# Patient Record
Sex: MALE | Race: WHITE | Employment: FULL TIME | URBAN - METROPOLITAN AREA
[De-identification: names, ages, dates, MRNs, and addresses within clinical notes are randomized per-mention and may not be internally consistent; named-entity substitution may affect disease eponyms.]

---

## 2018-03-25 ENCOUNTER — HOSPITAL ENCOUNTER (EMERGENCY)
Age: 58
Discharge: HOME | End: 2018-03-25
Payer: MEDICAID

## 2018-03-25 VITALS
WEIGHT: 180 LBS | TEMPERATURE: 97.9 F | BODY MASS INDEX: 25.77 KG/M2 | HEART RATE: 93 BPM | OXYGEN SATURATION: 97 % | HEIGHT: 70 IN | SYSTOLIC BLOOD PRESSURE: 146 MMHG | RESPIRATION RATE: 15 BRPM | DIASTOLIC BLOOD PRESSURE: 87 MMHG

## 2018-03-25 DIAGNOSIS — L03.317 CELLULITIS OF BUTTOCK: Primary | ICD-10-CM

## 2018-03-25 LAB
BASOPHILS ABSOLUTE: 0.07 E9/L (ref 0–0.2)
BASOPHILS RELATIVE PERCENT: 0.8 % (ref 0–2)
EOSINOPHILS ABSOLUTE: 0.12 E9/L (ref 0.05–0.5)
EOSINOPHILS RELATIVE PERCENT: 1.4 % (ref 0–6)
GFR AFRICAN AMERICAN: >60
GFR NON-AFRICAN AMERICAN: >60 ML/MIN/1.73
GLUCOSE BLD-MCNC: 117 MG/DL (ref 74–109)
HCT VFR BLD CALC: 40 % (ref 37–54)
HEMOGLOBIN: 13.7 G/DL (ref 12.5–16.5)
IMMATURE GRANULOCYTES #: 0.04 E9/L
IMMATURE GRANULOCYTES %: 0.5 % (ref 0–5)
LYMPHOCYTES ABSOLUTE: 1.28 E9/L (ref 1.5–4)
LYMPHOCYTES RELATIVE PERCENT: 15.2 % (ref 20–42)
MCH RBC QN AUTO: 33.1 PG (ref 26–35)
MCHC RBC AUTO-ENTMCNC: 34.3 % (ref 32–34.5)
MCV RBC AUTO: 96.6 FL (ref 80–99.9)
MONOCYTES ABSOLUTE: 1.3 E9/L (ref 0.1–0.95)
MONOCYTES RELATIVE PERCENT: 15.5 % (ref 2–12)
NEUTROPHILS ABSOLUTE: 5.6 E9/L (ref 1.8–7.3)
NEUTROPHILS RELATIVE PERCENT: 66.6 % (ref 43–80)
PDW BLD-RTO: 12.4 FL (ref 11.5–15)
PLATELET # BLD: 173 E9/L (ref 130–450)
PMV BLD AUTO: 9.2 FL (ref 7–12)
POC CHLORIDE: 100 MMOL/L (ref 100–108)
POC CREATININE: 0.6 MG/DL (ref 0.7–1.2)
POC POTASSIUM: 4.3 MMOL/L (ref 3.5–5)
POC SODIUM: 133 MMOL/L (ref 132–146)
RBC # BLD: 4.14 E12/L (ref 3.8–5.8)
WBC # BLD: 8.4 E9/L (ref 4.5–11.5)

## 2018-03-25 PROCEDURE — 82565 ASSAY OF CREATININE: CPT

## 2018-03-25 PROCEDURE — 6360000002 HC RX W HCPCS: Performed by: NURSE PRACTITIONER

## 2018-03-25 PROCEDURE — 90471 IMMUNIZATION ADMIN: CPT | Performed by: NURSE PRACTITIONER

## 2018-03-25 PROCEDURE — 96372 THER/PROPH/DIAG INJ SC/IM: CPT

## 2018-03-25 PROCEDURE — 82947 ASSAY GLUCOSE BLOOD QUANT: CPT

## 2018-03-25 PROCEDURE — 99282 EMERGENCY DEPT VISIT SF MDM: CPT

## 2018-03-25 PROCEDURE — 84295 ASSAY OF SERUM SODIUM: CPT

## 2018-03-25 PROCEDURE — 90715 TDAP VACCINE 7 YRS/> IM: CPT | Performed by: NURSE PRACTITIONER

## 2018-03-25 PROCEDURE — 2500000003 HC RX 250 WO HCPCS: Performed by: NURSE PRACTITIONER

## 2018-03-25 PROCEDURE — 2580000003 HC RX 258

## 2018-03-25 PROCEDURE — 82435 ASSAY OF BLOOD CHLORIDE: CPT

## 2018-03-25 PROCEDURE — 36415 COLL VENOUS BLD VENIPUNCTURE: CPT

## 2018-03-25 PROCEDURE — 84132 ASSAY OF SERUM POTASSIUM: CPT

## 2018-03-25 PROCEDURE — 85025 COMPLETE CBC W/AUTO DIFF WBC: CPT

## 2018-03-25 RX ORDER — LIDOCAINE HYDROCHLORIDE AND EPINEPHRINE 10; 10 MG/ML; UG/ML
20 INJECTION, SOLUTION INFILTRATION; PERINEURAL ONCE
Status: COMPLETED | OUTPATIENT
Start: 2018-03-25 | End: 2018-03-25

## 2018-03-25 RX ORDER — CEPHALEXIN 500 MG/1
500 CAPSULE ORAL 4 TIMES DAILY
Qty: 40 CAPSULE | Refills: 0 | Status: SHIPPED | OUTPATIENT
Start: 2018-03-25 | End: 2018-04-04

## 2018-03-25 RX ORDER — IBUPROFEN 800 MG/1
800 TABLET ORAL EVERY 6 HOURS PRN
Qty: 16 TABLET | Refills: 0 | Status: SHIPPED | OUTPATIENT
Start: 2018-03-25 | End: 2018-10-07 | Stop reason: ALTCHOICE

## 2018-03-25 RX ORDER — SULFAMETHOXAZOLE AND TRIMETHOPRIM 800; 160 MG/1; MG/1
1 TABLET ORAL 2 TIMES DAILY
Qty: 20 TABLET | Refills: 0 | Status: SHIPPED | OUTPATIENT
Start: 2018-03-25 | End: 2018-04-04

## 2018-03-25 RX ORDER — CEFAZOLIN SODIUM 1 G/3ML
1 INJECTION, POWDER, FOR SOLUTION INTRAMUSCULAR; INTRAVENOUS ONCE
Status: COMPLETED | OUTPATIENT
Start: 2018-03-25 | End: 2018-03-25

## 2018-03-25 RX ORDER — KETOROLAC TROMETHAMINE 30 MG/ML
30 INJECTION, SOLUTION INTRAMUSCULAR; INTRAVENOUS ONCE
Status: COMPLETED | OUTPATIENT
Start: 2018-03-25 | End: 2018-03-25

## 2018-03-25 RX ADMIN — TETANUS TOXOID, REDUCED DIPHTHERIA TOXOID AND ACELLULAR PERTUSSIS VACCINE, ADSORBED 0.5 ML: 5; 2.5; 8; 8; 2.5 SUSPENSION INTRAMUSCULAR at 14:08

## 2018-03-25 RX ADMIN — LIDOCAINE HYDROCHLORIDE AND EPINEPHRINE 20 ML: 10; 10 INJECTION, SOLUTION INFILTRATION; PERINEURAL at 14:07

## 2018-03-25 RX ADMIN — KETOROLAC TROMETHAMINE 30 MG: 30 INJECTION, SOLUTION INTRAMUSCULAR at 14:07

## 2018-03-25 RX ADMIN — WATER 10 ML: 1 INJECTION INTRAMUSCULAR; INTRAVENOUS; SUBCUTANEOUS at 14:07

## 2018-03-25 RX ADMIN — CEFAZOLIN SODIUM 1 G: 1 INJECTION, POWDER, FOR SOLUTION INTRAMUSCULAR; INTRAVENOUS at 14:07

## 2018-03-25 ASSESSMENT — PAIN SCALES - GENERAL: PAINLEVEL_OUTOF10: 7

## 2018-03-25 NOTE — ED NOTES
Discharge instructions given, medications and follow up instructions reviewed. Patient verbalized understanding, no other noted or stated problems at this time. Patient will follow up with physicians as directed.       Bud Camara RN  03/25/18 [id number]

## 2018-03-27 NOTE — ED PROVIDER NOTES
[facility]     Department of Emergency Medicine   ED  Provider Note  Admit Date/RoomTime: 3/25/2018 12:42 PM  ED Room: 28/33  Chief Complaint   Abscess (right buttocks x 1 week)    History of Present Illness   Source of history provided by:  patient. History/Exam Limitations: none. Deidra Haynes is a 59 y.o. old male with has a past medical history of:   Past Medical History:   Diagnosis Date    Hepatitis C     liver bx wnl. genotype is the best possible    Herniated disc, cervical     presents to the emergency department by private vehicle, for complaint of sudden onset red, flat and painful area on  Right buttocks which began 1 week(s) prior to arrival.  The symptoms were caused by unknown cause. Since onset the symptoms have been persistent. Prior history of similar episodes: Yes. His symptoms are associated with pain warmth and relieved by nothing. He denies any difficulty breathing, difficulty swallowing, wheezing, throat tightness, hoarseness, stridor, lightheadedness, dizziness, facial swelling, lip swelling or tongue swelling. ROS    Pertinent positives and negatives are stated within HPI, all other systems reviewed and are negative. Past Surgical History:   Procedure Laterality Date    ANTERIOR CRUCIATE LIGAMENT REPAIR  1990s    left, [place]    ANTERIOR CRUCIATE LIGAMENT REPAIR  2009    right ACL repair, Dr. Garrick Bills COLONOSCOPY  2009    normal, Dr Jarrett [facility]  5/20/2014    right indirect inguinal [facility]niorrhaphy [facility] 5/20/[facility]    with mesh    NECK SURGERY  1992    neck fusion C6 and C7, [facility] SHOULDER SURGERY  2007    left, removal spur, Dr. Juana Gonzalez  2007    melenoma, left clavical area, [facility]   Social History:  reports that he has been smoking Cigarettes. He has a 20.00 pack-year smoking history.  He has never used smokeless tobacco. He [facility] [id number]

## 2018-08-05 ENCOUNTER — APPOINTMENT (OUTPATIENT)
Dept: GENERAL RADIOLOGY | Age: 58
End: 2018-08-05

## 2018-08-05 ENCOUNTER — HOSPITAL ENCOUNTER (EMERGENCY)
Age: 58
Discharge: HOME | End: 2018-08-05
Attending: EMERGENCY MEDICINE

## 2018-08-05 VITALS
OXYGEN SATURATION: 99 % | HEIGHT: 70 IN | RESPIRATION RATE: 18 BRPM | BODY MASS INDEX: 25.77 KG/M2 | WEIGHT: 180 LBS | HEART RATE: 92 BPM | SYSTOLIC BLOOD PRESSURE: 116 MMHG | DIASTOLIC BLOOD PRESSURE: 76 MMHG | TEMPERATURE: 98.5 F

## 2018-08-05 DIAGNOSIS — A49.02 MRSA INFECTION: Primary | ICD-10-CM

## 2018-08-05 PROCEDURE — 73562 X-RAY EXAM OF KNEE 3: CPT

## 2018-08-05 PROCEDURE — 99283 EMERGENCY DEPT VISIT LOW MDM: CPT

## 2018-08-05 RX ORDER — SULFAMETHOXAZOLE AND TRIMETHOPRIM 800; 160 MG/1; MG/1
1 TABLET ORAL 2 TIMES DAILY
Qty: 20 TABLET | Refills: 0 | Status: SHIPPED | OUTPATIENT
Start: 2018-08-05 | End: 2018-08-15

## 2018-08-05 RX ORDER — ACETAMINOPHEN 325 MG/1
650 TABLET ORAL EVERY 6 HOURS PRN
COMMUNITY
End: 2019-07-19 | Stop reason: ALTCHOICE

## 2018-08-05 ASSESSMENT — PAIN DESCRIPTION - FREQUENCY: FREQUENCY: CONTINUOUS

## 2018-08-05 ASSESSMENT — PAIN DESCRIPTION - DESCRIPTORS: DESCRIPTORS: SHARP

## 2018-08-05 ASSESSMENT — PAIN DESCRIPTION - PAIN TYPE: TYPE: ACUTE PAIN

## 2018-08-05 ASSESSMENT — PAIN DESCRIPTION - PROGRESSION: CLINICAL_PROGRESSION: GRADUALLY WORSENING

## 2018-08-05 ASSESSMENT — PAIN DESCRIPTION - ORIENTATION: ORIENTATION: RIGHT

## 2018-08-05 ASSESSMENT — PAIN DESCRIPTION - LOCATION: LOCATION: KNEE

## 2018-08-05 ASSESSMENT — PAIN SCALES - GENERAL: PAINLEVEL_OUTOF10: 8

## 2018-08-05 NOTE — ED PROVIDER NOTES
HPI:  8/5/18,   Time: 4:37 PM         Glen Nazario is a 59 y.o. male presenting to the ED for pain and redness of the right knee, beginning 1w ago. The complaint has been persistent, mild in severity, and worsened by nothing. The patient states that he has a history of staph infections    ROS:   Pertinent positives and negatives are stated within HPI, all other systems reviewed and are negative.  --------------------------------------------- PAST HISTORY ---------------------------------------------  Past Medical History:  has a past medical history of Hepatitis C and Herniated disc, cervical.    Past Surgical History:  has a past surgical history that includes Anterior cruciate ligament repair (1990s); Neck surgery (1992); shoulder surgery (2007); Skin cancer excision (2007); Anterior cruciate ligament repair (2009); Colonoscopy (2009); Inguinal hernia repair (5/20/2014); and Inguinal hernia repair (Right, 5/20/14). Social History:  reports that he has been smoking Cigarettes. He has a 20.00 pack-year smoking history. He has never used smokeless tobacco. He reports that he drinks about 8.4 oz of alcohol per week . He reports that he uses drugs, including Opiates . Family History: family history includes Cancer in his father; Heart Attack (age of onset: 68) in his mother; Parkinsonism in his paternal uncle. The patients home medications have been reviewed. Allergies: Patient has no known allergies. -------------------------------------------------- RESULTS -------------------------------------------------  All laboratory and radiology results have been personally reviewed by myself   LABS:  No results found for this visit on 08/05/18.     RADIOLOGY:  Interpreted by Radiologist.  XR KNEE RIGHT (3 VIEWS)    (Results Pending)       ------------------------- NURSING NOTES AND VITALS REVIEWED ---------------------------   The nursing notes within the ED encounter and vital signs as below have been

## 2018-10-07 ENCOUNTER — HOSPITAL ENCOUNTER (EMERGENCY)
Age: 58
Discharge: HOME | End: 2018-10-07
Attending: FAMILY MEDICINE

## 2018-10-07 VITALS
HEIGHT: 70 IN | TEMPERATURE: 98.4 F | HEART RATE: 74 BPM | OXYGEN SATURATION: 95 % | DIASTOLIC BLOOD PRESSURE: 93 MMHG | BODY MASS INDEX: 25.77 KG/M2 | WEIGHT: 180 LBS | SYSTOLIC BLOOD PRESSURE: 137 MMHG

## 2018-10-07 DIAGNOSIS — M70.42 PREPATELLAR BURSITIS OF LEFT KNEE: Primary | ICD-10-CM

## 2018-10-07 PROCEDURE — 99282 EMERGENCY DEPT VISIT SF MDM: CPT

## 2018-10-07 RX ORDER — SULFAMETHOXAZOLE AND TRIMETHOPRIM 800; 160 MG/1; MG/1
1 TABLET ORAL 2 TIMES DAILY
Qty: 20 TABLET | Refills: 0 | Status: SHIPPED | OUTPATIENT
Start: 2018-10-07 | End: 2018-10-17

## 2018-10-07 ASSESSMENT — PAIN DESCRIPTION - PAIN TYPE: TYPE: ACUTE PAIN

## 2018-10-07 ASSESSMENT — PAIN DESCRIPTION - ORIENTATION: ORIENTATION: LEFT

## 2018-10-07 ASSESSMENT — PAIN SCALES - GENERAL: PAINLEVEL_OUTOF10: 7

## 2018-10-07 ASSESSMENT — PAIN DESCRIPTION - FREQUENCY: FREQUENCY: CONTINUOUS

## 2018-10-07 ASSESSMENT — PAIN DESCRIPTION - DESCRIPTORS: DESCRIPTORS: ACHING

## 2018-10-07 ASSESSMENT — PAIN DESCRIPTION - LOCATION: LOCATION: KNEE

## 2019-03-11 ENCOUNTER — HOSPITAL ENCOUNTER (EMERGENCY)
Age: 59
Discharge: HOME | End: 2019-03-11
Payer: COMMERCIAL

## 2019-03-11 ENCOUNTER — APPOINTMENT (OUTPATIENT)
Dept: GENERAL RADIOLOGY | Age: 59
End: 2019-03-11
Payer: COMMERCIAL

## 2019-03-11 VITALS
TEMPERATURE: 97.9 F | RESPIRATION RATE: 16 BRPM | HEART RATE: 78 BPM | BODY MASS INDEX: 25.77 KG/M2 | SYSTOLIC BLOOD PRESSURE: 124 MMHG | OXYGEN SATURATION: 100 % | DIASTOLIC BLOOD PRESSURE: 84 MMHG | HEIGHT: 70 IN | WEIGHT: 180 LBS

## 2019-03-11 DIAGNOSIS — S20.212A CONTUSION OF RIB ON LEFT SIDE, INITIAL ENCOUNTER: Primary | ICD-10-CM

## 2019-03-11 PROCEDURE — 99283 EMERGENCY DEPT VISIT LOW MDM: CPT

## 2019-03-11 PROCEDURE — 71101 X-RAY EXAM UNILAT RIBS/CHEST: CPT

## 2019-03-11 RX ORDER — NAPROXEN 500 MG/1
500 TABLET ORAL 2 TIMES DAILY
Qty: 20 TABLET | Refills: 0 | Status: SHIPPED | OUTPATIENT
Start: 2019-03-11 | End: 2019-07-19 | Stop reason: ALTCHOICE

## 2019-03-11 ASSESSMENT — PAIN DESCRIPTION - ORIENTATION: ORIENTATION: LEFT

## 2019-03-11 ASSESSMENT — PAIN DESCRIPTION - LOCATION: LOCATION: RIB CAGE

## 2019-03-11 ASSESSMENT — PAIN DESCRIPTION - DESCRIPTORS: DESCRIPTORS: SHARP

## 2019-07-19 ENCOUNTER — HOSPITAL ENCOUNTER (EMERGENCY)
Age: 59
Discharge: HOME | End: 2019-07-19
Payer: COMMERCIAL

## 2019-07-19 VITALS
WEIGHT: 180 LBS | TEMPERATURE: 97.7 F | SYSTOLIC BLOOD PRESSURE: 142 MMHG | HEART RATE: 74 BPM | DIASTOLIC BLOOD PRESSURE: 88 MMHG | HEIGHT: 70 IN | OXYGEN SATURATION: 97 % | BODY MASS INDEX: 25.77 KG/M2 | RESPIRATION RATE: 16 BRPM

## 2019-07-19 DIAGNOSIS — E87.1 ACUTE HYPONATREMIA: Primary | ICD-10-CM

## 2019-07-19 DIAGNOSIS — E86.0 MILD DEHYDRATION: ICD-10-CM

## 2019-07-19 LAB
ALBUMIN SERPL-MCNC: 5.2 G/DL (ref 3.5–5.2)
ALP BLD-CCNC: 97 U/L (ref 40–129)
ALT SERPL-CCNC: 124 U/L (ref 0–40)
ANION GAP SERPL CALCULATED.3IONS-SCNC: 17 MMOL/L (ref 7–16)
ANION GAP SERPL CALCULATED.3IONS-SCNC: 20 MMOL/L (ref 7–16)
AST SERPL-CCNC: 117 U/L (ref 0–39)
BASOPHILS ABSOLUTE: 0.07 E9/L (ref 0–0.2)
BASOPHILS RELATIVE PERCENT: 1.2 % (ref 0–2)
BILIRUB SERPL-MCNC: 0.9 MG/DL (ref 0–1.2)
BUN BLDV-MCNC: 18 MG/DL (ref 8–23)
BUN BLDV-MCNC: 21 MG/DL (ref 8–23)
CALCIUM SERPL-MCNC: 8.3 MG/DL (ref 8.6–10.2)
CALCIUM SERPL-MCNC: 9.9 MG/DL (ref 8.6–10.2)
CHLORIDE BLD-SCNC: 90 MMOL/L (ref 98–107)
CHLORIDE BLD-SCNC: 95 MMOL/L (ref 98–107)
CO2: 17 MMOL/L (ref 22–29)
CO2: 19 MMOL/L (ref 22–29)
CREAT SERPL-MCNC: 0.9 MG/DL (ref 0.7–1.2)
CREAT SERPL-MCNC: 1.1 MG/DL (ref 0.7–1.2)
EOSINOPHILS ABSOLUTE: 0.01 E9/L (ref 0.05–0.5)
EOSINOPHILS RELATIVE PERCENT: 0.2 % (ref 0–6)
GFR AFRICAN AMERICAN: >60
GFR AFRICAN AMERICAN: >60
GFR NON-AFRICAN AMERICAN: >60 ML/MIN/1.73
GFR NON-AFRICAN AMERICAN: >60 ML/MIN/1.73
GLUCOSE BLD-MCNC: 109 MG/DL (ref 74–99)
GLUCOSE BLD-MCNC: 109 MG/DL (ref 74–99)
HCT VFR BLD CALC: 44.4 % (ref 37–54)
HEMOGLOBIN: 15.8 G/DL (ref 12.5–16.5)
IMMATURE GRANULOCYTES #: 0.02 E9/L
IMMATURE GRANULOCYTES %: 0.3 % (ref 0–5)
LIPASE: 30 U/L (ref 13–60)
LYMPHOCYTES ABSOLUTE: 1.32 E9/L (ref 1.5–4)
LYMPHOCYTES RELATIVE PERCENT: 21.9 % (ref 20–42)
MCH RBC QN AUTO: 33.8 PG (ref 26–35)
MCHC RBC AUTO-ENTMCNC: 35.6 % (ref 32–34.5)
MCV RBC AUTO: 94.9 FL (ref 80–99.9)
MONOCYTES ABSOLUTE: 0.78 E9/L (ref 0.1–0.95)
MONOCYTES RELATIVE PERCENT: 12.9 % (ref 2–12)
NEUTROPHILS ABSOLUTE: 3.84 E9/L (ref 1.8–7.3)
NEUTROPHILS RELATIVE PERCENT: 63.5 % (ref 43–80)
PDW BLD-RTO: 11.8 FL (ref 11.5–15)
PLATELET # BLD: 173 E9/L (ref 130–450)
PMV BLD AUTO: 9.3 FL (ref 7–12)
POTASSIUM REFLEX MAGNESIUM: 3.8 MMOL/L (ref 3.5–5)
POTASSIUM REFLEX MAGNESIUM: 4.3 MMOL/L (ref 3.5–5)
RBC # BLD: 4.68 E12/L (ref 3.8–5.8)
SODIUM BLD-SCNC: 129 MMOL/L (ref 132–146)
SODIUM BLD-SCNC: 129 MMOL/L (ref 132–146)
TOTAL CK: 483 U/L (ref 20–200)
TOTAL PROTEIN: 9.3 G/DL (ref 6.4–8.3)
WBC # BLD: 6 E9/L (ref 4.5–11.5)

## 2019-07-19 PROCEDURE — 2580000003 HC RX 258: Performed by: NURSE PRACTITIONER

## 2019-07-19 PROCEDURE — 6360000002 HC RX W HCPCS: Performed by: NURSE PRACTITIONER

## 2019-07-19 PROCEDURE — 80053 COMPREHEN METABOLIC PANEL: CPT

## 2019-07-19 PROCEDURE — 99283 EMERGENCY DEPT VISIT LOW MDM: CPT

## 2019-07-19 PROCEDURE — 80048 BASIC METABOLIC PNL TOTAL CA: CPT

## 2019-07-19 PROCEDURE — 85025 COMPLETE CBC W/AUTO DIFF WBC: CPT

## 2019-07-19 PROCEDURE — 96374 THER/PROPH/DIAG INJ IV PUSH: CPT

## 2019-07-19 PROCEDURE — 83690 ASSAY OF LIPASE: CPT

## 2019-07-19 PROCEDURE — 36415 COLL VENOUS BLD VENIPUNCTURE: CPT

## 2019-07-19 PROCEDURE — 96361 HYDRATE IV INFUSION ADD-ON: CPT

## 2019-07-19 PROCEDURE — 82550 ASSAY OF CK (CPK): CPT

## 2019-07-19 RX ORDER — 0.9 % SODIUM CHLORIDE 0.9 %
1000 INTRAVENOUS SOLUTION INTRAVENOUS ONCE
Status: COMPLETED | OUTPATIENT
Start: 2019-07-19 | End: 2019-07-19

## 2019-07-19 RX ORDER — BUTALBITAL, ACETAMINOPHEN AND CAFFEINE 50; 325; 40 MG/1; MG/1; MG/1
1 TABLET ORAL EVERY 4 HOURS PRN
Status: ON HOLD | COMMUNITY
End: 2020-06-13 | Stop reason: HOSPADM

## 2019-07-19 RX ORDER — KETOROLAC TROMETHAMINE 30 MG/ML
15 INJECTION, SOLUTION INTRAMUSCULAR; INTRAVENOUS ONCE
Status: COMPLETED | OUTPATIENT
Start: 2019-07-19 | End: 2019-07-19

## 2019-07-19 RX ADMIN — KETOROLAC TROMETHAMINE 15 MG: 30 INJECTION, SOLUTION INTRAMUSCULAR at 20:37

## 2019-07-19 RX ADMIN — SODIUM CHLORIDE 1000 ML: 9 INJECTION, SOLUTION INTRAVENOUS at 20:33

## 2019-07-19 RX ADMIN — SODIUM CHLORIDE 1000 ML: 9 INJECTION, SOLUTION INTRAVENOUS at 20:32

## 2019-07-19 ASSESSMENT — PAIN DESCRIPTION - DESCRIPTORS: DESCRIPTORS: CRAMPING

## 2019-07-19 ASSESSMENT — PAIN DESCRIPTION - FREQUENCY: FREQUENCY: CONTINUOUS

## 2019-07-19 ASSESSMENT — PAIN DESCRIPTION - PAIN TYPE: TYPE: ACUTE PAIN

## 2019-07-19 ASSESSMENT — PAIN DESCRIPTION - LOCATION: LOCATION: GENERALIZED

## 2019-07-19 ASSESSMENT — PAIN SCALES - GENERAL: PAINLEVEL_OUTOF10: 10

## 2020-02-15 ENCOUNTER — HOSPITAL ENCOUNTER (EMERGENCY)
Age: 60
Discharge: LEFT BEFORE DISCHARGE | End: 2020-02-15
Attending: EMERGENCY MEDICINE
Payer: COMMERCIAL

## 2020-02-15 VITALS
HEIGHT: 70 IN | SYSTOLIC BLOOD PRESSURE: 127 MMHG | DIASTOLIC BLOOD PRESSURE: 93 MMHG | OXYGEN SATURATION: 96 % | RESPIRATION RATE: 16 BRPM | BODY MASS INDEX: 25.77 KG/M2 | WEIGHT: 180 LBS | HEART RATE: 98 BPM | TEMPERATURE: 98 F

## 2020-02-15 LAB
ALBUMIN SERPL-MCNC: 4.3 G/DL (ref 3.5–5.2)
ALP BLD-CCNC: 113 U/L (ref 40–129)
ALT SERPL-CCNC: 139 U/L (ref 0–40)
ANION GAP SERPL CALCULATED.3IONS-SCNC: 15 MMOL/L (ref 7–16)
AST SERPL-CCNC: 146 U/L (ref 0–39)
BASOPHILS ABSOLUTE: 0.08 E9/L (ref 0–0.2)
BASOPHILS RELATIVE PERCENT: 2.2 % (ref 0–2)
BILIRUB SERPL-MCNC: 0.3 MG/DL (ref 0–1.2)
BUN BLDV-MCNC: 12 MG/DL (ref 8–23)
CALCIUM SERPL-MCNC: 9.4 MG/DL (ref 8.6–10.2)
CHLORIDE BLD-SCNC: 100 MMOL/L (ref 98–107)
CO2: 20 MMOL/L (ref 22–29)
CREAT SERPL-MCNC: 0.7 MG/DL (ref 0.7–1.2)
EOSINOPHILS ABSOLUTE: 0.05 E9/L (ref 0.05–0.5)
EOSINOPHILS RELATIVE PERCENT: 1.4 % (ref 0–6)
GFR AFRICAN AMERICAN: >60
GFR NON-AFRICAN AMERICAN: >60 ML/MIN/1.73
GLUCOSE BLD-MCNC: 104 MG/DL (ref 74–99)
HCT VFR BLD CALC: 43.2 % (ref 37–54)
HEMOGLOBIN: 14.7 G/DL (ref 12.5–16.5)
IMMATURE GRANULOCYTES #: 0.01 E9/L
IMMATURE GRANULOCYTES %: 0.3 % (ref 0–5)
INFLUENZA A BY PCR: NOT DETECTED
INFLUENZA B BY PCR: NOT DETECTED
LYMPHOCYTES ABSOLUTE: 1.76 E9/L (ref 1.5–4)
LYMPHOCYTES RELATIVE PERCENT: 48.1 % (ref 20–42)
MCH RBC QN AUTO: 33 PG (ref 26–35)
MCHC RBC AUTO-ENTMCNC: 34 % (ref 32–34.5)
MCV RBC AUTO: 96.9 FL (ref 80–99.9)
MONOCYTES ABSOLUTE: 0.39 E9/L (ref 0.1–0.95)
MONOCYTES RELATIVE PERCENT: 10.7 % (ref 2–12)
NEUTROPHILS ABSOLUTE: 1.37 E9/L (ref 1.8–7.3)
NEUTROPHILS RELATIVE PERCENT: 37.3 % (ref 43–80)
PDW BLD-RTO: 12.3 FL (ref 11.5–15)
PLATELET # BLD: 179 E9/L (ref 130–450)
PMV BLD AUTO: 9 FL (ref 7–12)
POTASSIUM SERPL-SCNC: 4 MMOL/L (ref 3.5–5)
RBC # BLD: 4.46 E12/L (ref 3.8–5.8)
SODIUM BLD-SCNC: 135 MMOL/L (ref 132–146)
TOTAL PROTEIN: 8.4 G/DL (ref 6.4–8.3)
TROPONIN: <0.01 NG/ML (ref 0–0.03)
WBC # BLD: 3.7 E9/L (ref 4.5–11.5)

## 2020-02-15 PROCEDURE — 99284 EMERGENCY DEPT VISIT MOD MDM: CPT

## 2020-02-15 PROCEDURE — 36415 COLL VENOUS BLD VENIPUNCTURE: CPT

## 2020-02-15 PROCEDURE — 80053 COMPREHEN METABOLIC PANEL: CPT

## 2020-02-15 PROCEDURE — 87502 INFLUENZA DNA AMP PROBE: CPT

## 2020-02-15 PROCEDURE — 84484 ASSAY OF TROPONIN QUANT: CPT

## 2020-02-15 PROCEDURE — 85025 COMPLETE CBC W/AUTO DIFF WBC: CPT

## 2020-02-15 PROCEDURE — 93005 ELECTROCARDIOGRAM TRACING: CPT | Performed by: PHYSICIAN ASSISTANT

## 2020-02-15 NOTE — ED NOTES
FIRST PROVIDER CONTACT ASSESSMENT NOTE      Department of Emergency Medicine   2/15/20  4:37 PM    Chief Complaint: Dizziness (pt states he got dizzy at work with some CP, and his left arm went numb, denies any CP or SOB at this time )    History of Present Illness:   Lenora Stephens is a 62 y.o. male who presents to the ED for dizziness, chest pain, SOB, and left arm numbness that occurred around 230-3pm, this afternoon. He states the symptoms lasted about 15-20 minutes. They have since improved. He reports feeling \"a golf ball\" under his left arm. Patient denies any known cardiac history. He has no active chest pain or SOB. Patient is a current, everyday smoker. Focused Physical Exam:  VS:  BP (!) 127/93   Pulse 98   Temp 98 °F (36.7 °C)   Resp 16   Ht 5' 10\" (1.778 m)   Wt 180 lb (81.6 kg)   SpO2 96%   BMI 25.83 kg/m²      General: Alert and in no apparent distress   CVS: Regular rate for age, normal rhythm  Resp: Clear and equal bilaterally with good airflow, no respiratory distress    Medical History:  has a past medical history of Hepatitis C and Herniated disc, cervical.    Surgical History:  has a past surgical history that includes Anterior cruciate ligament repair (1990s); Neck surgery (1992); shoulder surgery (2007); Skin cancer excision (2007); Anterior cruciate ligament repair (2009); Colonoscopy (2009); Inguinal hernia repair (5/20/2014); and Inguinal hernia repair (Right, 5/20/14). Social History:  reports that he has been smoking cigarettes. He has a 13.20 pack-year smoking history. He has never used smokeless tobacco. He reports current alcohol use of about 14.0 standard drinks of alcohol per week. He reports current drug use. Drug: Opiates . Family History: family history includes Cancer in his father; Heart Attack (age of onset: 68) in his mother; Parkinsonism in his paternal uncle. *ALLERGIES*     Patient has no known allergies.      Initial Plan of Care:  Initiate

## 2020-02-16 LAB
EKG ATRIAL RATE: 70 BPM
EKG P AXIS: 34 DEGREES
EKG P-R INTERVAL: 170 MS
EKG Q-T INTERVAL: 404 MS
EKG QRS DURATION: 102 MS
EKG QTC CALCULATION (BAZETT): 436 MS
EKG R AXIS: 41 DEGREES
EKG T AXIS: 62 DEGREES
EKG VENTRICULAR RATE: 70 BPM

## 2020-02-16 PROCEDURE — 93010 ELECTROCARDIOGRAM REPORT: CPT | Performed by: INTERNAL MEDICINE

## 2020-05-07 ENCOUNTER — HOSPITAL ENCOUNTER (OUTPATIENT)
Age: 60
Discharge: HOME | End: 2020-05-09

## 2020-05-07 ENCOUNTER — OFFICE VISIT (OUTPATIENT)
Dept: FAMILY MEDICINE CLINIC | Age: 60
End: 2020-05-07
Payer: COMMERCIAL

## 2020-05-07 VITALS
WEIGHT: 183 LBS | RESPIRATION RATE: 16 BRPM | DIASTOLIC BLOOD PRESSURE: 93 MMHG | BODY MASS INDEX: 26.2 KG/M2 | HEIGHT: 70 IN | TEMPERATURE: 97.2 F | SYSTOLIC BLOOD PRESSURE: 133 MMHG | HEART RATE: 90 BPM

## 2020-05-07 PROCEDURE — 81596 NFCT DS CHRNC HCV 6 ASSAYS: CPT

## 2020-05-07 PROCEDURE — G8427 DOCREV CUR MEDS BY ELIG CLIN: HCPCS | Performed by: FAMILY MEDICINE

## 2020-05-07 PROCEDURE — 3017F COLORECTAL CA SCREEN DOC REV: CPT | Performed by: FAMILY MEDICINE

## 2020-05-07 PROCEDURE — 99214 OFFICE O/P EST MOD 30 MIN: CPT | Performed by: FAMILY MEDICINE

## 2020-05-07 PROCEDURE — 4004F PT TOBACCO SCREEN RCVD TLK: CPT | Performed by: FAMILY MEDICINE

## 2020-05-07 PROCEDURE — 87522 HEPATITIS C REVRS TRNSCRPJ: CPT

## 2020-05-07 PROCEDURE — G8419 CALC BMI OUT NRM PARAM NOF/U: HCPCS | Performed by: FAMILY MEDICINE

## 2020-05-07 PROCEDURE — 36415 COLL VENOUS BLD VENIPUNCTURE: CPT

## 2020-05-07 PROCEDURE — 86803 HEPATITIS C AB TEST: CPT

## 2020-05-07 RX ORDER — HYDROCORTISONE BUTYRATE 1 MG/G
CREAM TOPICAL 2 TIMES DAILY
Qty: 45 G | Refills: 0 | Status: ON HOLD
Start: 2020-05-07 | End: 2020-06-12

## 2020-05-07 RX ORDER — DICLOFENAC SODIUM 1 MG/ML
1 SOLUTION/ DROPS OPHTHALMIC 4 TIMES DAILY
Qty: 5 ML | Refills: 1 | Status: SHIPPED | OUTPATIENT
Start: 2020-05-07 | End: 2020-05-14

## 2020-05-07 ASSESSMENT — PATIENT HEALTH QUESTIONNAIRE - PHQ9
SUM OF ALL RESPONSES TO PHQ9 QUESTIONS 1 & 2: 0
SUM OF ALL RESPONSES TO PHQ QUESTIONS 1-9: 0
2. FEELING DOWN, DEPRESSED OR HOPELESS: 0
SUM OF ALL RESPONSES TO PHQ QUESTIONS 1-9: 0
1. LITTLE INTEREST OR PLEASURE IN DOING THINGS: 0

## 2020-05-08 LAB — HEPATITIS C ANTIBODY INTERPRETATION: REACTIVE

## 2020-05-12 LAB
HCV QNT BY NAAT IU/ML: ABNORMAL
HCV QNT BY NAAT LOG IU/ML: 6.31 LOG IU/ML
INTERPRETATION: DETECTED

## 2020-05-15 LAB — HEPATITIS C GENOTYPE: NORMAL

## 2020-05-18 ENCOUNTER — NURSE TRIAGE (OUTPATIENT)
Dept: OTHER | Facility: CLINIC | Age: 60
End: 2020-05-18

## 2020-05-18 ENCOUNTER — HOSPITAL ENCOUNTER (OUTPATIENT)
Age: 60
Discharge: HOME | End: 2020-05-20

## 2020-05-18 ENCOUNTER — OFFICE VISIT (OUTPATIENT)
Dept: FAMILY MEDICINE CLINIC | Age: 60
End: 2020-05-18
Payer: COMMERCIAL

## 2020-05-18 ENCOUNTER — TELEPHONE (OUTPATIENT)
Dept: ADMINISTRATIVE | Age: 60
End: 2020-05-18

## 2020-05-18 VITALS
HEIGHT: 70 IN | SYSTOLIC BLOOD PRESSURE: 146 MMHG | OXYGEN SATURATION: 97 % | TEMPERATURE: 98.4 F | DIASTOLIC BLOOD PRESSURE: 98 MMHG | RESPIRATION RATE: 16 BRPM | HEART RATE: 82 BPM | BODY MASS INDEX: 26.87 KG/M2 | WEIGHT: 187.7 LBS

## 2020-05-18 PROCEDURE — 4004F PT TOBACCO SCREEN RCVD TLK: CPT | Performed by: NURSE PRACTITIONER

## 2020-05-18 PROCEDURE — 80061 LIPID PANEL: CPT

## 2020-05-18 PROCEDURE — G8427 DOCREV CUR MEDS BY ELIG CLIN: HCPCS | Performed by: NURSE PRACTITIONER

## 2020-05-18 PROCEDURE — 84443 ASSAY THYROID STIM HORMONE: CPT

## 2020-05-18 PROCEDURE — G8419 CALC BMI OUT NRM PARAM NOF/U: HCPCS | Performed by: NURSE PRACTITIONER

## 2020-05-18 PROCEDURE — 3017F COLORECTAL CA SCREEN DOC REV: CPT | Performed by: NURSE PRACTITIONER

## 2020-05-18 PROCEDURE — 99214 OFFICE O/P EST MOD 30 MIN: CPT | Performed by: NURSE PRACTITIONER

## 2020-05-18 RX ORDER — CLONIDINE HYDROCHLORIDE 0.1 MG/1
0.1 TABLET ORAL ONCE
Status: DISCONTINUED | OUTPATIENT
Start: 2020-05-18 | End: 2020-05-18

## 2020-05-18 RX ORDER — LISINOPRIL 10 MG/1
10 TABLET ORAL DAILY
Qty: 30 TABLET | Refills: 0 | Status: SHIPPED
Start: 2020-05-18 | End: 2020-06-08 | Stop reason: SDUPTHER

## 2020-05-18 RX ORDER — CLONIDINE HYDROCHLORIDE 0.1 MG/1
TABLET ORAL
Qty: 1 TABLET | Refills: 0 | Status: ON HOLD
Start: 2020-05-18 | End: 2020-06-12

## 2020-05-18 ASSESSMENT — ENCOUNTER SYMPTOMS
EYE PAIN: 0
PHOTOPHOBIA: 0
EYE ITCHING: 0
EYE REDNESS: 0
TROUBLE SWALLOWING: 0
COUGH: 0
CHOKING: 0
SORE THROAT: 0
CHEST TIGHTNESS: 0
RHINORRHEA: 0
SINUS PRESSURE: 0
DIARRHEA: 0
SHORTNESS OF BREATH: 0
SINUS PAIN: 0
STRIDOR: 0
NAUSEA: 0
EYE DISCHARGE: 0
VOMITING: 0
VOICE CHANGE: 0
ABDOMINAL PAIN: 0
WHEEZING: 0

## 2020-05-18 NOTE — TELEPHONE ENCOUNTER
Reason for Disposition   [1] MODERATE dizziness (e.g., vertigo; feels very unsteady, interferes with normal activities) AND [2] has NOT been evaluated by physician for this    Answer Assessment - Initial Assessment Questions  1. DESCRIPTION: \"Describe your dizziness. \"  When stands gets very dizzy , room spins, ringing in ears , eyes very red   2. VERTIGO: \"Do you feel like either you or the room is spinning or tilting? \"    yes   3. LIGHTHEADED: \"Do you feel lightheaded? \" (e.g., somewhat faint, woozy, weak upon standing)    Yes   4. SEVERITY: \"How bad is it? \"  \"Can you walk? \"    - MILD - Feels unsteady but walking normally. - MODERATE - Feels very unsteady when walking, but not falling; interferes with normal activities (e.g., school, work) . - SEVERE - Unable to walk without falling (requires assistance). moderate   5. ONSET:  \"When did the dizziness begin? \"  3 days ago  6. AGGRAVATING FACTORS: \"Does anything make it worse? \" (e.g., standing, change in head position)  Standing , change head position   7. CAUSE: \"What do you think is causing the dizziness? \"   unsure   8. RECURRENT SYMPTOM: \"Have you had dizziness before? \" If so, ask: \"When was the last time? \" \"What happened that time? \"  No   9. OTHER SYMPTOMS: \"Do you have any other symptoms? \" (e.g., headache, weakness, numbness, vomiting, earache)   weakness, slight headache    10. PREGNANCY: \"Is there any chance you are pregnant? \" \"When was your last menstrual period? \"        No    Protocols used: DIZZINESS - VERTIGO-ADULT-    Pt called in with Dizziness and vertigo that has been going on for 3 days. Disposition to see PCP within 24 hrs. Warm transfer to [facility].

## 2020-05-18 NOTE — PROGRESS NOTES
Jeannette Taylor is a 62 y.o. male who presents today for   Chief Complaint   Patient presents with    Dizziness     started saturday     Hypertension    Headache         HPI    Hypertension/Dizziness/Headaches  Patient is here for Dizziness and Headaches. Pt also has multiple elevated BP readings today. Pt also was seen by his PCP on 5/7/20, BP at that time his BPwas mildly elevated- 133/93. He is exercising and is not adherent to a low-salt diet. Blood pressure is not well controlled at home. Cardiac symptoms: none. Patient denies chest pain, chest pressure/discomfort, claudication, dyspnea, irregular heart beat, lower extremity edema, near-syncope, orthopnea, palpitations, paroxysmal nocturnal dyspnea and syncope. Cardiovascular risk factors: advanced age (older than 54 for men, 72 for women), hypertension, male gender and smoking/ tobacco exposure. Use of agents associated with hypertension: Fioricet   Pt works for "[employer] pt stated he has to climb ladders, pt stated dizziness is intermittent and can occur at any time, denies this issue in the past, denies any sinus/URI issues. Headaches are dull achy to entire head      [address]:  Patient's past medical, surgical, social and/or family history reviewed, updated in chart, and are non-contributory (unless otherwise stated). Medications and allergies also reviewed and updated in chart. Review of Systems  Review of Systems   Constitutional: Negative for appetite change, chills, diaphoresis, fatigue and fever. HENT: Negative for congestion, ear discharge, ear pain, hearing loss, nosebleeds, postnasal drip, rhinorrhea, sinus pressure, sinus pain, sneezing, sore throat, tinnitus, trouble swallowing and voice change. Eyes: Negative for photophobia, pain, discharge, redness, itching and visual disturbance. Respiratory: Negative for cough, choking, chest tightness, shortness of breath, wheezing and stridor.     Cardiovascular: Negative regarding above diagnosis, including possible risks and complications,  especially if left uncontrolled. Counseled regarding the possible side effects, risks, benefits and alternatives to treatment; patient and/or guardian verbalizes understanding, agrees, feels comfortable with and wishes to proceed with above treatment plan. Advised patient to call with any new medication issues, and read all Rx info from pharmacy to assure aware of all possible risks and side effects of medication before taking. Reviewed age and gender appropriate health screening exams and vaccinations. Advised patient regarding importance of keeping up with recommended health maintenance and to schedule as soon as possible if overdue, as this is important in assessing for undiagnosed pathology, especially cancer, as well as protecting against potentially harmful/life threatening disease. Patient and/or guardian verbalizes understanding and agrees with above counseling, assessment and plan. All questions answered.     Veronica Mccrary, APRN - CNP

## 2020-05-18 NOTE — TELEPHONE ENCOUNTER
Pt called and stated he has been having dizziness x 3 days. He loses his balance and the room spins and feels disoriented. It occurs when he is sitting or bending over. Spoke with nurse triage and transferred the call to the nurse.

## 2020-05-19 ENCOUNTER — TELEPHONE (OUTPATIENT)
Dept: FAMILY MEDICINE CLINIC | Age: 60
End: 2020-05-19

## 2020-05-19 LAB
CHOLESTEROL, TOTAL: 189 MG/DL (ref 0–199)
HDLC SERPL-MCNC: 70 MG/DL
LDL CHOLESTEROL CALCULATED: 95 MG/DL (ref 0–99)
Lab: NORMAL
REPORT: NORMAL
THIS TEST SENT TO: NORMAL
TRIGL SERPL-MCNC: 121 MG/DL (ref 0–149)
TSH SERPL DL<=0.05 MIU/L-ACNC: 3.14 UIU/ML (ref 0.27–4.2)
VLDLC SERPL CALC-MCNC: 24 MG/DL

## 2020-05-19 RX ORDER — VELPATASVIR AND SOFOSBUVIR 100; 400 MG/1; MG/1
1 TABLET, FILM COATED ORAL DAILY
Qty: 84 TABLET | Refills: 0 | Status: ON HOLD
Start: 2020-05-19 | End: 2020-06-12

## 2020-05-19 NOTE — TELEPHONE ENCOUNTER
Just spoke with pt he was wondering if there was any way he could be seen sooner than Thursday or if he should just wait

## 2020-05-19 NOTE — TELEPHONE ENCOUNTER
Pt started taking Lisinopril 10 mg , he states it making him really lightheaded should he discontinue and would he need to be seen in office or just come in to get a Bp check.       Contact # [phone]

## 2020-05-21 ENCOUNTER — OFFICE VISIT (OUTPATIENT)
Dept: FAMILY MEDICINE CLINIC | Age: 60
End: 2020-05-21
Payer: COMMERCIAL

## 2020-05-21 ENCOUNTER — TELEPHONE (OUTPATIENT)
Dept: FAMILY MEDICINE CLINIC | Age: 60
End: 2020-05-21

## 2020-05-21 VITALS
SYSTOLIC BLOOD PRESSURE: 135 MMHG | BODY MASS INDEX: 26.77 KG/M2 | HEIGHT: 70 IN | WEIGHT: 187 LBS | RESPIRATION RATE: 16 BRPM | DIASTOLIC BLOOD PRESSURE: 93 MMHG | TEMPERATURE: 99.2 F | HEART RATE: 95 BPM | OXYGEN SATURATION: 97 %

## 2020-05-21 PROCEDURE — 3017F COLORECTAL CA SCREEN DOC REV: CPT | Performed by: FAMILY MEDICINE

## 2020-05-21 PROCEDURE — G8419 CALC BMI OUT NRM PARAM NOF/U: HCPCS | Performed by: FAMILY MEDICINE

## 2020-05-21 PROCEDURE — 99214 OFFICE O/P EST MOD 30 MIN: CPT | Performed by: FAMILY MEDICINE

## 2020-05-21 PROCEDURE — G8427 DOCREV CUR MEDS BY ELIG CLIN: HCPCS | Performed by: FAMILY MEDICINE

## 2020-05-21 PROCEDURE — 4004F PT TOBACCO SCREEN RCVD TLK: CPT | Performed by: FAMILY MEDICINE

## 2020-05-21 NOTE — PATIENT INSTRUCTIONS
Increase the lisinopril from 10 to 20 mg today. Take 20 mg for 3 days then, if you are still having symptoms, increase to 40 mg.

## 2020-05-21 NOTE — PROGRESS NOTES
change in bowels, no diarrhea or constipation   No blood in stool or blood per rectum  Neuro: No headaches   No syncope/near syncope   No dizziness  MS: No back pain   No arthralgias   No myalgias   No gait issues            Objective:   BP (!) 135/93   Pulse 95   Temp 99.2 °F (37.3 °C)   Resp 16   Ht 5' 10\" (1.778 m)   Wt 187 lb (84.8 kg)   SpO2 97%   BMI 26.83 kg/m²   General appearance: NAD, alert and interacting appropriately  HEENT: NCAT, PERRLA, EOMI. Jaundice. L eye improved. Resp: CTAB, no [place]  CVS: RRR, no MRG  Abdomen: BS +, SNDNT  Extremities: No clubbing, cyanosis, or edema. Warm. Dry. I have reviewed this patient's previous records. I have reviewed this patient's labs. I have reviewed this patient's medications. Assessment/Plan:    Eliseo Auguste was seen today for hypertension. Diagnoses and all orders for this visit:    Hep C w/o coma, chronic ([address]  -      LIVER; Future    Essential hypertension    Lightheadedness    Tinnitus of both ears        DDx: sxs 2/2 HTN > meniere's vs bppv vs acute labyrinthitis vs IC pathology        Patient Instructions   Increase the lisinopril from 10 to 20 mg today. Take 20 mg for 3 days then, if you are still having symptoms, increase to 40 mg. Return in about 2 weeks (around 6/4/2020) for hypertension. Greater than 50% of this 25 minute face-to-face patient encounter was spent counseling on the following: The primary encounter diagnosis was Hep C w/o coma, chronic ([address] Diagnoses of Essential hypertension, Lightheadedness, and Tinnitus of both ears were also pertinent to this visit.       Electronically signed by Analilia Cifuentes MD on 5/21/2020 at 10:07 AM

## 2020-05-29 ENCOUNTER — TELEPHONE (OUTPATIENT)
Dept: FAMILY MEDICINE CLINIC | Age: 60
End: 2020-05-29

## 2020-05-29 RX ORDER — LISINOPRIL 10 MG/1
10 TABLET ORAL DAILY
Qty: 120 TABLET | Refills: 1 | Status: CANCELLED | OUTPATIENT
Start: 2020-05-29

## 2020-06-05 ENCOUNTER — TELEPHONE (OUTPATIENT)
Dept: FAMILY MEDICINE CLINIC | Age: 60
End: 2020-06-05

## 2020-06-05 NOTE — TELEPHONE ENCOUNTER
Pt called no answer unable to LVM no mailbox set up, need to know if patient has insurance coverage so we can do PA for Hep C medication

## 2020-06-08 RX ORDER — LISINOPRIL 40 MG/1
40 TABLET ORAL DAILY
Qty: 90 TABLET | Refills: 1 | Status: ON HOLD | OUTPATIENT
Start: 2020-06-08 | End: 2020-06-13 | Stop reason: HOSPADM

## 2020-06-09 ENCOUNTER — OFFICE VISIT (OUTPATIENT)
Dept: FAMILY MEDICINE CLINIC | Age: 60
End: 2020-06-09
Payer: COMMERCIAL

## 2020-06-09 VITALS
SYSTOLIC BLOOD PRESSURE: 100 MMHG | HEIGHT: 70 IN | RESPIRATION RATE: 16 BRPM | BODY MASS INDEX: 26.39 KG/M2 | HEART RATE: 123 BPM | DIASTOLIC BLOOD PRESSURE: 74 MMHG | TEMPERATURE: 95.8 F | WEIGHT: 184.3 LBS

## 2020-06-09 LAB — HBA1C MFR BLD: 5.7 %

## 2020-06-09 PROCEDURE — 3017F COLORECTAL CA SCREEN DOC REV: CPT | Performed by: FAMILY MEDICINE

## 2020-06-09 PROCEDURE — 93000 ELECTROCARDIOGRAM COMPLETE: CPT | Performed by: FAMILY MEDICINE

## 2020-06-09 PROCEDURE — G8419 CALC BMI OUT NRM PARAM NOF/U: HCPCS | Performed by: FAMILY MEDICINE

## 2020-06-09 PROCEDURE — 4004F PT TOBACCO SCREEN RCVD TLK: CPT | Performed by: FAMILY MEDICINE

## 2020-06-09 PROCEDURE — G8428 CUR MEDS NOT DOCUMENT: HCPCS | Performed by: FAMILY MEDICINE

## 2020-06-09 PROCEDURE — 99214 OFFICE O/P EST MOD 30 MIN: CPT | Performed by: FAMILY MEDICINE

## 2020-06-09 PROCEDURE — 83036 HEMOGLOBIN GLYCOSYLATED A1C: CPT | Performed by: FAMILY MEDICINE

## 2020-06-09 NOTE — PROGRESS NOTES
Other Topics Concern    Not on file   Social History Narrative    Not on file     Review Of Systems (unless otherwise specified)  Gen: No fevers, sweats, chills   No fatigue   No weight unintentional weight changes  Skin:  No rash, no lesion  Resp: No cough, shortness of breath, wheeze, chest congestion  CV: No chest pain, palpitation, edema   GI:  No abdominal pain   No nausea, no vomiting   No change in bowels, no diarrhea or constipation   No blood in stool or blood per rectum  Neuro: No headaches   No syncope/near syncope   No dizziness  MS: No back pain   No arthralgias   No myalgias   No gait issues        Objective:   /74   Pulse 123   Temp 95.8 °F (35.4 °C)   Resp 16   Ht 5' 10\" (1.778 m)   Wt 184 lb 4.8 oz (83.6 kg)   BMI 26.44 kg/m²   General appearance: NAD, alert and interacting appropriately  HEENT: NCAT, PERRLA, EOMI. Jaundice. Resp: CTAB, no [place]  CVS: Tachycardic, no MRG  Abdomen: BS +, SNDNT  Extremities: No clubbing, cyanosis, or edema. Warm. Dry. Tremor bilateral hands. Denies drug use. Smoking 1/2 pack/day. I have reviewed this patient's previous records. I have reviewed this patient's labs. I have reviewed this patient's medications. Assessment/Plan:    Ramy Bowman was seen today for hypertension. Diagnoses and all orders for this visit:    Hyperglycemia  -     POCT glycosylated hemoglobin (Hb A1C)    Hep C w/o coma, chronic (HCC)  -     External Referral To Gastroenterology    Palpitations  -     EKG 12 Lead; Future  -     Holter Monitor 24 Hour; Future  -     EKG 12 Lead    Tachycardia  -     EKG 12 Lead; Future  -     Holter Monitor 24 Hour; Future  -     EKG 12 Lead    Lightheadedness  -     EKG 12 Lead; Future  -     Holter Monitor 24 Hour; Future  -     EKG 12 Lead    Essential hypertension  -     EKG 12 Lead; Future  -     EKG 12 Lead    ETOH abuse    Extensive counseling provided today on alcohol tapering and cessation.   Continue lisinopril 40 daily for HTN    EKG showing sinus tach, possible early repol. No concerning ischemic changes. There are no Patient Instructions on file for this visit. Return in about 1 month (around 7/9/2020) for hypertension, palpitations. Greater than 50% of this 25 minute face-to-face patient encounter was spent counseling on the following: The primary encounter diagnosis was Hyperglycemia. Diagnoses of Hep C w/o coma, chronic (HCC), Palpitations, Tachycardia, Lightheadedness, Essential hypertension, and ETOH abuse were also pertinent to this visit.       Electronically signed by Rebecca Doll MD on 6/9/2020 at 4:39 PM

## 2020-06-10 ENCOUNTER — TELEPHONE (OUTPATIENT)
Dept: FAMILY MEDICINE CLINIC | Age: 60
End: 2020-06-10

## 2020-06-10 ENCOUNTER — NURSE ONLY (OUTPATIENT)
Dept: FAMILY MEDICINE CLINIC | Age: 60
End: 2020-06-10

## 2020-06-10 ENCOUNTER — TELEPHONE (OUTPATIENT)
Dept: PRIMARY CARE CLINIC | Age: 60
End: 2020-06-10

## 2020-06-10 VITALS — DIASTOLIC BLOOD PRESSURE: 90 MMHG | HEART RATE: 111 BPM | OXYGEN SATURATION: 98 % | SYSTOLIC BLOOD PRESSURE: 126 MMHG

## 2020-06-10 NOTE — TELEPHONE ENCOUNTER
PCP office staff, Angy Sarkar, called regarding the patient today. Pt was seen by doctors at the [facility] yesterday for palpitations and dizziness and was to have a lab visit with that office today. Upon the visit, the pt reported moderate shortness of breath and achiness. Informed the office that further workup may be needed at the ED, but if the patient was seen yesterday and the provider felt that only a COVID swab was necessary, he could come here with an order from that provider in [facility] Otherwise, he would be seen for a visit by a provider here for further workup.

## 2020-06-10 NOTE — TELEPHONE ENCOUNTER
Pt came in today due to still feeling heart palpitations. He said his SOB has increased was unable to even walk up the steps.     Right arm   123/90  Pulse 115    Left arm   126/90  Pulse   111

## 2020-06-10 NOTE — TELEPHONE ENCOUNTER
Tried to do a PA on patients Hep C medication over the phone and online, they are both stating patients insurance was inactive since 2/2020, pt called and notified stated he will check with insurance and call us back

## 2020-06-11 ENCOUNTER — TELEPHONE (OUTPATIENT)
Dept: FAMILY MEDICINE CLINIC | Age: 60
End: 2020-06-11

## 2020-06-11 ENCOUNTER — NURSE ONLY (OUTPATIENT)
Dept: NON INVASIVE DIAGNOSTICS | Age: 60
End: 2020-06-11

## 2020-06-11 VITALS — TEMPERATURE: 98.1 F

## 2020-06-12 ENCOUNTER — APPOINTMENT (OUTPATIENT)
Dept: GENERAL RADIOLOGY | Age: 60
End: 2020-06-12

## 2020-06-12 ENCOUNTER — HOSPITAL ENCOUNTER (OUTPATIENT)
Age: 60
Setting detail: OBSERVATION
Discharge: HOME | End: 2020-06-13
Attending: EMERGENCY MEDICINE | Admitting: FAMILY MEDICINE
Payer: COMMERCIAL

## 2020-06-12 ENCOUNTER — TELEPHONE (OUTPATIENT)
Dept: FAMILY MEDICINE CLINIC | Age: 60
End: 2020-06-12

## 2020-06-12 ENCOUNTER — APPOINTMENT (OUTPATIENT)
Dept: CT IMAGING | Age: 60
End: 2020-06-12

## 2020-06-12 ENCOUNTER — NURSE ONLY (OUTPATIENT)
Dept: FAMILY MEDICINE CLINIC | Age: 60
End: 2020-06-12
Payer: COMMERCIAL

## 2020-06-12 VITALS — SYSTOLIC BLOOD PRESSURE: 109 MMHG | DIASTOLIC BLOOD PRESSURE: 78 MMHG | HEART RATE: 123 BPM

## 2020-06-12 PROBLEM — I10 ESSENTIAL HYPERTENSION: Status: ACTIVE | Noted: 2020-06-12

## 2020-06-12 PROBLEM — R00.0 SINUS TACHYCARDIA: Status: ACTIVE | Noted: 2020-06-12

## 2020-06-12 PROBLEM — I95.2 HYPOTENSION DUE TO DRUGS: Status: ACTIVE | Noted: 2020-06-12

## 2020-06-12 PROBLEM — I95.9 HYPOTENSION: Status: ACTIVE | Noted: 2020-06-12

## 2020-06-12 PROBLEM — R42 DIZZINESS: Status: ACTIVE | Noted: 2020-06-12

## 2020-06-12 LAB
ACETAMINOPHEN LEVEL: <5 MCG/ML (ref 10–30)
ALBUMIN SERPL-MCNC: 4.3 G/DL (ref 3.5–5.2)
ALP BLD-CCNC: 125 U/L (ref 40–129)
ALT SERPL-CCNC: 79 U/L (ref 0–40)
AMPHETAMINE SCREEN, URINE: NOT DETECTED
ANION GAP SERPL CALCULATED.3IONS-SCNC: 15 MMOL/L (ref 7–16)
APTT: 33.3 SEC (ref 24.5–35.1)
AST SERPL-CCNC: 60 U/L (ref 0–39)
BARBITURATE SCREEN URINE: NOT DETECTED
BASOPHILS ABSOLUTE: 0.08 E9/L (ref 0–0.2)
BASOPHILS RELATIVE PERCENT: 1.4 % (ref 0–2)
BENZODIAZEPINE SCREEN, URINE: NOT DETECTED
BILIRUB SERPL-MCNC: 0.3 MG/DL (ref 0–1.2)
BILIRUBIN URINE: NEGATIVE
BLOOD, URINE: NEGATIVE
BUN BLDV-MCNC: 16 MG/DL (ref 8–23)
CALCIUM SERPL-MCNC: 9.1 MG/DL (ref 8.6–10.2)
CANNABINOID SCREEN URINE: NOT DETECTED
CHLORIDE BLD-SCNC: 101 MMOL/L (ref 98–107)
CLARITY: CLEAR
CO2: 20 MMOL/L (ref 22–29)
COCAINE METABOLITE SCREEN URINE: NOT DETECTED
COLOR: YELLOW
CREAT SERPL-MCNC: 0.9 MG/DL (ref 0.7–1.2)
D DIMER: 285 NG/ML DDU
EKG ATRIAL RATE: 106 BPM
EKG P AXIS: 46 DEGREES
EKG P-R INTERVAL: 158 MS
EKG Q-T INTERVAL: 312 MS
EKG QRS DURATION: 84 MS
EKG QTC CALCULATION (BAZETT): 414 MS
EKG R AXIS: 12 DEGREES
EKG T AXIS: 67 DEGREES
EKG VENTRICULAR RATE: 106 BPM
EOSINOPHILS ABSOLUTE: 0.11 E9/L (ref 0.05–0.5)
EOSINOPHILS RELATIVE PERCENT: 2 % (ref 0–6)
ETHANOL: 142 MG/DL (ref 0–0.08)
FENTANYL SCREEN, URINE: NOT DETECTED
GFR AFRICAN AMERICAN: >60
GFR NON-AFRICAN AMERICAN: >60 ML/MIN/1.73
GLUCOSE BLD-MCNC: 119 MG/DL (ref 74–99)
GLUCOSE URINE: NEGATIVE MG/DL
HCT VFR BLD CALC: 42.6 % (ref 37–54)
HEMOGLOBIN: 14.7 G/DL (ref 12.5–16.5)
IMMATURE GRANULOCYTES #: 0.01 E9/L
IMMATURE GRANULOCYTES %: 0.2 % (ref 0–5)
INR BLD: 1
KETONES, URINE: ABNORMAL MG/DL
LEUKOCYTE ESTERASE, URINE: NEGATIVE
LYMPHOCYTES ABSOLUTE: 1.81 E9/L (ref 1.5–4)
LYMPHOCYTES RELATIVE PERCENT: 32.1 % (ref 20–42)
Lab: NORMAL
MCH RBC QN AUTO: 33.4 PG (ref 26–35)
MCHC RBC AUTO-ENTMCNC: 34.5 % (ref 32–34.5)
MCV RBC AUTO: 96.8 FL (ref 80–99.9)
METHADONE SCREEN, URINE: NOT DETECTED
MONOCYTES ABSOLUTE: 1.15 E9/L (ref 0.1–0.95)
MONOCYTES RELATIVE PERCENT: 20.4 % (ref 2–12)
NEUTROPHILS ABSOLUTE: 2.48 E9/L (ref 1.8–7.3)
NEUTROPHILS RELATIVE PERCENT: 43.9 % (ref 43–80)
NITRITE, URINE: NEGATIVE
OPIATE SCREEN URINE: NOT DETECTED
OXYCODONE URINE: NOT DETECTED
PDW BLD-RTO: 11.9 FL (ref 11.5–15)
PH UA: 5.5 (ref 5–9)
PHENCYCLIDINE SCREEN URINE: NOT DETECTED
PLATELET # BLD: 167 E9/L (ref 130–450)
PMV BLD AUTO: 8.9 FL (ref 7–12)
POTASSIUM REFLEX MAGNESIUM: 4.3 MMOL/L (ref 3.5–5)
PRO-BNP: 52 PG/ML (ref 0–125)
PROTEIN UA: NEGATIVE MG/DL
PROTHROMBIN TIME: 11.1 SEC (ref 9.3–12.4)
RBC # BLD: 4.4 E12/L (ref 3.8–5.8)
SALICYLATE, SERUM: <0.3 MG/DL (ref 0–30)
SODIUM BLD-SCNC: 136 MMOL/L (ref 132–146)
SPECIFIC GRAVITY UA: 1.02 (ref 1–1.03)
TOTAL PROTEIN: 8.3 G/DL (ref 6.4–8.3)
TRICYCLIC ANTIDEPRESSANTS SCREEN SERUM: NEGATIVE NG/ML
TROPONIN: <0.01 NG/ML (ref 0–0.03)
TSH SERPL DL<=0.05 MIU/L-ACNC: 4.32 UIU/ML (ref 0.27–4.2)
UROBILINOGEN, URINE: 0.2 E.U./DL
WBC # BLD: 5.6 E9/L (ref 4.5–11.5)

## 2020-06-12 PROCEDURE — 80053 COMPREHEN METABOLIC PANEL: CPT

## 2020-06-12 PROCEDURE — 71046 X-RAY EXAM CHEST 2 VIEWS: CPT

## 2020-06-12 PROCEDURE — 85610 PROTHROMBIN TIME: CPT

## 2020-06-12 PROCEDURE — 99215 OFFICE O/P EST HI 40 MIN: CPT | Performed by: FAMILY MEDICINE

## 2020-06-12 PROCEDURE — 83880 ASSAY OF NATRIURETIC PEPTIDE: CPT

## 2020-06-12 PROCEDURE — 70450 CT HEAD/BRAIN W/O DYE: CPT

## 2020-06-12 PROCEDURE — 80307 DRUG TEST PRSMV CHEM ANLYZR: CPT

## 2020-06-12 PROCEDURE — G0378 HOSPITAL OBSERVATION PER HR: HCPCS

## 2020-06-12 PROCEDURE — 36415 COLL VENOUS BLD VENIPUNCTURE: CPT

## 2020-06-12 PROCEDURE — 93005 ELECTROCARDIOGRAM TRACING: CPT | Performed by: PHYSICIAN ASSISTANT

## 2020-06-12 PROCEDURE — 81003 URINALYSIS AUTO W/O SCOPE: CPT

## 2020-06-12 PROCEDURE — 2580000003 HC RX 258: Performed by: STUDENT IN AN ORGANIZED HEALTH CARE EDUCATION/TRAINING PROGRAM

## 2020-06-12 PROCEDURE — 85730 THROMBOPLASTIN TIME PARTIAL: CPT

## 2020-06-12 PROCEDURE — 94761 N-INVAS EAR/PLS OXIMETRY MLT: CPT

## 2020-06-12 PROCEDURE — 85025 COMPLETE CBC W/AUTO DIFF WBC: CPT

## 2020-06-12 PROCEDURE — 85378 FIBRIN DEGRADE SEMIQUANT: CPT

## 2020-06-12 PROCEDURE — 84443 ASSAY THYROID STIM HORMONE: CPT

## 2020-06-12 PROCEDURE — 99285 EMERGENCY DEPT VISIT HI MDM: CPT

## 2020-06-12 PROCEDURE — G0480 DRUG TEST DEF 1-7 CLASSES: HCPCS

## 2020-06-12 PROCEDURE — 6360000004 HC RX CONTRAST MEDICATION: Performed by: RADIOLOGY

## 2020-06-12 PROCEDURE — 2580000003 HC RX 258: Performed by: EMERGENCY MEDICINE

## 2020-06-12 PROCEDURE — 71275 CT ANGIOGRAPHY CHEST: CPT

## 2020-06-12 PROCEDURE — 84484 ASSAY OF TROPONIN QUANT: CPT

## 2020-06-12 PROCEDURE — 6370000000 HC RX 637 (ALT 250 FOR IP): Performed by: STUDENT IN AN ORGANIZED HEALTH CARE EDUCATION/TRAINING PROGRAM

## 2020-06-12 RX ORDER — SODIUM CHLORIDE 0.9 % (FLUSH) 0.9 %
10 SYRINGE (ML) INJECTION EVERY 12 HOURS SCHEDULED
Status: DISCONTINUED | OUTPATIENT
Start: 2020-06-12 | End: 2020-06-13 | Stop reason: HOSPADM

## 2020-06-12 RX ORDER — ACETAMINOPHEN 325 MG/1
650 TABLET ORAL EVERY 6 HOURS PRN
Status: DISCONTINUED | OUTPATIENT
Start: 2020-06-12 | End: 2020-06-13 | Stop reason: HOSPADM

## 2020-06-12 RX ORDER — NICOTINE 21 MG/24HR
1 PATCH, TRANSDERMAL 24 HOURS TRANSDERMAL ONCE
Status: DISCONTINUED | OUTPATIENT
Start: 2020-06-12 | End: 2020-06-13 | Stop reason: HOSPADM

## 2020-06-12 RX ORDER — SODIUM CHLORIDE 9 MG/ML
INJECTION, SOLUTION INTRAVENOUS CONTINUOUS
Status: DISCONTINUED | OUTPATIENT
Start: 2020-06-12 | End: 2020-06-13

## 2020-06-12 RX ORDER — 0.9 % SODIUM CHLORIDE 0.9 %
1000 INTRAVENOUS SOLUTION INTRAVENOUS ONCE
Status: COMPLETED | OUTPATIENT
Start: 2020-06-12 | End: 2020-06-12

## 2020-06-12 RX ORDER — VELPATASVIR AND SOFOSBUVIR 100; 400 MG/1; MG/1
1 TABLET, FILM COATED ORAL DAILY
Status: DISCONTINUED | OUTPATIENT
Start: 2020-06-13 | End: 2020-06-13 | Stop reason: HOSPADM

## 2020-06-12 RX ORDER — ONDANSETRON 2 MG/ML
4 INJECTION INTRAMUSCULAR; INTRAVENOUS EVERY 6 HOURS PRN
Status: DISCONTINUED | OUTPATIENT
Start: 2020-06-12 | End: 2020-06-13 | Stop reason: HOSPADM

## 2020-06-12 RX ORDER — PROMETHAZINE HYDROCHLORIDE 25 MG/1
12.5 TABLET ORAL EVERY 6 HOURS PRN
Status: DISCONTINUED | OUTPATIENT
Start: 2020-06-12 | End: 2020-06-13 | Stop reason: HOSPADM

## 2020-06-12 RX ORDER — SODIUM CHLORIDE 0.9 % (FLUSH) 0.9 %
10 SYRINGE (ML) INJECTION PRN
Status: DISCONTINUED | OUTPATIENT
Start: 2020-06-12 | End: 2020-06-13 | Stop reason: HOSPADM

## 2020-06-12 RX ORDER — ACETAMINOPHEN 650 MG/1
650 SUPPOSITORY RECTAL EVERY 6 HOURS PRN
Status: DISCONTINUED | OUTPATIENT
Start: 2020-06-12 | End: 2020-06-13 | Stop reason: HOSPADM

## 2020-06-12 RX ADMIN — IOPAMIDOL 60 ML: 755 INJECTION, SOLUTION INTRAVENOUS at 20:25

## 2020-06-12 RX ADMIN — SODIUM CHLORIDE, PRESERVATIVE FREE 10 ML: 5 INJECTION INTRAVENOUS at 23:52

## 2020-06-12 RX ADMIN — SODIUM CHLORIDE 1000 ML: 9 INJECTION, SOLUTION INTRAVENOUS at 18:07

## 2020-06-12 RX ADMIN — SODIUM CHLORIDE: 9 INJECTION, SOLUTION INTRAVENOUS at 23:52

## 2020-06-12 ASSESSMENT — ENCOUNTER SYMPTOMS
CONSTIPATION: 0
RHINORRHEA: 0
VOMITING: 0
COUGH: 0
COUGH: 1
SINUS PAIN: 0
ABDOMINAL DISTENTION: 0
WHEEZING: 0
ABDOMINAL PAIN: 0
SINUS PRESSURE: 0
COLOR CHANGE: 0
NAUSEA: 0
SHORTNESS OF BREATH: 1
DIARRHEA: 0

## 2020-06-12 ASSESSMENT — PAIN SCALES - GENERAL: PAINLEVEL_OUTOF10: 0

## 2020-06-12 NOTE — PROGRESS NOTES
Patient presents today for BP check - he is currently wearing heart monitor and was advised to cut BP med in half due to low readings. He is now taking 20mg daily. He has ringing in his ears, he has shortness of breath. BP is 109/78 and has a heart rate of 123.

## 2020-06-12 NOTE — ED PROVIDER NOTES
Amount and/or Complexity of Data Reviewed  Clinical lab tests: reviewed         ED Course as of Jun 13 1013   Fri Jun 12, 2020 2020 8:20 PM EDT  I have signed this patient out to the oncoming physician, Dr. Mitchell Found. I have discussed the patient's initial exam, treatment and plan of care with the on coming physician. I have notified the patient / family of the change in treating physician and answered their questions to this point. [MA]   2141 Discussed with Dr. Josr Elliott, accepts patient for further evaluation to the hospital.  CT imaging negative for pulmonary embolism, no acute intracranial abnormality. [LM]      ED Course User Index  [LM] Marques Lim DO  [MA] Zakia Carr DO          ----------------------------------------------- PAST HISTORY --------------------------------------------  Past Medical History:  has a past medical history of Hepatitis C and Herniated disc, cervical.    Past Surgical History:  has a past surgical history that includes Anterior cruciate ligament repair (1990s); Neck surgery (1992); shoulder surgery (2007); Skin cancer excision (2007); Anterior cruciate ligament repair (2009); Colonoscopy (2009); Inguinal hernia repair (5/20/2014); and Inguinal hernia repair (Right, 5/20/14). Social History:  reports that he has been smoking cigarettes. He has a 20.00 pack-year smoking history. He has never used smokeless tobacco. He reports current alcohol use of about 14.0 standard drinks of alcohol per week. He reports current drug use. Drug: Opiates . Family History: family history includes Cancer in his father; Heart Attack (age of onset: 68) in his mother; Parkinsonism in his paternal uncle. The patients home medications have been reviewed. Allergies: Patient has no known allergies.     ------------------------------------------------ RESULTS ---------------------------------------------------    LABS:  Results for orders placed or performed during the hospital encounter of 06/12/20   CBC Auto Differential   Result Value Ref Range    WBC 5.6 4.5 - 11.5 E9/L    RBC 4.40 3.80 - 5.80 E12/L    Hemoglobin 14.7 12.5 - 16.5 g/dL    Hematocrit 42.6 37.0 - 54.0 %    MCV 96.8 80.0 - 99.9 fL    MCH 33.4 26.0 - 35.0 pg    MCHC 34.5 32.0 - 34.5 %    RDW 11.9 11.5 - 15.0 fL    Platelets 072 093 - 805 E9/L    MPV 8.9 7.0 - 12.0 fL    Neutrophils % 43.9 43.0 - 80.0 %    Immature Granulocytes % 0.2 0.0 - 5.0 %    Lymphocytes % 32.1 20.0 - 42.0 %    Monocytes % 20.4 (H) 2.0 - 12.0 %    Eosinophils % 2.0 0.0 - 6.0 %    Basophils % 1.4 0.0 - 2.0 %    Neutrophils Absolute 2.48 1.80 - 7.30 E9/L    Immature Granulocytes # 0.01 E9/L    Lymphocytes Absolute 1.81 1.50 - 4.00 E9/L    Monocytes Absolute 1.15 (H) 0.10 - 0.95 E9/L    Eosinophils Absolute 0.11 0.05 - 0.50 E9/L    Basophils Absolute 0.08 0.00 - 0.20 E9/L   Comprehensive Metabolic Panel w/ Reflex to MG   Result Value Ref Range    Sodium 136 132 - 146 mmol/L    Potassium reflex Magnesium 4.3 3.5 - 5.0 mmol/L    Chloride 101 98 - 107 mmol/L    CO2 20 (L) 22 - 29 mmol/L    Anion Gap 15 7 - 16 mmol/L    Glucose 119 (H) 74 - 99 mg/dL    BUN 16 8 - 23 mg/dL    CREATININE 0.9 0.7 - 1.2 mg/dL    GFR Non-African American >60 >=60 mL/min/1.73    GFR African American >60     Calcium 9.1 8.6 - 10.2 mg/dL    Total Protein 8.3 6.4 - 8.3 g/dL    Alb 4.3 3.5 - 5.2 g/dL    Total Bilirubin 0.3 0.0 - 1.2 mg/dL    Alkaline Phosphatase 125 40 - 129 U/L    ALT 79 (H) 0 - 40 U/L    AST 60 (H) 0 - 39 U/L   Brain Natriuretic Peptide   Result Value Ref Range    Pro-BNP 52 0 - 125 pg/mL   Troponin   Result Value Ref Range    Troponin <0.01 0.00 - 0.03 ng/mL   Urinalysis, reflex to microscopic   Result Value Ref Range    Color, UA Yellow Straw/Yellow    Clarity, UA Clear Clear    Glucose, Ur Negative Negative mg/dL    Bilirubin Urine Negative Negative    Ketones, Urine TRACE (A) Negative mg/dL    Specific Gravity, UA 1.025 1.005 - 1.030    Blood, Urine Negative g/dL    Hematocrit 38.2 37.0 - 54.0 %    MCV 96.5 80.0 - 99.9 fL    MCH 33.6 26.0 - 35.0 pg    MCHC 34.8 (H) 32.0 - 34.5 %    RDW 11.8 11.5 - 15.0 fL    Platelets 930 346 - 632 E9/L    MPV 8.8 7.0 - 12.0 fL    Neutrophils % 37.7 (L) 43.0 - 80.0 %    Immature Granulocytes % 0.6 0.0 - 5.0 %    Lymphocytes % 32.6 20.0 - 42.0 %    Monocytes % 22.5 (H) 2.0 - 12.0 %    Eosinophils % 4.9 0.0 - 6.0 %    Basophils % 1.7 0.0 - 2.0 %    Neutrophils Absolute 1.31 (L) 1.80 - 7.30 E9/L    Immature Granulocytes # 0.02 E9/L    Lymphocytes Absolute 1.13 (L) 1.50 - 4.00 E9/L    Monocytes Absolute 0.78 0.10 - 0.95 E9/L    Eosinophils Absolute 0.17 0.05 - 0.50 E9/L    Basophils Absolute 0.06 0.00 - 0.20 E9/L   Troponin   Result Value Ref Range    Troponin <0.01 0.00 - 0.03 ng/mL   Troponin   Result Value Ref Range    Troponin <0.01 0.00 - 0.03 ng/mL   EKG 12 Lead   Result Value Ref Range    Ventricular Rate 106 BPM    Atrial Rate 106 BPM    P-R Interval 158 ms    QRS Duration 84 ms    Q-T Interval 312 ms    QTc Calculation (Bazett) 414 ms    P Axis 46 degrees    R Axis 12 degrees    T Axis 67 degrees   EKG 12 Lead   Result Value Ref Range    Ventricular Rate 77 BPM    Atrial Rate 77 BPM    P-R Interval 182 ms    QRS Duration 96 ms    Q-T Interval 368 ms    QTc Calculation (Bazett) 416 ms    P Axis 67 degrees    R Axis -3 degrees    T Axis 56 degrees       RADIOLOGY:    All Radiology results interpreted by Radiologist unless otherwise noted. CT Head WO Contrast   Final Result   Negative noncontrast CT study. Specifically, there is no evidence of intracranial hemorrhage or mass   effect, and no calvarial traumatic findings are noted. CTA CHEST W CONTRAST   Final Result      No evidence for pulmonary embolism or aortic dissection.       No evidence for acute process on CTA of the chest.      XR CHEST STANDARD (2 VW)   Final Result   NO ACUTE CARDIOPULMONARY PROCESS          MRI Brain WO Contrast    (Results Pending)

## 2020-06-13 ENCOUNTER — APPOINTMENT (OUTPATIENT)
Dept: MRI IMAGING | Age: 60
End: 2020-06-13

## 2020-06-13 VITALS
WEIGHT: 190.4 LBS | BODY MASS INDEX: 28.2 KG/M2 | DIASTOLIC BLOOD PRESSURE: 82 MMHG | TEMPERATURE: 97.6 F | HEART RATE: 91 BPM | SYSTOLIC BLOOD PRESSURE: 122 MMHG | RESPIRATION RATE: 18 BRPM | HEIGHT: 69 IN | OXYGEN SATURATION: 95 %

## 2020-06-13 PROBLEM — H81.13 BENIGN PAROXYSMAL POSITIONAL VERTIGO DUE TO BILATERAL VESTIBULAR DISORDER: Status: ACTIVE | Noted: 2020-06-13

## 2020-06-13 PROBLEM — I95.9 HYPOTENSION: Status: RESOLVED | Noted: 2020-06-12 | Resolved: 2020-06-13

## 2020-06-13 PROBLEM — I95.2 DRUG-INDUCED HYPOTENSION: Status: RESOLVED | Noted: 2020-06-12 | Resolved: 2020-06-13

## 2020-06-13 PROBLEM — R42 DIZZINESS: Status: RESOLVED | Noted: 2020-06-12 | Resolved: 2020-06-13

## 2020-06-13 LAB
ANION GAP SERPL CALCULATED.3IONS-SCNC: 11 MMOL/L (ref 7–16)
BASOPHILS ABSOLUTE: 0.06 E9/L (ref 0–0.2)
BASOPHILS RELATIVE PERCENT: 1.7 % (ref 0–2)
BUN BLDV-MCNC: 13 MG/DL (ref 8–23)
CALCIUM SERPL-MCNC: 8.4 MG/DL (ref 8.6–10.2)
CHLORIDE BLD-SCNC: 102 MMOL/L (ref 98–107)
CO2: 23 MMOL/L (ref 22–29)
CREAT SERPL-MCNC: 0.7 MG/DL (ref 0.7–1.2)
EOSINOPHILS ABSOLUTE: 0.17 E9/L (ref 0.05–0.5)
EOSINOPHILS RELATIVE PERCENT: 4.9 % (ref 0–6)
GFR AFRICAN AMERICAN: >60
GFR NON-AFRICAN AMERICAN: >60 ML/MIN/1.73
GLUCOSE BLD-MCNC: 98 MG/DL (ref 74–99)
HCT VFR BLD CALC: 38.2 % (ref 37–54)
HEMOGLOBIN: 13.3 G/DL (ref 12.5–16.5)
IMMATURE GRANULOCYTES #: 0.02 E9/L
IMMATURE GRANULOCYTES %: 0.6 % (ref 0–5)
LYMPHOCYTES ABSOLUTE: 1.13 E9/L (ref 1.5–4)
LYMPHOCYTES RELATIVE PERCENT: 32.6 % (ref 20–42)
MCH RBC QN AUTO: 33.6 PG (ref 26–35)
MCHC RBC AUTO-ENTMCNC: 34.8 % (ref 32–34.5)
MCV RBC AUTO: 96.5 FL (ref 80–99.9)
MONOCYTES ABSOLUTE: 0.78 E9/L (ref 0.1–0.95)
MONOCYTES RELATIVE PERCENT: 22.5 % (ref 2–12)
NEUTROPHILS ABSOLUTE: 1.31 E9/L (ref 1.8–7.3)
NEUTROPHILS RELATIVE PERCENT: 37.7 % (ref 43–80)
PDW BLD-RTO: 11.8 FL (ref 11.5–15)
PLATELET # BLD: 138 E9/L (ref 130–450)
PMV BLD AUTO: 8.8 FL (ref 7–12)
POTASSIUM REFLEX MAGNESIUM: 4.4 MMOL/L (ref 3.5–5)
RBC # BLD: 3.96 E12/L (ref 3.8–5.8)
SODIUM BLD-SCNC: 136 MMOL/L (ref 132–146)
TROPONIN: <0.01 NG/ML (ref 0–0.03)
TROPONIN: <0.01 NG/ML (ref 0–0.03)
WBC # BLD: 3.5 E9/L (ref 4.5–11.5)

## 2020-06-13 PROCEDURE — 84484 ASSAY OF TROPONIN QUANT: CPT

## 2020-06-13 PROCEDURE — G0378 HOSPITAL OBSERVATION PER HR: HCPCS

## 2020-06-13 PROCEDURE — 93005 ELECTROCARDIOGRAM TRACING: CPT | Performed by: STUDENT IN AN ORGANIZED HEALTH CARE EDUCATION/TRAINING PROGRAM

## 2020-06-13 PROCEDURE — 70551 MRI BRAIN STEM W/O DYE: CPT

## 2020-06-13 PROCEDURE — 85025 COMPLETE CBC W/AUTO DIFF WBC: CPT

## 2020-06-13 PROCEDURE — 80048 BASIC METABOLIC PNL TOTAL CA: CPT

## 2020-06-13 PROCEDURE — 99236 HOSP IP/OBS SAME DATE HI 85: CPT | Performed by: FAMILY MEDICINE

## 2020-06-13 PROCEDURE — 36415 COLL VENOUS BLD VENIPUNCTURE: CPT

## 2020-06-13 ASSESSMENT — ENCOUNTER SYMPTOMS: SORE THROAT: 0

## 2020-06-13 ASSESSMENT — PAIN SCALES - GENERAL: PAINLEVEL_OUTOF10: 0

## 2020-06-13 NOTE — PROGRESS NOTES
Progress Note    Patient:  Shayne Rodriguez 62 y.o. male MRN: [id number] Date of Service: 6/13/2020   LOS: 0    CC:   Chief Complaint   Patient presents with    Fatigue     x3 weeks, dizzy, feels extremely week    Shortness of Breath     w/ exertion, just finished heart monitor and sent in by cardiologist     Brief Summary: Patient was admitted for hypotension tachycardia dizziness shortness of breath after being sent in by PCP. Overnight events: No acute events overnight. Normal sinus rhythm on the monitor. No falls or shortness of breath. Subjective     Feels improved this morning. Denies any chest pain or shortness of breath. However continues to feel dizzy and have a pressure in his head. Feels like his ears are ringing however it is been going on over the last few months. Also concerned about the monitor and has labs showing his heart rate being in the 100s at times. Blood pressure improved overnight and overall feels like he is improving. Objective     Physical Exam:  · Vitals: /76   Pulse 104   Temp 97.5 °F (36.4 °C) (Temporal)   Resp 16   Ht 5' 9\" (1.753 m)   Wt 190 lb 6.4 oz (86.4 kg)   SpO2 95%   BMI 28.12 kg/m²     · I & O - 24hr: I/O this shift:  · In: 480 [P.O.:480]  · Out: -    · General Appearance: alert, appears stated age and cooperative  · HEENT:  Head: Normocephalic, no lesions, without obvious abnormality. · Neck: no adenopathy, no carotid bruit, no JVD, supple, symmetrical, trachea midline and thyroid not enlarged, symmetric, no tenderness/mass/nodules  · Lung: clear to auscultation bilaterally  · Heart: regular rate and rhythm, S1, S2 normal, no murmur, click, rub or gallop  · Abdomen: soft, non-tender; bowel sounds normal; no masses,  no organomegaly  · Extremities:  extremities normal, atraumatic, no cyanosis or edema  · Musculokeletal: No joint swelling, no muscle tenderness. ROM normal in all joints of extremities.    · Neurologic: Mental status: Alert, oriented, thought content appropriate  Subject  Pertinent Labs & Imaging Studies & Meds    axel  MEDICATIONS:  Scheduled Meds:   nicotine  1 patch Transdermal Once    Sofosbuvir-Velpatasvir  1 tablet Oral Daily    sodium chloride flush  10 mL Intravenous 2 times per day    enoxaparin  40 mg Subcutaneous Daily     Continuous Infusions:    PRN Meds:   sodium chloride flush, 10 mL, PRN  acetaminophen, 650 mg, Q6H PRN    Or  acetaminophen, 650 mg, Q6H PRN  magnesium hydroxide, 30 mL, Daily PRN  promethazine, 12.5 mg, Q6H PRN    Or  ondansetron, 4 mg, Q6H PRN  perflutren lipid microspheres, 1.5 mL, ONCE PRN        Laboratory findings:  Labs reviewed. Pertinent Radiology reviewed. Resident's Assessment and Plan     Principal Problem:    Drug-induced hypotension  Active Problems:    Essential hypertension    Dizziness    Hypotension    Sinus tachycardia  Resolved Problems:    * No resolved hospital problems.  *     Drug-induced hypotension  Sinus tachycardia  - Continue to hold lisinopril, blood pressures come up with fluids and heart rate has improved  -Possibly a combination of anxiety and BPPV causing an elevation in his blood pressure that led to being treated for it with high-dose lisinopril and it made him more dizzy  - EKG done this morning shows a normal heart rate no other acute changes, on telemetry he does seem to increase to 104 occasionally, just had a Holter monitor, will need further outpatient follow-up likely with an echo  -We will ambulate patient on pulse ox and telemetry and assess    Dizziness/possible BPPV versus intracranial mass  -will get an MRI brain if cannot be done as inpatient will schedule as outpatient  -Possibly would benefit from an ENT referral as outpatient as well, signs and symptoms of BPPV versus possibly Ménière's disease    Hypertension  -Continue to hold lisinopril, if blood pressure comes up will consider a beta-blocker given his tachycardia and likely underlying anxiety    Hepatitis C  -Continue home med    DVT prophylaxis/ GI prophylaxis: lovenox/not indicated    Disposition: home today      Leah Webb MD, PGY-3  Attending physician: Dr. Mega Ardon

## 2020-06-13 NOTE — PROGRESS NOTES
Ambulated with patient in hallway. Complained of slight dizziness but was not unsteady. Pulse ox remained 95-99% and heart rate ranged 90-120BPM. Resident notified.

## 2020-06-13 NOTE — H&P
History and Physical        Patient's name:  Godfrey Brown  Medical Record Number: [id number]  Patient's YOB: 1960  Age: 62 y.o. Date of Admission: 6/12/2020  5:25 PM  Chief Complaint   Patient presents with    Fatigue     x3 weeks, dizzy, feels extremely week    Shortness of Breath     w/ exertion, just finished heart monitor and sent in by cardiologist     History of Present Illness:   Godfrey Brown with a pmhx of alcohol use, Hep C, HTN p/w dizziness, headaches for about a week or so. Was seen by PCP earlier today and concern for hypotension with some SOB as well. Is a pack a day smoker and drinks beer almost every day. Drinks about 1.5 large cans a day. No hx of DTs except for the shakes on some occasions in the past. His lisinopril has been getting titrated by his PCP over the last few weeks. It was increased to 40mg yesterday and and he has been taking 20mg BID. BPs have fluctuated between low and high depending where he is. When examined feeling otherwise okay. Denies cp, sob currently. The sob has been intermittent as well on exertion over the last few weeks. No cough, sputum production, fevers, chills. Not on inhalers. No pfts on record. Was on holter monitor per PCP as well to determine cardiac cause of his dyspnea. Denies any orthopnea, pnd, lower extremity edema. ED Course: unremarkable work up. 1L bolus NS improved his initial tachycardia and hypotension. CTA negative. CT head negative. Orthostatic negative at 1813. Decision to admit for hypotension and tachycardia per ED. REVIEW OF SYSTEMS:  Review of Systems   Constitutional: Negative for activity change, appetite change, chills, diaphoresis, fatigue and fever. HENT: Negative for congestion, rhinorrhea, sinus pressure and sinus pain. Respiratory: Positive for shortness of breath. Negative for cough and wheezing. Cardiovascular: Positive for palpitations. Negative for chest pain and leg swelling. weakness. Gait: Gait normal.   Psychiatric:         Mood and Affect: Mood normal.       Any additional physical findings:    Laboratory findings:  Complete Blood Count:   Recent Labs     06/12/20  1706   WBC 5.6   HGB 14.7   HCT 42.6           Last 3 Blood Glucose:   Recent Labs     06/12/20  1706   GLUCOSE 119*        PT/INR:    Lab Results   Component Value Date    PROTIME 11.1 06/12/2020    INR 1.0 06/12/2020     PTT:    Lab Results   Component Value Date    APTT 33.3 06/12/2020       Comprehensive Metabolic Profile:   Recent Labs     06/12/20  1706      K 4.3      CO2 20*   BUN 16   CREATININE 0.9   GLUCOSE 119*   CALCIUM 9.1   PROT 8.3   LABALBU 4.3   BILITOT 0.3   ALKPHOS 125   AST 60*   ALT 79*        Radiology/Imaging:   Chest Xray (6/12/2020):   CT Head WO Contrast   Final Result   Negative noncontrast CT study. Specifically, there is no evidence of intracranial hemorrhage or mass   effect, and no calvarial traumatic findings are noted. CTA CHEST W CONTRAST   Final Result      No evidence for pulmonary embolism or aortic dissection. No evidence for acute process on CTA of the chest.      XR CHEST STANDARD (2 VW)   Final Result   NO ACUTE CARDIOPULMONARY PROCESS            ASSESSMENT  And PLAN:   Principal Problem:    Drug-induced hypotension  Active Problems:    Essential hypertension    Dizziness    Hypotension    Sinus tachycardia  Resolved Problems:    * No resolved hospital problems.  *    Sinus Tachycardia  Hypotension   -likely 2/2 lisinopril being uptitrated and fluctuating blood pressures, will have more frequent vital checks to assess   -monitor on telemetry for arrhythmia overnight, sinus tach on holter it appears per chart review   -cycle troponin, repeat EKG in am  -ECHO due to the sob and sporadic palpitations sinus tachycardia   -no active chest pain, ambulating on RA around his hospital room, will hold off on stress test   -repeat orthostatics,

## 2020-06-15 LAB
EKG ATRIAL RATE: 77 BPM
EKG P AXIS: 67 DEGREES
EKG P-R INTERVAL: 182 MS
EKG Q-T INTERVAL: 368 MS
EKG QRS DURATION: 96 MS
EKG QTC CALCULATION (BAZETT): 416 MS
EKG R AXIS: -3 DEGREES
EKG T AXIS: 56 DEGREES
EKG VENTRICULAR RATE: 77 BPM

## 2020-06-15 PROCEDURE — 93010 ELECTROCARDIOGRAM REPORT: CPT | Performed by: FAMILY MEDICINE

## 2020-06-16 ENCOUNTER — OFFICE VISIT (OUTPATIENT)
Dept: FAMILY MEDICINE CLINIC | Age: 60
End: 2020-06-16
Payer: COMMERCIAL

## 2020-06-16 VITALS
BODY MASS INDEX: 26.77 KG/M2 | DIASTOLIC BLOOD PRESSURE: 73 MMHG | TEMPERATURE: 96.3 F | HEIGHT: 70 IN | HEART RATE: 122 BPM | SYSTOLIC BLOOD PRESSURE: 101 MMHG | WEIGHT: 187 LBS | RESPIRATION RATE: 20 BRPM

## 2020-06-16 PROCEDURE — 3017F COLORECTAL CA SCREEN DOC REV: CPT | Performed by: FAMILY MEDICINE

## 2020-06-16 PROCEDURE — G8427 DOCREV CUR MEDS BY ELIG CLIN: HCPCS | Performed by: FAMILY MEDICINE

## 2020-06-16 PROCEDURE — 4004F PT TOBACCO SCREEN RCVD TLK: CPT | Performed by: FAMILY MEDICINE

## 2020-06-16 PROCEDURE — 99214 OFFICE O/P EST MOD 30 MIN: CPT | Performed by: FAMILY MEDICINE

## 2020-06-16 PROCEDURE — G8419 CALC BMI OUT NRM PARAM NOF/U: HCPCS | Performed by: FAMILY MEDICINE

## 2020-06-16 RX ORDER — NICOTINE 21 MG/24HR
1 PATCH, TRANSDERMAL 24 HOURS TRANSDERMAL DAILY
Qty: 14 PATCH | Refills: 5 | Status: SHIPPED
Start: 2020-06-16 | End: 2021-02-04

## 2020-06-17 PROBLEM — I95.9 HYPOTENSION: Status: ACTIVE | Noted: 2020-06-17

## 2020-06-30 LAB — HBA1C MFR BLD: 5.7 %

## 2020-06-30 PROCEDURE — 83036 HEMOGLOBIN GLYCOSYLATED A1C: CPT | Performed by: FAMILY MEDICINE

## 2020-07-01 ENCOUNTER — HOSPITAL ENCOUNTER (OUTPATIENT)
Age: 60
Discharge: HOME | End: 2020-07-03
Payer: OTHER GOVERNMENT

## 2020-07-01 ENCOUNTER — NURSE ONLY (OUTPATIENT)
Dept: PRIMARY CARE CLINIC | Age: 60
End: 2020-07-01

## 2020-07-01 ENCOUNTER — TELEPHONE (OUTPATIENT)
Dept: NON INVASIVE DIAGNOSTICS | Age: 60
End: 2020-07-01

## 2020-07-01 PROCEDURE — U0003 INFECTIOUS AGENT DETECTION BY NUCLEIC ACID (DNA OR RNA); SEVERE ACUTE RESPIRATORY SYNDROME CORONAVIRUS 2 (SARS-COV-2) (CORONAVIRUS DISEASE [COVID-19]), AMPLIFIED PROBE TECHNIQUE, MAKING USE OF HIGH THROUGHPUT TECHNOLOGIES AS DESCRIBED BY CMS-2020-01-R: HCPCS

## 2020-07-02 ENCOUNTER — HOSPITAL ENCOUNTER (OUTPATIENT)
Dept: NON INVASIVE DIAGNOSTICS | Age: 60
Discharge: HOME | End: 2020-07-02

## 2020-07-02 LAB
LV EF: 60 %
LVEF MODALITY: NORMAL
SARS-COV-2: NOT DETECTED

## 2020-07-02 PROCEDURE — 93306 TTE W/DOPPLER COMPLETE: CPT

## 2020-07-07 ENCOUNTER — TELEPHONE (OUTPATIENT)
Dept: FAMILY MEDICINE CLINIC | Age: 60
End: 2020-07-07

## 2020-07-14 ENCOUNTER — OFFICE VISIT (OUTPATIENT)
Dept: FAMILY MEDICINE CLINIC | Age: 60
End: 2020-07-14
Payer: COMMERCIAL

## 2020-07-14 ENCOUNTER — HOSPITAL ENCOUNTER (OUTPATIENT)
Age: 60
Discharge: HOME | End: 2020-07-16

## 2020-07-14 VITALS
HEIGHT: 70 IN | BODY MASS INDEX: 28.77 KG/M2 | TEMPERATURE: 96.5 F | DIASTOLIC BLOOD PRESSURE: 80 MMHG | RESPIRATION RATE: 20 BRPM | SYSTOLIC BLOOD PRESSURE: 129 MMHG | HEART RATE: 100 BPM | WEIGHT: 201 LBS

## 2020-07-14 LAB
ALBUMIN SERPL-MCNC: 4.6 G/DL (ref 3.5–5.2)
ALP BLD-CCNC: 121 U/L (ref 40–129)
ALT SERPL-CCNC: 118 U/L (ref 0–40)
ANION GAP SERPL CALCULATED.3IONS-SCNC: 13 MMOL/L (ref 7–16)
AST SERPL-CCNC: 96 U/L (ref 0–39)
BILIRUB SERPL-MCNC: 0.4 MG/DL (ref 0–1.2)
BUN BLDV-MCNC: 12 MG/DL (ref 8–23)
CALCIUM SERPL-MCNC: 9.6 MG/DL (ref 8.6–10.2)
CHLORIDE BLD-SCNC: 102 MMOL/L (ref 98–107)
CO2: 27 MMOL/L (ref 22–29)
CREAT SERPL-MCNC: 0.8 MG/DL (ref 0.7–1.2)
GFR AFRICAN AMERICAN: >60
GFR NON-AFRICAN AMERICAN: >60 ML/MIN/1.73
GLUCOSE BLD-MCNC: 101 MG/DL (ref 74–99)
POTASSIUM SERPL-SCNC: 4.5 MMOL/L (ref 3.5–5)
SODIUM BLD-SCNC: 142 MMOL/L (ref 132–146)
TOTAL PROTEIN: 8.6 G/DL (ref 6.4–8.3)
TSH SERPL DL<=0.05 MIU/L-ACNC: 2.26 UIU/ML (ref 0.27–4.2)

## 2020-07-14 PROCEDURE — 99214 OFFICE O/P EST MOD 30 MIN: CPT | Performed by: FAMILY MEDICINE

## 2020-07-14 PROCEDURE — 3017F COLORECTAL CA SCREEN DOC REV: CPT | Performed by: FAMILY MEDICINE

## 2020-07-14 PROCEDURE — G8427 DOCREV CUR MEDS BY ELIG CLIN: HCPCS | Performed by: FAMILY MEDICINE

## 2020-07-14 PROCEDURE — 80053 COMPREHEN METABOLIC PANEL: CPT

## 2020-07-14 PROCEDURE — G8419 CALC BMI OUT NRM PARAM NOF/U: HCPCS | Performed by: FAMILY MEDICINE

## 2020-07-14 PROCEDURE — 84443 ASSAY THYROID STIM HORMONE: CPT

## 2020-07-14 PROCEDURE — 4004F PT TOBACCO SCREEN RCVD TLK: CPT | Performed by: FAMILY MEDICINE

## 2020-07-14 PROCEDURE — 86704 HEP B CORE ANTIBODY TOTAL: CPT

## 2020-07-14 RX ORDER — BUPRENORPHINE AND NALOXONE 8; 2 MG/1; MG/1
1 FILM, SOLUBLE BUCCAL; SUBLINGUAL 2 TIMES DAILY
COMMUNITY
Start: 2020-07-09

## 2020-07-14 NOTE — PROGRESS NOTES
FM Progress Note    Subjective: Azalee Halsted is a 62y.o. year old male here for SOB. Health Maintenance Due   Topic Date Due    Hepatitis A vaccine (1 of 2 - Risk 2-dose series) 10/21/1959    Pneumococcal 0-64 years Vaccine (1 of 1 - PPSV23) 10/21/1964    Hepatitis B vaccine (1 of 3 - Risk 3-dose series) 10/21/1977    Shingles Vaccine (1 of 2) 10/21/2008    Colon cancer screen colonoscopy  10/21/2008     No further lightheadedness. trops neg. Neg tele  Echo showed age related changes, otherwise wnl. Not on lisinopril. Not on BB. Not feeling palps anymore. Has been drinking more fluid lately. Still smoking a couple cigarettes per day. Wants to quit. Nicotine patches helping at 14 mg. LFTs elevated. Hep C. Seeing GI. Jaundice. No n/v. No abd pain. TSH elevated but less than 10. Some weight gain. Gained much weight with covid. To clarify, pt was neg for covid but has been isolating himself. Now on suboxone from pain clinic. 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      Past Medical History:   Diagnosis Date    Hepatitis C     liver bx wnl. genotype is the best possible    Herniated disc, cervical      Past Surgical History:   Procedure Laterality Date    ANTERIOR CRUCIATE LIGAMENT REPAIR  1990s    left, [facility] ANTERIOR CRUCIATE LIGAMENT REPAIR  2009    right ACL repair, Dr. Laron Rodriguez COL[facility]OSCOPY  2009    [facility]  [facility]    right indirect inguinal herniorrhaphy [place] [id number] [place] 5/20/14    with mesh    NECK SURGERY  1992    neck fusion C6 and C7, [address]  2007    left, removal spur, Dr. Masoud Gonzáles  2007    melenoma, left clavical area, [facility]     Family History   Problem Relation Age of Onset    Heart Attack Mother 68    Cancer Father     Parkinsonism Paternal Uncle      Social History     Socioeconomic History    Marital status: Single     Spouse name: Not on file    Number of children: Not on file    Years of education: Not on file    Highest education level: Not on file   Occupational History    Not on file   Social Needs    Financial resource strain: Not on file    Food insecurity     Worry: Not on file     Inability: Not on file    Transportation needs     Medical: Not on file     Non-medical: Not on file   Tobacco Use    Smoking status: Current Every Day Smoker     Packs/day: 0.50     Years: 40.00     Pack years: 20.00     Types: Cigarettes    Smokeless tobacco: Never Used   Substance and Sexual Activity    Alcohol use:  Yes     Alcohol/week: 14.0 standard drinks     Types: 14 Cans of beer per week    Drug use: Yes     Types: Opiates     Sexual activity: Not on file   Lifestyle    Physical activity     Days per week: Not on file     Minutes per session: Not on file    Stress: Not on file   Relationships    Social connections     Talks on phone: Not on file     Gets together: Not on file     Attends Quaker service: Not on file     Active member of club or organization: Not on file     Attends meetings of clubs or organizations: Not on file     Relationship status: Not on file    Intimate partner violence     Fear of current or ex partner: Not on file     Emotionally abused: Not on file     Physically abused: Not on file     Forced sexual activity: Not on file   Other Topics Concern    Not on file   Social History Narrative    Not on file       Review Of Systems (unless otherwise specified)  Gen: No fevers, sweats, chills   No fatigue   No weight unintentional weight changes  Skin:  No rash, no lesion  Resp: No cough, shortness of breath, wheeze, chest congestion  CV: No chest pain, palpitation, edema   GI:  No abdominal pain   No nausea, no vomiting   No change in bowels, no diarrhea or constipation   No blood in stool or blood per rectum  Neuro: No headaches   No syncope/near syncope   No dizziness  MS: No back pain   No arthralgias   No myalgias   No gait issues        Objective:   /80   Pulse 100   Temp 96.5 °F (35.8 °C) (Temporal)   Resp 20   Ht 5' 10\" (1.778 m)   Wt 201 lb (91.2 kg)   BMI 28.84 kg/m²   General appearance: NAD, alert and interacting appropriately  HEENT: NCAT, PERRLA, EOMI. Scleral icterus per baseline. Resp: CTAB, no [place]  CVS: RRR, no MRG  Abdomen: BS +, SNDNT  Extremities: No clubbing, cyanosis, or edema. Warm. Dry. I have reviewed this patient's previous records. I have reviewed this patient's labs. I have reviewed this patient's imaging reports. I have reviewed this patient's medications. Assessment/Plan:    Joceline Jeffers was seen today for dizziness. Diagnoses and all orders for this visit:    Chronic hepatitis C without hepatic coma ([facility] [address]  -     HEPATITIS B CORE ANTIBODY, TOTAL; Future    Elevated TSH  -     TSH; Future    Tachycardia  -     COMPREHENSIVE METABOLIC PANEL; Future    Elevated LFTs  -     COMPREHENSIVE METABOLIC PANEL; Future    Encounter for smoking cessation counseling      Tobacco abuse improved. CPM.   DE and tachy improved but not controlled. PFTs tomorrow. Return in about 3 months (around 10/14/2020) for shortness of breath.       Electronically signed by Atul Skinner MD on 7/14/2020 at 3:44 PM

## 2020-07-15 ENCOUNTER — HOSPITAL ENCOUNTER (OUTPATIENT)
Dept: PULMONOLOGY | Age: 60
Discharge: HOME | End: 2020-07-15
Payer: COMMERCIAL

## 2020-07-15 ENCOUNTER — TELEPHONE (OUTPATIENT)
Dept: FAMILY MEDICINE CLINIC | Age: 60
End: 2020-07-15

## 2020-07-15 PROCEDURE — 94060 EVALUATION OF WHEEZING: CPT

## 2020-07-15 PROCEDURE — 94060 EVALUATION OF WHEEZING: CPT | Performed by: INTERNAL MEDICINE

## 2020-07-15 PROCEDURE — 94729 DIFFUSING CAPACITY: CPT

## 2020-07-15 PROCEDURE — 94726 PLETHYSMOGRAPHY LUNG VOLUMES: CPT | Performed by: INTERNAL MEDICINE

## 2020-07-15 PROCEDURE — 94729 DIFFUSING CAPACITY: CPT | Performed by: INTERNAL MEDICINE

## 2020-07-15 NOTE — TELEPHONE ENCOUNTER
Pt called stated PCP should be receiving PFT results today, please advise patient once PCP reviews results

## 2020-07-16 LAB — HEPATITIS B CORE TOTAL ANTIBODY: NONREACTIVE

## 2020-07-17 NOTE — PROCEDURES
[address]                  [address] [facility],  [facility]                               PULMONARY FUNCTION    PATIENT NAME: Timothy Sheets                     :        1960  MED REC NO:   [id number]                            ROOM:  ACCOUNT NO:   [de-identified]                           ADMIT DATE: 07/15/2020  PROVIDER:     Eusebia De La Rosa DO    DATE OF PROCEDURE:  07/15/2020    A 57-year-old  male, 70 inches, 200 pounds. Dyspnea on  exertion and 45 pack-year smoker. Pulmonary function test showed forced vital capacity postbronchodilator  4.72 liters 103% of predicted and FEV1 of 3.58 liters 101% of predicted  with an FEV1/FVC ratio of 78%. Significant bronchodilator response  identified. Maximum voluntary ventilation is 168 L/min at 121% of  predicted. Static lung volumes with slow vital capacity 4.96 L, 108% of predicted. Respiratory capacity 4.16 liters 124% of predicted. Expiratory reserve  volume 64% of predicted. Diffusion capacity is normal at 30.95  mL/min/mmHg, 95% of predicted. IMPRESSION:  Normal spirometry and diffusion capacity. Clinical  correlation needed.         Kristen Jaramillo DO    D: 2020 9:22:03       T: 2020 9:30:09     [id number]  Job#: [id number]     Doc#: [id number]    CC:

## 2020-12-16 ENCOUNTER — TELEPHONE (OUTPATIENT)
Dept: FAMILY MEDICINE CLINIC | Age: 60
End: 2020-12-16

## 2020-12-16 RX ORDER — LISINOPRIL 40 MG/1
40 TABLET ORAL DAILY
Qty: 90 TABLET | Refills: 1 | Status: ON HOLD
Start: 2020-12-16 | End: 2021-03-31 | Stop reason: HOSPADM

## 2020-12-17 NOTE — TELEPHONE ENCOUNTER
Pt calling and would like to start taking Lisinopril again. Can be sent to [facility].  He did schedule an appt  2/4/21
Pt notified. Verbalized understanding.
Restart at 20 mg for a week, then can increase to full 40 mg tablet if needed. Thanks.
No

## 2021-02-04 ENCOUNTER — OFFICE VISIT (OUTPATIENT)
Dept: FAMILY MEDICINE CLINIC | Age: 61
End: 2021-02-04
Payer: COMMERCIAL

## 2021-02-04 VITALS
HEIGHT: 70 IN | TEMPERATURE: 97 F | WEIGHT: 195 LBS | BODY MASS INDEX: 27.92 KG/M2 | HEART RATE: 98 BPM | SYSTOLIC BLOOD PRESSURE: 116 MMHG | RESPIRATION RATE: 20 BRPM | DIASTOLIC BLOOD PRESSURE: 71 MMHG

## 2021-02-04 DIAGNOSIS — R25.2 MUSCLE CRAMPS: ICD-10-CM

## 2021-02-04 DIAGNOSIS — F10.10 ETOH ABUSE: ICD-10-CM

## 2021-02-04 DIAGNOSIS — R79.89 ELEVATED LFTS: Primary | ICD-10-CM

## 2021-02-04 DIAGNOSIS — R79.89 ELEVATED TSH: ICD-10-CM

## 2021-02-04 DIAGNOSIS — I10 ESSENTIAL HYPERTENSION: ICD-10-CM

## 2021-02-04 DIAGNOSIS — B19.20 HEPATITIS C VIRUS INFECTION WITHOUT HEPATIC COMA, UNSPECIFIED CHRONICITY: ICD-10-CM

## 2021-02-04 DIAGNOSIS — R53.83 OTHER FATIGUE: ICD-10-CM

## 2021-02-04 DIAGNOSIS — Z12.11 SCREEN FOR COLON CANCER: ICD-10-CM

## 2021-02-04 DIAGNOSIS — R25.2 MUSCLE CRAMP: ICD-10-CM

## 2021-02-04 PROCEDURE — 99214 OFFICE O/P EST MOD 30 MIN: CPT | Performed by: FAMILY MEDICINE

## 2021-02-04 SDOH — ECONOMIC STABILITY: TRANSPORTATION INSECURITY
IN THE PAST 12 MONTHS, HAS LACK OF TRANSPORTATION KEPT YOU FROM MEETINGS, WORK, OR FROM GETTING THINGS NEEDED FOR DAILY LIVING?: NO

## 2021-02-04 ASSESSMENT — PATIENT HEALTH QUESTIONNAIRE - PHQ9
SUM OF ALL RESPONSES TO PHQ9 QUESTIONS 1 & 2: 0
2. FEELING DOWN, DEPRESSED OR HOPELESS: 0
SUM OF ALL RESPONSES TO PHQ QUESTIONS 1-9: 0
SUM OF ALL RESPONSES TO PHQ QUESTIONS 1-9: 0

## 2021-02-04 NOTE — PROGRESS NOTES
FM Progress Note    Subjective:   Ringing in ears    Constantly draining sinus    Dry throat in AM.  Relieved with moving tongue around. Lost voice starting 2 mo ago. Has it back now. R shoulder pain, chronic, starting again. Shots helped in past.     Low energy. HTN, controlled. Lisinopril 40. Leg cramps. Drinks water. Knee stiffness. Chronic. Cut back on EtOH. Drinks a half a tall can per day. Hepatitis C. Saw GI but did not get ultrasound FibroScan nor did he get treatment for hepatitis C despite high viral load. Health Maintenance Due   Topic Date Due    Hepatitis A vaccine (1 of 2 - Risk 2-dose series) 10/21/1959    Pneumococcal 0-64 years Vaccine (1 of 1 - PPSV23) 10/21/1964    Hepatitis B vaccine (1 of 3 - Risk 3-dose series) 10/21/1977    Shingles Vaccine (1 of 2) 10/21/2008    Colon cancer screen colonoscopy  10/21/2008    Flu vaccine (1) 09/01/2020         Objective:   /71   Pulse 98   Temp 97 °F (36.1 °C) (Temporal)   Resp 20   Ht 5' 10\" (1.778 m)   Wt 195 lb (88.5 kg)   BMI 27.98 kg/m²   General appearance: NAD, alert and interacting appropriately. Jaundice with scleral icterus. HEENT: NCAT, PERRLA, EOMI   Resp: CTAB, no WRC  CVS: RRR, no MRG  Abdomen: BS +, SNDNT  Extremities: No clubbing, cyanosis, or edema. Warm. Dry. I have reviewed this patient's previous records. I have reviewed this patient's labs. I have reviewed this patient's medications. Assessment/Plan:    Nestor Delong was seen today for hypertension. Diagnoses and all orders for this visit:    Elevated LFTs  -     COMPREHENSIVE METABOLIC PANEL; Future  -     US FIBROSCAN; Future    Screen for colon cancer  -     Cologuard (For External Results Only); Future    Muscle cramps    Muscle cramp  -     COMPREHENSIVE METABOLIC PANEL; Future  -     MAGNESIUM;  Future    Hepatitis C virus infection without hepatic coma, unspecified chronicity  -     HEP C VIRAL LOAD; Future Other fatigue  -     CBC Auto Differential; Future    Essential hypertension    Elevated TSH  -     TSH; Future    ETOH abuse    Continue present management hypertension. Continue alcohol avoidance as best he can          Patient Instructions   Make another appointment with Dr. Chiki Flores for the Hep C to get started on treatment. Return in about 2 weeks (around 2/18/2021) for Right shoulder injection.           Electronically signed by Simin Kearney MD on 2/4/2021 at 5:46 PM

## 2021-03-23 ENCOUNTER — OFFICE VISIT (OUTPATIENT)
Dept: FAMILY MEDICINE CLINIC | Age: 61
End: 2021-03-23
Payer: COMMERCIAL

## 2021-03-23 VITALS
WEIGHT: 193 LBS | HEIGHT: 70 IN | BODY MASS INDEX: 27.63 KG/M2 | DIASTOLIC BLOOD PRESSURE: 54 MMHG | RESPIRATION RATE: 18 BRPM | SYSTOLIC BLOOD PRESSURE: 96 MMHG | OXYGEN SATURATION: 99 % | HEART RATE: 128 BPM | TEMPERATURE: 97.2 F

## 2021-03-23 DIAGNOSIS — G89.29 CHRONIC RIGHT SHOULDER PAIN: Primary | ICD-10-CM

## 2021-03-23 DIAGNOSIS — R79.89 ELEVATED TSH: ICD-10-CM

## 2021-03-23 DIAGNOSIS — I10 ESSENTIAL HYPERTENSION: ICD-10-CM

## 2021-03-23 DIAGNOSIS — R06.02 SOB (SHORTNESS OF BREATH): ICD-10-CM

## 2021-03-23 DIAGNOSIS — R00.0 TACHYCARDIA: ICD-10-CM

## 2021-03-23 DIAGNOSIS — R25.2 MUSCLE CRAMPS: ICD-10-CM

## 2021-03-23 DIAGNOSIS — R05.9 COUGH: ICD-10-CM

## 2021-03-23 DIAGNOSIS — R79.89 ELEVATED LFTS: ICD-10-CM

## 2021-03-23 DIAGNOSIS — B19.20 HEPATITIS C VIRUS INFECTION WITHOUT HEPATIC COMA, UNSPECIFIED CHRONICITY: ICD-10-CM

## 2021-03-23 DIAGNOSIS — R25.2 MUSCLE CRAMP: ICD-10-CM

## 2021-03-23 DIAGNOSIS — M25.511 CHRONIC RIGHT SHOULDER PAIN: Primary | ICD-10-CM

## 2021-03-23 DIAGNOSIS — F10.10 ETOH ABUSE: ICD-10-CM

## 2021-03-23 DIAGNOSIS — R53.83 OTHER FATIGUE: ICD-10-CM

## 2021-03-23 LAB
ALBUMIN SERPL-MCNC: 3.8 G/DL (ref 3.5–5.2)
ALP BLD-CCNC: 113 U/L (ref 40–129)
ALT SERPL-CCNC: 37 U/L (ref 0–40)
ANION GAP SERPL CALCULATED.3IONS-SCNC: 18 MMOL/L (ref 7–16)
AST SERPL-CCNC: 31 U/L (ref 0–39)
BASOPHILS ABSOLUTE: 0.23 E9/L (ref 0–0.2)
BASOPHILS RELATIVE PERCENT: 0.9 % (ref 0–2)
BILIRUB SERPL-MCNC: 0.6 MG/DL (ref 0–1.2)
BUN BLDV-MCNC: 55 MG/DL (ref 8–23)
BURR CELLS: ABNORMAL
CALCIUM SERPL-MCNC: 9 MG/DL (ref 8.6–10.2)
CHLORIDE BLD-SCNC: 100 MMOL/L (ref 98–107)
CO2: 18 MMOL/L (ref 22–29)
CREAT SERPL-MCNC: 2 MG/DL (ref 0.7–1.2)
D DIMER: 591 NG/ML DDU
EOSINOPHILS ABSOLUTE: 0 E9/L (ref 0.05–0.5)
EOSINOPHILS RELATIVE PERCENT: 0 % (ref 0–6)
GFR AFRICAN AMERICAN: 41
GFR NON-AFRICAN AMERICAN: 34 ML/MIN/1.73
GLUCOSE BLD-MCNC: 183 MG/DL (ref 74–99)
HCT VFR BLD CALC: 40.2 % (ref 37–54)
HEMOGLOBIN: 13.4 G/DL (ref 12.5–16.5)
LYMPHOCYTES ABSOLUTE: 0.77 E9/L (ref 1.5–4)
LYMPHOCYTES RELATIVE PERCENT: 2.6 % (ref 20–42)
MAGNESIUM: 1.7 MG/DL (ref 1.6–2.6)
MCH RBC QN AUTO: 34.6 PG (ref 26–35)
MCHC RBC AUTO-ENTMCNC: 33.3 % (ref 32–34.5)
MCV RBC AUTO: 103.9 FL (ref 80–99.9)
METAMYELOCYTES RELATIVE PERCENT: 2.6 % (ref 0–1)
MONOCYTES ABSOLUTE: 1.28 E9/L (ref 0.1–0.95)
MONOCYTES RELATIVE PERCENT: 5.2 % (ref 2–12)
NEUTROPHILS ABSOLUTE: 23.3 E9/L (ref 1.8–7.3)
NEUTROPHILS RELATIVE PERCENT: 88.7 % (ref 43–80)
OVALOCYTES: ABNORMAL
PDW BLD-RTO: 12.1 FL (ref 11.5–15)
PLATELET # BLD: 195 E9/L (ref 130–450)
PMV BLD AUTO: 10.8 FL (ref 7–12)
POIKILOCYTES: ABNORMAL
POLYCHROMASIA: ABNORMAL
POTASSIUM SERPL-SCNC: 5.1 MMOL/L (ref 3.5–5)
RBC # BLD: 3.87 E12/L (ref 3.8–5.8)
SODIUM BLD-SCNC: 136 MMOL/L (ref 132–146)
TEAR DROP CELLS: ABNORMAL
TOTAL PROTEIN: 8.2 G/DL (ref 6.4–8.3)
TSH SERPL DL<=0.05 MIU/L-ACNC: 3.87 UIU/ML (ref 0.27–4.2)
WBC # BLD: 25.6 E9/L (ref 4.5–11.5)

## 2021-03-23 PROCEDURE — 20610 DRAIN/INJ JOINT/BURSA W/O US: CPT | Performed by: FAMILY MEDICINE

## 2021-03-23 PROCEDURE — 99215 OFFICE O/P EST HI 40 MIN: CPT | Performed by: FAMILY MEDICINE

## 2021-03-23 RX ORDER — FLUTICASONE PROPIONATE 50 MCG
1 SPRAY, SUSPENSION (ML) NASAL 2 TIMES DAILY
Qty: 1 BOTTLE | Refills: 3 | Status: SHIPPED
Start: 2021-03-23 | End: 2021-03-24

## 2021-03-23 RX ORDER — PREDNISONE 20 MG/1
40 TABLET ORAL DAILY
Qty: 10 TABLET | Refills: 0 | Status: ON HOLD
Start: 2021-03-23 | End: 2021-03-31 | Stop reason: HOSPADM

## 2021-03-23 RX ORDER — BENZONATATE 100 MG/1
100 CAPSULE ORAL 3 TIMES DAILY PRN
Qty: 30 CAPSULE | Refills: 0 | Status: ON HOLD
Start: 2021-03-23 | End: 2021-03-31 | Stop reason: HOSPADM

## 2021-03-23 RX ORDER — TRIAMCINOLONE ACETONIDE 40 MG/ML
40 INJECTION, SUSPENSION INTRA-ARTICULAR; INTRAMUSCULAR ONCE
Status: COMPLETED | OUTPATIENT
Start: 2021-03-23 | End: 2021-03-23

## 2021-03-23 RX ORDER — ALBUTEROL SULFATE 90 UG/1
2 AEROSOL, METERED RESPIRATORY (INHALATION) 4 TIMES DAILY PRN
Qty: 3 INHALER | Refills: 1 | Status: SHIPPED
Start: 2021-03-23 | End: 2021-06-22

## 2021-03-23 RX ORDER — LIDOCAINE HYDROCHLORIDE 10 MG/ML
4 INJECTION, SOLUTION INFILTRATION; PERINEURAL ONCE
Status: COMPLETED | OUTPATIENT
Start: 2021-03-23 | End: 2021-03-23

## 2021-03-23 RX ADMIN — LIDOCAINE HYDROCHLORIDE 4 ML: 10 INJECTION, SOLUTION INFILTRATION; PERINEURAL at 15:09

## 2021-03-23 RX ADMIN — TRIAMCINOLONE ACETONIDE 40 MG: 40 INJECTION, SUSPENSION INTRA-ARTICULAR; INTRAMUSCULAR at 15:11

## 2021-03-23 NOTE — PROGRESS NOTES
FM Progress Note    Subjective:   6 and 2 yo grandkids recently w/ URI sxs. Pt started getting SOB w/ cough. Coughed so hard he felt new right scapular pain that is not worse with deep inspiration but worse with cough. Cough is slightly productive. Much rhinorrhea and nasal congestion. Had fever 101.4, but none for last 24 hrs at least.     Pt is overall improving per pt, but tachy today and BP low. Some lightheadedness. On lisinopril 40 mg daily. Blood pressure today 96/54. Hardly drinks any alcohol anymore. Last beer was 3 days ago. Chronic right shoulder pain. Worse with lifting overhead with right shoulder flexion. Can get moderate to severe. Follows w/ GI for Hep C. Dr. Dennie Levee. Was getting muscle cramps frequently, but has been trying to hydrate more lately and has been avoiding alcohol and cramps have improved. Health Maintenance Due   Topic Date Due    Hepatitis A vaccine (1 of 2 - Risk 2-dose series) Never done    Pneumococcal 0-64 years Vaccine (1 of 1 - PPSV23) Never done    COVID-19 Vaccine (1) Never done    Hepatitis B vaccine (1 of 3 - Risk 3-dose series) Never done    Shingles Vaccine (1 of 2) Never done    Colon cancer screen colonoscopy  Never done    Flu vaccine (1) Never done           Objective:   BP (!) 96/54 (Site: Right Upper Arm, Position: Sitting, Cuff Size: Medium Adult)   Pulse 128   Temp 97.2 °F (36.2 °C) (Temporal)   Resp 18   Ht 5' 10\" (1.778 m)   Wt 193 lb (87.5 kg)   SpO2 99%   BMI 27.69 kg/m²   General appearance: NAD, alert and interacting appropriately. Cooperative, AOx4. Jaundice per baseline. HEENT: NCAT, PERRLA, EOMI   Resp: CTAB, no WRC. Appears comfortable w/o tripoding, but does have some increased WOB w/ some dyspnea getting through sentences. CVS: tachy, no MRG  Abdomen: BS +, SNDNT  Extremities: No clubbing, cyanosis, or edema. Warm. Dry. I have reviewed this patient's previous records.     I have reviewed this patient's labs. I have reviewed this patient's medications. Assessment/Plan:    Yasmin Melton was seen today for shoulder pain. Diagnoses and all orders for this visit:    Chronic right shoulder pain  -     triamcinolone acetonide (KENALOG-40) injection 40 mg  -     lidocaine 1 % injection 4 mL  -     CA TRIAMCINOLONE ACETONIDE INJ  -     CA ARTHROCENTESIS ASPIR&/INJ MAJOR JT/BURSA W/O US    SOB (shortness of breath)  -     albuterol sulfate HFA (VENTOLIN HFA) 108 (90 Base) MCG/ACT inhaler; Inhale 2 puffs into the lungs 4 times daily as needed for Wheezing  -     predniSONE (DELTASONE) 20 MG tablet; Take 2 tablets by mouth daily for 5 days  -     XR CHEST STANDARD (2 VW); Future  -     D-DIMER, QUANTITATIVE; Future    Cough  -     fluticasone (FLONASE) 50 MCG/ACT nasal spray; 1 spray by Nasal route 2 times daily  -     albuterol sulfate HFA (VENTOLIN HFA) 108 (90 Base) MCG/ACT inhaler; Inhale 2 puffs into the lungs 4 times daily as needed for Wheezing  -     benzonatate (TESSALON PERLES) 100 MG capsule; Take 1 capsule by mouth 3 times daily as needed for Cough  -     XR CHEST STANDARD (2 VW); Future    Muscle cramps    Hepatitis C virus infection without hepatic coma, unspecified chronicity    ETOH abuse    Essential hypertension    Tachycardia  -     D-DIMER, QUANTITATIVE; Future        R shoulder Injection Procedure Note  R/B/A reviewed with pt. Verbal consent was obtained for the procedure. The joint was prepped with alcohol and a 25 gauge needle was inserted into the joint from a 25 approach. After aspirating to ensure no blood in the syringe, 5 ml 1% lidocaine and 1 ml of kenalog was then injected into the joint through the same needle. The needle was removed and the area cleansed and dressed. Verbal care instructions provided to pt. Complications:  None; patient tolerated the procedure well.          DDx: tachy 2/2 PE vs LRTI vs EtOH w/d vs compensatory tachy 2/2 hypotension 2/2 lisinopril        Patient Instructions   Cut the lisinopril in half to 20 mg daily for now. Continue the mucinex DM.       f/u w/ GI for Hep C     rtc 1 wk for SOB  Counseled pt today extensively that if ddimer elevated then will go to [facility] ER for CT PE which will help to eval for PNA as well. Can get fluids for hypotension as well. Pt agreed. Greater than 50% of this 40 minute face-to-face patient encounter was spent counseling and/or care coordination on the following: The primary encounter diagnosis was Chronic right shoulder pain. Diagnoses of SOB (shortness of breath), Cough, Muscle cramps, Hepatitis C virus infection without hepatic coma, unspecified chronicity, ETOH abuse, Essential hypertension, and Tachycardia were also pertinent to this visit.       Electronically signed by Kwesi Sanchez MD on 3/23/2021 at 5:08 PM

## 2021-03-24 ENCOUNTER — HOSPITAL ENCOUNTER (INPATIENT)
Age: 61
LOS: 7 days | Discharge: HOME | DRG: 871 | End: 2021-03-31
Attending: EMERGENCY MEDICINE | Admitting: FAMILY MEDICINE
Payer: COMMERCIAL

## 2021-03-24 ENCOUNTER — APPOINTMENT (OUTPATIENT)
Dept: CT IMAGING | Age: 61
DRG: 871 | End: 2021-03-24
Payer: COMMERCIAL

## 2021-03-24 ENCOUNTER — APPOINTMENT (OUTPATIENT)
Dept: GENERAL RADIOLOGY | Age: 61
DRG: 871 | End: 2021-03-24
Payer: COMMERCIAL

## 2021-03-24 DIAGNOSIS — E87.20 METABOLIC ACIDOSIS: ICD-10-CM

## 2021-03-24 DIAGNOSIS — I95.9 HYPOTENSION, UNSPECIFIED HYPOTENSION TYPE: ICD-10-CM

## 2021-03-24 DIAGNOSIS — J18.9 PNEUMONIA DUE TO ORGANISM: Primary | ICD-10-CM

## 2021-03-24 DIAGNOSIS — F10.11 HISTORY OF ALCOHOL ABUSE: ICD-10-CM

## 2021-03-24 DIAGNOSIS — N28.9 ACUTE RENAL INSUFFICIENCY: ICD-10-CM

## 2021-03-24 LAB
ADENOVIRUS BY PCR: NOT DETECTED
ALBUMIN SERPL-MCNC: 3.2 G/DL (ref 3.5–5.2)
ALP BLD-CCNC: 126 U/L (ref 40–129)
ALT SERPL-CCNC: 30 U/L (ref 0–40)
AMMONIA: 29.9 UMOL/L (ref 16–60)
ANION GAP SERPL CALCULATED.3IONS-SCNC: 13 MMOL/L (ref 7–16)
AST SERPL-CCNC: 23 U/L (ref 0–39)
B.E.: -8.1 MMOL/L (ref -3–3)
BACTERIA: ABNORMAL /HPF
BASOPHILS ABSOLUTE: 0.07 E9/L (ref 0–0.2)
BASOPHILS RELATIVE PERCENT: 0.3 % (ref 0–2)
BILIRUB SERPL-MCNC: 0.4 MG/DL (ref 0–1.2)
BILIRUBIN URINE: NEGATIVE
BLOOD, URINE: ABNORMAL
BORDETELLA PARAPERTUSSIS BY PCR: NOT DETECTED
BORDETELLA PERTUSSIS BY PCR: NOT DETECTED
BUN BLDV-MCNC: 48 MG/DL (ref 8–23)
C-REACTIVE PROTEIN: 25 MG/DL (ref 0–0.4)
CALCIUM SERPL-MCNC: 8.6 MG/DL (ref 8.6–10.2)
CHLAMYDOPHILIA PNEUMONIAE BY PCR: NOT DETECTED
CHLORIDE BLD-SCNC: 99 MMOL/L (ref 98–107)
CLARITY: CLEAR
CO2: 16 MMOL/L (ref 22–29)
COHB: 0.7 % (ref 0–1.5)
COLOR: YELLOW
CORONAVIRUS 229E BY PCR: NOT DETECTED
CORONAVIRUS HKU1 BY PCR: NOT DETECTED
CORONAVIRUS NL63 BY PCR: NOT DETECTED
CORONAVIRUS OC43 BY PCR: NOT DETECTED
CREAT SERPL-MCNC: 1.6 MG/DL (ref 0.7–1.2)
CRITICAL: ABNORMAL
DATE ANALYZED: ABNORMAL
DATE OF COLLECTION: ABNORMAL
DOHLE BODIES: ABNORMAL
EKG ATRIAL RATE: 117 BPM
EKG P AXIS: 57 DEGREES
EKG P-R INTERVAL: 162 MS
EKG Q-T INTERVAL: 306 MS
EKG QRS DURATION: 86 MS
EKG QTC CALCULATION (BAZETT): 426 MS
EKG R AXIS: 1 DEGREES
EKG T AXIS: 60 DEGREES
EKG VENTRICULAR RATE: 117 BPM
EOSINOPHILS ABSOLUTE: 0.07 E9/L (ref 0.05–0.5)
EOSINOPHILS RELATIVE PERCENT: 0.3 % (ref 0–6)
GFR AFRICAN AMERICAN: 53
GFR NON-AFRICAN AMERICAN: 44 ML/MIN/1.73
GLUCOSE BLD-MCNC: 319 MG/DL (ref 74–99)
GLUCOSE URINE: 250 MG/DL
HCO3: 15 MMOL/L (ref 22–26)
HCT VFR BLD CALC: 35.4 % (ref 37–54)
HEMOGLOBIN: 12.2 G/DL (ref 12.5–16.5)
HHB: 8 % (ref 0–5)
HUMAN METAPNEUMOVIRUS BY PCR: NOT DETECTED
HUMAN RHINOVIRUS/ENTEROVIRUS BY PCR: DETECTED
HYALINE CASTS: ABNORMAL /LPF (ref 0–2)
IMMATURE GRANULOCYTES #: 0.15 E9/L
IMMATURE GRANULOCYTES %: 0.5 % (ref 0–5)
INFLUENZA A BY PCR: NOT DETECTED
INFLUENZA B BY PCR: NOT DETECTED
INR BLD: 1.3
KETONES, URINE: NEGATIVE MG/DL
LAB: ABNORMAL
LACTIC ACID, SEPSIS: 1.9 MMOL/L (ref 0.5–1.9)
LACTIC ACID, SEPSIS: 3.7 MMOL/L (ref 0.5–1.9)
LEUKOCYTE ESTERASE, URINE: NEGATIVE
LYMPHOCYTES ABSOLUTE: 0.3 E9/L (ref 1.5–4)
LYMPHOCYTES RELATIVE PERCENT: 1.1 % (ref 20–42)
Lab: ABNORMAL
MCH RBC QN AUTO: 33.7 PG (ref 26–35)
MCHC RBC AUTO-ENTMCNC: 34.5 % (ref 32–34.5)
MCV RBC AUTO: 97.8 FL (ref 80–99.9)
METHB: 0.2 % (ref 0–1.5)
MODE: ABNORMAL
MONOCYTES ABSOLUTE: 1.38 E9/L (ref 0.1–0.95)
MONOCYTES RELATIVE PERCENT: 4.9 % (ref 2–12)
MUCUS: PRESENT /LPF
MYCOPLASMA PNEUMONIAE BY PCR: NOT DETECTED
NEUTROPHILS ABSOLUTE: 25.91 E9/L (ref 1.8–7.3)
NEUTROPHILS RELATIVE PERCENT: 92.9 % (ref 43–80)
NITRITE, URINE: NEGATIVE
O2 CONTENT: 16.3 ML/DL
O2 SATURATION: 91.9 % (ref 92–98.5)
O2HB: 91.1 % (ref 94–97)
OPERATOR ID: 2260
PARAINFLUENZA VIRUS 1 BY PCR: NOT DETECTED
PARAINFLUENZA VIRUS 2 BY PCR: NOT DETECTED
PARAINFLUENZA VIRUS 3 BY PCR: NOT DETECTED
PARAINFLUENZA VIRUS 4 BY PCR: NOT DETECTED
PATIENT TEMP: 37 C
PCO2: 25 MMHG (ref 35–45)
PDW BLD-RTO: 11.9 FL (ref 11.5–15)
PH BLOOD GAS: 7.4 (ref 7.35–7.45)
PH UA: 5.5 (ref 5–9)
PLATELET # BLD: 210 E9/L (ref 130–450)
PMV BLD AUTO: 10.3 FL (ref 7–12)
PO2: 61.2 MMHG (ref 75–100)
POTASSIUM REFLEX MAGNESIUM: 4 MMOL/L (ref 3.5–5)
PRO-BNP: 843 PG/ML (ref 0–125)
PROTEIN UA: 30 MG/DL
PROTHROMBIN TIME: 13.9 SEC (ref 9.3–12.4)
RBC # BLD: 3.62 E12/L (ref 3.8–5.8)
RBC # BLD: NORMAL 10*6/UL
RBC UA: ABNORMAL /HPF (ref 0–2)
RESPIRATORY SYNCYTIAL VIRUS BY PCR: NOT DETECTED
SARS-COV-2, NAAT: NOT DETECTED
SARS-COV-2, PCR: NOT DETECTED
SEDIMENTATION RATE, ERYTHROCYTE: 81 MM/HR (ref 0–15)
SODIUM BLD-SCNC: 128 MMOL/L (ref 132–146)
SOURCE, BLOOD GAS: ABNORMAL
SPECIFIC GRAVITY UA: 1.02 (ref 1–1.03)
THB: 12.7 G/DL (ref 11.5–16.5)
TIME ANALYZED: 2130
TOTAL PROTEIN: 7.4 G/DL (ref 6.4–8.3)
TROPONIN: <0.01 NG/ML (ref 0–0.03)
UROBILINOGEN, URINE: 1 E.U./DL
WBC # BLD: 27.9 E9/L (ref 4.5–11.5)
WBC UA: ABNORMAL /HPF (ref 0–5)

## 2021-03-24 PROCEDURE — 82805 BLOOD GASES W/O2 SATURATION: CPT

## 2021-03-24 PROCEDURE — 86703 HIV-1/HIV-2 1 RESULT ANTBDY: CPT

## 2021-03-24 PROCEDURE — 87150 DNA/RNA AMPLIFIED PROBE: CPT

## 2021-03-24 PROCEDURE — 2580000003 HC RX 258: Performed by: GENERAL PRACTICE

## 2021-03-24 PROCEDURE — 87635 SARS-COV-2 COVID-19 AMP PRB: CPT

## 2021-03-24 PROCEDURE — 93005 ELECTROCARDIOGRAM TRACING: CPT | Performed by: GENERAL PRACTICE

## 2021-03-24 PROCEDURE — 87040 BLOOD CULTURE FOR BACTERIA: CPT

## 2021-03-24 PROCEDURE — 85025 COMPLETE CBC W/AUTO DIFF WBC: CPT

## 2021-03-24 PROCEDURE — 86140 C-REACTIVE PROTEIN: CPT

## 2021-03-24 PROCEDURE — 85651 RBC SED RATE NONAUTOMATED: CPT

## 2021-03-24 PROCEDURE — 87186 SC STD MICRODIL/AGAR DIL: CPT

## 2021-03-24 PROCEDURE — 93010 ELECTROCARDIOGRAM REPORT: CPT | Performed by: INTERNAL MEDICINE

## 2021-03-24 PROCEDURE — 6360000002 HC RX W HCPCS: Performed by: INTERNAL MEDICINE

## 2021-03-24 PROCEDURE — 87088 URINE BACTERIA CULTURE: CPT

## 2021-03-24 PROCEDURE — 81001 URINALYSIS AUTO W/SCOPE: CPT

## 2021-03-24 PROCEDURE — 71250 CT THORAX DX C-: CPT

## 2021-03-24 PROCEDURE — 82140 ASSAY OF AMMONIA: CPT

## 2021-03-24 PROCEDURE — 83880 ASSAY OF NATRIURETIC PEPTIDE: CPT

## 2021-03-24 PROCEDURE — 2500000003 HC RX 250 WO HCPCS: Performed by: GENERAL PRACTICE

## 2021-03-24 PROCEDURE — 80053 COMPREHEN METABOLIC PANEL: CPT

## 2021-03-24 PROCEDURE — 85610 PROTHROMBIN TIME: CPT

## 2021-03-24 PROCEDURE — 84484 ASSAY OF TROPONIN QUANT: CPT

## 2021-03-24 PROCEDURE — 87449 NOS EACH ORGANISM AG IA: CPT

## 2021-03-24 PROCEDURE — 36600 WITHDRAWAL OF ARTERIAL BLOOD: CPT

## 2021-03-24 PROCEDURE — 1200000000 HC SEMI PRIVATE

## 2021-03-24 PROCEDURE — 96360 HYDRATION IV INFUSION INIT: CPT

## 2021-03-24 PROCEDURE — 99285 EMERGENCY DEPT VISIT HI MDM: CPT

## 2021-03-24 PROCEDURE — 99223 1ST HOSP IP/OBS HIGH 75: CPT | Performed by: INTERNAL MEDICINE

## 2021-03-24 PROCEDURE — 6360000002 HC RX W HCPCS: Performed by: GENERAL PRACTICE

## 2021-03-24 PROCEDURE — 71045 X-RAY EXAM CHEST 1 VIEW: CPT

## 2021-03-24 PROCEDURE — 87077 CULTURE AEROBIC IDENTIFY: CPT

## 2021-03-24 PROCEDURE — 83605 ASSAY OF LACTIC ACID: CPT

## 2021-03-24 PROCEDURE — 0202U NFCT DS 22 TRGT SARS-COV-2: CPT

## 2021-03-24 PROCEDURE — 2580000003 HC RX 258: Performed by: INTERNAL MEDICINE

## 2021-03-24 PROCEDURE — 6370000000 HC RX 637 (ALT 250 FOR IP): Performed by: INTERNAL MEDICINE

## 2021-03-24 RX ORDER — BUPRENORPHINE HYDROCHLORIDE AND NALOXONE HYDROCHLORIDE DIHYDRATE 8; 2 MG/1; MG/1
1 TABLET SUBLINGUAL 2 TIMES DAILY
Status: DISCONTINUED | OUTPATIENT
Start: 2021-03-25 | End: 2021-03-31 | Stop reason: HOSPADM

## 2021-03-24 RX ORDER — SODIUM CHLORIDE 0.9 % (FLUSH) 0.9 %
10 SYRINGE (ML) INJECTION PRN
Status: DISCONTINUED | OUTPATIENT
Start: 2021-03-24 | End: 2021-03-26 | Stop reason: SDUPTHER

## 2021-03-24 RX ORDER — M-VIT,TX,IRON,MINS/CALC/FOLIC 27MG-0.4MG
1 TABLET ORAL DAILY
COMMUNITY

## 2021-03-24 RX ORDER — ONDANSETRON 2 MG/ML
4 INJECTION INTRAMUSCULAR; INTRAVENOUS EVERY 6 HOURS PRN
Status: DISCONTINUED | OUTPATIENT
Start: 2021-03-24 | End: 2021-03-31 | Stop reason: HOSPADM

## 2021-03-24 RX ORDER — BUPRENORPHINE HYDROCHLORIDE AND NALOXONE HYDROCHLORIDE DIHYDRATE 8; 2 MG/1; MG/1
1 TABLET SUBLINGUAL ONCE
Status: COMPLETED | OUTPATIENT
Start: 2021-03-24 | End: 2021-03-24

## 2021-03-24 RX ORDER — FLUTICASONE PROPIONATE 50 MCG
1 SPRAY, SUSPENSION (ML) NASAL DAILY
COMMUNITY

## 2021-03-24 RX ORDER — DIPHENHYDRAMINE HCL 25 MG
50 TABLET ORAL NIGHTLY PRN
COMMUNITY

## 2021-03-24 RX ORDER — BUPRENORPHINE AND NALOXONE 8; 2 MG/1; MG/1
1 FILM, SOLUBLE BUCCAL; SUBLINGUAL 2 TIMES DAILY
Status: DISCONTINUED | OUTPATIENT
Start: 2021-03-24 | End: 2021-03-24 | Stop reason: SDUPTHER

## 2021-03-24 RX ORDER — THIAMINE HYDROCHLORIDE 100 MG/ML
100 INJECTION, SOLUTION INTRAMUSCULAR; INTRAVENOUS ONCE
Status: COMPLETED | OUTPATIENT
Start: 2021-03-24 | End: 2021-03-24

## 2021-03-24 RX ORDER — POLYETHYLENE GLYCOL 3350 17 G/17G
17 POWDER, FOR SOLUTION ORAL DAILY PRN
Status: DISCONTINUED | OUTPATIENT
Start: 2021-03-24 | End: 2021-03-31 | Stop reason: HOSPADM

## 2021-03-24 RX ORDER — GUAIFENESIN AND DEXTROMETHORPHAN HYDROBROMIDE 400; 20 MG/1; MG/1
0.5 TABLET ORAL EVERY 8 HOURS PRN
Status: DISCONTINUED | OUTPATIENT
Start: 2021-03-24 | End: 2021-03-31 | Stop reason: HOSPADM

## 2021-03-24 RX ORDER — PROMETHAZINE HYDROCHLORIDE 25 MG/1
12.5 TABLET ORAL EVERY 6 HOURS PRN
Status: DISCONTINUED | OUTPATIENT
Start: 2021-03-24 | End: 2021-03-31 | Stop reason: HOSPADM

## 2021-03-24 RX ORDER — SODIUM CHLORIDE 0.9 % (FLUSH) 0.9 %
10 SYRINGE (ML) INJECTION EVERY 12 HOURS SCHEDULED
Status: DISCONTINUED | OUTPATIENT
Start: 2021-03-24 | End: 2021-03-26 | Stop reason: SDUPTHER

## 2021-03-24 RX ORDER — SODIUM CHLORIDE 9 MG/ML
INJECTION, SOLUTION INTRAVENOUS CONTINUOUS
Status: DISCONTINUED | OUTPATIENT
Start: 2021-03-24 | End: 2021-03-29

## 2021-03-24 RX ORDER — 0.9 % SODIUM CHLORIDE 0.9 %
1000 INTRAVENOUS SOLUTION INTRAVENOUS ONCE
Status: COMPLETED | OUTPATIENT
Start: 2021-03-24 | End: 2021-03-24

## 2021-03-24 RX ORDER — ACETAMINOPHEN 650 MG/1
650 SUPPOSITORY RECTAL EVERY 6 HOURS PRN
Status: DISCONTINUED | OUTPATIENT
Start: 2021-03-24 | End: 2021-03-31 | Stop reason: HOSPADM

## 2021-03-24 RX ORDER — DIPHENHYDRAMINE HCL 25 MG
50 TABLET ORAL NIGHTLY PRN
Status: DISCONTINUED | OUTPATIENT
Start: 2021-03-24 | End: 2021-03-31 | Stop reason: HOSPADM

## 2021-03-24 RX ORDER — SODIUM CHLORIDE 9 MG/ML
INJECTION, SOLUTION INTRAVENOUS ONCE
Status: DISCONTINUED | OUTPATIENT
Start: 2021-03-24 | End: 2021-03-24

## 2021-03-24 RX ORDER — BUPRENORPHINE HYDROCHLORIDE AND NALOXONE HYDROCHLORIDE DIHYDRATE 8; 2 MG/1; MG/1
1 TABLET SUBLINGUAL DAILY
Status: DISCONTINUED | OUTPATIENT
Start: 2021-03-25 | End: 2021-03-24

## 2021-03-24 RX ORDER — ACETAMINOPHEN 325 MG/1
650 TABLET ORAL EVERY 6 HOURS PRN
Status: DISCONTINUED | OUTPATIENT
Start: 2021-03-24 | End: 2021-03-31 | Stop reason: HOSPADM

## 2021-03-24 RX ORDER — FLUTICASONE PROPIONATE 50 MCG
1 SPRAY, SUSPENSION (ML) NASAL DAILY
Status: DISCONTINUED | OUTPATIENT
Start: 2021-03-24 | End: 2021-03-31 | Stop reason: HOSPADM

## 2021-03-24 RX ADMIN — CEFEPIME HYDROCHLORIDE 2000 MG: 2 INJECTION, POWDER, FOR SOLUTION INTRAVENOUS at 16:12

## 2021-03-24 RX ADMIN — FLUTICASONE PROPIONATE 1 SPRAY: 50 SPRAY, METERED NASAL at 22:23

## 2021-03-24 RX ADMIN — THIAMINE HYDROCHLORIDE 100 MG: 100 INJECTION, SOLUTION INTRAMUSCULAR; INTRAVENOUS at 22:23

## 2021-03-24 RX ADMIN — ENOXAPARIN SODIUM 40 MG: 40 INJECTION SUBCUTANEOUS at 22:23

## 2021-03-24 RX ADMIN — VANCOMYCIN HYDROCHLORIDE 1750 MG: 5 INJECTION, POWDER, LYOPHILIZED, FOR SOLUTION INTRAVENOUS at 22:23

## 2021-03-24 RX ADMIN — DIPHENHYDRAMINE HCL 50 MG: 25 TABLET ORAL at 22:22

## 2021-03-24 RX ADMIN — SODIUM CHLORIDE 1000 ML: 9 INJECTION, SOLUTION INTRAVENOUS at 13:12

## 2021-03-24 RX ADMIN — BUPRENORPHINE HYDROCHLORIDE AND NALOXONE HYDROCHLORIDE DIHYDRATE 1 TABLET: 8; 2 TABLET SUBLINGUAL at 23:02

## 2021-03-24 RX ADMIN — DOXYCYCLINE 100 MG: 100 INJECTION, POWDER, LYOPHILIZED, FOR SOLUTION INTRAVENOUS at 17:42

## 2021-03-24 RX ADMIN — SODIUM CHLORIDE 1000 ML: 9 INJECTION, SOLUTION INTRAVENOUS at 13:11

## 2021-03-24 RX ADMIN — SODIUM CHLORIDE, PRESERVATIVE FREE 10 ML: 5 INJECTION INTRAVENOUS at 22:22

## 2021-03-24 ASSESSMENT — PAIN SCALES - GENERAL: PAINLEVEL_OUTOF10: 0

## 2021-03-24 ASSESSMENT — ENCOUNTER SYMPTOMS
DIARRHEA: 0
SINUS PRESSURE: 0
COUGH: 1
VOMITING: 0
EYE PAIN: 0
NAUSEA: 0
BACK PAIN: 0
WHEEZING: 0
ABDOMINAL PAIN: 0
EYE REDNESS: 0
SORE THROAT: 0
SHORTNESS OF BREATH: 0
EYE DISCHARGE: 0

## 2021-03-24 ASSESSMENT — PAIN DESCRIPTION - PROGRESSION: CLINICAL_PROGRESSION: NOT CHANGED

## 2021-03-24 ASSESSMENT — PAIN DESCRIPTION - FREQUENCY: FREQUENCY: INTERMITTENT

## 2021-03-24 ASSESSMENT — PAIN DESCRIPTION - LOCATION: LOCATION: CHEST

## 2021-03-24 ASSESSMENT — PAIN DESCRIPTION - DESCRIPTORS: DESCRIPTORS: PATIENT UNABLE TO DESCRIBE

## 2021-03-24 NOTE — ED NOTES
Pt's spo2 89-91%. This RN placed on pt on 2lC.  Resident and attending notified     Albino Chase RN  03/24/21 [id number]

## 2021-03-24 NOTE — H&P
[place] [id number] [facility]   History and Physical      CHIEF COMPLAINT:  dizziness    History of Present Illness: pt is a 56 y.o. male who presents to hospital for dizziness. Pt states that he has been having dizziness and seeing spots at times for 2 weeks. This has worsened in last 24-48 hours. Pt has been coughing up phlegm but believes it is from post nasal gtt which he has been having. Pt recounts that he had similar experience with all these symptoms a year ago at this time. Everything improved but still has persistent productive cough. Pt does admit that it is worse in morning. Pt denies fevers, chills,n/v, abd pain, diarrhea, constipation, melena, hematochezia, change in urination, dysuria, or hematuria. Last cigarette was today. Pt notes that he has a 24 ounce beer and couple of shots a fireball a day and does not have withdrawal symptoms if he should not have it. Pt smoked for about 50 years. Pt denies muscle or joint pain. Pt also relays that he does have difficulty with his food lately and has to be careful that he does not choke. Pt notes that he went to his pcp office and was noted to have low bp. According to records, he had received steroids during that visit. Pt would be a fair historian but would note that he denied aches or pains but did complain of muscle cramps and had to have right shoulder injected yesterday at the pcp's office. Also, pt was noted to have 101.4 temp noted in office but pt had denied having fever. Also noted that pt had been avoiding alcohol and cramps have improved. REVIEW OF SYSTEMS:    A detailed system review was conducted with patient and is negative unless stated in hpi.         PMH:  Past Medical History:   Diagnosis Date    Hepatitis C     liver bx wnl. genotype is the best possible    Herniated disc, cervical        Surgical History:  Past Surgical History:   Procedure Laterality Date    ANTERIOR CRUCIATE LIGAMENT REPAIR  1990s    left, [place]    ANTERIOR CRUCIATE LIGAMENT REPAIR  2009    right ACL repair, Dr. Itzel Almaraz COLONOSCOPY  2009    [facility]  5/20/2014    right indirect inguinal herniorrhaphy with mesh, Dr. Dorcas Emanuel, [place] [address] Right 5/20/14    with mesh    NECK SURGERY  1992    neck fusion C6 and C7, [facility] SHOULDER SURGERY  2007    left, removal spur, Dr. Coby Muse  2007    [place], [place] cl[place]       Medications Prior to Admission:    Prior to Admission medications    Medication Sig Start Date End Date Taking? Authorizing Provider   fluticasone (FLONASE) 50 MCG/ACT nasal spray 1 spray by Each Nostril route daily   Yes Historical Provider, MD   Multiple Vitamins-Minerals (THERAPEUTIC MULTIVITAMIN-MINERALS) tablet Take 1 tablet by mouth daily   Yes Historical Provider, MD   dextromethorphan-guaiFENesin (MUCINEX DM)  MG per extended release tablet Take 0.5 tablets by mouth every 12 hours as needed   Yes Historical Provider, MD   diphenhydrAMINE (BENADRYL) 25 MG tablet Take 50 mg by mouth nightly as needed for Sleep   Yes Historical Provider, MD   albuterol sulfate HFA (VENTOLIN HFA) 108 (90 Base) MCG/ACT inhaler Inhale 2 puffs into the lungs 4 times daily as needed for Wheezing 3/23/21  Yes Damion France MD   benzonatate (TESSALON PERLES) 100 MG capsule Take 1 capsule by mouth 3 times daily as needed for Cough 3/23/21 3/30/21 Yes Damion France MD   predniSONE (DELTASONE) 20 MG tablet Take 2 tablets by mouth daily for 5 days 3/23/21 3/28/21 Yes Damion France MD   lisinopril (PRINIVIL;ZESTRIL) 40 MG tablet Take 1 tablet by mouth daily 12/16/20  Yes Damion France MD   buprenorphine-naloxone (SUBOXONE) 8-2 MG FILM SL film Place 1 Film under the tongue 2 times daily. 7/9/20  Yes Historical Provider, MD       Allergies:    Patient has no known allergies.     Social History:    reports 03/24/2021    MCH 33.7 03/24/2021    MCHC 34.5 03/24/2021    RDW 11.9 03/24/2021    METASPCT 2.6 03/23/2021    LYMPHOPCT 1.1 03/24/2021    MONOPCT 4.9 03/24/2021    BASOPCT 0.3 03/24/2021    MONOSABS 1.38 03/24/2021    LYMPHSABS 0.30 03/24/2021    EOSABS 0.07 03/24/2021    BASOSABS 0.07 03/24/2021     CMP:    Lab Results   Component Value Date     03/24/2021    K 4.0 03/24/2021    CL 99 03/24/2021    CO2 16 03/24/2021    BUN 48 03/24/2021    CREATININE 1.6 03/24/2021    GFRAA 53 03/24/2021    LABGLOM 44 03/24/2021    GLUCOSE 319 03/24/2021    PROT 7.4 03/24/2021    LABALBU 3.2 03/24/2021    CALCIUM 8.6 03/24/2021    BILITOT 0.4 03/24/2021    ALKPHOS 126 03/24/2021    AST 23 03/24/2021    ALT 30 03/24/2021     PT/INR:    Lab Results   Component Value Date    PROTIME 13.9 03/24/2021    INR 1.3 03/24/2021     Troponin:    Lab Results   Component Value Date    TROPONINI <0.01 03/24/2021       Radiology: Ct Chest Wo Contrast    Result Date: 3/24/2021  EXAMINATION: CT OF THE CHEST WITHOUT CONTRAST 3/24/2021 1:58 pm TECHNIQUE: CT of the chest was performed without the administration of intravenous contrast. Multiplanar reformatted images are provided for review. Dose modulation, iterative reconstruction, and/or weight based adjustment of the mA/kV was utilized to reduce the radiation dose to as low as reasonably achievable. COMPARISON: Chest radiograph 03/24/2021. Chest CT 06/12/2020. HISTORY: ORDERING SYSTEM PROVIDED HISTORY: chest pain TECHNOLOGIST PROVIDED HISTORY: Rule out PE Reason for exam:->chest pain FINDINGS: PE cannot be excluded without IV contrast.  Normal caliber pulmonary arteries. Normal caliber thoracic aorta with slight calcified atherosclerotic plaque. Normal cardiac size without pericardial effusion. No hilar mass or mediastinal adenopathy. No axillary or supraclavicular adenopathy. Normal-appearing esophagus. 2 mm and 3 mm nonobstructing left renal calculi.   No hydronephrosis in the imaged portion of the kidneys. Clear central airways. The left lung appears clear. A large opacity with scattered consolidation and air bronchograms is present in the posterior, medial, inferior aspect of the right upper lobe, more prominent medially than laterally. It abuts the pleural surface. Greatest consolidation is noted inferiorly. Inferior margin abuts the minor and major fissures. No definite extension into the right middle lobe and right lower lobe. It is new from June 2020 chest CT. No definite well-defined mass although soft tissue lesion cannot be excluded in the region of greatest inferior consolidation. Large densely consolidated right upper lobe infiltrate most compatible with pneumonia in the acute setting. Recommend follow-up chest CT in 3 months to exclude underlying mass in the region of greatest consolidation PE cannot be excluded without IV contrast Nonobstructing left renal calculi     Xr Chest Portable    Result Date: 3/24/2021  EXAMINATION: ONE XRAY VIEW OF THE CHEST 3/24/2021 1:07 pm COMPARISON: 06/12/2020 HISTORY: ORDERING SYSTEM PROVIDED HISTORY: chest pain TECHNOLOGIST PROVIDED HISTORY: Reason for exam:->chest pain FINDINGS: There is a large infiltrate seen within the right upper lobe. The infiltrate abuts the minor fissure and is seen  extending along the right paratracheal region. The left lung is clear. There is no pleural effusion. The heart is not enlarged. Right upper lobe infiltrate. Repeat CT scan is recommended in 10-14 days to confirm partial resolution and exclude underlying pathology. Given that the infiltrate abuts the minor fissure and abuts the right paratracheal region underlying pathology cannot be excluded. ASSESSMENT:      Active Problems:    Pneumonia due to organism  Resolved Problems:    * No resolved hospital problems. *      PLAN:    1. Pneumonia likely bacterial  Will give rocephin and zithromax and tx as CAP.  Given no current bronchospasm, will monitor off steroids. Will ask pulm to see given the rul infiltrate, inconsistent hx, and similar experience a year ago. 2. Dysphagia will asks speech to evaluate  3. Hypotension favor hypovolemia due to insensible losses of fever as well as recently placed on steroids and ua noted glucosuria. Will continue iv fluids. 4. Hyperglycemia. Pt did receive steroids. Pt denies dm and that his a1c was reported low to him, however, will recheck a1c  5. htn hold bp med  6. Dehydration continue iv fluids  7. Alcohol abuse pt denies withdrawal symptoms but will need to monitor closely for withdrawal. rec cessation  8. Tobacco abuse rec cessation  9. Hyperkalemia improved  10. Acidosis/elevated lactic acid level monitor  11. raeann continue iv fluids.          Electronically signed by David Sevilla DO on 3/24/2021 at 7:28 PM

## 2021-03-24 NOTE — ED PROVIDER NOTES
ED  Provider Note  Admit Date/RoomTime: 3/24/2021 12:24 PM  ED Room: 13/13     HPI:   Sherwin Thomas is a 56 y.o. male presenting to the ED for dizziness, beginning 48 hours ago. History comes primarily from the patient. Past medical history includes otitis C, hypertension. The complaint has been persistent, moderate in severity, improved by nothing and worsened by nothing. Associated symptoms include lightheadedness, difficulty with focusing, cough. Dolly Rooney states that he has been following with his primary care provider due to his chronic hypertension for which she is taking lisinopril. He states he has felt lightheaded and dizzy over the course the last week to 2 weeks, and this has become significantly worse in the last 48 hours. For this reason he saw his primary care provider yesterday who noted that his blood pressure was slightly lower than normal with a pressure in the 47D systolic. Laboratory studies were drawn and the patient was sent home to await results of these tests. He was notified today that he had numerous elevated laboratory studies including a white blood cell count of 25, a creatinine of 2.0 (most recently 0.8 in July of last year), and an elevated D-dimer. For this reason he was directed to [facility] emergency department for further evaluation and treatment. Patient also notes as an aside that his children have been having coughing and congestive symptoms for the last several days and he started to develop a cough as well. On arrival, the patient was assessed with history, physical exam, imaging studies, laboratory studies and ekg, vital signs. Vital signs were notable on arrival for a hypertension of 71/48, a tachycardia 129. The patient was afebrile. .             Review of Systems   Constitutional: Positive for activity change and fatigue. Negative for chills and fever. HENT: Negative for ear pain, sinus pressure and sore throat.     Eyes: Negative for pain, discharge and redness. Respiratory: Positive for cough. Negative for shortness of breath and wheezing. Cardiovascular: Negative for chest pain. Gastrointestinal: Negative for abdominal pain, diarrhea, nausea and vomiting. Genitourinary: Negative for dysuria and frequency. Musculoskeletal: Negative for arthralgias and back pain. Skin: Negative for rash and wound. Neurological: Positive for dizziness and light-headedness. Negative for weakness and headaches. Hematological: Negative for adenopathy. All other systems reviewed and are negative. Physical Exam  Vitals signs and nursing note reviewed. Constitutional:       General: He is not in acute distress. Appearance: He is well-developed. He is not diaphoretic. HENT:      Head: Normocephalic and atraumatic. Mouth/Throat:      Mouth: Mucous membranes are dry. Dentition: Abnormal dentition. Eyes:      Pupils: Pupils are equal, round, and reactive to light. Neck:      Musculoskeletal: Normal range of motion. Vascular: No JVD. Trachea: No tracheal deviation. Cardiovascular:      Rate and Rhythm: Regular rhythm. Tachycardia present. Heart sounds: No murmur. No friction rub. No gallop. Pulmonary:      Effort: Pulmonary effort is normal. No respiratory distress. Breath sounds: Normal breath sounds. No stridor. No wheezing or rales. Chest:      Chest wall: No tenderness. Abdominal:      General: Bowel sounds are normal. There is no distension. Palpations: Abdomen is soft. Tenderness: There is no abdominal tenderness. There is no guarding. Musculoskeletal: Normal range of motion. Skin:     General: Skin is warm and dry. Capillary Refill: Capillary refill takes less than 2 seconds. Neurological:      General: No focal deficit present. Mental Status: He is alert. Cranial Nerves: No cranial nerve deficit.    Psychiatric:         Behavior: Behavior normal.        EKG #1: Interpreted by emergency department physician unless otherwise noted. Time:  1241    Rate: 117 bpm  Rhythm: Sinus Tachycardia. Interpretation: Sinus tachycardia. Procedures     MDM  Number of Diagnoses or Management Options  Acute renal insufficiency  History of alcohol abuse  Hypotension, unspecified hypotension type  Metabolic acidosis  Pneumonia due to organism  Diagnosis management comments: Patient's workup in the emergency department was concerning for findings of dense focal consolidation in the right upper lobe with associated marked leukocytosis consistent with bacterial pneumonia. Ana Castillo was also noted to be hypotensive and in renal failure on arrival. His hypotension was fluid responsive and his vital signs did stabilize while in the emergency department, however his findings place him at risk for worsening decompensation due to his infectious processes. Ana Castillo will require IV antibiotics and further assessment in the inpatient setting. Due to his past medical history of drug abuse there was concern for atypical bacteria and broad spectrum coverage was administered. Patient was discussed with accepting hospitalist (Dr. Kenny Rothman) who has agreed to accept the patient.           --------------------------------------------- PAST HISTORY ---------------------------------------------  Past Medical History:  has a past medical history of Hepatitis C and Herniated disc, cervical.    Past Surgical History:  has a past surgical history that includes Anterior cruciate ligament repair (1990s); Neck surgery (1992); shoulder surgery (2007); Skin cancer excision (2007); Anterior cruciate ligament repair (2009); Colonoscopy (2009); Inguinal hernia repair (5/20/2014); and Inguinal hernia repair (Right, 5/20/14). Social History:  reports that he has been smoking cigarettes. He has a 20.00 pack-year smoking history.  He has never used smokeless tobacco. He reports current alcohol use of about 38.0 standard drinks of alcohol per week. He reports current drug use. Drug: Opiates . Family History: family history includes Cancer in his father; Heart Attack (age of onset: 68) in his mother; Parkinsonism in his paternal uncle. The patients home medications have been reviewed. Allergies: Patient has no known allergies.     -------------------------------------------------- RESULTS -------------------------------------------------    LABS:  Results for orders placed or performed during the hospital encounter of 03/24/21   SARS-CoV-2 NAAT (Rapid)    Specimen: Nasopharyngeal Swab   Result Value Ref Range    SARS-CoV-2, NAAT Not Detected Not Detected   Culture, Blood 1    Specimen: Blood   Result Value Ref Range    Blood Culture, Routine (A)      Gram stain performed from blood culture bottle media  Gram positive cocci in chains     Culture, Blood 2    Specimen: Blood   Result Value Ref Range    Culture, Blood 2 (A)      Gram stain performed from blood culture bottle media  Gram positive cocci in chains     Respiratory Panel, Molecular, with COVID-19 (Restricted: peds pts or suitable admitted adults)    Specimen: Nasopharyngeal   Result Value Ref Range    Adenovirus by PCR Not Detected Not Detected    Bordetella parapertussis by PCR Not Detected Not Detected    Bordetella pertussis by PCR Not Detected Not Detected    Chlamydophilia pneumoniae by PCR Not Detected Not Detected    Coronavirus 229E by PCR Not Detected Not Detected    Coronavirus HKU1 by PCR Not Detected Not Detected    Coronavirus NL63 by PCR Not Detected Not Detected    Coronavirus OC43 by PCR Not Detected Not Detected    SARS-CoV-2, PCR Not Detected Not Detected    Human Metapneumovirus by PCR Not Detected Not Detected    Human Rhinovirus/Enterovirus by PCR DETECTED (A) Not Detected    Influenza A by PCR Not Detected Not Detected    Influenza B by PCR Not Detected Not Detected    Mycoplasma pneumoniae by PCR Not Detected Not Detected    Parainfluenza Virus 1 by PCR Not Detected Not Detected    Parainfluenza Virus 2 by PCR Not Detected Not Detected    Parainfluenza Virus 3 by PCR Not Detected Not Detected    Parainfluenza Virus 4 by PCR Not Detected Not Detected    Respiratory Syncytial Virus by PCR Not Detected Not Detected   LEGIONELLA ANTIGEN, URINE    Specimen: Urine, clean catch   Result Value Ref Range    L. pneumophila Serogp 1 Ur Ag       Presumptive Negative -suggesting no recent or current infections  with Legionella pneumophila serogroup 1. Infection to Legionella cannot be ruled out since other serogroups  and species may cause infection, antigen may not be present in  early infection, or level of antigen may be below the  detection limit. Normal Range: Presumptive Negative     Strep Pneumoniae Antigen    Specimen: Urine, clean catch   Result Value Ref Range    STREP PNEUMONIAE ANTIGEN, URINE       Presumptive negative- suggests no current or recent  pneumococcal infection.   Infection due to Strep pneumoniae cannot be  ruled out since the antigen present in the sample  may be below the detection limit of the test.  Normal Range:Presumptive Negative     CBC Auto Differential   Result Value Ref Range    WBC 27.9 (H) 4.5 - 11.5 E9/L    RBC 3.62 (L) 3.80 - 5.80 E12/L    Hemoglobin 12.2 (L) 12.5 - 16.5 g/dL    Hematocrit 35.4 (L) 37.0 - 54.0 %    MCV 97.8 80.0 - 99.9 fL    MCH 33.7 26.0 - 35.0 pg    MCHC 34.5 32.0 - 34.5 %    RDW 11.9 11.5 - 15.0 fL    Platelets 839 468 - 098 E9/L    MPV 10.3 7.0 - 12.0 fL    Neutrophils % 92.9 (H) 43.0 - 80.0 %    Immature Granulocytes % 0.5 0.0 - 5.0 %    Lymphocytes % 1.1 (L) 20.0 - 42.0 %    Monocytes % 4.9 2.0 - 12.0 %    Eosinophils % 0.3 0.0 - 6.0 %    Basophils % 0.3 0.0 - 2.0 %    Neutrophils Absolute 25.91 (H) 1.80 - 7.30 E9/L    Immature Granulocytes # 0.15 E9/L    Lymphocytes Absolute 0.30 (L) 1.50 - 4.00 E9/L    Monocytes Absolute 1.38 (H) 0.10 - 0.95 E9/L    Eosinophils Absolute 0.07 0.05 - 0.50 E9/L    Basophils Absolute 0. 07 0.00 - 0.20 E9/L    Dohle Bodies 2+     RBC Morphology Normal    Comprehensive Metabolic Panel w/ Reflex to MG   Result Value Ref Range    Sodium 128 (L) 132 - 146 mmol/L    Potassium reflex Magnesium 4.0 3.5 - 5.0 mmol/L    Chloride 99 98 - 107 mmol/L    CO2 16 (L) 22 - 29 mmol/L    Anion Gap 13 7 - 16 mmol/L    Glucose 319 (H) 74 - 99 mg/dL    BUN 48 (H) 8 - 23 mg/dL    CREATININE 1.6 (H) 0.7 - 1.2 mg/dL    GFR Non-African American 44 >=60 mL/min/1.73    GFR African American 53     Calcium 8.6 8.6 - 10.2 mg/dL    Total Protein 7.4 6.4 - 8.3 g/dL    Albumin 3.2 (L) 3.5 - 5.2 g/dL    Total Bilirubin 0.4 0.0 - 1.2 mg/dL    Alkaline Phosphatase 126 40 - 129 U/L    ALT 30 0 - 40 U/L    AST 23 0 - 39 U/L   Brain Natriuretic Peptide   Result Value Ref Range    Pro- (H) 0 - 125 pg/mL   Troponin   Result Value Ref Range    Troponin <0.01 0.00 - 0.03 ng/mL   Ammonia   Result Value Ref Range    Ammonia 29.9 16.0 - 60.0 umol/L   Lactate, Sepsis   Result Value Ref Range    Lactic Acid, Sepsis 3.7 (H) 0.5 - 1.9 mmol/L   Lactate, Sepsis   Result Value Ref Range    Lactic Acid, Sepsis 1.9 0.5 - 1.9 mmol/L   Sedimentation Rate   Result Value Ref Range    Sed Rate 81 (H) 0 - 15 mm/Hr   C-reactive protein   Result Value Ref Range    CRP 25.0 (H) 0.0 - 0.4 mg/dL   Protime-INR   Result Value Ref Range    Protime 13.9 (H) 9.3 - 12.4 sec    INR 1.3    Urinalysis   Result Value Ref Range    Color, UA Yellow Straw/Yellow    Clarity, UA Clear Clear    Glucose, Ur 250 (A) Negative mg/dL    Bilirubin Urine Negative Negative    Ketones, Urine Negative Negative mg/dL    Specific Gravity, UA 1.020 1.005 - 1.030    Blood, Urine TRACE-INTACT Negative    pH, UA 5.5 5.0 - 9.0    Protein, UA 30 (A) Negative mg/dL    Urobilinogen, Urine 1.0 <2.0 E.U./dL    Nitrite, Urine Negative Negative    Leukocyte Esterase, Urine Negative Negative   Microscopic Urinalysis   Result Value Ref Range    Hyaline Casts, UA 6-10 (A) 0 - 2 /LPF    Mucus, UA Present (A) None Seen /LPF    WBC, UA 1-3 0 - 5 /HPF    RBC, UA 0-1 0 - 2 /HPF    Bacteria, UA FEW (A) None Seen /HPF   Ethanol   Result Value Ref Range    Ethanol Lvl <10 mg/dL   Comprehensive Metabolic Panel w/ Reflex to MG   Result Value Ref Range    Sodium 130 (L) 132 - 146 mmol/L    Potassium reflex Magnesium 4.2 3.5 - 5.0 mmol/L    Chloride 102 98 - 107 mmol/L    CO2 17 (L) 22 - 29 mmol/L    Anion Gap 11 7 - 16 mmol/L    Glucose 178 (H) 74 - 99 mg/dL    BUN 43 (H) 8 - 23 mg/dL    CREATININE 1.1 0.7 - 1.2 mg/dL    GFR Non-African American >60 >=60 mL/min/1.73    GFR African American >60     Calcium 8.2 (L) 8.6 - 10.2 mg/dL    Total Protein 6.9 6.4 - 8.3 g/dL    Albumin 2.8 (L) 3.5 - 5.2 g/dL    Total Bilirubin 0.3 0.0 - 1.2 mg/dL    Alkaline Phosphatase 111 40 - 129 U/L    ALT 27 0 - 40 U/L    AST 25 0 - 39 U/L   CBC auto differential   Result Value Ref Range    WBC 26.9 (H) 4.5 - 11.5 E9/L    RBC 3.26 (L) 3.80 - 5.80 E12/L    Hemoglobin 11.2 (L) 12.5 - 16.5 g/dL    Hematocrit 31.9 (L) 37.0 - 54.0 %    MCV 97.9 80.0 - 99.9 fL    MCH 34.4 26.0 - 35.0 pg    MCHC 35.1 (H) 32.0 - 34.5 %    RDW 12.0 11.5 - 15.0 fL    Platelets 581 227 - 519 E9/L    MPV 9.9 7.0 - 12.0 fL    Neutrophils % 89.3 (H) 43.0 - 80.0 %    Immature Granulocytes % 0.8 0.0 - 5.0 %    Lymphocytes % 2.2 (L) 20.0 - 42.0 %    Monocytes % 7.3 2.0 - 12.0 %    Eosinophils % 0.1 0.0 - 6.0 %    Basophils % 0.3 0.0 - 2.0 %    Neutrophils Absolute 24.05 (H) 1.80 - 7.30 E9/L    Immature Granulocytes # 0.21 E9/L    Lymphocytes Absolute 0.59 (L) 1.50 - 4.00 E9/L    Monocytes Absolute 1.96 (H) 0.10 - 0.95 E9/L    Eosinophils Absolute 0.03 (L) 0.05 - 0.50 E9/L    Basophils Absolute 0.07 0.00 - 0.20 E9/L   Lactic acid, plasma   Result Value Ref Range    Lactic Acid 1.8 0.5 - 2.2 mmol/L   Blood gas, arterial   Result Value Ref Range    Date Analyzed [id number]     Time Analyzed 2130     Source: Blood Arterial     pH, Blood Gas 7.397 7.350 - 7.450    PCO2 25.0 (L) 35.0 - 45.0 mmHg    PO2 61.2 (L) 75.0 - 100.0 mmHg    HCO3 15.0 (L) 22.0 - 26.0 mmol/L    B.E. -8.1 (L) -3.0 - 3.0 mmol/L    O2 Sat 91.9 (L) 92.0 - 98.5 %    O2Hb 91.1 (L) 94.0 - 97.0 %    COHb 0.7 0.0 - 1.5 %    MetHb 0.2 0.0 - 1.5 %    O2 Content 16.3 mL/dL    HHb 8.0 (H) 0.0 - 5.0 %    tHb (est) 12.7 11.5 - 16.5 g/dL    Mode NC- 1 L     Date Of Collection      Time Collected      Pt Temp 37.0 C     ID [id number]     Lab 04400     Critical(s) Notified .  No Critical Values    Procalcitonin   Result Value Ref Range    Procalcitonin 3.71 (H) 0.00 - 0.08 ng/mL   Hemoglobin A1c   Result Value Ref Range    Hemoglobin A1C 5.5 4.0 - 5.6 %   URINE DRUG SCREEN   Result Value Ref Range    Amphetamine Screen, Urine NOT DETECTED Negative <1000 ng/mL    Barbiturate Screen, Ur NOT DETECTED Negative < 200 ng/mL    Benzodiazepine Screen, Urine NOT DETECTED Negative < 200 ng/mL    Cannabinoid Scrn, Ur NOT DETECTED Negative < 50ng/mL    Cocaine Metabolite Screen, Urine NOT DETECTED Negative < 300 ng/mL    Opiate Scrn, Ur NOT DETECTED Negative < 300ng/mL    PCP Screen, Urine NOT DETECTED Negative < 25 ng/mL    Methadone Screen, Urine NOT DETECTED Negative <300 ng/mL    Oxycodone Urine NOT DETECTED Negative <100 ng/mL    FENTANYL SCREEN, URINE NOT DETECTED Negative <1 ng/mL    Drug Screen Comment: see below    Blood ID, Molecular   Result Value Ref Range    Bottle Type Aerobic     Source of Blood Culture No site given     Order Number [id number]     Enterobacter cloacae complex by PCR Not Detected Not Detected    Escherichia coli by PCR Not Detected Not Detected    Klebsiella oxytoca by PCR Not Detected Not Detected    Klebsiella pneumoniae group by PCR Not Detected Not Detected    Proteus species by PCR Not Detected Not Detected    Streptococcus agalactiae by PCR Not Detected Not Detected    Staphylococcus aureus by PCR Not Detected Not Detected    Serratia marcescens by PCR Not Detected Not Detected    Streptococcus pneumoniae by PCR DETECTED (AA) Not Detected    Streptococcus pyogenes  by PCR Not Detected Not Detected    Acinetobacter baumannii by PCR Not Detected Not Detected    Candida albicans by PCR Not Detected Not Detected    Candida glabrata by PCR Not Detected Not Detected    Candida krusei by PCR Not Detected Not Detected    Candida parapsilosis by PCR Not Detected Not Detected    Candida tropicalis by PCR Not Detected Not Detected     Enterobacteriaceae by PCR Not Detected Not Detected    Enterococcus by PCR Not Detected Not Detected    Haemophilus Influenzae by PCR Not Detected Not Detected    Listeria monocytogenes by PCR Not Detected Not Detected    Neisseria meningitidis by PCR Not Detected Not Detected    Pseudomonas aeruginosa by PCR Not Detected Not Detected    Staphylococcus species by PCR Not Detected Not Detected    Streptococcus species by PCR DETECTED (AA) Not Detected   EKG 12 Lead   Result Value Ref Range    Ventricular Rate 117 BPM    Atrial Rate 117 BPM    P-R Interval 162 ms    QRS Duration 86 ms    Q-T Interval 306 ms    QTc Calculation (Bazett) 426 ms    P Axis 57 degrees    R Axis 1 degrees    T Axis 60 degrees       RADIOLOGY:  CT CHEST WO CONTRAST   Final Result   Large densely consolidated right upper lobe infiltrate most compatible with   pneumonia in the acute setting. Recommend follow-up chest CT in 3 months to   exclude underlying mass in the region of greatest consolidation      PE cannot be excluded without IV contrast      Nonobstructing left renal calculi         XR CHEST PORTABLE   Final Result   Right upper lobe infiltrate. Repeat CT scan is recommended in 10-14 days to   confirm partial resolution and exclude underlying pathology. Given that the   infiltrate abuts the minor fissure and abuts the right paratracheal region   underlying pathology cannot be excluded.                ------------------------- NURSING NOTES AND VITALS REVIEWED ---------------------------  Date / Time Roomed:  3/24/2021 12:24 PM  ED Bed Assignment:  [id number]    The nursing notes within the ED encounter and vital signs as below have been reviewed. Patient Vitals for the past 24 hrs:   BP Temp Temp src Pulse Resp SpO2 Height Weight   03/25/21 0715 108/74 97.9 °F (36.6 °C) Oral 75 18 93 % -- --   03/24/21 2124 103/71 97.8 °F (36.6 °C) Oral 83 16 95 % -- --   03/24/21 1758 102/74 -- -- 97 18 95 % -- --   03/24/21 1745 -- -- -- -- 18 95 % -- --   03/24/21 1715 99/68 -- -- 93 -- 94 % -- --   03/24/21 1652 108/73 -- -- 92 18 93 % -- --   03/24/21 1645 108/73 -- -- 93 -- -- -- --   03/24/21 1615 107/71 -- -- 89 -- 93 % -- --   03/24/21 1515 98/70 -- -- 93 -- 95 % -- --   03/24/21 1430 90/62 -- -- 104 18 95 % -- --   03/24/21 1345 (!) 91/59 -- -- 103 -- 92 % -- --   03/24/21 1330 (!) 76/53 -- -- 102 -- 91 % -- --   03/24/21 1315 (!) 72/53 -- -- 105 -- 91 % -- --   03/24/21 1300 (!) 66/48 -- -- 116 -- 93 % -- --   03/24/21 1245 (!) 77/52 -- -- 119 -- 93 % -- --   03/24/21 1221 (!) 71/48 97 °F (36.1 °C) Tympanic 129 14 96 % 5' 10\" (1.778 m) 193 lb (87.5 kg)       Oxygen Saturation Interpretation: Normal    ------------------------------------------ PROGRESS NOTES ------------------------------------------  Re-evaluation(s):  Time: 10:52 AM EDT  Patients symptoms show no change  Repeat physical examination is not changed    Counseling:  I have spoken with the patient and discussed todays results, in addition to providing specific details for the plan of care and counseling regarding the diagnosis and prognosis. Their questions are answered at this time and they are agreeable with the plan of admission.    --------------------------------- ADDITIONAL PROVIDER NOTES ---------------------------------  Consultations:  Time: 10:54 AM EDT. Spoke with Dr. Governor Ha. Discussed case. They will admit the patient.   This patient's ED course included: a personal history and physicial examination, multiple bedside re-evaluations, IV medications and cardiac monitoring    This patient has remained hemodynamically stable during their ED course. Diagnosis:  1. Pneumonia due to organism    2. Hypotension, unspecified hypotension type    3. History of alcohol abuse    4. Acute renal insufficiency    5. Metabolic acidosis        Disposition:  Patient's disposition: Admit to med/surg floor  Patient's condition is poor.                    Beverley Mendez 41., DO  Resident  03/25/21 1054

## 2021-03-25 ENCOUNTER — APPOINTMENT (OUTPATIENT)
Dept: GENERAL RADIOLOGY | Age: 61
DRG: 871 | End: 2021-03-25
Payer: COMMERCIAL

## 2021-03-25 LAB
ACINETOBACTER BAUMANNII BY PCR: NOT DETECTED
ALBUMIN SERPL-MCNC: 2.8 G/DL (ref 3.5–5.2)
ALP BLD-CCNC: 111 U/L (ref 40–129)
ALT SERPL-CCNC: 27 U/L (ref 0–40)
AMPHETAMINE SCREEN, URINE: NOT DETECTED
ANION GAP SERPL CALCULATED.3IONS-SCNC: 11 MMOL/L (ref 7–16)
AST SERPL-CCNC: 25 U/L (ref 0–39)
BARBITURATE SCREEN URINE: NOT DETECTED
BASOPHILS ABSOLUTE: 0.07 E9/L (ref 0–0.2)
BASOPHILS RELATIVE PERCENT: 0.3 % (ref 0–2)
BENZODIAZEPINE SCREEN, URINE: NOT DETECTED
BILIRUB SERPL-MCNC: 0.3 MG/DL (ref 0–1.2)
BOTTLE TYPE: ABNORMAL
BUN BLDV-MCNC: 43 MG/DL (ref 8–23)
CALCIUM SERPL-MCNC: 8.2 MG/DL (ref 8.6–10.2)
CANDIDA ALBICANS BY PCR: NOT DETECTED
CANDIDA GLABRATA BY PCR: NOT DETECTED
CANDIDA KRUSEI BY PCR: NOT DETECTED
CANDIDA PARAPSILOSIS BY PCR: NOT DETECTED
CANDIDA TROPICALIS BY PCR: NOT DETECTED
CANNABINOID SCREEN URINE: NOT DETECTED
CHLORIDE BLD-SCNC: 102 MMOL/L (ref 98–107)
CO2: 17 MMOL/L (ref 22–29)
COCAINE METABOLITE SCREEN URINE: NOT DETECTED
CREAT SERPL-MCNC: 1.1 MG/DL (ref 0.7–1.2)
ENTEROBACTER CLOACAE COMPLEX BY PCR: NOT DETECTED
ENTEROBACTERALES BY PCR: NOT DETECTED
ENTEROCOCCUS BY PCR: NOT DETECTED
EOSINOPHILS ABSOLUTE: 0.03 E9/L (ref 0.05–0.5)
EOSINOPHILS RELATIVE PERCENT: 0.1 % (ref 0–6)
ESCHERICHIA COLI BY PCR: NOT DETECTED
ETHANOL: <10 MG/DL (ref 0–0.08)
FENTANYL SCREEN, URINE: NOT DETECTED
GFR AFRICAN AMERICAN: >60
GFR NON-AFRICAN AMERICAN: >60 ML/MIN/1.73
GLUCOSE BLD-MCNC: 178 MG/DL (ref 74–99)
HAEMOPHILUS INFLUENZAE BY PCR: NOT DETECTED
HBA1C MFR BLD: 5.5 % (ref 4–5.6)
HCT VFR BLD CALC: 31.9 % (ref 37–54)
HEMOGLOBIN: 11.2 G/DL (ref 12.5–16.5)
HIV-1 AND HIV-2 ANTIBODIES: NORMAL
IMMATURE GRANULOCYTES #: 0.21 E9/L
IMMATURE GRANULOCYTES %: 0.8 % (ref 0–5)
KLEBSIELLA OXYTOCA BY PCR: NOT DETECTED
KLEBSIELLA PNEUMONIAE GROUP BY PCR: NOT DETECTED
L. PNEUMOPHILA SEROGP 1 UR AG: NORMAL
LACTIC ACID: 1.8 MMOL/L (ref 0.5–2.2)
LISTERIA MONOCYTOGENES BY PCR: NOT DETECTED
LYMPHOCYTES ABSOLUTE: 0.59 E9/L (ref 1.5–4)
LYMPHOCYTES RELATIVE PERCENT: 2.2 % (ref 20–42)
Lab: NORMAL
MCH RBC QN AUTO: 34.4 PG (ref 26–35)
MCHC RBC AUTO-ENTMCNC: 35.1 % (ref 32–34.5)
MCV RBC AUTO: 97.9 FL (ref 80–99.9)
METHADONE SCREEN, URINE: NOT DETECTED
MONOCYTES ABSOLUTE: 1.96 E9/L (ref 0.1–0.95)
MONOCYTES RELATIVE PERCENT: 7.3 % (ref 2–12)
NEISSERIA MENINGITIDIS BY PCR: NOT DETECTED
NEUTROPHILS ABSOLUTE: 24.05 E9/L (ref 1.8–7.3)
NEUTROPHILS RELATIVE PERCENT: 89.3 % (ref 43–80)
OPIATE SCREEN URINE: NOT DETECTED
ORDER NUMBER: ABNORMAL
OXYCODONE URINE: NOT DETECTED
PDW BLD-RTO: 12 FL (ref 11.5–15)
PHENCYCLIDINE SCREEN URINE: NOT DETECTED
PLATELET # BLD: 188 E9/L (ref 130–450)
PMV BLD AUTO: 9.9 FL (ref 7–12)
POTASSIUM REFLEX MAGNESIUM: 4.2 MMOL/L (ref 3.5–5)
PROCALCITONIN: 3.71 NG/ML (ref 0–0.08)
PROTEUS SPECIES BY PCR: NOT DETECTED
PSEUDOMONAS AERUGINOSA BY PCR: NOT DETECTED
RBC # BLD: 3.26 E12/L (ref 3.8–5.8)
SERRATIA MARCESCENS BY PCR: NOT DETECTED
SODIUM BLD-SCNC: 130 MMOL/L (ref 132–146)
SOURCE OF BLOOD CULTURE: ABNORMAL
STAPHYLOCOCCUS AUREUS BY PCR: NOT DETECTED
STAPHYLOCOCCUS SPECIES BY PCR: NOT DETECTED
STREP PNEUMONIAE ANTIGEN, URINE: NORMAL
STREPTOCOCCUS AGALACTIAE BY PCR: NOT DETECTED
STREPTOCOCCUS PNEUMONIAE BY PCR: DETECTED
STREPTOCOCCUS PYOGENES  BY PCR: NOT DETECTED
STREPTOCOCCUS SPECIES BY PCR: DETECTED
TOTAL PROTEIN: 6.9 G/DL (ref 6.4–8.3)
WBC # BLD: 26.9 E9/L (ref 4.5–11.5)

## 2021-03-25 PROCEDURE — 85025 COMPLETE CBC W/AUTO DIFF WBC: CPT

## 2021-03-25 PROCEDURE — 94664 DEMO&/EVAL PT USE INHALER: CPT

## 2021-03-25 PROCEDURE — 2580000003 HC RX 258: Performed by: SPECIALIST

## 2021-03-25 PROCEDURE — 87040 BLOOD CULTURE FOR BACTERIA: CPT

## 2021-03-25 PROCEDURE — 2500000003 HC RX 250 WO HCPCS: Performed by: RADIOLOGY

## 2021-03-25 PROCEDURE — 80307 DRUG TEST PRSMV CHEM ANLYZR: CPT

## 2021-03-25 PROCEDURE — 6360000002 HC RX W HCPCS: Performed by: INTERNAL MEDICINE

## 2021-03-25 PROCEDURE — 99232 SBSQ HOSP IP/OBS MODERATE 35: CPT | Performed by: INTERNAL MEDICINE

## 2021-03-25 PROCEDURE — 83036 HEMOGLOBIN GLYCOSYLATED A1C: CPT

## 2021-03-25 PROCEDURE — G0009 ADMIN PNEUMOCOCCAL VACCINE: HCPCS | Performed by: SPECIALIST

## 2021-03-25 PROCEDURE — 6370000000 HC RX 637 (ALT 250 FOR IP): Performed by: INTERNAL MEDICINE

## 2021-03-25 PROCEDURE — 90670 PCV13 VACCINE IM: CPT | Performed by: SPECIALIST

## 2021-03-25 PROCEDURE — 92610 EVALUATE SWALLOWING FUNCTION: CPT | Performed by: SPEECH-LANGUAGE PATHOLOGIST

## 2021-03-25 PROCEDURE — 92611 MOTION FLUOROSCOPY/SWALLOW: CPT | Performed by: SPEECH-LANGUAGE PATHOLOGIST

## 2021-03-25 PROCEDURE — 1200000000 HC SEMI PRIVATE

## 2021-03-25 PROCEDURE — 6360000004 HC RX CONTRAST MEDICATION: Performed by: RADIOLOGY

## 2021-03-25 PROCEDURE — 87449 NOS EACH ORGANISM AG IA: CPT

## 2021-03-25 PROCEDURE — A4641 RADIOPHARM DX AGENT NOC: HCPCS | Performed by: RADIOLOGY

## 2021-03-25 PROCEDURE — 83605 ASSAY OF LACTIC ACID: CPT

## 2021-03-25 PROCEDURE — 92526 ORAL FUNCTION THERAPY: CPT | Performed by: SPEECH-LANGUAGE PATHOLOGIST

## 2021-03-25 PROCEDURE — 6360000002 HC RX W HCPCS: Performed by: SPECIALIST

## 2021-03-25 PROCEDURE — 2700000000 HC OXYGEN THERAPY PER DAY

## 2021-03-25 PROCEDURE — 2580000003 HC RX 258: Performed by: INTERNAL MEDICINE

## 2021-03-25 PROCEDURE — 36415 COLL VENOUS BLD VENIPUNCTURE: CPT

## 2021-03-25 PROCEDURE — 80053 COMPREHEN METABOLIC PANEL: CPT

## 2021-03-25 PROCEDURE — 82077 ASSAY SPEC XCP UR&BREATH IA: CPT

## 2021-03-25 PROCEDURE — 84145 PROCALCITONIN (PCT): CPT

## 2021-03-25 PROCEDURE — 74230 X-RAY XM SWLNG FUNCJ C+: CPT

## 2021-03-25 RX ORDER — ALBUTEROL SULFATE 2.5 MG/3ML
2.5 SOLUTION RESPIRATORY (INHALATION) 4 TIMES DAILY
Status: DISCONTINUED | OUTPATIENT
Start: 2021-03-25 | End: 2021-03-31 | Stop reason: HOSPADM

## 2021-03-25 RX ORDER — NICOTINE 21 MG/24HR
1 PATCH, TRANSDERMAL 24 HOURS TRANSDERMAL DAILY
Status: DISCONTINUED | OUTPATIENT
Start: 2021-03-25 | End: 2021-03-31 | Stop reason: HOSPADM

## 2021-03-25 RX ADMIN — BUPRENORPHINE AND NALOXONE 1 TABLET: 8; 2 TABLET SUBLINGUAL at 07:56

## 2021-03-25 RX ADMIN — BARIUM SULFATE 120 ML: 400 SUSPENSION ORAL at 14:46

## 2021-03-25 RX ADMIN — BARIUM SULFATE 1 TABLET: 700 TABLET ORAL at 14:46

## 2021-03-25 RX ADMIN — PNEUMOCOCCAL 13-VALENT CONJUGATE VACCINE 0.5 ML: 2.2; 2.2; 2.2; 2.2; 2.2; 4.4; 2.2; 2.2; 2.2; 2.2; 2.2; 2.2; 2.2 INJECTION, SUSPENSION INTRAMUSCULAR at 14:28

## 2021-03-25 RX ADMIN — BUPRENORPHINE AND NALOXONE 1 TABLET: 8; 2 TABLET SUBLINGUAL at 21:44

## 2021-03-25 RX ADMIN — BARIUM SULFATE 70 G: 0.81 POWDER, FOR SUSPENSION ORAL at 14:46

## 2021-03-25 RX ADMIN — AZITHROMYCIN DIHYDRATE 500 MG: 500 INJECTION, POWDER, LYOPHILIZED, FOR SOLUTION INTRAVENOUS at 00:43

## 2021-03-25 RX ADMIN — SODIUM CHLORIDE: 9 INJECTION, SOLUTION INTRAVENOUS at 02:00

## 2021-03-25 RX ADMIN — SODIUM CHLORIDE: 9 INJECTION, SOLUTION INTRAVENOUS at 19:54

## 2021-03-25 RX ADMIN — DIPHENHYDRAMINE HCL 50 MG: 25 TABLET ORAL at 21:44

## 2021-03-25 RX ADMIN — SODIUM CHLORIDE: 9 INJECTION, SOLUTION INTRAVENOUS at 11:21

## 2021-03-25 RX ADMIN — FLUTICASONE PROPIONATE 1 SPRAY: 50 SPRAY, METERED NASAL at 07:56

## 2021-03-25 RX ADMIN — CEFTRIAXONE SODIUM 1000 MG: 1 INJECTION, POWDER, FOR SOLUTION INTRAMUSCULAR; INTRAVENOUS at 01:58

## 2021-03-25 RX ADMIN — SODIUM CHLORIDE, PRESERVATIVE FREE 10 ML: 5 INJECTION INTRAVENOUS at 21:46

## 2021-03-25 RX ADMIN — ALBUTEROL SULFATE 2.5 MG: 2.5 SOLUTION RESPIRATORY (INHALATION) at 20:15

## 2021-03-25 RX ADMIN — SODIUM CHLORIDE, PRESERVATIVE FREE 10 ML: 5 INJECTION INTRAVENOUS at 07:56

## 2021-03-25 RX ADMIN — ENOXAPARIN SODIUM 40 MG: 40 INJECTION SUBCUTANEOUS at 21:43

## 2021-03-25 RX ADMIN — BARIUM SULFATE 10 ML: 400 PASTE ORAL at 14:46

## 2021-03-25 RX ADMIN — WATER 2000 MG: 1 INJECTION INTRAMUSCULAR; INTRAVENOUS; SUBCUTANEOUS at 21:44

## 2021-03-25 ASSESSMENT — PAIN SCALES - GENERAL
PAINLEVEL_OUTOF10: 0
PAINLEVEL_OUTOF10: 6

## 2021-03-25 NOTE — CONSULTS
Full note dictated  Severe sepsis resolved  RUL pneumococcal pneumonia  Suspect underlying copd  Dysphagia  Heavy smoker and drinker  Added albuterol, continue ceftriaxone. Watch for dts.

## 2021-03-25 NOTE — PROGRESS NOTES
casandraserved Dr. Fatimah Paul for consult. Dr. Michael Baxter on call. perfectserved Dr. Michael Baxter via text.

## 2021-03-25 NOTE — CONSULTS
Q24H    azithromycin  500 mg Intravenous Q24H    buprenorphine-naloxone  1 tablet Sublingual BID     Continuous Infusions:   sodium chloride 100 mL/hr at 03/25/21 1121     PRN Meds:diphenhydrAMINE, sodium chloride flush, promethazine **OR** ondansetron, polyethylene glycol, acetaminophen **OR** acetaminophen, dextromethorphan-guaiFENesin    Allergies:  Patient has no known allergies. Social History:   Social History     Socioeconomic History    Marital status: Single     Spouse name: None    Number of children: None    Years of education: None    Highest education level: None   Occupational History    None   Social Needs    Financial resource strain: Not hard at all   DeckDAQ insecurity     Worry: Never true     Inability: Never true    Transportation needs     Medical: No     Non-medical: No   Tobacco Use    Smoking status: Current Every Day Smoker     Packs/day: 0.50     Years: 40.00     Pack years: 20.00     Types: Cigarettes    Smokeless tobacco: Never Used   Substance and Sexual Activity    Alcohol use: Yes     Alcohol/week: 38.0 standard drinks     Types: 18 Cans of beer, 20 Shots of liquor per week     Comment: daily    Drug use: Yes     Types: Opiates     Sexual activity: None   Lifestyle    Physical activity     Days per week: None     Minutes per session: None    Stress: None   Relationships    Social connections     Talks on phone: None     Gets together: None     Attends Bahai service: None     Active member of club or organization: None     Attends meetings of clubs or organizations: None     Relationship status: None    Intimate partner violence     Fear of current or ex partner: None     Emotionally abused: None     Physically abused: None     Forced sexual activity: None   Other Topics Concern    None   Social History Narrative    None      Pets: No  Travel: No  The patient lives at home with his wife. He works installing insulation.   He drinks a 24 ounce beer every day and a shot    Family History:       Problem Relation Age of Onset    Heart Attack Mother 68    Cancer Father     Parkinsonism Paternal Uncle    . Otherwise non-pertinent to the chief complaint. REVIEW OF SYSTEMS:    Constitutional: Positive for fevers, chills, diaphoresis  Neurologic: Negative   Psychiatric: Negative  Rheumatologic: Negative   Endocrine: Negative  Hematologic: Negative  Immunologic: Negative  ENT: Negative  Respiratory: As in the HPI  Cardiovascular: Negative  GI: Negative  : Negative  Musculoskeletal: Negative  Skin: No rashes. PHYSICAL EXAM:    Vitals:   /74   Pulse 75   Temp 97.9 °F (36.6 °C) (Oral)   Resp 18   Ht 5' 10\" (1.778 m)   Wt 193 lb (87.5 kg)   SpO2 93%   BMI 27.69 kg/m²   Constitutional: The patient is awake, alert, and oriented. He is in no distress. He has a moist cough. Skin: Warm and dry. No rashes were noted. HEENT: Eyes show round, and reactive pupils. No jaundice. Moist mucous membranes, no ulcerations, no thrush. Neck: Supple to movements. No lymphadenopathy. Chest: No use of accessory muscles to breathe. Symmetrical expansion. Auscultation reveals bronchial sounds right middle lung field. Cardiovascular: S1 and S2 are rhythmic and regular. No murmurs appreciated. Abdomen: Positive bowel sounds to auscultation. Benign to palpation. No masses felt. No hepatosplenomegaly. Extremities: No clubbing, no cyanosis, no edema.   Lines: peripheral      CBC+dif:  Recent Labs     03/23/21  1650 03/24/21  1250 03/25/21  0440   WBC 25.6* 27.9* 26.9*   HGB 13.4 12.2* 11.2*   HCT 40.2 35.4* 31.9*   .9* 97.8 97.9    210 188   NEUTROABS 23.30* 25.91* 24.05*     Lab Results   Component Value Date    CRP 25.0 (H) 03/24/2021      No results found for: CRPHS  Lab Results   Component Value Date    SEDRATE 81 (H) 03/24/2021     Lab Results   Component Value Date    ALT 27 03/25/2021    AST 25 03/25/2021    ALKPHOS 111 03/25/2021    BILITOT 0.3 03/25/2021     Lab Results   Component Value Date     03/25/2021    K 4.2 03/25/2021     03/25/2021    CO2 17 03/25/2021    BUN 43 03/25/2021    CREATININE 1.1 03/25/2021    GFRAA >60 03/25/2021    LABGLOM >60 03/25/2021    GLUCOSE 178 03/25/2021    PROT 6.9 03/25/2021    LABALBU 2.8 03/25/2021    CALCIUM 8.2 03/25/2021    BILITOT 0.3 03/25/2021    ALKPHOS 111 03/25/2021    AST 25 03/25/2021    ALT 27 03/25/2021       Lab Results   Component Value Date    PROTIME 13.9 03/24/2021    INR 1.3 03/24/2021       Lab Results   Component Value Date    TSH 3.870 03/23/2021       Lab Results   Component Value Date    COLORU Yellow 03/24/2021    PHUR 5.5 03/24/2021    WBCUA 1-3 03/24/2021    RBCUA 0-1 03/24/2021    MUCUS Present 03/24/2021    BACTERIA FEW 03/24/2021    CLARITYU Clear 03/24/2021    SPECGRAV 1.020 03/24/2021    LEUKOCYTESUR Negative 03/24/2021    UROBILINOGEN 1.0 03/24/2021    BILIRUBINUR Negative 03/24/2021    BLOODU TRACE-INTACT 03/24/2021    GLUCOSEU 250 03/24/2021       Lab Results   Component Value Date    HCO3 15.0 03/24/2021    BE -8.1 03/24/2021    O2SAT 91.9 03/24/2021    PH 7.397 03/24/2021    PCO2 25.0 03/24/2021    PO2 61.2 03/24/2021     Radiology:      Microbiology:  Pending  Recent Labs     03/24/21  1250   BC Gram stain performed from blood culture bottle media  Gram positive cocci in chains  *     No results for input(s): ORG in the last 72 hours. Recent Labs     03/24/21  1250   BLOODCULT2 Gram stain performed from blood culture bottle media  Gram positive cocci in chains  *     Recent Labs     03/25/21  0541   STREPNEUMAGU Presumptive negative- suggests no current or recent  pneumococcal infection.   Infection due to Strep pneumoniae cannot be  ruled out since the antigen present in the sample  may be below the detection limit of the test.  Normal Range:Presumptive Negative       Recent Labs     03/24/21  1602   LP1UAG Presumptive Negative -suggesting no recent or current infections  with Legionella pneumophila serogroup 1. Infection to Legionella cannot be ruled out since other serogroups  and species may cause infection, antigen may not be present in  early infection, or level of antigen may be below the  detection limit. Normal Range: Presumptive Negative       No results for input(s): ASO in the last 72 hours. No results for input(s): CULTRESP in the last 72 hours. Recent Labs     03/25/21  0440   PROCAL 3.71*       Assessment:  · Community-acquired pneumonia right upper lobe  · Fever with leukocytosis secondary to pneumonia  · Bacteremia with Streptococcus pneumonia  · Invasive pneumococcal disease  · Rhinovirus/Enterovirus infection    Plan:    · Continue Ceftriaxone. Increase dose to 2 g IV once daily  · Stop Azithromycin  · Check final and repeat blood cultures, baseline ESR, CRP  · Monitor labs  · Will follow with you  · Pneumococcal vaccine before discharge    Thank you for having us see this patient in consultation. I will be discussing this case with the treating physicians.     Nikos Mayorga  1:49 PM  3/25/2021

## 2021-03-25 NOTE — PROGRESS NOTES
SPEECH/LANGUAGE PATHOLOGY  VIDEOFLUOROSCOPIC STUDY OF SWALLOWING (MBS)      PATIENT NAME:  Adrien Nesbitt      :  1960      TODAY'S DATE:  3/25/2021   ROOM:  [address] [place] [address]    SUMMARY OF EVALUATION     DYSPHAGIA DIAGNOSIS:  Mild-moderate oropharyngeal dysphagia; consistent deep, transient penetration of thin liquid noted during this study. This was able to be eliminated with chin tuck maneuver. Pt at risk of high risk of aspiration if chin tuck maneuver not used with liquids. Esophageal sweep completed during this study due to pt complaints of food getting \"stuck\" which was not observed in pharynx. Discussed esophageal sweep findings with radiologist;  recommend esophagram at this time to further assess. DIET RECOMMENDATIONS:  Regular consistency solids with  thin liquids using chin tuck maneuver as trained      FEEDING RECOMMENDATIONS:     Assistance level:  No assistance needed      Compensatory strategies recommended: Chin tuck, Small bites/sips and Alternate solids and liquids    THERAPY RECOMMENDATIONS:      Dysphagia therapy is recommended 3-5 times per week for LOS or when goals are met.      Pt will complete laryngeal strength/ ROM therapeutic exercises to improve airway protection for the least restrictive PO diet with  minimal verbal prompts   Ongoing meal endurance analysis to provide diet modification and compensatory strategy implementation to minimize risk of aspiration associated with PO intake                  PROCEDURE     Consistencies Administered During the Evaluation   Liquids: thin liquid and nectar thick liquid   Solids:  pureed foods and solid foods and barium tablet       Method of Intake:   cup, spoon  Self fed, Fed by clinician      Position:   Seated, upright, Lateral plane    Current Respiratory Status   room air                RESULTS     ORAL STAGE       The oral stage of swallowing was within functional limits        PHARYNGEAL STAGE           ONSET TIME     Delayed initiation of the pharyngeal swallow was noted with swallow reflex triggered at the level of the pyriform sinus       PHARYNGEAL RESIDUALS          Vallecula/Pharyngeal Wall           No significant residuals were noted in the vallecula      Pyriform Sinuses      No significant residuals were noted in the pyriform sinuses    LARYNGEAL PENETRATION     Laryngeal penetration occurred in the absence of aspiration DURING the swallow for thin liquid due to inadequate laryngeal closure which penetrated deep into the laryngeal vestibule to the level of the true vocal folds. Laryngeal penetration was moderate and occurred consistently. Penetration noted to decrease when pt cued to use chin tuck maneuver. Laryngeal penetration occurred in the absence of aspiration DURING the swallow for nectar consistency liquid due to  inadequate laryngeal closure which cleared from the laryngeal vestibule spontaneously (transient). Laryngeal penetration was mild and occurred inconsistently. ASPIRATION    Aspiration  was not present during this evaluation however there is high risk for aspiration  of thin liquid due to laryngeal penetration if NOT using chin tuck maneuver. COMPENSATORY STRATEGIES       Compensatory strategies that were beneficial included Chin tuck      STRUCTURAL/FUNCTIONAL ANOMALIES       No structural/functional anomalies were noted      CERVICAL ESOPHAGEAL STAGE :       Based on esophageal sweep findings, recommend esophagram to further assess. The Speech Language Pathologist (SLP) completed education with the patient regarding results of evaluation. Explained that Speech Pathology intervention is warranted  at this time   Prognosis for improvements is fair +     This plan will be re-evaluated and revised in 1 week  if warranted.     Patient stated goals: Agreed with above,   Treatment goals discussed with Patient   The Patient understand(s) the diagnosis, prognosis and plan of care         INTERVENTION/EDUCATION    Pt educated on above results and plan of care with visuals. Pt was able to demonstrate good use of chin tuck maneuver. Pt trained on compensatory strategies for safe swallow with good outcome. Pt encouraged to engage in question/answer session. All questions answered and pt verbalized understanding of above. CPT code:  33855  dysphagia study, 77080 dysphagia therapy 15 mins        [x]The admitting diagnosis and active problem list, as listed below have been reviewed prior to initiation of this evaluation. ADMITTING DIAGNOSIS: Pneumonia due to organism [J18.9]     ACTIVE PROBLEM LIST:   Patient Active Problem List   Diagnosis    Inguinal hernia, right    Cervical radiculopathy    Chronic hepatitis C without hepatic coma (HCC)    Right shoulder pain    Right subclavian artery occlusion    Essential hypertension    Sinus tachycardia    Benign paroxysmal positional vertigo due to bilateral vestibular disorder    Hypotension    Pneumonia due to organism    Bacterial pneumonia    Acidosis    JOSIE (acute kidney injury) (HCC)     CARISA Lloyd. Speech-Language Pathology Student    Kaitlin BEAULIEU CCC/SLP [id number]  Speech-Language Pathologist

## 2021-03-25 NOTE — PROGRESS NOTES
SPEECH/LANGUAGE PATHOLOGY  CLINICAL ASSESSMENT OF SWALLOWING FUNCTION    PATIENT NAME:  Renata Nunez      :  1960      TODAY'S DATE:  3/25/2021  ROOM:  [facility]/[id number]    SUMMARY OF EVALUATION  Chart reviewed. Pt reports solids and liquids \"getting stuck\" in the throat region for a few years. He reports that he will need to bring some items back up into oral cavity daily. Per pt report, he had an anterior cervical fusion approximatley 20 years ago. DYSPHAGIA DIAGNOSIS:  Oropharyngeal swallow within normal limits, however, silent aspiration cannot be ruled out at bedside. Based on pt complaints, a video swallow study is recommended and requires a physician order. DIET RECOMMENDATIONS:  Continue current diet until video swallow study is completed to further assess. FEEDING RECOMMENDATIONS:     Assistance level:  No assistance needed      Compensatory strategies recommended: Small bites/sips and Alternate solids and liquids    THERAPY RECOMMENDATIONS:         A Video Swallow Study (MBSS) is recommended and requires a physician order                 PROCEDURE     Consistencies Administered During the Evaluation   Liquids: thin liquid   Solids:  pureed foods and solid foods      Method of Intake:   cup, straw, spoon  Self fed, Fed by clinician      Position:   Seated, upright                  RESULTS     Oral Stage: The oral stage of swallowing was within functional limits      Pharyngeal Stage:      No signs of aspiration were noted during this evaluation however, silent aspiration cannot be ruled out at bedside. Based on pt complaints, a video swallow study is recommended and requires a physician order. The Speech Language Pathologist (SLP) completed education with the patient regarding results of evaluation.    Explained that Speech Pathology intervention is warranted  at this time   Prognosis for improvements is fair +     This plan will be re-evaluated and

## 2021-03-25 NOTE — CARE COORDINATION
Social Work / Discharge Planning :Patient admitted with Pneumonia. SW attempted to meet with patient but unsuccessful. Patient is independent from HOME. He does have an established PCP and has health insurance. Patient currently on 02 and NEW. SW will follow for this potential need at discharge. . Drug screen negative. NO hx of HHC /SNF. Pulmonology and ID on board and await plan. SW to follow.  Electronically signed by EMMANUELLE Loja on 3/25/21 at 8:48 AM EDT

## 2021-03-26 LAB
ALBUMIN SERPL-MCNC: 2.5 G/DL (ref 3.5–5.2)
ALP BLD-CCNC: 125 U/L (ref 40–129)
ALT SERPL-CCNC: 24 U/L (ref 0–40)
ANION GAP SERPL CALCULATED.3IONS-SCNC: 9 MMOL/L (ref 7–16)
AST SERPL-CCNC: 24 U/L (ref 0–39)
BASOPHILS ABSOLUTE: 0.01 E9/L (ref 0–0.2)
BASOPHILS RELATIVE PERCENT: 0.1 % (ref 0–2)
BILIRUB SERPL-MCNC: <0.2 MG/DL (ref 0–1.2)
BUN BLDV-MCNC: 40 MG/DL (ref 8–23)
CALCIUM SERPL-MCNC: 8.2 MG/DL (ref 8.6–10.2)
CHLORIDE BLD-SCNC: 108 MMOL/L (ref 98–107)
CO2: 19 MMOL/L (ref 22–29)
CREAT SERPL-MCNC: 0.9 MG/DL (ref 0.7–1.2)
EOSINOPHILS ABSOLUTE: 0.01 E9/L (ref 0.05–0.5)
EOSINOPHILS RELATIVE PERCENT: 0.1 % (ref 0–6)
GFR AFRICAN AMERICAN: >60
GFR NON-AFRICAN AMERICAN: >60 ML/MIN/1.73
GLUCOSE BLD-MCNC: 131 MG/DL (ref 74–99)
HCT VFR BLD CALC: 27.1 % (ref 37–54)
HCV QNT BY NAAT IU/ML: ABNORMAL
HCV QNT BY NAAT LOG IU/ML: 6.02 LOG IU/ML
HEMOGLOBIN: 8.9 G/DL (ref 12.5–16.5)
IMMATURE GRANULOCYTES #: 0.37 E9/L
IMMATURE GRANULOCYTES %: 2.5 % (ref 0–5)
INTERPRETATION: DETECTED
LYMPHOCYTES ABSOLUTE: 1.04 E9/L (ref 1.5–4)
LYMPHOCYTES RELATIVE PERCENT: 7.1 % (ref 20–42)
MAGNESIUM: 1.8 MG/DL (ref 1.6–2.6)
MCH RBC QN AUTO: 33 PG (ref 26–35)
MCHC RBC AUTO-ENTMCNC: 32.8 % (ref 32–34.5)
MCV RBC AUTO: 100.4 FL (ref 80–99.9)
MONOCYTES ABSOLUTE: 1.89 E9/L (ref 0.1–0.95)
MONOCYTES RELATIVE PERCENT: 12.9 % (ref 2–12)
NEUTROPHILS ABSOLUTE: 11.37 E9/L (ref 1.8–7.3)
NEUTROPHILS RELATIVE PERCENT: 77.3 % (ref 43–80)
PDW BLD-RTO: 12.3 FL (ref 11.5–15)
PHOSPHORUS: 3 MG/DL (ref 2.5–4.5)
PLATELET # BLD: 224 E9/L (ref 130–450)
PMV BLD AUTO: 10.5 FL (ref 7–12)
POLYCHROMASIA: ABNORMAL
POTASSIUM SERPL-SCNC: 4.4 MMOL/L (ref 3.5–5)
RBC # BLD: 2.7 E12/L (ref 3.8–5.8)
SODIUM BLD-SCNC: 136 MMOL/L (ref 132–146)
TOTAL PROTEIN: 6.4 G/DL (ref 6.4–8.3)
URINE CULTURE, ROUTINE: NORMAL
WBC # BLD: 14.7 E9/L (ref 4.5–11.5)

## 2021-03-26 PROCEDURE — 6360000002 HC RX W HCPCS: Performed by: INTERNAL MEDICINE

## 2021-03-26 PROCEDURE — 6360000002 HC RX W HCPCS: Performed by: SPECIALIST

## 2021-03-26 PROCEDURE — 6370000000 HC RX 637 (ALT 250 FOR IP): Performed by: INTERNAL MEDICINE

## 2021-03-26 PROCEDURE — 1200000000 HC SEMI PRIVATE

## 2021-03-26 PROCEDURE — 92526 ORAL FUNCTION THERAPY: CPT | Performed by: SPEECH-LANGUAGE PATHOLOGIST

## 2021-03-26 PROCEDURE — 99232 SBSQ HOSP IP/OBS MODERATE 35: CPT | Performed by: INTERNAL MEDICINE

## 2021-03-26 PROCEDURE — 80053 COMPREHEN METABOLIC PANEL: CPT

## 2021-03-26 PROCEDURE — 97112 NEUROMUSCULAR REEDUCATION: CPT | Performed by: SPEECH-LANGUAGE PATHOLOGIST

## 2021-03-26 PROCEDURE — 2580000003 HC RX 258: Performed by: SPECIALIST

## 2021-03-26 PROCEDURE — 94640 AIRWAY INHALATION TREATMENT: CPT

## 2021-03-26 PROCEDURE — 83735 ASSAY OF MAGNESIUM: CPT

## 2021-03-26 PROCEDURE — 2580000003 HC RX 258: Performed by: INTERNAL MEDICINE

## 2021-03-26 PROCEDURE — 84100 ASSAY OF PHOSPHORUS: CPT

## 2021-03-26 PROCEDURE — 36415 COLL VENOUS BLD VENIPUNCTURE: CPT

## 2021-03-26 PROCEDURE — 85025 COMPLETE CBC W/AUTO DIFF WBC: CPT

## 2021-03-26 PROCEDURE — 97032 APPL MODALITY 1+ESTIM EA 15: CPT | Performed by: SPEECH-LANGUAGE PATHOLOGIST

## 2021-03-26 RX ORDER — LORAZEPAM 2 MG/ML
4 INJECTION INTRAMUSCULAR
Status: DISCONTINUED | OUTPATIENT
Start: 2021-03-26 | End: 2021-03-29

## 2021-03-26 RX ORDER — SODIUM CHLORIDE 0.9 % (FLUSH) 0.9 %
10 SYRINGE (ML) INJECTION PRN
Status: DISCONTINUED | OUTPATIENT
Start: 2021-03-26 | End: 2021-03-31 | Stop reason: HOSPADM

## 2021-03-26 RX ORDER — LORAZEPAM 1 MG/1
4 TABLET ORAL
Status: DISCONTINUED | OUTPATIENT
Start: 2021-03-26 | End: 2021-03-29

## 2021-03-26 RX ORDER — LORAZEPAM 2 MG/ML
1 INJECTION INTRAMUSCULAR
Status: DISCONTINUED | OUTPATIENT
Start: 2021-03-26 | End: 2021-03-29

## 2021-03-26 RX ORDER — LORAZEPAM 1 MG/1
2 TABLET ORAL
Status: DISCONTINUED | OUTPATIENT
Start: 2021-03-26 | End: 2021-03-29

## 2021-03-26 RX ORDER — LORAZEPAM 1 MG/1
3 TABLET ORAL
Status: DISCONTINUED | OUTPATIENT
Start: 2021-03-26 | End: 2021-03-29

## 2021-03-26 RX ORDER — SODIUM CHLORIDE 9 MG/ML
25 INJECTION, SOLUTION INTRAVENOUS PRN
Status: DISCONTINUED | OUTPATIENT
Start: 2021-03-26 | End: 2021-03-31 | Stop reason: HOSPADM

## 2021-03-26 RX ORDER — LORAZEPAM 2 MG/ML
2 INJECTION INTRAMUSCULAR
Status: DISCONTINUED | OUTPATIENT
Start: 2021-03-26 | End: 2021-03-29

## 2021-03-26 RX ORDER — LORAZEPAM 2 MG/ML
3 INJECTION INTRAMUSCULAR
Status: DISCONTINUED | OUTPATIENT
Start: 2021-03-26 | End: 2021-03-29

## 2021-03-26 RX ORDER — LORAZEPAM 1 MG/1
1 TABLET ORAL
Status: DISCONTINUED | OUTPATIENT
Start: 2021-03-26 | End: 2021-03-29

## 2021-03-26 RX ORDER — SODIUM CHLORIDE 0.9 % (FLUSH) 0.9 %
10 SYRINGE (ML) INJECTION EVERY 12 HOURS SCHEDULED
Status: DISCONTINUED | OUTPATIENT
Start: 2021-03-26 | End: 2021-03-31 | Stop reason: HOSPADM

## 2021-03-26 RX ADMIN — ALBUTEROL SULFATE 2.5 MG: 2.5 SOLUTION RESPIRATORY (INHALATION) at 20:13

## 2021-03-26 RX ADMIN — BUPRENORPHINE AND NALOXONE 1 TABLET: 8; 2 TABLET SUBLINGUAL at 08:38

## 2021-03-26 RX ADMIN — ACETAMINOPHEN 650 MG: 325 TABLET ORAL at 14:21

## 2021-03-26 RX ADMIN — SODIUM CHLORIDE: 9 INJECTION, SOLUTION INTRAVENOUS at 06:40

## 2021-03-26 RX ADMIN — ENOXAPARIN SODIUM 40 MG: 40 INJECTION SUBCUTANEOUS at 20:09

## 2021-03-26 RX ADMIN — DEXTROMETHORPHAN HBR AND GUAIFENESIN 0.5 TABLET: 20; 400 TABLET, FILM COATED ORAL at 20:09

## 2021-03-26 RX ADMIN — ACETAMINOPHEN 650 MG: 325 TABLET ORAL at 20:09

## 2021-03-26 RX ADMIN — FLUTICASONE PROPIONATE 1 SPRAY: 50 SPRAY, METERED NASAL at 08:38

## 2021-03-26 RX ADMIN — ACETAMINOPHEN 650 MG: 325 TABLET ORAL at 03:26

## 2021-03-26 RX ADMIN — WATER 2000 MG: 1 INJECTION INTRAMUSCULAR; INTRAVENOUS; SUBCUTANEOUS at 20:12

## 2021-03-26 RX ADMIN — ALBUTEROL SULFATE 2.5 MG: 2.5 SOLUTION RESPIRATORY (INHALATION) at 07:53

## 2021-03-26 RX ADMIN — Medication 2 SPRAY: at 09:42

## 2021-03-26 RX ADMIN — DEXTROMETHORPHAN HBR AND GUAIFENESIN 0.5 TABLET: 20; 400 TABLET, FILM COATED ORAL at 03:26

## 2021-03-26 RX ADMIN — ALBUTEROL SULFATE 2.5 MG: 2.5 SOLUTION RESPIRATORY (INHALATION) at 16:26

## 2021-03-26 RX ADMIN — SODIUM CHLORIDE, PRESERVATIVE FREE 10 ML: 5 INJECTION INTRAVENOUS at 08:38

## 2021-03-26 RX ADMIN — BUPRENORPHINE AND NALOXONE 1 TABLET: 8; 2 TABLET SUBLINGUAL at 20:09

## 2021-03-26 RX ADMIN — ALBUTEROL SULFATE 2.5 MG: 2.5 SOLUTION RESPIRATORY (INHALATION) at 12:17

## 2021-03-26 ASSESSMENT — PAIN SCALES - GENERAL
PAINLEVEL_OUTOF10: 5
PAINLEVEL_OUTOF10: 3
PAINLEVEL_OUTOF10: 3
PAINLEVEL_OUTOF10: 5
PAINLEVEL_OUTOF10: 5
PAINLEVEL_OUTOF10: 8

## 2021-03-26 ASSESSMENT — PAIN DESCRIPTION - ONSET
ONSET: ON-GOING
ONSET: ON-GOING

## 2021-03-26 ASSESSMENT — PAIN DESCRIPTION - ORIENTATION: ORIENTATION: LEFT

## 2021-03-26 ASSESSMENT — PAIN DESCRIPTION - DESCRIPTORS
DESCRIPTORS: SHARP;ACHING;DISCOMFORT
DESCRIPTORS: ACHING;SHARP

## 2021-03-26 ASSESSMENT — PAIN - FUNCTIONAL ASSESSMENT: PAIN_FUNCTIONAL_ASSESSMENT: ACTIVITIES ARE NOT PREVENTED

## 2021-03-26 ASSESSMENT — PAIN DESCRIPTION - LOCATION: LOCATION: BACK;SHOULDER

## 2021-03-26 ASSESSMENT — PAIN DESCRIPTION - PAIN TYPE: TYPE: ACUTE PAIN

## 2021-03-26 ASSESSMENT — PAIN DESCRIPTION - PROGRESSION: CLINICAL_PROGRESSION: NOT CHANGED

## 2021-03-26 NOTE — PROGRESS NOTES
[facility]   Progress Note    Admitting Date and Time: 3/24/2021 12:24 PM  Admit Dx: Pneumonia due to organism [J18.9]    Subjective:    Patient was admitted with Pneumonia due to organism [J18.9].  Patient denies fever, chills, cp, sob, n/v.     cefTRIAXone (ROCEPHIN) IV  2,000 mg Intravenous Q24H    albuterol  2.5 mg Nebulization 4x daily    nicotine  1 patch Transdermal Daily    fluticasone  1 spray Each Nostril Daily    sodium chloride flush  10 mL Intravenous 2 times per day    enoxaparin  40 mg Subcutaneous Daily    buprenorphine-naloxone  1 tablet Sublingual BID     glycerin moisturizing mouth spray, 2 spray, PRN  diphenhydrAMINE, 50 mg, Nightly PRN  sodium chloride flush, 10 mL, PRN  promethazine, 12.5 mg, Q6H PRN    Or  ondansetron, 4 mg, Q6H PRN  polyethylene glycol, 17 g, Daily PRN  acetaminophen, 650 mg, Q6H PRN    Or  acetaminophen, 650 mg, Q6H PRN  dextromethorphan-guaiFENesin, 0.5 tablet, Q8H PRN         Objective:    /81   Pulse 73   Temp 97.9 °F (36.6 °C) (Oral)   Resp 18   Ht 5' 10\" (1.778 m)   Wt 197 lb 3.2 oz (89.4 kg)   SpO2 95%   BMI 28.30 kg/m²   Skin: warm and dry, no rash or erythema  Pulmonary/Chest: clear to auscultation bilaterally- no wheezes, rales or rhonchi, normal air movement, no respiratory distress  Cardiovascular: rhythm reg at rate of 76  Abdomen: soft, non-tender, non-distended, normal bowel sounds, no masses or organomegaly  Extremities: no cyanosis, no clubbing and no edema      Recent Labs     03/23/21  1650 03/24/21  1250 03/25/21  0440    128* 130*   K 5.1* 4.0 4.2    99 102   CO2 18* 16* 17*   BUN 55* 48* 43*   CREATININE 2.0* 1.6* 1.1   GLUCOSE 183* 319* 178*   CALCIUM 9.0 8.6 8.2*       Recent Labs     03/23/21  1650 03/24/21  1250 03/25/21  0440   WBC 25.6* 27.9* 26.9*   RBC 3.87 3.62* 3.26*   HGB 13.4 12.2* 11.2*   HCT 40.2 35.4* 31.9*   .9* 97.8 97.9   MCH 34.6 33.7 34.4   MCHC 33.3 34.5 35.1*   RDW 12.1 11.9 12.0    210 188   MPV 10.8 10.3 9.9       CBC with Differential:    Lab Results   Component Value Date    WBC 26.9 03/25/2021    RBC 3.26 03/25/2021    HGB 11.2 03/25/2021    HCT 31.9 03/25/2021     03/25/2021    MCV 97.9 03/25/2021    MCH 34.4 03/25/2021    MCHC 35.1 03/25/2021    RDW 12.0 03/25/2021    METASPCT 2.6 03/23/2021    LYMPHOPCT 2.2 03/25/2021    MONOPCT 7.3 03/25/2021    BASOPCT 0.3 03/25/2021    MONOSABS 1.96 03/25/2021    LYMPHSABS 0.59 03/25/2021    EOSABS 0.03 03/25/2021    BASOSABS 0.07 03/25/2021     CMP:    Lab Results   Component Value Date     03/25/2021    K 4.2 03/25/2021     03/25/2021    CO2 17 03/25/2021    BUN 43 03/25/2021    CREATININE 1.1 03/25/2021    GFRAA >60 03/25/2021    LABGLOM >60 03/25/2021    GLUCOSE 178 03/25/2021    PROT 6.9 03/25/2021    LABALBU 2.8 03/25/2021    CALCIUM 8.2 03/25/2021    BILITOT 0.3 03/25/2021    ALKPHOS 111 03/25/2021    AST 25 03/25/2021    ALT 27 03/25/2021        Radiology:   FL MODIFIED BARIUM SWALLOW W VIDEO   Final Result   Abnormal swallowing mechanism with laryngeal penetration of thin liquid and   nectar consistency barium. No barium aspiration. Please see separate speech pathology report for full discussion of findings   and recommendations. CT CHEST WO CONTRAST   Final Result   Large densely consolidated right upper lobe infiltrate most compatible with   pneumonia in the acute setting. Recommend follow-up chest CT in 3 months to   exclude underlying mass in the region of greatest consolidation      PE cannot be excluded without IV contrast      Nonobstructing left renal calculi         XR CHEST PORTABLE   Final Result   Right upper lobe infiltrate. Repeat CT scan is recommended in 10-14 days to   confirm partial resolution and exclude underlying pathology. Given that the   infiltrate abuts the minor fissure and abuts the right paratracheal region   underlying pathology cannot be excluded. Assessment:    Active Problems:    Pneumonia due to organism  Resolved Problems:    * No resolved hospital problems. *      Plan:  1. Bacteremia strep pneumoniae with invasive pneumococcal disease asked ID to see pt   2. Bacterial pneumonia continue rocephin per ID  3. Possible copd pulmonary following neb per pulm  4. Concurrent rhinovirus/enterovirus infection supportive care. 5. Dysphagia slp following continue diet  6. Hypotension favor hypovolemia due to insensible losses of fever as well as recently placed on steroids and ua noted glucosuria. Will continue iv fluids. 7. Hyperglycemia. Pt did receive steroids. Pt denies dm and that his a1c was reported low to him, however, will recheck a1c  8. htn hold bp med  9. Dehydration continue iv fluids  10. Alcohol abuse pt denies withdrawal symptoms but will need to monitor closely for withdrawal. rec cessation  11. Tobacco abuse rec cessation  12. Hyperkalemia improved  13. Acidosis/elevated lactic acid level monitor  14. raeann continue iv fluids. 15. Hyponatremia monitor  16. Sepsis(pt had fever prior to admission and had tachycardia which was in part due to hypovolemia and leukocytosis which could be affected by steroids but given bacteremia, pt with sepsis)POA supportive care and tx underlying infection.          Electronically signed by Anson Kearney DO on 3/25/2021 at 11:54 PM

## 2021-03-26 NOTE — PROGRESS NOTES
Pulmonary Progress Note    Admit Date: 3/24/2021                            PCP: Jl Christianson MD  Active Problems:    Pneumonia due to organism    Bacteremia  Resolved Problems:    * No resolved hospital problems. *      Subjective:  Resting in bed on room air. Reports mild cough with small amount of mucous, not enough to expectorate. Was having back pain prior to admission and resolved. Overnight more severe, responded mildly to cough meds and tylenol a couple hours ago. Medications:   sodium chloride 100 mL/hr at 21 0640        cefTRIAXone (ROCEPHIN) IV  2,000 mg Intravenous Q24H    albuterol  2.5 mg Nebulization 4x daily    nicotine  1 patch Transdermal Daily    fluticasone  1 spray Each Nostril Daily    sodium chloride flush  10 mL Intravenous 2 times per day    enoxaparin  40 mg Subcutaneous Daily    buprenorphine-naloxone  1 tablet Sublingual BID       Vitals:  VITALS:  BP (!) 140/97   Pulse 69   Temp 97.6 °F (36.4 °C) (Oral)   Resp 18   Ht 5' 10\" (1.778 m)   Wt 198 lb 9 oz (90.1 kg)   SpO2 95%   BMI 28.49 kg/m²   24HR INTAKE/OUTPUT:      Intake/Output Summary (Last 24 hours) at 3/26/2021 0719  Last data filed at 3/26/2021 0644  Gross per 24 hour   Intake 6747 ml   Output 450 ml   Net 6297 ml     CURRENT PULSE OXIMETRY:  SpO2: 95 %  24HR PULSE OXIMETRY RANGE:  SpO2  Av.2 %  Min: 93 %  Max: 97 %  CVP:    VENT SETTINGS:   Vent Information  SpO2: 95 %  Additional Respiratory  Assessments  Pulse: 69  Resp: 18  SpO2: 95 %      EXAM:  General: No distress. Alert. ENT: Pharynx clear. Neck: Trachea midline. Resp: No accessory muscle use. No crackles. No wheezing. No rhonchi. CV: Regular rate. Regular rhythm. No mumur or rub. No edema. ABD: Non-tender. Non-distended. Normal bowel sounds. Skin: Warm and dry. No rash on exposed extremities. M/S: No cyanosis. No joint deformity. No clubbing. Neuro: Awake. Follows commands. No obvious deficits. .    I/O: I/O last 3 completed shifts: In: 8427 [P.O.:600; I.V.:6147]  Out: 450 [Urine:450]  No intake/output data recorded. Results:  CBC:   Recent Labs     03/24/21  1250 03/25/21  0440 03/26/21  0530   WBC 27.9* 26.9* 14.7*   HGB 12.2* 11.2* 8.9*   HCT 35.4* 31.9* 27.1*   MCV 97.8 97.9 100.4*    188 224     BMP:   Recent Labs     03/24/21  1250 03/25/21  0440 03/26/21  0530   * 130* 136   K 4.0 4.2 4.4   CL 99 102 108*   CO2 16* 17* 19*   PHOS  --   --  3.0   BUN 48* 43* 40*   CREATININE 1.6* 1.1 0.9     LFT:   Recent Labs     03/24/21  1250 03/25/21  0440 03/26/21  0530   ALKPHOS 126 111 125   ALT 30 27 24   AST 23 25 24   PROT 7.4 6.9 6.4   BILITOT 0.4 0.3 <0.2   LABALBU 3.2* 2.8* 2.5*     PT/INR:   Recent Labs     03/24/21  1250   PROTIME 13.9*   INR 1.3     Cultures:  No results for input(s): CULTRESP in the last 72 hours. ABG:   Recent Labs     03/24/21  2130   PH 7.397   PO2 61.2*   PCO2 25.0*   HCO3 15.0*   BE -8.1*   O2SAT 91.9*       Films:  Ct Chest Wo Contrast    Result Date: 3/24/2021  EXAMINATION: CT OF THE CHEST WITHOUT CONTRAST 3/24/2021 1:58 pm TECHNIQUE: CT of the chest was performed without the administration of intravenous contrast. Multiplanar reformatted images are provided for review. Dose modulation, iterative reconstruction, and/or weight based adjustment of the mA/kV was utilized to reduce the radiation dose to as low as reasonably achievable. COMPARISON: Chest radiograph 03/24/2021. Chest CT 06/12/2020. HISTORY: ORDERING SYSTEM PROVIDED HISTORY: chest pain TECHNOLOGIST PROVIDED HISTORY: Rule out PE Reason for exam:->chest pain FINDINGS: PE cannot be excluded without IV contrast.  Normal caliber pulmonary arteries. Normal caliber thoracic aorta with slight calcified atherosclerotic plaque. Normal cardiac size without pericardial effusion. No hilar mass or mediastinal adenopathy. No axillary or supraclavicular adenopathy. Normal-appearing esophagus.  2 mm and 3 mm nonobstructing left renal calculi. No hydronephrosis in the imaged portion of the kidneys. Clear central airways. The left lung appears clear. A large opacity with scattered consolidation and air bronchograms is present in the posterior, medial, inferior aspect of the right upper lobe, more prominent medially than laterally. It abuts the pleural surface. Greatest consolidation is noted inferiorly. Inferior margin abuts the minor and major fissures. No definite extension into the right middle lobe and right lower lobe. It is new from June 2020 chest CT. No definite well-defined mass although soft tissue lesion cannot be excluded in the region of greatest inferior consolidation. Large densely consolidated right upper lobe infiltrate most compatible with pneumonia in the acute setting. Recommend follow-up chest CT in 3 months to exclude underlying mass in the region of greatest consolidation PE cannot be excluded without IV contrast Nonobstructing left renal calculi     Xr Chest Portable    Result Date: 3/24/2021  EXAMINATION: ONE XRAY VIEW OF THE CHEST 3/24/2021 1:07 pm COMPARISON: 06/12/2020 HISTORY: ORDERING SYSTEM PROVIDED HISTORY: chest pain TECHNOLOGIST PROVIDED HISTORY: Reason for exam:->chest pain FINDINGS: There is a large infiltrate seen within the right upper lobe. The infiltrate abuts the minor fissure and is seen  extending along the right paratracheal region. The left lung is clear. There is no pleural effusion. The heart is not enlarged. Right upper lobe infiltrate. Repeat CT scan is recommended in 10-14 days to confirm partial resolution and exclude underlying pathology. Given that the infiltrate abuts the minor fissure and abuts the right paratracheal region underlying pathology cannot be excluded.      Fl Modified Barium Swallow W Video    Result Date: 3/25/2021  EXAMINATION: MODIFIED BARIUM SWALLOW WAS PERFORMED IN CONJUNCTION WITH SPEECH PATHOLOGY [facility] TECHNIQUE: Fluoroscopic evaluation of the swallowing mechanism was performed with multiple consistency of barium product. FLUOROSCOPY DOSE AND TYPE OR TIME AND EXPOSURES: Images: 5 Fluoroscopic time: 1.9 minutes Total dose: 15.56 mGy COMPARISON: None HISTORY: ORDERING SYSTEM PROVIDED HISTORY: dysphagia TECHNOLOGIST PROVIDED HISTORY: Prior cervical spine surgical fusion Reason for exam:->dysphagia FINDINGS: Satisfactory oral phase of the swallowing mechanism. No significant oral cavity or hypopharyngeal barium residuals. Abnormal pharyngeal phase of the swallow with laryngeal penetration of thin liquid and nectar consistency barium. No barium aspiration. Abnormal swallowing mechanism with laryngeal penetration of thin liquid and nectar consistency barium. No barium aspiration. Please see separate speech pathology report for full discussion of findings and recommendations. Assessment:  56 yom smoker with progressive dyspnea and congestion for a couple years began having chest congestion and shortness of breath after being exposed to his sick grandchildren. Complicated by severe right sided back pain and fevers as high as 101.4. · New RUL pneumonia   · Bacteremia with strep  · + rhinovirus  · Suspect COPD  · 1.5 ppd smoker and ETOH abuse  · New back pain      Plan:  · On ceftriaxone per ID (started 3/25)  · Continue albuterol nebs qid to aid mucous clearance/cough  · Add IS  · On nicoderm and suboxone  · DVT prophylaxis on lovenox  · Continue tylenol prn for back pain  · On room air, maintain pox > 92%        Electronically signed by BENITA Lizama CNP on 3/26/2021 at 7:19 AM    Seen and examined, c/o pain and severe cough. WBC better and afebrile. Pneumococcal pneumonia with bacteremai doing better continue ceftriaxone per ID.

## 2021-03-26 NOTE — CONSULTS
[address] [address]                                  CONSULTATION    PATIENT NAME: Roxann Sanchez                     :        1960  MED REC NO:   [id number]                            ROOM:       6801  ACCOUNT NO:   [de-identified]                           ADMIT DATE: 2021  PROVIDER:     Aileen Escoto MD    CONSULT DATE:  2021    PULMONARY CONSULTATION    Requested by Dr. Sabine Gar. REASON FOR CONSULTATION:  Pneumonia. IMPRESSION:  New right upper lobe pneumonia, not previously seen on . He is also bacteremic with pneumococcus with a high-grade  bacteremia. At this point, ceftriaxone 2 gm per day has been started by  Infectious Disease, which I agree with obviously. I also suspect based  on his chronic cough, shortness of breath with exertion, and chest  congestion that he has underlying COPD in light of being a  1.5-pack-per-day smoker. We will obviously defer pulmonary function  testing until he is cleared from his pneumonia. He also drinks heavily,  24-ounce can of beer plus 2 Fireballs a day, so obviously there is  concern for development of withdrawal down the road during his  hospitalization. He also had severe sepsis, borderline septic shock on  admission, but this has resolved. HISTORY OF PRESENT ILLNESS:  This is a 68-year-old white male,  1.5-pack-per-day smoker, who notes a couple of year history of  progressive shortness of breath and chest congestion. 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 He also has chest congestion the last couple of years as well as  some difficulty swallowing. He became ill after his grandchildren who  were also ill, coughed and sneezed on him. Two days later, he started  having severe head and chest congestion with some shortness of breath.    He was coughing, but swallowing rather than expectorating his mucus. He  had severe right-sided back pain that went to the front of the chest and  noted shortness of breath again with a flight of stairs, this time not  carrying anything. Subsequently, he developed fevers as high as 101.4. He spoke to his primary care doctor and was advised to go to the  emergency department. He has chronic chest congestion and a chronic  cough with sputum. He smokes and drinks as I mentioned above. He is  otherwise pretty healthy. PAST MEDICAL HISTORY:  Includes questionable hypertension, knee and  shoulder problems, and really not a lot else. He has been found to have  dysphagia here. PAST SURGICAL HISTORY:  Includes knee and shoulder surgery. FAMILY HISTORY:  His father had what sounds like head and neck cancer  with mets to the lung. SOCIAL HISTORY:  He works with insulation and both smokes and drinks. MEDICATIONS:  Currently are albuterol aerosols four times a day that I  just added, Suboxone 1 tablet sublingually b.i.d., ceftriaxone 2 gm  daily, dextromethorphan with guaifenesin 0.5 tablets q.8 p.r.n.,  Benadryl 50 at bedtime p.r.n., Lovenox 40 subcutaneously daily, Flonase  1 spray in each nostril daily, and p.r.n. promethazine and Zofran. REVIEW OF SYSTEMS:  Reveals no known drug allergies. He has been  febrile. He has skin irritation behind both ears. It clears up pretty  quickly. No visual or hearing complaints except some pressure behind  his eyes. Positive right-sided chest pain. No heart history. Positive  shortness of breath and cough. No nausea. No urinary complaints. Positive knee and shoulder pain. Review of systems is otherwise  negative. PHYSICAL EXAMINATION:  GENERAL:  He is in no acute distress. VITAL SIGNS:  Temp is 37.2, pulse 75, respiratory rate was 18, blood  pressure 113/76, pulse ox 93. SKIN:  Had no rashes. Little redness behind the ears. HEENT:  Eyes had clear sclerae.   Teeth and palate were normal.  NECK:  Without masses or adenopathy. CHEST:  Symmetric. There was no accessory muscle use. HEART:  Regular in rate and rhythm. LUNGS:  Had bronchial breath sounds in the right upper lung field. ABDOMEN:  Soft and nontender without masses or organomegaly. EXTREMITIES:  Showed no clubbing, no cyanosis, and no limb edema. NEUROLOGICAL:  He was alert and appropriate. DATA:  White count is 26.9, hemoglobin is 11.2, platelets of 748. He is  hyponatremic at 130, K of 4.2, chloride 102, bicarb 17, anion gap 13,  BUN 43, creatinine 1.1. He had JOSIE when he came in. Glucose was 178. He also had a lactic acidosis with 3.7. Blood cultures are showing  pneumococcus in 4/4 bottles. Chest imaging was reviewed and showed a  dense right upper lobe infiltrate more posteriorly that was not present  on the June scan. Thank you.         Fernando Ellis MD    D: 03/25/2021 15:55:51       T: 03/25/2021 16:04:36     [email or web address]  Job#: [id number]     Doc#: [id number]    CC:  Godo Stacy MD

## 2021-03-26 NOTE — PROGRESS NOTES
SPEECH LANGUAGE PATHOLOGY  DAILY PROGRESS NOTE        PATIENT NAME:  Tika Whitmore      :  1960          TODAY'S DATE:  3/26/2021 ROOM:  [address] [place] [address]        SWALLOWING    Pt in bed, alert and cooperative. Reports toleration of current diet with use of chin tuck maneuver. Initiated NMES and Deep Pharyngeal Neuromuscular Stimulation with good outcome. Tolerated 30 mins of NMES at 8.5 with good response. DPNS X30 with spontaneous swallow at 100%. No gag reflex observed. DYSPHAGIA DIAGNOSIS:  Mild-moderate oropharyngeal dysphagia; consistent deep, transient penetration of thin liquid noted during video swallow study. This was able to be eliminated with chin tuck maneuver. Pt at risk of high risk of aspiration if chin tuck maneuver not used with liquids.       Esophageal sweep completed during this study due to pt complaints of food getting \"stuck\" which was not observed in pharynx. Discussed esophageal sweep findings with radiologist;  recommend esophagram at this time to further assess. DIET RECOMMENDATIONS:  Regular consistency solids with thin liquids using chin tuck maneuver as trained                  FEEDING RECOMMENDATIONS:                           Assistance level:  No assistance needed                             Compensatory strategies recommended: Chin tuck, Small bites/sips and Alternate solids and liquids      Education- Pt educated on results and POC. Pt trained on compensatory strategies for safe swallow with good outcome. Questions answered. Will continue SP intervention as per previously established POC. Elke BEAULIEU CCC/SLP [id number]  Speech Pathologist              CPT code(s) 47288  dysphagia tx  [address]  sp elec stim attend- (15 min)  15002  neuromuscular re-education,  DPNS  Total minutes :  45 minutes

## 2021-03-26 NOTE — PROGRESS NOTES
[address] [place] [address] [facility]  Progress Note    SUBJECTIVE:  Chief Complaint   Patient presents with    Dizziness     started saturday, sent in by pcp    Chest Pain     right lateral chest, hurts when coughing     Patient is tolerating medications. No fevers or chills. No N/V/D. Endorses some pain in the right pleural area on deep inspiration and cough. Review of systems:  As stated above in the chief complaint, otherwise negative. Medications:  Scheduled Meds:   cefTRIAXone (ROCEPHIN) IV  2,000 mg Intravenous Q24H    albuterol  2.5 mg Nebulization 4x daily    nicotine  1 patch Transdermal Daily    fluticasone  1 spray Each Nostril Daily    sodium chloride flush  10 mL Intravenous 2 times per day    enoxaparin  40 mg Subcutaneous Daily    buprenorphine-naloxone  1 tablet Sublingual BID     Continuous Infusions:   sodium chloride 100 mL/hr at 21 0640     PRN Meds:glycerin moisturizing mouth spray, diphenhydrAMINE, sodium chloride flush, promethazine **OR** ondansetron, polyethylene glycol, acetaminophen **OR** acetaminophen, dextromethorphan-guaiFENesin    OBJECTIVE:  /86   Pulse 88   Temp 97.7 °F (36.5 °C) (Oral)   Resp 18   Ht 5' 10\" (1.778 m)   Wt 198 lb 9 oz (90.1 kg)   SpO2 94%   BMI 28.49 kg/m²   Temp  Av °F (36.7 °C)  Min: 97.6 °F (36.4 °C)  Max: 98.9 °F (37.2 °C)  Constitutional: The patient is lying in bed, in no distress. Awake, alert. Skin: Warm and dry. No rashes were noted. HEENT: Round and reactive pupils. Moist mucous membranes. No ulcerations or thrush. Neck: Supple to movements. Chest: No respiratory distress. On room air. Occasional strong moist cough. Pain with deep inspiratory & cough in the right middle lung field. Faint crackles noted in the right middle lobe. Cardiovascular: S1 and S2 are rhythmic and regular. No murmurs appreciated. Abdomen: Positive bowel sounds to auscultation. Benign to palpation. No masses felt. Extremities: No edema.   Lines: peripheral    Laboratory and Tests Review:  Lab Results   Component Value Date    WBC 14.7 (H) 03/26/2021    WBC 26.9 (H) 03/25/2021    WBC 27.9 (H) 03/24/2021    HGB 8.9 (L) 03/26/2021    HCT 27.1 (L) 03/26/2021    .4 (H) 03/26/2021     03/26/2021     Lab Results   Component Value Date    NEUTROABS 11.37 (H) 03/26/2021    NEUTROABS 24.05 (H) 03/25/2021    NEUTROABS 25.91 (H) 03/24/2021     No results found for: CRPHS  Lab Results   Component Value Date    ALT 24 03/26/2021    AST 24 03/26/2021    ALKPHOS 125 03/26/2021    BILITOT <0.2 03/26/2021     Lab Results   Component Value Date     03/26/2021    K 4.4 03/26/2021    K 4.2 03/25/2021     03/26/2021    CO2 19 03/26/2021    BUN 40 03/26/2021    CREATININE 0.9 03/26/2021    CREATININE 1.1 03/25/2021    CREATININE 1.6 03/24/2021    GFRAA >60 03/26/2021    LABGLOM >60 03/26/2021    GLUCOSE 131 03/26/2021    PROT 6.4 03/26/2021    LABALBU 2.5 03/26/2021    CALCIUM 8.2 03/26/2021    BILITOT <0.2 03/26/2021    ALKPHOS 125 03/26/2021    AST 24 03/26/2021    ALT 24 03/26/2021     Lab Results   Component Value Date    CRP 25.0 (H) 03/24/2021     Lab Results   Component Value Date    SEDRATE 81 (H) 03/24/2021     Radiology:  Reviewed     Microbiology:   Blood cultures 3/24/2021 - GPC, Streptococcus pneumoniae by PCR  Blood cultures 3/25/2021 - pending  Respiratory Viral Panel with SARS-CoV-2 3/24/2021 - Rhinovirus/Enterovirus detected   Strep/Legionella urine antigens 3/24/2021 - Negative      Recent Labs     03/25/21  0440   PROCAL 3.71*       ASSESSMENT:  · Community-acquired pneumonia right upper lobe  · Fever with leukocytosis secondary to pneumonia, improved   · Bacteremia with Streptococcus pneumonia  · Invasive pneumococcal disease  · Rhinovirus/Enterovirus infection    PLAN:  · Continue Ceftriaxone 2g IV daily  · Repeat blood cultures are pending   · Check final cultures  · Monitor labs- WBC 14.7 today, CRP

## 2021-03-26 NOTE — CARE COORDINATION
Met with pt re; d/c planning; lives independently with s/o; PCP . denies any needs at discharge. Has some swallowing issues; speech therapy recommends OPT follow-up therapy post discharge. Will need RX at discharge. Sussy Coronel.

## 2021-03-26 NOTE — PLAN OF CARE
Problem: Daily Care:  Goal: Daily care needs are met  Description: Daily care needs are met  Outcome: Met This Shift     Problem: Pain:  Goal: Patient's pain/discomfort is manageable  Description: Patient's pain/discomfort is manageable  Outcome: Met This Shift

## 2021-03-26 NOTE — PLAN OF CARE
Problem: Daily Care:  Goal: Daily care needs are met  Description: Daily care needs are met  Outcome: Met This Shift     Problem: Pain:  Goal: Patient's pain/discomfort is manageable  Description: Patient's pain/discomfort is manageable  Outcome: Met This Shift  Goal: Pain level will decrease  Description: Pain level will decrease  Outcome: Met This Shift  Goal: Control of acute pain  Description: Control of acute pain  Outcome: Met This Shift  Goal: Control of chronic pain  Description: Control of chronic pain  Outcome: Met This Shift

## 2021-03-27 ENCOUNTER — APPOINTMENT (OUTPATIENT)
Dept: ULTRASOUND IMAGING | Age: 61
DRG: 871 | End: 2021-03-27
Payer: COMMERCIAL

## 2021-03-27 LAB
ALBUMIN SERPL-MCNC: 2.7 G/DL (ref 3.5–5.2)
ALP BLD-CCNC: 128 U/L (ref 40–129)
ALT SERPL-CCNC: 26 U/L (ref 0–40)
ANION GAP SERPL CALCULATED.3IONS-SCNC: 9 MMOL/L (ref 7–16)
AST SERPL-CCNC: 23 U/L (ref 0–39)
BASOPHILS ABSOLUTE: 0.03 E9/L (ref 0–0.2)
BASOPHILS RELATIVE PERCENT: 0.3 % (ref 0–2)
BILIRUB SERPL-MCNC: <0.2 MG/DL (ref 0–1.2)
BUN BLDV-MCNC: 28 MG/DL (ref 8–23)
CALCIUM SERPL-MCNC: 8.7 MG/DL (ref 8.6–10.2)
CHLORIDE BLD-SCNC: 108 MMOL/L (ref 98–107)
CO2: 20 MMOL/L (ref 22–29)
CREAT SERPL-MCNC: 0.7 MG/DL (ref 0.7–1.2)
EOSINOPHILS ABSOLUTE: 0.03 E9/L (ref 0.05–0.5)
EOSINOPHILS RELATIVE PERCENT: 0.3 % (ref 0–6)
GFR AFRICAN AMERICAN: >60
GFR NON-AFRICAN AMERICAN: >60 ML/MIN/1.73
GLUCOSE BLD-MCNC: 158 MG/DL (ref 74–99)
HCT VFR BLD CALC: 34.4 % (ref 37–54)
HEMOGLOBIN: 11.6 G/DL (ref 12.5–16.5)
IMMATURE GRANULOCYTES #: 0.47 E9/L
IMMATURE GRANULOCYTES %: 4.4 % (ref 0–5)
LYMPHOCYTES ABSOLUTE: 1.12 E9/L (ref 1.5–4)
LYMPHOCYTES RELATIVE PERCENT: 10.5 % (ref 20–42)
MCH RBC QN AUTO: 33.6 PG (ref 26–35)
MCHC RBC AUTO-ENTMCNC: 33.7 % (ref 32–34.5)
MCV RBC AUTO: 99.7 FL (ref 80–99.9)
MONOCYTES ABSOLUTE: 1.42 E9/L (ref 0.1–0.95)
MONOCYTES RELATIVE PERCENT: 13.3 % (ref 2–12)
NEUTROPHILS ABSOLUTE: 7.58 E9/L (ref 1.8–7.3)
NEUTROPHILS RELATIVE PERCENT: 71.2 % (ref 43–80)
PDW BLD-RTO: 12.5 FL (ref 11.5–15)
PLATELET # BLD: 212 E9/L (ref 130–450)
PMV BLD AUTO: 9.8 FL (ref 7–12)
POTASSIUM SERPL-SCNC: 4.1 MMOL/L (ref 3.5–5)
RBC # BLD: 3.45 E12/L (ref 3.8–5.8)
SODIUM BLD-SCNC: 137 MMOL/L (ref 132–146)
TOTAL PROTEIN: 6.9 G/DL (ref 6.4–8.3)
WBC # BLD: 10.7 E9/L (ref 4.5–11.5)

## 2021-03-27 PROCEDURE — 94640 AIRWAY INHALATION TREATMENT: CPT

## 2021-03-27 PROCEDURE — 36415 COLL VENOUS BLD VENIPUNCTURE: CPT

## 2021-03-27 PROCEDURE — 1200000000 HC SEMI PRIVATE

## 2021-03-27 PROCEDURE — 02HV33Z INSERTION OF INFUSION DEVICE INTO SUPERIOR VENA CAVA, PERCUTANEOUS APPROACH: ICD-10-PCS | Performed by: SPECIALIST

## 2021-03-27 PROCEDURE — 6370000000 HC RX 637 (ALT 250 FOR IP): Performed by: INTERNAL MEDICINE

## 2021-03-27 PROCEDURE — 80053 COMPREHEN METABOLIC PANEL: CPT

## 2021-03-27 PROCEDURE — 6360000002 HC RX W HCPCS: Performed by: INTERNAL MEDICINE

## 2021-03-27 PROCEDURE — 6360000002 HC RX W HCPCS: Performed by: SPECIALIST

## 2021-03-27 PROCEDURE — 2580000003 HC RX 258: Performed by: SPECIALIST

## 2021-03-27 PROCEDURE — 2580000003 HC RX 258: Performed by: INTERNAL MEDICINE

## 2021-03-27 PROCEDURE — 99233 SBSQ HOSP IP/OBS HIGH 50: CPT | Performed by: INTERNAL MEDICINE

## 2021-03-27 PROCEDURE — 85025 COMPLETE CBC W/AUTO DIFF WBC: CPT

## 2021-03-27 PROCEDURE — B548ZZA ULTRASONOGRAPHY OF SUPERIOR VENA CAVA, GUIDANCE: ICD-10-PCS | Performed by: INTERNAL MEDICINE

## 2021-03-27 RX ORDER — LIDOCAINE HYDROCHLORIDE 10 MG/ML
5 INJECTION, SOLUTION EPIDURAL; INFILTRATION; INTRACAUDAL; PERINEURAL ONCE
Status: COMPLETED | OUTPATIENT
Start: 2021-03-27 | End: 2021-03-28

## 2021-03-27 RX ORDER — HEPARIN SODIUM (PORCINE) LOCK FLUSH IV SOLN 100 UNIT/ML 100 UNIT/ML
3 SOLUTION INTRAVENOUS PRN
Status: DISCONTINUED | OUTPATIENT
Start: 2021-03-27 | End: 2021-03-31 | Stop reason: HOSPADM

## 2021-03-27 RX ORDER — HEPARIN SODIUM (PORCINE) LOCK FLUSH IV SOLN 100 UNIT/ML 100 UNIT/ML
3 SOLUTION INTRAVENOUS EVERY 12 HOURS SCHEDULED
Status: DISCONTINUED | OUTPATIENT
Start: 2021-03-27 | End: 2021-03-31 | Stop reason: HOSPADM

## 2021-03-27 RX ORDER — SODIUM CHLORIDE 0.9 % (FLUSH) 0.9 %
10 SYRINGE (ML) INJECTION PRN
Status: DISCONTINUED | OUTPATIENT
Start: 2021-03-27 | End: 2021-03-31 | Stop reason: HOSPADM

## 2021-03-27 RX ADMIN — SODIUM CHLORIDE: 9 INJECTION, SOLUTION INTRAVENOUS at 02:33

## 2021-03-27 RX ADMIN — BUPRENORPHINE AND NALOXONE 1 TABLET: 8; 2 TABLET SUBLINGUAL at 20:56

## 2021-03-27 RX ADMIN — ACETAMINOPHEN 650 MG: 325 TABLET ORAL at 20:59

## 2021-03-27 RX ADMIN — DEXTROMETHORPHAN HBR AND GUAIFENESIN 0.5 TABLET: 20; 400 TABLET, FILM COATED ORAL at 20:59

## 2021-03-27 RX ADMIN — ALBUTEROL SULFATE 2.5 MG: 2.5 SOLUTION RESPIRATORY (INHALATION) at 11:59

## 2021-03-27 RX ADMIN — WATER 2000 MG: 1 INJECTION INTRAMUSCULAR; INTRAVENOUS; SUBCUTANEOUS at 20:56

## 2021-03-27 RX ADMIN — FLUTICASONE PROPIONATE 1 SPRAY: 50 SPRAY, METERED NASAL at 08:08

## 2021-03-27 RX ADMIN — BUPRENORPHINE AND NALOXONE 1 TABLET: 8; 2 TABLET SUBLINGUAL at 08:05

## 2021-03-27 RX ADMIN — ACETAMINOPHEN 650 MG: 325 TABLET ORAL at 02:33

## 2021-03-27 RX ADMIN — SODIUM CHLORIDE, PRESERVATIVE FREE 10 ML: 5 INJECTION INTRAVENOUS at 08:05

## 2021-03-27 RX ADMIN — ALBUTEROL SULFATE 2.5 MG: 2.5 SOLUTION RESPIRATORY (INHALATION) at 08:01

## 2021-03-27 RX ADMIN — ALBUTEROL SULFATE 2.5 MG: 2.5 SOLUTION RESPIRATORY (INHALATION) at 15:58

## 2021-03-27 RX ADMIN — DIPHENHYDRAMINE HCL 50 MG: 25 TABLET ORAL at 20:59

## 2021-03-27 RX ADMIN — SODIUM CHLORIDE: 9 INJECTION, SOLUTION INTRAVENOUS at 23:32

## 2021-03-27 RX ADMIN — ENOXAPARIN SODIUM 40 MG: 40 INJECTION SUBCUTANEOUS at 20:56

## 2021-03-27 RX ADMIN — ALBUTEROL SULFATE 2.5 MG: 2.5 SOLUTION RESPIRATORY (INHALATION) at 19:50

## 2021-03-27 ASSESSMENT — PAIN SCALES - GENERAL
PAINLEVEL_OUTOF10: 0
PAINLEVEL_OUTOF10: 0
PAINLEVEL_OUTOF10: 5

## 2021-03-27 NOTE — PROGRESS NOTES
[facility]   Progress Note    Admitting Date and Time: 3/24/2021 12:24 PM  Admit Dx: Pneumonia due to organism [J18.9]    Subjective:    Patient was admitted with Pneumonia due to organism [J18.9].  Patient denies fever, chills, cp, sob, n/v. Pt notes tremor     cefTRIAXone (ROCEPHIN) IV  2,000 mg Intravenous Q24H    albuterol  2.5 mg Nebulization 4x daily    nicotine  1 patch Transdermal Daily    fluticasone  1 spray Each Nostril Daily    sodium chloride flush  10 mL Intravenous 2 times per day    enoxaparin  40 mg Subcutaneous Daily    buprenorphine-naloxone  1 tablet Sublingual BID     glycerin moisturizing mouth spray, 2 spray, PRN  diphenhydrAMINE, 50 mg, Nightly PRN  sodium chloride flush, 10 mL, PRN  promethazine, 12.5 mg, Q6H PRN    Or  ondansetron, 4 mg, Q6H PRN  polyethylene glycol, 17 g, Daily PRN  acetaminophen, 650 mg, Q6H PRN    Or  acetaminophen, 650 mg, Q6H PRN  dextromethorphan-guaiFENesin, 0.5 tablet, Q8H PRN         Objective:    BP (!) 142/92   Pulse 76   Temp 98.2 °F (36.8 °C) (Oral)   Resp 18   Ht 5' 10\" (1.778 m)   Wt 198 lb 9 oz (90.1 kg)   SpO2 96%   BMI 28.49 kg/m²   Skin: pt warm and mildly diaphoretic along with tremor  Pulmonary/Chest: clear to auscultation bilaterally- no wheezes, rales or rhonchi, normal air movement, no respiratory distress  Cardiovascular: rhythm reg at rate of 78  Abdomen: soft, non-tender, non-distended, normal bowel sounds, no masses or organomegaly  Extremities: no cyanosis, no clubbing and no edema      Recent Labs     03/24/21  1250 03/25/21  0440 03/26/21  0530   * 130* 136   K 4.0 4.2 4.4   CL 99 102 108*   CO2 16* 17* 19*   BUN 48* 43* 40*   CREATININE 1.6* 1.1 0.9   GLUCOSE 319* 178* 131*   CALCIUM 8.6 8.2* 8.2*       Recent Labs     03/24/21  1250 03/25/21  0440 03/26/21  0530   WBC 27.9* 26.9* 14.7*   RBC 3.62* 3.26* 2.70*   HGB 12.2* 11.2* 8.9*   HCT 35.4* 31.9* 27.1*   MCV 97.8 97.9 100.4*   MCH 33.7 34.4 days to   confirm partial resolution and exclude underlying pathology. Given that the   infiltrate abuts the minor fissure and abuts the right paratracheal region   underlying pathology cannot be excluded. Assessment:    Active Problems:    Pneumonia due to organism    Bacteremia  Resolved Problems:    * No resolved hospital problems. *      Plan:  1. Bacteremia strep pneumoniae with invasive pneumococcal disease asked ID to see pt   2. Bacterial pneumonia continue rocephin per ID  3. Possible copd pulmonary following neb per pulm  4. Concurrent rhinovirus/enterovirus infection supportive care. 5. Dysphagia slp following continue diet  6. Hypotension favor hypovolemia due to insensible losses of fever as well as recently placed on steroids and ua noted glucosuria.  Will continue iv fluids. 7. Hyperglycemia.  Pt did receive steroids. Pt denies dm and that his a1c was reported low to him, however, will recheck a1c  8. htn hold bp med  9. Dehydration continue iv fluids  10. Alcohol abuse  rec cessation  11. Probable alcohol withdrawal symptoms appear to be mild but will order ciwa scale and monitor closely  12. Tobacco abuse rec cessation  13. Hyperkalemia improved  14. Acidosis/elevated lactic acid level monitor  15. raeann continue iv fluids.   16. Hyponatremia monitor  17. Back pain monitor and tx prn  18. Sepsis(pt had fever prior to admission and had tachycardia which was in part due to hypovolemia and leukocytosis which could be affected by steroids but given bacteremia, pt with sepsis)POA supportive care and tx underlying infection.          Electronically signed by Ephraim Zuñiga DO on 3/26/2021 at 8:05 PM

## 2021-03-27 NOTE — PROGRESS NOTES
Pulmonary Progress Note    Admit Date: 3/24/2021                            PCP: Randolph Harper MD  Active Problems:    Pneumonia due to organism    Bacteremia    Alcohol withdrawal syndrome without complication ([address]  Resolved Problems:    * No resolved hospital problems. *      Subjective:  Resting in bed  Feels better today but still fatigued  Denies dyspnea  Coughing but swallowing sputum  Has been ambulating in room without issues  Mild back pain which was present prior to admission   On room air    Medications:   sodium chloride      sodium chloride 100 mL/hr at 21 0233        sodium chloride flush  10 mL Intravenous 2 times per day    cefTRIAXone (ROCEPHIN) IV  2,000 mg Intravenous Q24H    albuterol  2.5 mg Nebulization 4x daily    nicotine  1 patch Transdermal Daily    fluticasone  1 spray Each Nostril Daily    enoxaparin  40 mg Subcutaneous Daily    buprenorphine-naloxone  1 tablet Sublingual BID       Vitals:  VITALS:  BP (!) 155/88   Pulse 66   Temp 98.2 °F (36.8 °C) (Oral)   Resp 16   Ht 5' 10\" (1.778 m)   Wt 198 lb 9 oz (90.1 kg)   SpO2 92%   BMI 28.49 kg/m²   24HR INTAKE/OUTPUT:      Intake/Output Summary (Last 24 hours) at 3/27/2021 0948  Last data filed at 3/27/2021 0446  Gross per 24 hour   Intake 1650 ml   Output 1300 ml   Net 350 ml     CURRENT PULSE OXIMETRY:  SpO2: 92 %  24HR PULSE OXIMETRY RANGE:  SpO2  Av.3 %  Min: 92 %  Max: 98 %  CVP:    VENT SETTINGS:   Vent Information  SpO2: 92 %  Additional Respiratory  Assessments  Pulse: 66  Resp: 16  SpO2: 92 %      EXAM:  General: No distress. Alert. ENT: Pharynx clear. Neck: Trachea midline. Resp: No accessory muscle use. No crackles. No wheezing. No rhonchi. CV: Regular rate. Regular rhythm. No mumur or rub. No edema. ABD: Non-tender. Non-distended. Normal bowel sounds. Skin: Warm and dry. No rash on exposed extremities. M/S: No cyanosis. No joint deformity. No clubbing. Neuro: Awake.  Follows commands. No obvious deficits. .    I/O: I/O last 3 completed shifts: In: 8691 [P.O.:500; I.V.:1150]  Out: 1300 [Urine:1300]  No intake/output data recorded. Results:  CBC:   Recent Labs     03/25/21 0440 03/26/21  0530 03/27/21  0230   WBC 26.9* 14.7* 10.7   HGB 11.2* 8.9* 11.6*   HCT 31.9* 27.1* 34.4*   MCV 97.9 100.4* 99.7    224 212     BMP:   Recent Labs     03/25/21 0440 03/26/21  0530 03/27/21  0230   * 136 137   K 4.2 4.4 4.1    108* 108*   CO2 17* 19* 20*   PHOS  --  3.0  --    BUN 43* 40* 28*   CREATININE 1.1 0.9 0.7     LFT:   Recent Labs     03/25/21 0440 03/26/21  0530 03/27/21  0230   ALKPHOS 111 125 128   ALT 27 24 26   AST 25 24 23   PROT 6.9 6.4 6.9   BILITOT 0.3 <0.2 <0.2   LABALBU 2.8* 2.5* 2.7*     PT/INR:   Recent Labs     03/24/21  1250   PROTIME 13.9*   INR 1.3     Cultures:  No results for input(s): CULTRESP in the last 72 hours. ABG:   Recent Labs     03/24/21  2130   PH 7.397   PO2 61.2*   PCO2 25.0*   HCO3 15.0*   BE -8.1*   O2SAT 91.9*       Films:  Ct Chest Wo Contrast 03/24/21: Large densely consolidated right upper lobe infiltrate most compatible with pneumonia in the acute setting. Recommend follow-up chest CT in 3 months to exclude underlying mass in the region of greatest consolidation PE cannot be excluded without IV contrast Nonobstructing left renal calculi     Xr Chest Portable: 3/24/2021 Right upper lobe infiltrate. Repeat CT scan is recommended in 10-14 days to confirm partial resolution and exclude underlying pathology. Given that the infiltrate abuts the minor fissure and abuts the right paratracheal region underlying pathology cannot be excluded. Fl Modified Barium Swallow W Video: 3/25/2021 Abnormal swallowing mechanism with laryngeal penetration of thin liquid and nectar consistency barium. No barium aspiration. Please see separate speech pathology report for full discussion of findings and recommendations.  Speech pathology rec: Compensatory strategies recommended: Chin tuck, Small bites/sips and Alternate solids and liquids      Assessment:  56 yom smoker with progressive dyspnea and congestion for a couple years began having chest congestion and shortness of breath after being exposed to his sick grandchildren. Complicated by severe right sided back pain and fevers as high as 101.4. · New RUL pneumonia   · Bacteremia with blood cultures growing Streptococcus pneumoniae.    · + rhinovirus  · Suspect COPD  · 1.5 ppd smoker and ETOH abuse  · New back pain      Plan:  · On ceftriaxone per ID (started 3/25) - to continue for a min 3-4 weeks  · Continue albuterol nebs qid to aid mucous clearance/cough  · Continue nicoderm and suboxone  · DVT prophylaxis on lovenox  · O2 at needed, currently on room air, maintain pox > 92%  · IS  · Outpt PFT's    ADAN Cheung    Electronically signed by BENITA Cheung CNP on 3/27/2021 at 9:48 AM

## 2021-03-27 NOTE — PROGRESS NOTES
Aware of consult for PICC placement. Patient is not for discharge today, and has functioning IV site. Per Charge Nurse, okay to assess tomorrow.  3/27/2021 5:18 PM JARETH Landa RN [facility]

## 2021-03-27 NOTE — PROGRESS NOTES
[facility] Hospitalist   Progress Note    Admitting Date and Time: 3/24/2021 12:24 PM  Admit Dx: Pneumonia due to organism [J18.9]    Subjective:    Patient was admitted with Pneumonia due to organism [J18.9]. Patient denies fever, chills, cp, sob, n/v.     lidocaine PF  5 mL Intradermal Once    heparin flush  3 mL Intravenous 2 times per day    sodium chloride flush  10 mL Intravenous 2 times per day    cefTRIAXone (ROCEPHIN) IV  2,000 mg Intravenous Q24H    albuterol  2.5 mg Nebulization 4x daily    nicotine  1 patch Transdermal Daily    fluticasone  1 spray Each Nostril Daily    enoxaparin  40 mg Subcutaneous Daily    buprenorphine-naloxone  1 tablet Sublingual BID     sodium chloride flush, 10 mL, PRN  heparin flush, 3 mL, PRN  sodium chloride flush, 10 mL, PRN  sodium chloride, 25 mL, PRN  LORazepam, 1 mg, Q1H PRN    Or  LORazepam, 1 mg, Q1H PRN    Or  LORazepam, 2 mg, Q1H PRN    Or  LORazepam, 2 mg, Q1H PRN    Or  LORazepam, 3 mg, Q1H PRN    Or  LORazepam, 3 mg, Q1H PRN    Or  LORazepam, 4 mg, Q1H PRN    Or  LORazepam, 4 mg, Q1H PRN  glycerin moisturizing mouth spray, 2 spray, PRN  diphenhydrAMINE, 50 mg, Nightly PRN  promethazine, 12.5 mg, Q6H PRN    Or  ondansetron, 4 mg, Q6H PRN  polyethylene glycol, 17 g, Daily PRN  acetaminophen, 650 mg, Q6H PRN    Or  acetaminophen, 650 mg, Q6H PRN  dextromethorphan-guaiFENesin, 0.5 tablet, Q8H PRN         Objective:    BP (!) 155/88   Pulse 66   Temp 98.2 °F (36.8 °C) (Oral)   Resp 20   Ht 5' 10\" (1.778 m)   Wt 198 lb 9 oz (90.1 kg)   SpO2 93%   BMI 28.49 kg/m²     General:  Appears comfortable. Answers questions appropriately and cooperative with exam  HEENT:  Mucous membranes moist. No erythema, rhinorrhea, or post-nasal drip noted. Neck:  No carotid bruits. Heart:  Rhythm regular at rate of 68  Lungs:  CTA. No wheeze, rales, or rhonchi  Abdomen:  Positive bowel sounds positive. Soft. Non-tender.  No guarding, rebound or rigidity. Breast/Rectal/Genitourinary: not pertinent. Extremities:  Negative for lower extremity edema  Skin:  Warm and dry  Vascular: 2/4 Dorsalis Pedis pulses bilaterally. Neuro:  Cranial nerves 2-12 grossly intact, no focal weakness or change in sensation noted. Extraocular muscles intact. Pupils equal, round, reactive to light.   No tremor noted            Recent Labs     03/25/21 0440 03/26/21  0530 03/27/21  0230   * 136 137   K 4.2 4.4 4.1    108* 108*   CO2 17* 19* 20*   BUN 43* 40* 28*   CREATININE 1.1 0.9 0.7   GLUCOSE 178* 131* 158*   CALCIUM 8.2* 8.2* 8.7       Recent Labs     03/25/21 0440 03/26/21  0530 03/27/21  0230   WBC 26.9* 14.7* 10.7   RBC 3.26* 2.70* 3.45*   HGB 11.2* 8.9* 11.6*   HCT 31.9* 27.1* 34.4*   MCV 97.9 100.4* 99.7   MCH 34.4 33.0 33.6   MCHC 35.1* 32.8 33.7   RDW 12.0 12.3 12.5    224 212   MPV 9.9 10.5 9.8       CBC with Differential:    Lab Results   Component Value Date    WBC 10.7 03/27/2021    RBC 3.45 03/27/2021    HGB 11.6 03/27/2021    HCT 34.4 03/27/2021     03/27/2021    MCV 99.7 03/27/2021    MCH 33.6 03/27/2021    MCHC 33.7 03/27/2021    RDW 12.5 03/27/2021    METASPCT 2.6 03/23/2021    LYMPHOPCT 10.5 03/27/2021    MONOPCT 13.3 03/27/2021    BASOPCT 0.3 03/27/2021    MONOSABS 1.42 03/27/2021    LYMPHSABS 1.12 03/27/2021    EOSABS 0.03 03/27/2021    BASOSABS 0.03 03/27/2021     CMP:    Lab Results   Component Value Date     03/27/2021    K 4.1 03/27/2021    K 4.2 03/25/2021     03/27/2021    CO2 20 03/27/2021    BUN 28 03/27/2021    CREATININE 0.7 03/27/2021    GFRAA >60 03/27/2021    LABGLOM >60 03/27/2021    GLUCOSE 158 03/27/2021    PROT 6.9 03/27/2021    LABALBU 2.7 03/27/2021    CALCIUM 8.7 03/27/2021    BILITOT <0.2 03/27/2021    ALKPHOS 128 03/27/2021    AST 23 03/27/2021    ALT 26 03/27/2021        Radiology:   FL MODIFIED BARIUM SWALLOW W VIDEO   Final Result   Abnormal swallowing mechanism with laryngeal penetration of thin liquid and   nectar consistency barium. No barium aspiration. Please see separate speech pathology report for full discussion of findings   and recommendations. CT CHEST WO CONTRAST   Final Result   Large densely consolidated right upper lobe infiltrate most compatible with   pneumonia in the acute setting. Recommend follow-up chest CT in 3 months to   exclude underlying mass in the region of greatest consolidation      PE cannot be excluded without IV contrast      Nonobstructing left renal calculi         XR CHEST PORTABLE   Final Result   Right upper lobe infiltrate. Repeat CT scan is recommended in 10-14 days to   confirm partial resolution and exclude underlying pathology. Given that the   infiltrate abuts the minor fissure and abuts the right paratracheal region   underlying pathology cannot be excluded. FL ESOPHAGRAM    (Results Pending)   US LIVER    (Results Pending)       Assessment:    Active Problems:    Pneumonia due to organism    Bacteremia    Alcohol withdrawal syndrome without complication (HCC)  Resolved Problems:    * No resolved hospital problems. *      Plan:  1. Bacteremia strep pneumoniae with invasive pneumococcal disease asked ID to see pt   2. Bacterial pneumonia continue rocephin per ID  3. Possible copd pulmonary following neb per pulm  4. Concurrent rhinovirus/enterovirus infection supportive care. 5. Dysphagia slp following continue diet. Spoke with slp and concerned that pt may have regurgitation. Will consult gi  6. Hypotension favor hypovolemia due to insensible losses of fever as well as recently placed on steroids and ua noted glucosuria.  Will continue iv fluids. 7. Hyperglycemia.  Pt did receive steroids. Pt denies dm and that his a1c was reported low to him, however, will recheck a1c  8. htn hold bp med  9. Dehydration continue iv fluids  10. Alcohol abuse  rec cessation  11.  Probable alcohol withdrawal symptoms appear to be mild but will order ciwa scale and monitor closely. Do not notice any symptoms today. 12. Tobacco abuse rec cessation  13. Hyperkalemia improved  14. Acidosis/elevated lactic acid level monitor  15. raeann continue iv fluids.   16. Hyponatremia monitor  17. Back pain monitor and tx prn  18. Hep C monitor  19.  Sepsis(pt had fever prior to admission and had tachycardia which was in part due to hypovolemia and leukocytosis which could be affected by steroids but given bacteremia, pt with sepsis)POA supportive care and tx underlying infection.     Chart reviewed and updated by nursing     Time spent is 35 min    Electronically signed by Ronny Conklin DO on 3/27/2021 at 6:29 PM

## 2021-03-27 NOTE — PROGRESS NOTES
[address] [place] [address] [facility]  [facility]  Progress Note    SUBJECTIVE:  Chief Complaint   Patient presents with    Dizziness     started saturday, sent in by pcp    Chest Pain     right lateral chest, hurts when coughing     Patient is tolerating medications. No fevers or chills. No N/V/D.  + cough, right sided pleural pain is improved. Review of systems:  As stated above in the chief complaint, otherwise negative. Medications:  Scheduled Meds:   sodium chloride flush  10 mL Intravenous 2 times per day    cefTRIAXone (ROCEPHIN) IV  2,000 mg Intravenous Q24H    albuterol  2.5 mg Nebulization 4x daily    nicotine  1 patch Transdermal Daily    fluticasone  1 spray Each Nostril Daily    enoxaparin  40 mg Subcutaneous Daily    buprenorphine-naloxone  1 tablet Sublingual BID     Continuous Infusions:   sodium chloride      sodium chloride 100 mL/hr at 21 0233     PRN Meds:sodium chloride flush, sodium chloride, LORazepam **OR** LORazepam **OR** LORazepam **OR** LORazepam **OR** LORazepam **OR** LORazepam **OR** LORazepam **OR** LORazepam, glycerin moisturizing mouth spray, diphenhydrAMINE, promethazine **OR** ondansetron, polyethylene glycol, acetaminophen **OR** acetaminophen, dextromethorphan-guaiFENesin    OBJECTIVE:  BP (!) 155/88   Pulse 66   Temp 98.2 °F (36.8 °C) (Oral)   Resp 16   Ht 5' 10\" (1.778 m)   Wt 198 lb 9 oz (90.1 kg)   SpO2 92%   BMI 28.49 kg/m²   Temp  Av.2 °F (36.8 °C)  Min: 98.2 °F (36.8 °C)  Max: 98.2 °F (36.8 °C)  Constitutional: The patient is sitting up in bed. In no distress. Skin: Warm and dry. No rashes were noted. HEENT: Round and reactive pupils. Moist mucous membranes. No ulcerations or thrush. Neck: Supple to movements. Chest: No respiratory distress. On room air. Occasional strong moist cough. Pain in the right pleural space is improved today. Faint crackles on the right   Cardiovascular: S1 and S2 are rhythmic and regular.  No murmurs appreciated. Abdomen: Positive bowel sounds to auscultation. Benign to palpation. No masses felt. Extremities: No edema.   Lines: peripheral    Laboratory and Tests Review:  Lab Results   Component Value Date    WBC 10.7 03/27/2021    WBC 14.7 (H) 03/26/2021    WBC 26.9 (H) 03/25/2021    HGB 11.6 (L) 03/27/2021    HCT 34.4 (L) 03/27/2021    MCV 99.7 03/27/2021     03/27/2021     Lab Results   Component Value Date    NEUTROABS 7.58 (H) 03/27/2021    NEUTROABS 11.37 (H) 03/26/2021    NEUTROABS 24.05 (H) 03/25/2021     No results found for: CRPHS  Lab Results   Component Value Date    ALT 26 03/27/2021    AST 23 03/27/2021    ALKPHOS 128 03/27/2021    BILITOT <0.2 03/27/2021     Lab Results   Component Value Date     03/27/2021    K 4.1 03/27/2021    K 4.2 03/25/2021     03/27/2021    CO2 20 03/27/2021    BUN 28 03/27/2021    CREATININE 0.7 03/27/2021    CREATININE 0.9 03/26/2021    CREATININE 1.1 03/25/2021    GFRAA >60 03/27/2021    LABGLOM >60 03/27/2021    GLUCOSE 158 03/27/2021    PROT 6.9 03/27/2021    LABALBU 2.7 03/27/2021    CALCIUM 8.7 03/27/2021    BILITOT <0.2 03/27/2021    ALKPHOS 128 03/27/2021    AST 23 03/27/2021    ALT 26 03/27/2021     Lab Results   Component Value Date    CRP 25.0 (H) 03/24/2021     Lab Results   Component Value Date    SEDRATE 81 (H) 03/24/2021     Radiology:  Reviewed     Microbiology:   Blood cultures 3/24/2021 - GPC, Streptococcus pneumoniae by PCR  Blood cultures 3/25/2021 - negative so far   Respiratory Viral Panel with SARS-CoV-2 3/24/2021 - Rhinovirus/Enterovirus detected   Strep/Legionella urine antigens 3/24/2021 - Negative      Recent Labs     03/25/21  0440   PROCAL 3.71*       ASSESSMENT:  · Community-acquired pneumonia right upper lobe  · Fever with leukocytosis secondary to pneumonia, improved   · Bacteremia with Streptococcus pneumonia  · Invasive pneumococcal disease  · Rhinovirus/Enterovirus infection    PLAN:  · Continue Ceftriaxone 2g IV daily for a total of 3-4 weeks   · Repeat blood cultures are negative so far  · Did have a swallow eval with some deep penetration of liquids - he tells me they are going to have GI see him   · Check final cultures  · Monitor labs- WBC 10.7 today     Tammy Erickson  11:26 AM  3/27/2021     Patient seen and examined. I had a face to face encounter with the patient. Agree with exam.  Assessment and plan as outlined above and directed by me. Addition and corrections were done as deemed appropriate. My exam and plan include: The patient is still having pleuritic pain but it is improving. He will need a PICC for IV antibiotics. He says that he will be allowed to do light work. Informed Consent for PICC:  I explained the risks, benefits, alternative options, and potential complications associated with insertion of Peripherally Inserted Central Catheter (PICC) with the patient prior to the procedure.     Electronically signed by Hayden Singh MD on 3/27/2021 at 1:18 PM      Hayden Singh  3/27/2021  1:17 PM

## 2021-03-27 NOTE — PROGRESS NOTES
Consult called to Dr. Lurdes Trevino (on call for [facility]). Will see pt later today.

## 2021-03-27 NOTE — CARE COORDINATION
Social Work/Discharge Planning:  Social Work consult noted for consideration of rehab. Met with patient and confirmed his plan to return home at discharge. Patient states his alcohol consumption has decreased and he drinks a beer once a day. He states he goes to [facility] for monitoring of his drinking with urine test and sees a counselor once a month. Discussed need for IV antibiotic and provided patient with option of home health care versus infusion center. Patient states he can accommodate IV antibiotic at home and prefers home health care. Provided patient with home health care choice list and requested at least three choices. Patient will need a home health care order prior to discharge. Will continue to follow.   Electronically signed by EMMANUELLE Yu on 3/27/2021 at 1:30 PM

## 2021-03-27 NOTE — CONSULTS
Devang Plata M.D. [facility]  Dr. Veronica Smith M.D.,  Dr. Alix Lopez M.D.,  Dr. Art Hogan D.O.,  Dr. Pancho D.O. ,  Dr. Pipo Guzman M.D.,  Dr. Jesse Magana D.O.        Tamra Abts  56 y.o.  male      Re: Regurgitation, pos smoking alcohol; hep C  Requesting physician:Dr Pagan  Date:3:06 PM 3/27/2021          HPI: Mr. Lisseth Beach is a 49-year-old male patient seen in the hospital for above-described issue. Patient has been admitted since the 24th when he presented with dizziness and currently has been treated for community-acquired pneumonia with bacteremia as well as rhinovirus/enterovirus infection and invasive pneumococcal disease. Patient is complaining of difficulty swallowing initially for solids and now progressing for the past 1 to 2 years. Patient reports that during this. Despite progressive symptoms he has not been evaluated nor seen by gastroenterologist.  Patient denies having upper endoscopy anytime recently with history of remote upper endoscopy after which patient was treated with Tagamet. Patient reports also previous colonoscopy which she has been at least 5 or more years ago during which no significant pathology has been identified. Patient also has history of hepatitis C and been found to have significant viral load most recently viral load of 6.02 logarithmic international units per milliliter. Patient reports that he has [place] but at that time because of the side effects of the available treatments his hepatitis was not treated. It is unclear why but patient has not been evaluated for treatment of his hepatitis given development of new available treatments. Patient also continue to consume alcohol on a regular basis despite otherwise advised by his gastroenterologist at that time. Patient is also reporting being a lifelong tobacco smoker.     Information sources:   -Patient  -medical record  -health care team    PMHx:  Past Medical History:   Diagnosis Date    Hepatitis C     liver bx wnl. genotype is the best possible    Herniated disc, cervical        PSHx:  Past Surgical History:   Procedure Laterality Date    ANTERIOR CRUCIATE LIGAMENT REPAIR  1990s    left, [facility]    ANTERIOR CRUCIATE LIGAMENT REPAIR  2009    right ACL repair, Dr. Ronald Anand COLONOSCOPY  2009    normal, [facility] Susan Hadley, [facility]  5/20/2014    right indirect inguinal herniorrhaphy with mesh, Dr. Kwasi Mckeon, [facility] Right 5/20/14    with mesh   [address]    neck fusion C6 and C7, [address]  2007    left, removal spur, Dr. Kaitlynn Rooney  2007    melenoma, left clavical area, [facility]       Meds:  Current Facility-Administered Medications   Medication Dose Route Frequency Provider Last Rate Last Admin    lidocaine PF 1 % injection 5 mL  5 mL Intradermal Once Elke Elizabeth MD        sodium chloride flush 0.9 % injection 10 mL  10 mL Intravenous PRN Elke Elizabeth MD        heparin flush 100 UNIT/ML injection 300 Units  3 mL Intravenous 2 times per day Elke Elizabeth MD        heparin flush 100 UNIT/ML injection 300 Units  3 mL Intercatheter PRN Elke Elizabeth MD        sodium chloride flush 0.9 % injection 10 mL  10 mL Intravenous 2 times per day Boni Hric, DO   10 mL at 03/27/21 0805    sodium chloride flush 0.9 % injection 10 mL  10 mL Intravenous PRN Boni Hric, DO        0.9 % sodium chloride infusion  25 mL Intravenous PRN Boni Hric, DO        LORazepam (ATIVAN) tablet 1 mg  1 mg Oral Q1H PRN Boni Hric, DO        Or    LORazepam (ATIVAN) injection 1 mg  1 mg Intravenous Q1H PRN Boni Hric, DO        Or    LORazepam (ATIVAN) tablet 2 mg  2 mg Oral Q1H PRN Boni Hric, DO        Or    LORazepam (ATIVAN) injection 2 mg  2 mg Intravenous Q1H PRN Boni Hric, DO        Or    LORazepam (ATIVAN) tablet 3 mg  3 mg Oral Q1H PRN Boni Hric, DO        Or    LORazepam (ATIVAN) injection 3 mg  3 mg Intravenous Q1H PRN Boni Hric, DO        Or    LORazepam (ATIVAN) tablet 4 mg  4 mg Oral Q1H PRN Boni Hric, DO        Or    LORazepam (ATIVAN) injection 4 mg  4 mg Intravenous Q1H PRN Boni Hric, DO        cefTRIAXone (ROCEPHIN) 2,000 mg in sterile water 20 mL IV syringe  2,000 mg Intravenous Q24H Lisette Maya MD   2,000 mg at 03/26/21 2012    albuterol (PROVENTIL) nebulizer solution 2.5 mg  2.5 mg Nebulization 4x daily Madiha Antony MD   2.5 mg at 03/27/21 1159    nicotine (NICODERM CQ) 14 MG/24HR 1 patch  1 patch Transdermal Daily Boni Hric, DO   1 patch at 03/27/21 0806    glycerin moisturizing mouth spray (OASIS) 35 % 2 spray  2 spray Mouth/Throat PRN Boni Hric, DO   2 spray at 03/26/21 0942    diphenhydrAMINE (BENADRYL) tablet 50 mg  50 mg Oral Nightly PRN Boni Hric, DO   50 mg at 03/25/21 2144    fluticasone (FLONASE) 50 MCG/ACT nasal spray 1 spray  1 spray Each Nostril Daily Boni Hric, DO   1 spray at 03/27/21 0808    enoxaparin (LOVENOX) injection 40 mg  40 mg Subcutaneous Daily Boni Hric, DO   40 mg at 03/26/21 2009    promethazine (PHENERGAN) tablet 12.5 mg  12.5 mg Oral Q6H PRN Boni Hric, DO        Or    ondansetron (ZOFRAN) injection 4 mg  4 mg Intravenous Q6H PRN Boni Hric, DO        polyethylene glycol (GLYCOLAX) packet 17 g  17 g Oral Daily PRN Boni Hric, DO        acetaminophen (TYLENOL) tablet 650 mg  650 mg Oral Q6H PRN Boni Hric, DO   650 mg at 03/27/21 0233    Or    acetaminophen (TYLENOL) suppository 650 mg  650 mg Rectal Q6H PRN Boni Hric, DO        0.9 % sodium chloride infusion   Intravenous Continuous Boni Hric,  mL/hr at 03/27/21 0233 New Bag at 03/27/21 0233    dextromethorphan-guaiFENesin 0.5 tablet  0.5 tablet Oral Q8H PRN Boni Hric, DO   0.5 tablet at 03/26/21 2009    buprenorphine-naloxone (SUBOXONE) 8-2 MG SL tablet 1 tablet  1 tablet Sublingual BID Fortunato Spence MD   1 tablet at 03/27/21 0805       SocHx:  Social History     Socioeconomic History    Marital status: Single     Spouse name: Not on file    Number of children: Not on file    Years of education: Not on file    Highest education level: Not on file   Occupational History    Not on file   Social Needs    Financial resource strain: Not hard at all   Larry-Dale insecurity     Worry: Never true     Inability: Never true   [employer] needs     Medical: No     Non-medical: No   Tobacco Use    Smoking status: Current Every Day Smoker     Packs/day: 0.50     Years: 40.00     Pack years: 20.00     Types: Cigarettes    Smokeless tobacco: Never Used   Substance and Sexual Activity    Alcohol use:  Yes     Alcohol/week: 38.0 standard drinks     Types: 18 Cans of beer, 20 Shots of liquor per week     Comment: daily    Drug use: Yes     Types: Opiates     Sexual activity: Not on file   Lifestyle    Physical activity     Days per week: Not on file     Minutes per session: Not on file    Stress: Not on file   Relationships    Social connections     Talks on phone: Not on file     Gets together: Not on file     Attends Oriental orthodox service: Not on file     Active member of club or organization: Not on file     Attends meetings of clubs or organizations: Not on file     Relationship status: Not on file    Intimate partner violence     Fear of current or ex partner: Not on file     Emotionally abused: Not on file     Physically abused: Not on file     Forced sexual activity: Not on file   Other Topics Concern    Not on file   Social History Narrative    Not on file       FamHx:  Family History   Problem Relation Age of Onset    Heart Attack Mother 68    Cancer Father     Parkinsonism Paternal Uncle        Allergy:No Known Allergies      ROS: As described in the HPI and in addition is negative upon detailed review of systems or unobtainable unless otherwise stated in this dictation. PE:  BP (!) 155/88   Pulse 66   Temp 98.2 °F (36.8 °C) (Oral)   Resp 16   Ht 5' 10\" (1.778 m)   Wt 198 lb 9 oz (90.1 kg)   SpO2 92%   BMI 28.49 kg/m²     Gen.: NAD/ male. AAO x3  Head: Atraumatic/normocephalic  Eyes: EOMI/anicteric sclera  ENT: Moist oral mucosa/no discharge from nose ears  Neck: Supple with trachea midline  Chest: CTA B  Cor: Regular  Abd.: Soft and obese  Extr.:  No peripheral edema  Muscles: Tone and bulk, consistent with age and condition  Skin: Warm and dry        DATA:     Lab Results   Component Value Date    WBC 10.7 03/27/2021    RBC 3.45 03/27/2021    HGB 11.6 03/27/2021    HCT 34.4 03/27/2021    MCV 99.7 03/27/2021    MCH 33.6 03/27/2021    MCHC 33.7 03/27/2021    RDW 12.5 03/27/2021     03/27/2021    MPV 9.8 03/27/2021     Lab Results   Component Value Date     03/27/2021    K 4.1 03/27/2021    K 4.2 03/25/2021     03/27/2021    CO2 20 03/27/2021    BUN 28 03/27/2021    CREATININE 0.7 03/27/2021    CALCIUM 8.7 03/27/2021    PROT 6.9 03/27/2021    LABALBU 2.7 03/27/2021    BILITOT <0.2 03/27/2021    ALKPHOS 128 03/27/2021    AST 23 03/27/2021    ALT 26 03/27/2021     Lab Results   Component Value Date    LIPASE 30 07/19/2019     No results found for: AMYLASE      ASSESSMENT/PLAN:  1. Dysphagia  -Chronic dysphagia for at least 1 to 2 years  -Broad differential diagnosis including malignancy  -Patient will require further evaluation with upper endoscopy -likely outpatient given chronicity of symptoms and current acute pneumonia unless significant high-grade obstruction  -Obtain esophagram    2. Hepatitis C  -Elevated viral load  -Treatment naïve  -Genotype 2B  -Plan for further follow-up as outpatient for treatment  -Obtain dedicated liver imaging however no clinical evidence of decompensated cirrhosis    3.   Alcohol abuse  -Patient strongly advised to completely abstain from alcohol given history of hepatitis C  -Currently unremarkable liver profile  -Obtain dedicated liver imaging as above    4. COPD/community-acquired pneumonia/invasive pneumococcal disease  -Per admitting/pulmonology/ID    Above has been discussed with the patient and all questions answered to his satisfaction. He is agreeable with the plan as delineated. Thank you for the opportunity to see this patient in consultation      Bhavesh Hanks MD  3/27/2021  3:06 PM    NOTE:  This report was transcribed using voice recognition software. Every effort was made to ensure accuracy; however, inadvertent computerized transcription errors may be present.

## 2021-03-27 NOTE — PLAN OF CARE
Problem: Daily Care:  Goal: Daily care needs are met  Description: Daily care needs are met  3/27/2021 0446 by Jc Garcia RN  Outcome: Met This Shift  3/26/2021 1856 by Kyree Montes De Oca RN  Outcome: Met This Shift     Problem: Pain:  Goal: Patient's pain/discomfort is manageable  Description: Patient's pain/discomfort is manageable  3/27/2021 0446 by Jc Garcia RN  Outcome: Met This Shift  3/26/2021 1856 by Kyree Montes De Oca RN  Outcome: Met This Shift  Goal: Pain level will decrease  Description: Pain level will decrease  3/27/2021 0446 by Jc Garcia RN  Outcome: Met This Shift  3/26/2021 1856 by Kyree Montes De Oca RN  Outcome: Met This Shift  Goal: Control of acute pain  Description: Control of acute pain  3/27/2021 0446 by Jc Garcia RN  Outcome: Met This Shift  3/26/2021 1856 by Kyree Montes De Oca RN  Outcome: Met This Shift  Goal: Control of chronic pain  Description: Control of chronic pain  3/27/2021 0446 by Jc Garcia RN  Outcome: Met This Shift  3/26/2021 1856 by Kyree Montes De Oca RN  Outcome: Met This Shift

## 2021-03-28 ENCOUNTER — APPOINTMENT (OUTPATIENT)
Dept: ULTRASOUND IMAGING | Age: 61
DRG: 871 | End: 2021-03-28
Payer: COMMERCIAL

## 2021-03-28 LAB
ALBUMIN SERPL-MCNC: 3 G/DL (ref 3.5–5.2)
ALP BLD-CCNC: 109 U/L (ref 40–129)
ALT SERPL-CCNC: 30 U/L (ref 0–40)
ANION GAP SERPL CALCULATED.3IONS-SCNC: 10 MMOL/L (ref 7–16)
AST SERPL-CCNC: 29 U/L (ref 0–39)
BASOPHILS ABSOLUTE: 0.04 E9/L (ref 0–0.2)
BASOPHILS RELATIVE PERCENT: 0.4 % (ref 0–2)
BILIRUB SERPL-MCNC: 0.4 MG/DL (ref 0–1.2)
BUN BLDV-MCNC: 18 MG/DL (ref 8–23)
CALCIUM SERPL-MCNC: 9.3 MG/DL (ref 8.6–10.2)
CHLORIDE BLD-SCNC: 103 MMOL/L (ref 98–107)
CO2: 22 MMOL/L (ref 22–29)
CREAT SERPL-MCNC: 0.6 MG/DL (ref 0.7–1.2)
CULTURE, BLOOD 2: ABNORMAL
EOSINOPHILS ABSOLUTE: 0.07 E9/L (ref 0.05–0.5)
EOSINOPHILS RELATIVE PERCENT: 0.7 % (ref 0–6)
GFR AFRICAN AMERICAN: >60
GFR NON-AFRICAN AMERICAN: >60 ML/MIN/1.73
GLUCOSE BLD-MCNC: 96 MG/DL (ref 74–99)
HCT VFR BLD CALC: 35 % (ref 37–54)
HEMOGLOBIN: 12 G/DL (ref 12.5–16.5)
IMMATURE GRANULOCYTES #: 0.48 E9/L
IMMATURE GRANULOCYTES %: 4.6 % (ref 0–5)
LYMPHOCYTES ABSOLUTE: 1.44 E9/L (ref 1.5–4)
LYMPHOCYTES RELATIVE PERCENT: 13.7 % (ref 20–42)
MCH RBC QN AUTO: 33.2 PG (ref 26–35)
MCHC RBC AUTO-ENTMCNC: 34.3 % (ref 32–34.5)
MCV RBC AUTO: 97 FL (ref 80–99.9)
MONOCYTES ABSOLUTE: 1.27 E9/L (ref 0.1–0.95)
MONOCYTES RELATIVE PERCENT: 12.1 % (ref 2–12)
NEUTROPHILS ABSOLUTE: 7.18 E9/L (ref 1.8–7.3)
NEUTROPHILS RELATIVE PERCENT: 68.5 % (ref 43–80)
ORGANISM: ABNORMAL
ORGANISM: ABNORMAL
PDW BLD-RTO: 12.3 FL (ref 11.5–15)
PLATELET # BLD: 233 E9/L (ref 130–450)
PMV BLD AUTO: 9.5 FL (ref 7–12)
POTASSIUM SERPL-SCNC: 4.2 MMOL/L (ref 3.5–5)
RBC # BLD: 3.61 E12/L (ref 3.8–5.8)
SODIUM BLD-SCNC: 135 MMOL/L (ref 132–146)
TOTAL PROTEIN: 7.4 G/DL (ref 6.4–8.3)
WBC # BLD: 10.5 E9/L (ref 4.5–11.5)

## 2021-03-28 PROCEDURE — 6370000000 HC RX 637 (ALT 250 FOR IP): Performed by: INTERNAL MEDICINE

## 2021-03-28 PROCEDURE — 6360000002 HC RX W HCPCS: Performed by: INTERNAL MEDICINE

## 2021-03-28 PROCEDURE — 76937 US GUIDE VASCULAR ACCESS: CPT

## 2021-03-28 PROCEDURE — 99232 SBSQ HOSP IP/OBS MODERATE 35: CPT | Performed by: INTERNAL MEDICINE

## 2021-03-28 PROCEDURE — 2500000003 HC RX 250 WO HCPCS: Performed by: SPECIALIST

## 2021-03-28 PROCEDURE — 89220 SPUTUM SPECIMEN COLLECTION: CPT

## 2021-03-28 PROCEDURE — 1200000000 HC SEMI PRIVATE

## 2021-03-28 PROCEDURE — 94640 AIRWAY INHALATION TREATMENT: CPT

## 2021-03-28 PROCEDURE — 36415 COLL VENOUS BLD VENIPUNCTURE: CPT

## 2021-03-28 PROCEDURE — 2580000003 HC RX 258: Performed by: INTERNAL MEDICINE

## 2021-03-28 PROCEDURE — 80053 COMPREHEN METABOLIC PANEL: CPT

## 2021-03-28 PROCEDURE — 36569 INSJ PICC 5 YR+ W/O IMAGING: CPT

## 2021-03-28 PROCEDURE — 76705 ECHO EXAM OF ABDOMEN: CPT

## 2021-03-28 PROCEDURE — 6360000002 HC RX W HCPCS: Performed by: SPECIALIST

## 2021-03-28 PROCEDURE — 85025 COMPLETE CBC W/AUTO DIFF WBC: CPT

## 2021-03-28 PROCEDURE — 2580000003 HC RX 258: Performed by: SPECIALIST

## 2021-03-28 RX ADMIN — DIPHENHYDRAMINE HCL 50 MG: 25 TABLET ORAL at 21:00

## 2021-03-28 RX ADMIN — ENOXAPARIN SODIUM 40 MG: 40 INJECTION SUBCUTANEOUS at 20:59

## 2021-03-28 RX ADMIN — ALBUTEROL SULFATE 2.5 MG: 2.5 SOLUTION RESPIRATORY (INHALATION) at 20:25

## 2021-03-28 RX ADMIN — SODIUM CHLORIDE: 9 INJECTION, SOLUTION INTRAVENOUS at 21:00

## 2021-03-28 RX ADMIN — SODIUM CHLORIDE: 9 INJECTION, SOLUTION INTRAVENOUS at 10:04

## 2021-03-28 RX ADMIN — ALBUTEROL SULFATE 2.5 MG: 2.5 SOLUTION RESPIRATORY (INHALATION) at 12:19

## 2021-03-28 RX ADMIN — Medication 20 ML: at 12:56

## 2021-03-28 RX ADMIN — SODIUM CHLORIDE, PRESERVATIVE FREE 10 ML: 5 INJECTION INTRAVENOUS at 09:51

## 2021-03-28 RX ADMIN — ALBUTEROL SULFATE 2.5 MG: 2.5 SOLUTION RESPIRATORY (INHALATION) at 16:38

## 2021-03-28 RX ADMIN — WATER 2000 MG: 1 INJECTION INTRAMUSCULAR; INTRAVENOUS; SUBCUTANEOUS at 20:58

## 2021-03-28 RX ADMIN — LIDOCAINE HYDROCHLORIDE 0.5 ML: 10 INJECTION, SOLUTION EPIDURAL; INFILTRATION; INTRACAUDAL; PERINEURAL at 12:52

## 2021-03-28 RX ADMIN — BUPRENORPHINE AND NALOXONE 1 TABLET: 8; 2 TABLET SUBLINGUAL at 09:49

## 2021-03-28 RX ADMIN — BUPRENORPHINE AND NALOXONE 1 TABLET: 8; 2 TABLET SUBLINGUAL at 20:58

## 2021-03-28 RX ADMIN — FLUTICASONE PROPIONATE 1 SPRAY: 50 SPRAY, METERED NASAL at 09:50

## 2021-03-28 ASSESSMENT — PAIN DESCRIPTION - PROGRESSION: CLINICAL_PROGRESSION: NOT CHANGED

## 2021-03-28 ASSESSMENT — PAIN DESCRIPTION - FREQUENCY: FREQUENCY: INTERMITTENT

## 2021-03-28 ASSESSMENT — PAIN DESCRIPTION - LOCATION: LOCATION: BACK

## 2021-03-28 ASSESSMENT — PAIN - FUNCTIONAL ASSESSMENT: PAIN_FUNCTIONAL_ASSESSMENT: ACTIVITIES ARE NOT PREVENTED

## 2021-03-28 ASSESSMENT — PAIN SCALES - GENERAL: PAINLEVEL_OUTOF10: 3

## 2021-03-28 NOTE — PROGRESS NOTES
PROGRESS NOTE  By Mikki Chang M.D. [facility]  Dr. Mary Rolon M.D.,  Dr. Jessica De Leon M.D.,   Dr. Rosalind Padilla D.O.,  Dr. Ahsan Azeveod M.D.,  Dr. Geraldine Grant D.O.,  Teddy Briceño  56 y.o.  male    SUBJECTIVE:  No new complaints. No nausea or vomiting    OBJECTIVE:    BP (!) 145/89   Pulse 82   Temp 99.3 °F (37.4 °C) (Oral)   Resp 18   Ht 5' 10\" (1.778 m)   Wt 198 lb 9 oz (90.1 kg)   SpO2 95%   BMI 28.49 kg/m²     General: NAD/ male. AAO x3  HEENT: Anicteric sclera/moist oral mucosa  Neck: Supple/trachea midline  Chest: Symmetrical excursion/nonlabored respirations  Cor: Regular  Abd.: Soft and not distended  Extr.:  Interval new right upper extremity PICC line  Skin: Warm and dry/anicteric      DATA:    Monitor data reviewed -sinus rhythm noted. Lab Results   Component Value Date    WBC 10.5 03/28/2021    RBC 3.61 03/28/2021    HGB 12.0 03/28/2021    HCT 35.0 03/28/2021    MCV 97.0 03/28/2021    MCH 33.2 03/28/2021    MCHC 34.3 03/28/2021    RDW 12.3 03/28/2021     03/28/2021    MPV 9.5 03/28/2021     Lab Results   Component Value Date     03/28/2021    K 4.2 03/28/2021    K 4.2 03/25/2021     03/28/2021    CO2 22 03/28/2021    BUN 18 03/28/2021    CREATININE 0.6 03/28/2021    CALCIUM 9.3 03/28/2021    PROT 7.4 03/28/2021    LABALBU 3.0 03/28/2021    BILITOT 0.4 03/28/2021    ALKPHOS 109 03/28/2021    AST 29 03/28/2021    ALT 30 03/28/2021     Lab Results   Component Value Date    LIPASE 30 07/19/2019     No results found for: AMYLASE      ASSESSMENT/PLAN:  1. Dysphagia  -Chronic dysphagia for at least 1 to 2 years  -Broad differential diagnosis including malignancy  -Patient will require further evaluation with upper endoscopy - likely nonemergent outpatient procedure  given chronicity of symptoms and current acute pneumonia unless significant high-grade obstruction  -Pending esophagram     2.   Hepatitis C  -Elevated viral load  -Treatment naïve  -Genotype 2B  -Plan for further follow-up as outpatient for treatment  -Pending n dedicated liver imaging however no clinical evidence of decompensated cirrhosis     3. Alcohol abuse  -Patient strongly advised to completely abstain from alcohol given history of hepatitis C  -Currently unremarkable liver profile  -Obtain dedicated liver imaging as above     4. COPD/community-acquired pneumonia/invasive pneumococcal disease  -Per admitting/pulmonology/ID       Hawa Finney MD  3/28/2021  1:53 PM    NOTE:  This report was transcribed using voice recognition software. Every effort was made to ensure accuracy; however, inadvertent computerized transcription errors may be present.

## 2021-03-28 NOTE — PROCEDURES
PICC    Catheter insertion date 3/28/2021     Product Number:  [id number]   Lot No: [id number]   Gauge: 4FR   Lumen: SINGLE   RIOGHT BRACHIAL VEIN    Vein Diameter : 0.50CM   Upper arm circumference (10CM ABOVE AC): 30CM   Catheter Length : 47CM   Internal Length: 47CM   External Catheter Length: 0CM   Ultrasound Used:YES  VPS [facility] [place] confirms PICC tip is placed in the lower 1/3 of the SVC or at the Cavoatrial junction. Floor nurse notified PICC is okay to use.    : Shayne Gurrola RN, CPUI, [facility]

## 2021-03-28 NOTE — PROGRESS NOTES
deformity. No clubbing. Neuro: Awake. Follows commands. No obvious deficits. .    I/O: I/O last 3 completed shifts: In: 2030 [P.O.:880; I.V.:1150]  Out: 1300 [Urine:1300]  No intake/output data recorded. Results:  CBC:   Recent Labs     03/26/21  0530 03/27/21  0230 03/28/21  0640   WBC 14.7* 10.7 10.5   HGB 8.9* 11.6* 12.0*   HCT 27.1* 34.4* 35.0*   .4* 99.7 97.0    212 233     BMP:   Recent Labs     03/26/21  0530 03/27/21  0230 03/28/21  0640    137 135   K 4.4 4.1 4.2   * 108* 103   CO2 19* 20* 22   PHOS 3.0  --   --    BUN 40* 28* 18   CREATININE 0.9 0.7 0.6*     LFT:   Recent Labs     03/26/21  0530 03/27/21  0230 03/28/21  0640   ALKPHOS 125 128 109   ALT 24 26 30   AST 24 23 29   PROT 6.4 6.9 7.4   BILITOT <0.2 <0.2 0.4   LABALBU 2.5* 2.7* 3.0*     PT/INR:   No results for input(s): PROTIME, INR in the last 72 hours. Cultures:  No results for input(s): CULTRESP in the last 72 hours. ABG:   No results for input(s): PH, PO2, PCO2, HCO3, BE, O2SAT in the last 72 hours. Films:  Ct Chest Wo Contrast 03/24/21: Large densely consolidated right upper lobe infiltrate most compatible with pneumonia in the acute setting. Recommend follow-up chest CT in 3 months to exclude underlying mass in the region of greatest consolidation PE cannot be excluded without IV contrast Nonobstructing left renal calculi     Xr Chest Portable: 3/24/2021 Right upper lobe infiltrate. Repeat CT scan is recommended in 10-14 days to confirm partial resolution and exclude underlying pathology. Given that the infiltrate abuts the minor fissure and abuts the right paratracheal region underlying pathology cannot be excluded. Fl Modified Barium Swallow W Video: 3/25/2021 Abnormal swallowing mechanism with laryngeal penetration of thin liquid and nectar consistency barium. No barium aspiration. Please see separate speech pathology report for full discussion of findings and recommendations.  Speech

## 2021-03-28 NOTE — PROGRESS NOTES
[address] [place] [address] [facility]  Progress Note    SUBJECTIVE:  Chief Complaint   Patient presents with    Dizziness     started saturday, sent in by pcp    Chest Pain     right lateral chest, hurts when coughing     Patient is tolerating medications. Up in bathroom brushing teeth. No fevers or chills. Pain is improving on the right. Still with occasional cough. Review of systems:  As stated above in the chief complaint, otherwise negative. Medications:  Scheduled Meds:   lidocaine PF  5 mL Intradermal Once    heparin flush  3 mL Intravenous 2 times per day    sodium chloride flush  10 mL Intravenous 2 times per day    cefTRIAXone (ROCEPHIN) IV  2,000 mg Intravenous Q24H    albuterol  2.5 mg Nebulization 4x daily    nicotine  1 patch Transdermal Daily    fluticasone  1 spray Each Nostril Daily    enoxaparin  40 mg Subcutaneous Daily    buprenorphine-naloxone  1 tablet Sublingual BID     Continuous Infusions:   sodium chloride      sodium chloride 100 mL/hr at 21 1004     PRN Meds:sodium chloride flush, heparin flush, sodium chloride flush, sodium chloride, LORazepam **OR** LORazepam **OR** LORazepam **OR** LORazepam **OR** LORazepam **OR** LORazepam **OR** LORazepam **OR** LORazepam, glycerin moisturizing mouth spray, diphenhydrAMINE, promethazine **OR** ondansetron, polyethylene glycol, acetaminophen **OR** acetaminophen, dextromethorphan-guaiFENesin    OBJECTIVE:  BP (!) 145/89   Pulse 82   Temp 99.3 °F (37.4 °C) (Oral)   Resp 18   Ht 5' 10\" (1.778 m)   Wt 198 lb 9 oz (90.1 kg)   SpO2 95%   BMI 28.49 kg/m²   Temp  Av.1 °F (37.3 °C)  Min: 98.8 °F (37.1 °C)  Max: 99.3 °F (37.4 °C)  Constitutional: The patient is up to bathroom in no distress. Skin: Warm and dry. No rashes were noted. HEENT: Round and reactive pupils. Moist mucous membranes. No ulcerations or thrush. Neck: Supple to movements. Chest: No respiratory distress. On room air. pneumonia, improved   · Bacteremia with Streptococcus pneumonia  · Invasive pneumococcal disease  · Rhinovirus/Enterovirus infection    PLAN:  · Continue Ceftriaxone 2g IV daily for a total of 3-4 weeks   · Awaiting PICC line placement  · Repeat blood cultures are negative so far  · GI following; has ordered US of liver & esophagram   · Check final cultures  · Monitor labs- WBC 10.5 today     Tino Harry  11:53 AM  3/28/2021     Patient seen and examined. I had a face to face encounter with the patient. Agree with exam.  Assessment and plan as outlined above and directed by me. Addition and corrections were done as deemed appropriate. My exam and plan include: The patient is doing significantly better. The pleuritic pain has improved significantly. We will repeat ESR and CRP tomorrow. He is getting a PICC today. It is possible to shorten the antibiotic therapy depending on how he does in the next couple of weeks.     Yariel Sandhu  3/28/2021  12:34 PM

## 2021-03-28 NOTE — PLAN OF CARE
Patient continues to work towards managing his pain by self managing with deep breathing and medications.  Whit Arguello RN

## 2021-03-29 ENCOUNTER — APPOINTMENT (OUTPATIENT)
Dept: GENERAL RADIOLOGY | Age: 61
DRG: 871 | End: 2021-03-29
Payer: COMMERCIAL

## 2021-03-29 LAB
ALBUMIN SERPL-MCNC: 2.6 G/DL (ref 3.5–5.2)
ALP BLD-CCNC: 99 U/L (ref 40–129)
ALT SERPL-CCNC: 37 U/L (ref 0–40)
ANION GAP SERPL CALCULATED.3IONS-SCNC: 7 MMOL/L (ref 7–16)
AST SERPL-CCNC: 40 U/L (ref 0–39)
ATYPICAL LYMPHOCYTE RELATIVE PERCENT: 2.6 % (ref 0–4)
BASOPHILS ABSOLUTE: 0 E9/L (ref 0–0.2)
BASOPHILS RELATIVE PERCENT: 0 % (ref 0–2)
BILIRUB SERPL-MCNC: 0.3 MG/DL (ref 0–1.2)
BUN BLDV-MCNC: 15 MG/DL (ref 8–23)
C-REACTIVE PROTEIN: 5 MG/DL (ref 0–0.4)
CALCIUM SERPL-MCNC: 8.7 MG/DL (ref 8.6–10.2)
CHLORIDE BLD-SCNC: 101 MMOL/L (ref 98–107)
CO2: 24 MMOL/L (ref 22–29)
CREAT SERPL-MCNC: 0.5 MG/DL (ref 0.7–1.2)
EOSINOPHILS ABSOLUTE: 0.16 E9/L (ref 0.05–0.5)
EOSINOPHILS RELATIVE PERCENT: 1.7 % (ref 0–6)
GFR AFRICAN AMERICAN: >60
GFR NON-AFRICAN AMERICAN: >60 ML/MIN/1.73
GLUCOSE BLD-MCNC: 103 MG/DL (ref 74–99)
HCT VFR BLD CALC: 32.7 % (ref 37–54)
HEMOGLOBIN: 11.3 G/DL (ref 12.5–16.5)
LYMPHOCYTES ABSOLUTE: 1.5 E9/L (ref 1.5–4)
LYMPHOCYTES RELATIVE PERCENT: 12.9 % (ref 20–42)
MCH RBC QN AUTO: 33.8 PG (ref 26–35)
MCHC RBC AUTO-ENTMCNC: 34.6 % (ref 32–34.5)
MCV RBC AUTO: 97.9 FL (ref 80–99.9)
METAMYELOCYTES RELATIVE PERCENT: 1.7 % (ref 0–1)
MONOCYTES ABSOLUTE: 0.47 E9/L (ref 0.1–0.95)
MONOCYTES RELATIVE PERCENT: 5.2 % (ref 2–12)
NEUTROPHILS ABSOLUTE: 7.33 E9/L (ref 1.8–7.3)
NEUTROPHILS RELATIVE PERCENT: 75.9 % (ref 43–80)
NUCLEATED RED BLOOD CELLS: 0 /100 WBC
PDW BLD-RTO: 12.2 FL (ref 11.5–15)
PLATELET # BLD: 216 E9/L (ref 130–450)
PMV BLD AUTO: 9.7 FL (ref 7–12)
POLYCHROMASIA: ABNORMAL
POTASSIUM SERPL-SCNC: 4.1 MMOL/L (ref 3.5–5)
RBC # BLD: 3.34 E12/L (ref 3.8–5.8)
RBC # BLD: NORMAL 10*6/UL
SEDIMENTATION RATE, ERYTHROCYTE: 71 MM/HR (ref 0–15)
SODIUM BLD-SCNC: 132 MMOL/L (ref 132–146)
TOTAL PROTEIN: 6.7 G/DL (ref 6.4–8.3)
WBC # BLD: 9.4 E9/L (ref 4.5–11.5)

## 2021-03-29 PROCEDURE — 6360000004 HC RX CONTRAST MEDICATION: Performed by: INTERNAL MEDICINE

## 2021-03-29 PROCEDURE — 6360000002 HC RX W HCPCS: Performed by: SPECIALIST

## 2021-03-29 PROCEDURE — 6370000000 HC RX 637 (ALT 250 FOR IP): Performed by: INTERNAL MEDICINE

## 2021-03-29 PROCEDURE — 6360000002 HC RX W HCPCS: Performed by: INTERNAL MEDICINE

## 2021-03-29 PROCEDURE — 86140 C-REACTIVE PROTEIN: CPT

## 2021-03-29 PROCEDURE — 99232 SBSQ HOSP IP/OBS MODERATE 35: CPT | Performed by: INTERNAL MEDICINE

## 2021-03-29 PROCEDURE — 74220 X-RAY XM ESOPHAGUS 1CNTRST: CPT

## 2021-03-29 PROCEDURE — 94640 AIRWAY INHALATION TREATMENT: CPT

## 2021-03-29 PROCEDURE — 36415 COLL VENOUS BLD VENIPUNCTURE: CPT

## 2021-03-29 PROCEDURE — A4641 RADIOPHARM DX AGENT NOC: HCPCS | Performed by: INTERNAL MEDICINE

## 2021-03-29 PROCEDURE — 2580000003 HC RX 258: Performed by: INTERNAL MEDICINE

## 2021-03-29 PROCEDURE — 85025 COMPLETE CBC W/AUTO DIFF WBC: CPT

## 2021-03-29 PROCEDURE — 71045 X-RAY EXAM CHEST 1 VIEW: CPT

## 2021-03-29 PROCEDURE — 85651 RBC SED RATE NONAUTOMATED: CPT

## 2021-03-29 PROCEDURE — 80053 COMPREHEN METABOLIC PANEL: CPT

## 2021-03-29 PROCEDURE — 1200000000 HC SEMI PRIVATE

## 2021-03-29 PROCEDURE — 2500000003 HC RX 250 WO HCPCS: Performed by: INTERNAL MEDICINE

## 2021-03-29 PROCEDURE — 2580000003 HC RX 258: Performed by: SPECIALIST

## 2021-03-29 RX ADMIN — ENOXAPARIN SODIUM 40 MG: 40 INJECTION SUBCUTANEOUS at 20:19

## 2021-03-29 RX ADMIN — BARIUM SULFATE 1 TABLET: 700 TABLET ORAL at 15:03

## 2021-03-29 RX ADMIN — SODIUM CHLORIDE, PRESERVATIVE FREE 10 ML: 5 INJECTION INTRAVENOUS at 20:20

## 2021-03-29 RX ADMIN — BARIUM SULFATE 140 ML: 980 POWDER, FOR SUSPENSION ORAL at 15:04

## 2021-03-29 RX ADMIN — ACETAMINOPHEN 650 MG: 325 TABLET ORAL at 21:49

## 2021-03-29 RX ADMIN — SODIUM CHLORIDE: 9 INJECTION, SOLUTION INTRAVENOUS at 15:51

## 2021-03-29 RX ADMIN — Medication 300 UNITS: at 20:21

## 2021-03-29 RX ADMIN — ALBUTEROL SULFATE 2.5 MG: 2.5 SOLUTION RESPIRATORY (INHALATION) at 12:02

## 2021-03-29 RX ADMIN — DIPHENHYDRAMINE HCL 50 MG: 25 TABLET ORAL at 21:49

## 2021-03-29 RX ADMIN — ALBUTEROL SULFATE 2.5 MG: 2.5 SOLUTION RESPIRATORY (INHALATION) at 08:12

## 2021-03-29 RX ADMIN — BUPRENORPHINE AND NALOXONE 1 TABLET: 8; 2 TABLET SUBLINGUAL at 08:23

## 2021-03-29 RX ADMIN — ALBUTEROL SULFATE 2.5 MG: 2.5 SOLUTION RESPIRATORY (INHALATION) at 20:11

## 2021-03-29 RX ADMIN — ANTACID/ANTIFLATULENT 1 EACH: 380; 550; 10; 10 GRANULE, EFFERVESCENT ORAL at 15:04

## 2021-03-29 RX ADMIN — Medication 300 UNITS: at 08:23

## 2021-03-29 RX ADMIN — BARIUM SULFATE 176 ML: 960 POWDER, FOR SUSPENSION ORAL at 15:04

## 2021-03-29 RX ADMIN — WATER 2000 MG: 1 INJECTION INTRAMUSCULAR; INTRAVENOUS; SUBCUTANEOUS at 20:18

## 2021-03-29 RX ADMIN — BUPRENORPHINE AND NALOXONE 1 TABLET: 8; 2 TABLET SUBLINGUAL at 20:18

## 2021-03-29 RX ADMIN — ALBUTEROL SULFATE 2.5 MG: 2.5 SOLUTION RESPIRATORY (INHALATION) at 16:05

## 2021-03-29 RX ADMIN — SODIUM CHLORIDE, PRESERVATIVE FREE 10 ML: 5 INJECTION INTRAVENOUS at 08:23

## 2021-03-29 ASSESSMENT — PAIN SCALES - GENERAL
PAINLEVEL_OUTOF10: 0
PAINLEVEL_OUTOF10: 3
PAINLEVEL_OUTOF10: 0
PAINLEVEL_OUTOF10: 3

## 2021-03-29 NOTE — CARE COORDINATION
Received call from Priscilla N Christie Briones at [facility]; states they have accepted pt and can open case tomorrow once pt dosed today. Will follow. Millie Dobson.

## 2021-03-29 NOTE — PROGRESS NOTES
PROGRESS NOTE  By José Miguel Landry M.D. [facility]  Dr. Genesis Maya M.D.,  Dr. Crescencio Long M.D.,   Dr. Audrey Mills D.O.,  Dr. Smooth Bermudez M.D.,  Dr. Bert Torrez D.O.,  Calvin Chariton, Hortensia Bleacher  56 y.o.  male    SUBJECTIVE:  Denies abdominal pain. Denies nausea or vomiting. No issues of significant dysphagia/odynophagia earlier today. Wife at bedside      OBJECTIVE:    /84   Pulse 80   Temp 98.4 °F (36.9 °C) (Oral)   Resp 16   Ht 5' 10\" (1.778 m)   Wt 198 lb 9 oz (90.1 kg)   SpO2 96%   BMI 28.49 kg/m²     General: NAD/ male. AAO x3  HEENT: Anicteric sclera/moist oral mucosa  Neck: Supple/trachea midline  Chest: Symmetric excursion/nonlabored respirations  Cor: Regular  Abd.: Soft/nondistended  Extr.:  No peripheral edema  Skin: Warm and dry      DATA:    Monitor data reviewed -sinus rhythm noted.     Fluoroscopy esophagogram - images reviewed - report pending     Ultrasound liver -images reviewed/report read -unremarkable hepatic echotexture/unremarkable gallbladder       Lab Results   Component Value Date    WBC 9.4 03/29/2021    RBC 3.34 03/29/2021    HGB 11.3 03/29/2021    HCT 32.7 03/29/2021    MCV 97.9 03/29/2021    MCH 33.8 03/29/2021    MCHC 34.6 03/29/2021    RDW 12.2 03/29/2021     03/29/2021    MPV 9.7 03/29/2021     Lab Results   Component Value Date     03/29/2021    K 4.1 03/29/2021    K 4.2 03/25/2021     03/29/2021    CO2 24 03/29/2021    BUN 15 03/29/2021    CREATININE 0.5 03/29/2021    CALCIUM 8.7 03/29/2021    PROT 6.7 03/29/2021    LABALBU 2.6 03/29/2021    BILITOT 0.3 03/29/2021    ALKPHOS 99 03/29/2021    AST 40 03/29/2021    ALT 37 03/29/2021     Lab Results   Component Value Date    LIPASE 30 07/19/2019     No results found for: AMYLASE      ASSESSMENT/PLAN:    1.  [id number]  -Chronic dysphagia for at least 1 to 2 years  -Broad differential diagnosis including malignancy  -Patient will require further

## 2021-03-29 NOTE — CARE COORDINATION
Pt will require iv atb's post discharge. prefers to have IV infusion at home. Choiced; prefers [facility]; referral called; [facility] order on chart. [facility]. Helen Bland.

## 2021-03-29 NOTE — CARE COORDINATION
Social Work / Discharge Planning : SW received script for IV antibiotics per ID and faxed over to [place] at [facility]. SW to follow.  Electronically signed by EMMANUELLE Muhammad on 3/29/21 at 1:41 PM EDT

## 2021-03-29 NOTE — PROGRESS NOTES
[facility]  [place]  Progress Note    SUBJECTIVE:  Chief Complaint   Patient presents with    Dizziness     started saturday, sent in by pcp    Chest Pain     right lateral chest, hurts when coughing     Patient is tolerating antibiotics. No fevers, chills, rashes. Ocassional cough; right pleuritic pain is much improved. Sitting up on edge of bed awaiting esophagram today. PICC in place with no issues. Review of systems:  As stated above in the chief complaint, otherwise negative. Medications:  Scheduled Meds:   heparin flush  3 mL Intravenous 2 times per day    sodium chloride flush  10 mL Intravenous 2 times per day    cefTRIAXone (ROCEPHIN) IV  2,000 mg Intravenous Q24H    albuterol  2.5 mg Nebulization 4x daily    nicotine  1 patch Transdermal Daily    fluticasone  1 spray Each Nostril Daily    enoxaparin  40 mg Subcutaneous Daily    buprenorphine-naloxone  1 tablet Sublingual BID     Continuous Infusions:   sodium chloride      sodium chloride 100 mL/hr at 21 2100     PRN Meds:sodium chloride flush, heparin flush, sodium chloride flush, sodium chloride, LORazepam **OR** LORazepam **OR** LORazepam **OR** LORazepam **OR** LORazepam **OR** LORazepam **OR** LORazepam **OR** LORazepam, glycerin moisturizing mouth spray, diphenhydrAMINE, promethazine **OR** ondansetron, polyethylene glycol, acetaminophen **OR** acetaminophen, dextromethorphan-guaiFENesin    OBJECTIVE:  BP (!) 144/78 Comment: manual  Pulse 73   Temp 98.2 °F (36.8 °C) (Oral)   Resp 18   Ht 5' 10\" (1.778 m)   Wt 198 lb 9 oz (90.1 kg)   SpO2 95%   BMI 28.49 kg/m²   Temp  Av.6 °F (37 °C)  Min: 98.2 °F (36.8 °C)  Max: 98.9 °F (37.2 °C)  Constitutional: The patient is sitting up on edge of bed. Awake alert. awaiting testing   Skin: Warm and dry. No rashes were noted. HEENT: Round and reactive pupils. Moist mucous membranes. No ulcerations or thrush. Neck: Supple to movements. Chest: No respiratory distress. On room air. Occasional strong moist cough. Pain in the right pleural space is much improved. Lungs are clear throughout. Cardiovascular: S1 and S2 are rhythmic and regular. No murmurs appreciated. Abdomen: Positive bowel sounds to auscultation. Benign to palpation. No masses felt. Extremities: No edema.   Lines: PICC right brachial 3/28/2021    Laboratory and Tests Review:  Lab Results   Component Value Date    WBC 9.4 03/29/2021    WBC 10.5 03/28/2021    WBC 10.7 03/27/2021    HGB 11.3 (L) 03/29/2021    HCT 32.7 (L) 03/29/2021    MCV 97.9 03/29/2021     03/29/2021     Lab Results   Component Value Date    NEUTROABS 7.33 (H) 03/29/2021    NEUTROABS 7.18 03/28/2021    NEUTROABS 7.58 (H) 03/27/2021     No results found for: CRPHS  Lab Results   Component Value Date    ALT 37 03/29/2021    AST 40 (H) 03/29/2021    ALKPHOS 99 03/29/2021    BILITOT 0.3 03/29/2021     Lab Results   Component Value Date     03/29/2021    K 4.1 03/29/2021    K 4.2 03/25/2021     03/29/2021    CO2 24 03/29/2021    BUN 15 03/29/2021    CREATININE 0.5 03/29/2021    CREATININE 0.6 03/28/2021    CREATININE 0.7 03/27/2021    GFRAA >60 03/29/2021    LABGLOM >60 03/29/2021    GLUCOSE 103 03/29/2021    PROT 6.7 03/29/2021    LABALBU 2.6 03/29/2021    CALCIUM 8.7 03/29/2021    BILITOT 0.3 03/29/2021    ALKPHOS 99 03/29/2021    AST 40 03/29/2021    ALT 37 03/29/2021     Lab Results   Component Value Date    CRP 5.0 (H) 03/29/2021    CRP 25.0 (H) 03/24/2021     Lab Results   Component Value Date    SEDRATE 71 (H) 03/29/2021    SEDRATE 81 (H) 03/24/2021     Radiology:  Reviewed   US liver noted     Microbiology:   Blood cultures 3/24/2021 - GPC, Streptococcus pneumoniae by PCR  Blood cultures 3/25/2021 - negative so far   Respiratory Viral Panel with SARS-CoV-2 3/24/2021 - Rhinovirus/Enterovirus detected   Strep/Legionella urine antigens 3/24/2021 - Negative      No results for input(s): PROCAL in the last 72 hours. ASSESSMENT:  · Community-acquired pneumonia right upper lobe  · Fever with leukocytosis secondary to pneumonia, improved   · Bacteremia with Streptococcus pneumonia  · Invasive pneumococcal disease  · Rhinovirus/Enterovirus infection    PLAN:  · Continue Ceftriaxone 2g IV daily for a total of 3 weeks; may be able to shorten his course depending on his improvement over the next few weeks    · PICC line in place  · Med rec completed   · Repeat blood cultures are negative so far  · GI following; for esophagram today  · Check final cultures  · Monitor labs- CRP 5, ESR 71, WBC 9.4      Altagracia Martinez  11:14 AM  3/29/2021     Patient seen and examined. I had a face to face encounter with the patient. Agree with exam.  Assessment and plan as outlined above and directed by me. Addition and corrections were done as deemed appropriate. My exam and plan include: The patient is feeling better. There are still some egophony over the right upper lobe posteriorly. He has made significant improvement. He will continue IV Ceftriaxone for a minimum of 3 weeks. If he is doing better and inflammatory markers are down by the time he sees us in the office, we might be able to shorten the antibiotic course.     Kaya Yeager  3/29/2021  12:23 PM

## 2021-03-29 NOTE — PROGRESS NOTES
Pulmonary Progress Note    Admit Date: 3/24/2021                            PCP: Yeni Rodriguez MD  Active Problems:    Pneumonia due to organism    Bacteremia    Alcohol withdrawal syndrome without complication ([address]    Dysphagia    Alcohol abuse  Resolved Problems:    * No resolved hospital problems. *      Subjective:  Resting in bed  Feels better, still minimally dyspneic  Coughing but no CP/tightness, wheezing  Wants to go home  On room air    Medications:   sodium chloride      sodium chloride 100 mL/hr at 21 2100        heparin flush  3 mL Intravenous 2 times per day    sodium chloride flush  10 mL Intravenous 2 times per day    cefTRIAXone (ROCEPHIN) IV  2,000 mg Intravenous Q24H    albuterol  2.5 mg Nebulization 4x daily    nicotine  1 patch Transdermal Daily    fluticasone  1 spray Each Nostril Daily    enoxaparin  40 mg Subcutaneous Daily    buprenorphine-naloxone  1 tablet Sublingual BID       Vitals:  VITALS:  BP (!) 144/78 Comment: manual  Pulse 73   Temp 98.2 °F (36.8 °C) (Oral)   Resp 18   Ht 5' 10\" (1.778 m)   Wt 198 lb 9 oz (90.1 kg)   SpO2 95%   BMI 28.49 kg/m²   24HR INTAKE/OUTPUT:    No intake or output data in the 24 hours ending 21 0750  CURRENT PULSE OXIMETRY:  SpO2: 95 %  24HR PULSE OXIMETRY RANGE:  SpO2  Av.7 %  Min: 94 %  Max: 98 %  CVP:    VENT SETTINGS:   Vent Information  SpO2: 95 %  Additional Respiratory  Assessments  Pulse: 73  Resp: 18  SpO2: 95 %      EXAM:  General: No distress. Alert. ENT: Pharynx clear. Neck: Trachea midline. Resp: No accessory muscle use. No crackles. No wheezing. No rhonchi. CV: Regular rate. Regular rhythm. No mumur or rub. No edema. ABD: Non-tender. Non-distended. Normal bowel sounds. Skin: Warm and dry. No rash on exposed extremities. M/S: No cyanosis. No joint deformity. No clubbing. Neuro: Awake. Follows commands. No obvious deficits. .    I/O: No intake/output data recorded.   No intake/output data recorded. Results:  CBC:   Recent Labs     03/27/21  0230 03/28/21  0640 03/29/21  0415   WBC 10.7 10.5 9.4   HGB 11.6* 12.0* 11.3*   HCT 34.4* 35.0* 32.7*   MCV 99.7 97.0 97.9    233 216     BMP:   Recent Labs     03/27/21  0230 03/28/21  0640 03/29/21  0415    135 132   K 4.1 4.2 4.1   * 103 101   CO2 20* 22 24   BUN 28* 18 15   CREATININE 0.7 0.6* 0.5*     LFT:   Recent Labs     03/27/21  0230 03/28/21  0640 03/29/21  0415   ALKPHOS 128 109 99   ALT 26 30 37   AST 23 29 40*   PROT 6.9 7.4 6.7   BILITOT <0.2 0.4 0.3   LABALBU 2.7* 3.0* 2.6*       Films:  Ct Chest Wo Contrast 03/24/21: Large densely consolidated right upper lobe infiltrate most compatible with pneumonia in the acute setting. Recommend follow-up chest CT in 3 months to exclude underlying mass in the region of greatest consolidation PE cannot be excluded without IV contrast Nonobstructing left renal calculi     Xr Chest Portable: 3/24/2021 Right upper lobe infiltrate. Repeat CT scan is recommended in 10-14 days to confirm partial resolution and exclude underlying pathology. Given that the infiltrate abuts the minor fissure and abuts the right paratracheal region underlying pathology cannot be excluded. Fl Modified Barium Swallow W Video: 3/25/2021 Abnormal swallowing mechanism with laryngeal penetration of thin liquid and nectar consistency barium. No barium aspiration. Please see separate speech pathology report for full discussion of findings and recommendations. Speech pathology rec: Compensatory strategies recommended: Chin tuck, Small bites/sips and Alternate solids and liquids    03/28/21 US Liver: Impression The liver appears sonographically unremarkable. No acute abnormality seen in the right upper quadrant abdomen. There is a right pleural effusion.        Assessment:  56 yom smoker with progressive dyspnea and congestion for a couple years began having chest congestion and shortness of breath after being

## 2021-03-29 NOTE — PROGRESS NOTES
[facility]   Progress Note    Admitting Date and Time: 3/24/2021 12:24 PM  Admit Dx: Pneumonia due to organism [J18.9]    Subjective:    Patient was admitted with Pneumonia due to organism [J18.9].  Patient denies fever, chills, cp, sob, n/v.     heparin flush  3 mL Intravenous 2 times per day    sodium chloride flush  10 mL Intravenous 2 times per day    cefTRIAXone (ROCEPHIN) IV  2,000 mg Intravenous Q24H    albuterol  2.5 mg Nebulization 4x daily    nicotine  1 patch Transdermal Daily    fluticasone  1 spray Each Nostril Daily    enoxaparin  40 mg Subcutaneous Daily    buprenorphine-naloxone  1 tablet Sublingual BID     sodium chloride flush, 10 mL, PRN  heparin flush, 3 mL, PRN  sodium chloride flush, 10 mL, PRN  sodium chloride, 25 mL, PRN  LORazepam, 1 mg, Q1H PRN    Or  LORazepam, 1 mg, Q1H PRN    Or  LORazepam, 2 mg, Q1H PRN    Or  LORazepam, 2 mg, Q1H PRN    Or  LORazepam, 3 mg, Q1H PRN    Or  LORazepam, 3 mg, Q1H PRN    Or  LORazepam, 4 mg, Q1H PRN    Or  LORazepam, 4 mg, Q1H PRN  glycerin moisturizing mouth spray, 2 spray, PRN  diphenhydrAMINE, 50 mg, Nightly PRN  promethazine, 12.5 mg, Q6H PRN    Or  ondansetron, 4 mg, Q6H PRN  polyethylene glycol, 17 g, Daily PRN  acetaminophen, 650 mg, Q6H PRN    Or  acetaminophen, 650 mg, Q6H PRN  dextromethorphan-guaiFENesin, 0.5 tablet, Q8H PRN         Objective:    BP (!) 145/89   Pulse 82   Temp 99.3 °F (37.4 °C) (Oral)   Resp 18   Ht 5' 10\" (1.778 m)   Wt 198 lb 9 oz (90.1 kg)   SpO2 98%   BMI 28.49 kg/m²   Skin: warm and dry, no rash or erythema  Pulmonary/Chest: clear to auscultation bilaterally- no wheezes, rales or rhonchi, normal air movement, no respiratory distress  Cardiovascular: rhythm reg at rate of 84  Abdomen: soft, non-tender, non-distended, normal bowel sounds, no masses or organomegaly  Extremities: no cyanosis, no clubbing and no edema      Recent Labs     03/26/21  0530 03/27/21  0230 03/28/21  0640  137 135   K 4.4 4.1 4.2   * 108* 103   CO2 19* 20* 22   BUN 40* 28* 18   CREATININE 0.9 0.7 0.6*   GLUCOSE 131* 158* 96   CALCIUM 8.2* 8.7 9.3       Recent Labs     03/26/21  0530 03/27/21  0230 03/28/21  0640   WBC 14.7* 10.7 10.5   RBC 2.70* 3.45* 3.61*   HGB 8.9* 11.6* 12.0*   HCT 27.1* 34.4* 35.0*   .4* 99.7 97.0   MCH 33.0 33.6 33.2   MCHC 32.8 33.7 34.3   RDW 12.3 12.5 12.3    212 233   MPV 10.5 9.8 9.5       CBC with Differential:    Lab Results   Component Value Date    WBC 10.5 03/28/2021    RBC 3.61 03/28/2021    HGB 12.0 03/28/2021    HCT 35.0 03/28/2021     03/28/2021    MCV 97.0 03/28/2021    MCH 33.2 03/28/2021    MCHC 34.3 03/28/2021    RDW 12.3 03/28/2021    METASPCT 2.6 03/23/2021    LYMPHOPCT 13.7 03/28/2021    MONOPCT 12.1 03/28/2021    BASOPCT 0.4 03/28/2021    MONOSABS 1.27 03/28/2021    LYMPHSABS 1.44 03/28/2021    EOSABS 0.07 03/28/2021    BASOSABS 0.04 03/28/2021     CMP:    Lab Results   Component Value Date     03/28/2021    K 4.2 03/28/2021    K 4.2 03/25/2021     03/28/2021    CO2 22 03/28/2021    BUN 18 03/28/2021    CREATININE 0.6 03/28/2021    GFRAA >60 03/28/2021    LABGLOM >60 03/28/2021    GLUCOSE 96 03/28/2021    PROT 7.4 03/28/2021    LABALBU 3.0 03/28/2021    CALCIUM 9.3 03/28/2021    BILITOT 0.4 03/28/2021    ALKPHOS 109 03/28/2021    AST 29 03/28/2021    ALT 30 03/28/2021        Radiology:   FL MODIFIED BARIUM SWALLOW W VIDEO   Final Result   Abnormal swallowing mechanism with laryngeal penetration of thin liquid and   nectar consistency barium. No barium aspiration. Please see separate speech pathology report for full discussion of findings   and recommendations. CT CHEST WO CONTRAST   Final Result   Large densely consolidated right upper lobe infiltrate most compatible with   pneumonia in the acute setting.   Recommend follow-up chest CT in 3 months to   exclude underlying mass in the region of greatest consolidation      PE cannot be excluded without IV contrast      Nonobstructing left renal calculi         XR CHEST PORTABLE   Final Result   Right upper lobe infiltrate. Repeat CT scan is recommended in 10-14 days to   confirm partial resolution and exclude underlying pathology. Given that the   infiltrate abuts the minor fissure and abuts the right paratracheal region   underlying pathology cannot be excluded. FL ESOPHAGRAM    (Results Pending)   US LIVER    (Results Pending)       Assessment:    Active Problems:    Pneumonia due to organism    Bacteremia    Alcohol withdrawal syndrome without complication (HCC)  Resolved Problems:    * No resolved hospital problems. *      Plan:  1. Bacteremia strep pneumoniae with invasive pneumococcal disease asked ID to see pt   2. Bacterial pneumonia continue rocephin per ID  3. Possible copd pulmonary following neb per pulm  4. Concurrent rhinovirus/enterovirus infection supportive care. 5. Dysphagia slp following continue diet. Spoke with slp and concerned that pt may have regurgitation. Will consult gi  6. Hypotension favor hypovolemia due to insensible losses of fever as well as recently placed on steroids and ua noted glucosuria.  Will continue iv fluids. 7. Hyperglycemia.  Pt did receive steroids. Pt denies dm and that his a1c was reported low to him, however, will recheck a1c  8. htn hold bp med  9. Dehydration continue iv fluids  10. Alcohol abuse  rec cessation  11. Probable alcohol withdrawal symptoms appear to be mild but will order ciwa scale and monitor closely. Do not notice any symptoms today. 12. Tobacco abuse rec cessation  13. Hyperkalemia improved  14. Acidosis/elevated lactic acid level monitor  15. raeann continue iv fluids.   16. Hyponatremia monitor  17. Back pain monitor and tx prn  18. Hep C monitor  19.  Sepsis(pt had fever prior to admission and had tachycardia which was in part due to hypovolemia and leukocytosis which could be affected by steroids but given bacteremia, pt with sepsis)POA supportive care and tx underlying infection. improved    Gi following. Pt for esophagram. Pt with picc.  Possible discharge tomorrow pend results of esophagram    Electronically signed by Delfina Tellez DO on 3/28/2021 at 8:07 PM

## 2021-03-29 NOTE — PROGRESS NOTES
[facility]   Progress Note    Admitting Date and Time: 3/24/2021 12:24 PM  Admit Dx: Pneumonia due to organism [J18.9]    Subjective:    Patient was admitted with Pneumonia due to organism [J18.9].  Patient denies fever, chills, cp, sob, n/v.     heparin flush  3 mL Intravenous 2 times per day    sodium chloride flush  10 mL Intravenous 2 times per day    cefTRIAXone (ROCEPHIN) IV  2,000 mg Intravenous Q24H    albuterol  2.5 mg Nebulization 4x daily    nicotine  1 patch Transdermal Daily    fluticasone  1 spray Each Nostril Daily    enoxaparin  40 mg Subcutaneous Daily    buprenorphine-naloxone  1 tablet Sublingual BID     sodium chloride flush, 10 mL, PRN  heparin flush, 3 mL, PRN  sodium chloride flush, 10 mL, PRN  sodium chloride, 25 mL, PRN  glycerin moisturizing mouth spray, 2 spray, PRN  diphenhydrAMINE, 50 mg, Nightly PRN  promethazine, 12.5 mg, Q6H PRN    Or  ondansetron, 4 mg, Q6H PRN  polyethylene glycol, 17 g, Daily PRN  acetaminophen, 650 mg, Q6H PRN    Or  acetaminophen, 650 mg, Q6H PRN  dextromethorphan-guaiFENesin, 0.5 tablet, Q8H PRN         Objective:    /84   Pulse 80   Temp 98.4 °F (36.9 °C) (Oral)   Resp 16   Ht 5' 10\" (1.778 m)   Wt 198 lb 9 oz (90.1 kg)   SpO2 96%   BMI 28.49 kg/m²   Skin: warm and dry, no rash or erythema  Pulmonary/Chest: clear to auscultation bilaterally- no wheezes, rales or rhonchi, normal air movement, no respiratory distress  Cardiovascular: rhythm reg at rate of 84  Abdomen: soft, non-tender, non-distended, normal bowel sounds, no masses or organomegaly  Extremities: no cyanosis, no clubbing and no edema      Recent Labs     03/27/21  0230 03/28/21  0640 03/29/21  0415    135 132   K 4.1 4.2 4.1   * 103 101   CO2 20* 22 24   BUN 28* 18 15   CREATININE 0.7 0.6* 0.5*   GLUCOSE 158* 96 103*   CALCIUM 8.7 9.3 8.7       Recent Labs     03/27/21  0230 03/28/21  0640 03/29/21  0415   WBC 10.7 10.5 9.4   RBC 3.45* 3.61* 3.34*   HGB 11.6* 12.0* 11.3*   HCT 34.4* 35.0* 32.7*   MCV 99.7 97.0 97.9   MCH 33.6 33.2 33.8   MCHC 33.7 34.3 34.6*   RDW 12.5 12.3 12.2    233 216   MPV 9.8 9.5 9.7       CBC with Differential:    Lab Results   Component Value Date    WBC 9.4 03/29/2021    RBC 3.34 03/29/2021    HGB 11.3 03/29/2021    HCT 32.7 03/29/2021     03/29/2021    MCV 97.9 03/29/2021    MCH 33.8 03/29/2021    MCHC 34.6 03/29/2021    RDW 12.2 03/29/2021    NRBC 0.0 03/29/2021    METASPCT 1.7 03/29/2021    LYMPHOPCT 12.9 03/29/2021    MONOPCT 5.2 03/29/2021    BASOPCT 0.0 03/29/2021    MONOSABS 0.47 03/29/2021    LYMPHSABS 1.50 03/29/2021    EOSABS 0.16 03/29/2021    BASOSABS 0.00 03/29/2021     CMP:    Lab Results   Component Value Date     03/29/2021    K 4.1 03/29/2021    K 4.2 03/25/2021     03/29/2021    CO2 24 03/29/2021    BUN 15 03/29/2021    CREATININE 0.5 03/29/2021    GFRAA >60 03/29/2021    LABGLOM >60 03/29/2021    GLUCOSE 103 03/29/2021    PROT 6.7 03/29/2021    LABALBU 2.6 03/29/2021    CALCIUM 8.7 03/29/2021    BILITOT 0.3 03/29/2021    ALKPHOS 99 03/29/2021    AST 40 03/29/2021    ALT 37 03/29/2021        Radiology:   XR CHEST PORTABLE   Final Result   Interval improvement in right upper lobe opacity. Small right-sided pleural effusion. US LIVER   Final Result   The liver appears sonographically unremarkable. No acute abnormality seen in   the right upper quadrant abdomen. There is a right pleural effusion. FL MODIFIED BARIUM SWALLOW W VIDEO   Final Result   Abnormal swallowing mechanism with laryngeal penetration of thin liquid and   nectar consistency barium. No barium aspiration. Please see separate speech pathology report for full discussion of findings   and recommendations. CT CHEST WO CONTRAST   Final Result   Large densely consolidated right upper lobe infiltrate most compatible with   pneumonia in the acute setting.   Recommend follow-up chest CT in 3 months to   exclude underlying mass in the region of greatest consolidation      PE cannot be excluded without IV contrast      Nonobstructing left renal calculi         XR CHEST PORTABLE   Final Result   Right upper lobe infiltrate. Repeat CT scan is recommended in 10-14 days to   confirm partial resolution and exclude underlying pathology. Given that the   infiltrate abuts the minor fissure and abuts the right paratracheal region   underlying pathology cannot be excluded. FL ESOPHAGRAM    (Results Pending)       Assessment:    Active Problems:    Pneumonia due to organism    Bacteremia    Alcohol withdrawal syndrome without complication (HCC)    Dysphagia    Alcohol abuse  Resolved Problems:    * No resolved hospital problems. *      Plan:  1. Bacteremia strep pneumoniae with invasive pneumococcal disease abx per ID. Transition to iv as outpt with picc. 2. Bacterial pneumonia continue rocephin per ID  3. Possible copd pulmonary following neb per pulm  4. Concurrent rhinovirus/enterovirus infection supportive care. 5. Dysphagia slp following continue diet. Spoke with slp and concerned that pt may have regurgitation. Will consult gi  6. Hypotension favor hypovolemia due to insensible losses of fever as well as recently placed on steroids and ua noted glucosuria.  Will continue iv fluids. 7. Hyperglycemia.  Pt did receive steroids. Pt denies dm and that his a1c was reported low to him, however, will recheck a1c  8. htn hold bp med  9. Dehydration continue iv fluids  10. Alcohol abuse  rec cessation  11. Probable alcohol withdrawal symptoms appear to be mild but will order ciwa scale and monitor closely. Do not notice any symptoms today. 12. Tobacco abuse rec cessation  13. Hyperkalemia improved  14. Acidosis/elevated lactic acid level monitor  15. raeann continue iv fluids.   16. Hyponatremia monitor  17. Back pain monitor and tx prn  18. Hep C monitor  19.  Sepsis(pt had fever prior to admission and had tachycardia which was in part due to hypovolemia and leukocytosis which could be affected by steroids but given bacteremia, pt with sepsis)POA supportive care and tx underlying infection. improved    Esophagram pending    Tentative discharge tomorrow.      Electronically signed by Kenna Jaime DO on 3/29/2021 at 6:29 PM

## 2021-03-30 ENCOUNTER — ANESTHESIA EVENT (OUTPATIENT)
Dept: ENDOSCOPY | Age: 61
DRG: 871 | End: 2021-03-30
Payer: COMMERCIAL

## 2021-03-30 LAB
ALBUMIN SERPL-MCNC: 2.9 G/DL (ref 3.5–5.2)
ALP BLD-CCNC: 101 U/L (ref 40–129)
ALT SERPL-CCNC: 65 U/L (ref 0–40)
ANION GAP SERPL CALCULATED.3IONS-SCNC: 10 MMOL/L (ref 7–16)
AST SERPL-CCNC: 66 U/L (ref 0–39)
ATYPICAL LYMPHOCYTE RELATIVE PERCENT: 3 % (ref 0–4)
BASOPHILS ABSOLUTE: 0 E9/L (ref 0–0.2)
BASOPHILS RELATIVE PERCENT: 0 % (ref 0–2)
BILIRUB SERPL-MCNC: 0.4 MG/DL (ref 0–1.2)
BLOOD CULTURE, ROUTINE: NORMAL
BUN BLDV-MCNC: 13 MG/DL (ref 8–23)
CALCIUM SERPL-MCNC: 9 MG/DL (ref 8.6–10.2)
CHLORIDE BLD-SCNC: 101 MMOL/L (ref 98–107)
CO2: 25 MMOL/L (ref 22–29)
CREAT SERPL-MCNC: 0.6 MG/DL (ref 0.7–1.2)
CULTURE, BLOOD 2: NORMAL
EOSINOPHILS ABSOLUTE: 0.15 E9/L (ref 0.05–0.5)
EOSINOPHILS RELATIVE PERCENT: 2 % (ref 0–6)
GFR AFRICAN AMERICAN: >60
GFR NON-AFRICAN AMERICAN: >60 ML/MIN/1.73
GLUCOSE BLD-MCNC: 91 MG/DL (ref 74–99)
HCT VFR BLD CALC: 34.5 % (ref 37–54)
HEMOGLOBIN: 11.9 G/DL (ref 12.5–16.5)
LYMPHOCYTES ABSOLUTE: 0.99 E9/L (ref 1.5–4)
LYMPHOCYTES RELATIVE PERCENT: 10 % (ref 20–42)
MCH RBC QN AUTO: 34.1 PG (ref 26–35)
MCHC RBC AUTO-ENTMCNC: 34.5 % (ref 32–34.5)
MCV RBC AUTO: 98.9 FL (ref 80–99.9)
MONOCYTES ABSOLUTE: 0.3 E9/L (ref 0.1–0.95)
MONOCYTES RELATIVE PERCENT: 4 % (ref 2–12)
MYELOCYTE PERCENT: 1 % (ref 0–0)
NEUTROPHILS ABSOLUTE: 6.16 E9/L (ref 1.8–7.3)
NEUTROPHILS RELATIVE PERCENT: 80 % (ref 43–80)
PDW BLD-RTO: 12.1 FL (ref 11.5–15)
PLATELET # BLD: 247 E9/L (ref 130–450)
PMV BLD AUTO: 10.3 FL (ref 7–12)
POTASSIUM SERPL-SCNC: 4.1 MMOL/L (ref 3.5–5)
RBC # BLD: 3.49 E12/L (ref 3.8–5.8)
RBC # BLD: NORMAL 10*6/UL
SODIUM BLD-SCNC: 136 MMOL/L (ref 132–146)
TOTAL PROTEIN: 7.1 G/DL (ref 6.4–8.3)
TOXIC GRANULATION: ABNORMAL
WBC # BLD: 7.6 E9/L (ref 4.5–11.5)

## 2021-03-30 PROCEDURE — 6360000002 HC RX W HCPCS: Performed by: INTERNAL MEDICINE

## 2021-03-30 PROCEDURE — 80053 COMPREHEN METABOLIC PANEL: CPT

## 2021-03-30 PROCEDURE — 2580000003 HC RX 258: Performed by: SPECIALIST

## 2021-03-30 PROCEDURE — 99233 SBSQ HOSP IP/OBS HIGH 50: CPT | Performed by: INTERNAL MEDICINE

## 2021-03-30 PROCEDURE — 6370000000 HC RX 637 (ALT 250 FOR IP): Performed by: INTERNAL MEDICINE

## 2021-03-30 PROCEDURE — 1200000000 HC SEMI PRIVATE

## 2021-03-30 PROCEDURE — 6360000002 HC RX W HCPCS: Performed by: SPECIALIST

## 2021-03-30 PROCEDURE — 94640 AIRWAY INHALATION TREATMENT: CPT

## 2021-03-30 PROCEDURE — 36415 COLL VENOUS BLD VENIPUNCTURE: CPT

## 2021-03-30 PROCEDURE — 2580000003 HC RX 258: Performed by: INTERNAL MEDICINE

## 2021-03-30 PROCEDURE — 85025 COMPLETE CBC W/AUTO DIFF WBC: CPT

## 2021-03-30 RX ADMIN — ENOXAPARIN SODIUM 40 MG: 40 INJECTION SUBCUTANEOUS at 21:13

## 2021-03-30 RX ADMIN — ALBUTEROL SULFATE 2.5 MG: 2.5 SOLUTION RESPIRATORY (INHALATION) at 20:16

## 2021-03-30 RX ADMIN — ALBUTEROL SULFATE 2.5 MG: 2.5 SOLUTION RESPIRATORY (INHALATION) at 17:25

## 2021-03-30 RX ADMIN — Medication 300 UNITS: at 04:57

## 2021-03-30 RX ADMIN — SODIUM CHLORIDE, PRESERVATIVE FREE 10 ML: 5 INJECTION INTRAVENOUS at 21:20

## 2021-03-30 RX ADMIN — DICLOFENAC SODIUM 2 G: 10 GEL TOPICAL at 23:27

## 2021-03-30 RX ADMIN — ACETAMINOPHEN 650 MG: 325 TABLET ORAL at 12:54

## 2021-03-30 RX ADMIN — FLUTICASONE PROPIONATE 1 SPRAY: 50 SPRAY, METERED NASAL at 08:16

## 2021-03-30 RX ADMIN — DIPHENHYDRAMINE HCL 50 MG: 25 TABLET ORAL at 21:12

## 2021-03-30 RX ADMIN — ACETAMINOPHEN 650 MG: 325 TABLET ORAL at 21:12

## 2021-03-30 RX ADMIN — Medication 300 UNITS: at 21:14

## 2021-03-30 RX ADMIN — ALBUTEROL SULFATE 2.5 MG: 2.5 SOLUTION RESPIRATORY (INHALATION) at 06:17

## 2021-03-30 RX ADMIN — BUPRENORPHINE AND NALOXONE 1 TABLET: 8; 2 TABLET SUBLINGUAL at 21:12

## 2021-03-30 RX ADMIN — BUPRENORPHINE AND NALOXONE 1 TABLET: 8; 2 TABLET SUBLINGUAL at 08:15

## 2021-03-30 RX ADMIN — WATER 2000 MG: 1 INJECTION INTRAMUSCULAR; INTRAVENOUS; SUBCUTANEOUS at 21:16

## 2021-03-30 RX ADMIN — SODIUM CHLORIDE, PRESERVATIVE FREE 10 ML: 5 INJECTION INTRAVENOUS at 09:00

## 2021-03-30 RX ADMIN — ALBUTEROL SULFATE 2.5 MG: 2.5 SOLUTION RESPIRATORY (INHALATION) at 11:54

## 2021-03-30 ASSESSMENT — PAIN SCALES - GENERAL
PAINLEVEL_OUTOF10: 4
PAINLEVEL_OUTOF10: 4
PAINLEVEL_OUTOF10: 0

## 2021-03-30 NOTE — CARE COORDINATION
Pt set up for home IV rocephin via [facility]; orders on chart; RX faxed to [facility] by [facility]. [facility]. Await clearance for discharge by medical team.Mariia Garrett RN,CM.

## 2021-03-30 NOTE — PLAN OF CARE
Problem: Daily Care:  Goal: Daily care needs are met  Description: Daily care needs are met  Outcome: Met This Shift     Problem: Pain:  Goal: Pain level will decrease  Description: Pain level will decrease  Outcome: Met This Shift

## 2021-03-30 NOTE — CARE COORDINATION
IV atb faxed; to [address] [address]; Madhav Rodriguez made aware of prob discharge today. David Valles orders on chart. Will follow. Millie Dobson.

## 2021-03-30 NOTE — ANESTHESIA PRE PROCEDURE
Department of Anesthesiology  Preprocedure Note       Name:  Valdo Paul   Age:  56 y.o.  :  1960                                          MRN:  [id number]         Date:  3/30/2021      Surgeon: Maren Fraser):  Brian Andrade DO    Procedure: Procedure(s):  EGD ESOPHAGOGASTRODUODENOSCOPY   +++ISOLATION+++    Medications prior to admission:   Prior to Admission medications    Medication Sig Start Date End Date Taking? Authorizing Provider   cefTRIAXone (ROCEPHIN) infusion Infuse 2,000 mg intravenously every 24 hours for 21 days Compound per protocol 3/29/21 4/19/21 Yes Lavera Conrado, APRN - CNP   fluticasone (FLONASE) 50 MCG/ACT nasal spray 1 spray by Each Nostril route daily   Yes Historical Provider, MD   Multiple Vitamins-Minerals (THERAPEUTIC MULTIVITAMIN-MINERALS) tablet Take 1 tablet by mouth daily   Yes Historical Provider, MD   dextromethorphan-guaiFENesin ([place] DM)  MG per extended release tablet Take 0.5 tablets by mouth every 12 hours as needed   Yes Historical Provider, MD   diphenhydrAMINE (BENADRYL) 25 MG tablet Take 50 mg by mouth nightly as needed for Sleep   Yes Historical Provider, MD   albuterol sulfate HFA (VENTOLIN HFA) 108 (90 Base) MCG/ACT inhaler Inhale 2 puffs into the lungs 4 times daily as needed for Wheezing 3/23/21  Yes Lupis Willis MD   benzonatate (TESSALON PERLES) 100 MG capsule Take 1 capsule by mouth 3 times daily as needed for Cough 3/23/21 3/30/21 Yes Lupis Willis MD   lisinopril (PRINIVIL;ZESTRIL) 40 MG tablet Take 1 tablet by mouth daily 20  Yes Lupis Willis MD   buprenorphine-naloxone (SUBOXONE) 8-2 MG FILM SL film Place 1 Film under the tongue 2 times daily.   20  Yes Historical Provider, MD       Current medications:    Current Facility-Administered Medications   Medication Dose Route Frequency Provider Last Rate Last Admin    sodium chloride flush 0.9 % injection 10 mL  10 mL Intravenous PRN Monika León MD   20 mL at 03/28/21 1256    heparin flush 100 UNIT/ML injection 300 Units  3 mL Intravenous 2 times per day Ayala Ni MD   300 Units at 03/29/21 2021    heparin flush 100 UNIT/ML injection 300 Units  3 mL Intercatheter PRN Ayala Ni MD   300 Units at 03/30/21 0457    sodium chloride flush 0.9 % injection 10 mL  10 mL Intravenous 2 times per day Boni Hric, DO   10 mL at 03/30/21 0900    sodium chloride flush 0.9 % injection 10 mL  10 mL Intravenous PRN Boni Hric, DO        0.9 % sodium chloride infusion  25 mL Intravenous PRN Boni Hric, DO        cefTRIAXone (ROCEPHIN) 2,000 mg in sterile water 20 mL IV syringe  2,000 mg Intravenous Q24H Ayala Ni MD   2,000 mg at 03/29/21 2018    albuterol (PROVENTIL) nebulizer solution 2.5 mg  2.5 mg Nebulization 4x daily Barbara Green MD   2.5 mg at 03/30/21 1154    nicotine (NICODERM CQ) 14 MG/24HR 1 patch  1 patch Transdermal Daily Boni Hric, DO   1 patch at 03/30/21 0816    glycerin moisturizing mouth spray (OASIS) 35 % 2 spray  2 spray Mouth/Throat PRN Boni Hric, DO   2 spray at 03/26/21 0942    diphenhydrAMINE (BENADRYL) tablet 50 mg  50 mg Oral Nightly PRN Boni Hric, DO   50 mg at 03/29/21 2149    fluticasone (FLONASE) 50 MCG/ACT nasal spray 1 spray  1 spray Each Nostril Daily Boni Hric, DO   1 spray at 03/30/21 0816    enoxaparin (LOVENOX) injection 40 mg  40 mg Subcutaneous Daily Boni Hric, DO   40 mg at 03/29/21 2019    promethazine (PHENERGAN) tablet 12.5 mg  12.5 mg Oral Q6H PRN Boni Hric, DO        Or    ondansetron (ZOFRAN) injection 4 mg  4 mg Intravenous Q6H PRN Boni Hric, DO        polyethylene glycol (GLYCOLAX) packet 17 g  17 g Oral Daily PRN Boni Hric, DO        acetaminophen (TYLENOL) tablet 650 mg  650 mg Oral Q6H PRN Boni Hric, DO   650 mg at 03/30/21 1254    Or    acetaminophen (TYLENOL) suppository 650 mg  650 mg Rectal Q6H PRN Boni Hric, DO        dextromethorphan-guaiFENesin 0.5 tablet  0.5 tablet Oral Q8H PRN Boni Pagan DO   0.5 tablet at 03/27/21 2059    buprenorphine-naloxone (SUBOXONE) 8-2 MG SL tablet 1 tablet  1 tablet Sublingual BID Eleazar Can MD   1 tablet at 03/30/21 5255       Allergies:  No Known Allergies    Problem List:    Patient Active Problem List   Diagnosis Code    Inguinal hernia, right K40.90    Cervical radiculopathy M54.12    Chronic hepatitis C without hepatic coma (HCC) B18.2    Right shoulder pain M25.511    Right subclavian artery occlusion I70.8    Essential hypertension I10    Sinus tachycardia R00.0    Benign paroxysmal positional vertigo due to bilateral vestibular disorder H81.13    Hypotension I95.9    Pneumonia due to organism J18.9    Bacterial pneumonia J15.9    Acidosis E87.2    JOSIE (acute kidney injury) ([facility] [address] N17.9    Bacteremia R78.81    Alcohol withdrawal syndrome without complication (HCC) H74.676    Dysphagia R13.10    Alcohol abuse F10.10       Past Medical History:        Diagnosis Date    Hepatitis C     liver bx wnl. genotype is the best possible    Herniated disc, cervical        Past Surgical History:        Procedure Laterality Date    ANTERIOR CRUCIATE LIGAMENT REPAIR  1990s    [facility] [place]    ANTERIOR CRUCIATE LIGAMENT REPAIR  2009    right ACL repair, Dr. Sherwin Monge [facility]  [facility]    [facility]  [facility]    right indirect inguinal hern[place]rhaphy with [place] [id number] [place] 5/20/14    with mesh    NECK SURGERY  1992    neck fusion C6 and C7, [address] PICC LINE INSERTION NURSE  3/28/2021         SHOULDER SURGERY  2007    left, removal spur, Dr. Steve Michele  2007    melenoma, left clavical area, [facility]       Social History:    Social History     Tobacco Use    Smoking status: Current Every Day Smoker     Packs/day: 0.50     Years: 40.00     Pack years: 20.00     Types: Cigarettes    Smokeless tobacco: Never Used   Substance Use Topics    Alcohol use: Yes     Alcohol/week: 38.0 standard drinks     Types: 18 Cans of beer, 20 Shots of liquor per week     Comment: daily                                Ready to quit: Not Answered  Counseling given: Not Answered      Vital Signs (Current):   Vitals:    03/29/21 1600 03/29/21 1945 03/30/21 0500 03/30/21 0745   BP: 134/84 136/76  138/79   Pulse: 80 80  78   Resp: 16 18  16   Temp: 98.4 °F (36.9 °C) 98.9 °F (37.2 °C)  98 °F (36.7 °C)   TempSrc: Oral Oral  Oral   SpO2: 96% 95%     Weight:   188 lb 7 oz (85.5 kg)    Height:                                                  BP Readings from Last 3 Encounters:   03/30/21 138/79   03/23/21 (!) 96/54   02/04/21 116/71       NPO Status:                                                                                 BMI:   Wt Readings from Last 3 Encounters:   03/30/21 188 lb 7 oz (85.5 kg)   03/23/21 193 lb (87.5 kg)   02/04/21 195 lb (88.5 kg)     Body mass index is 27.04 kg/m². CBC:   Lab Results   Component Value Date    WBC 7.6 03/30/2021    RBC 3.49 03/30/2021    HGB 11.9 03/30/2021    HCT 34.5 03/30/2021    MCV 98.9 03/30/2021    RDW 12.1 03/30/2021     03/30/2021       CMP:   Lab Results   Component Value Date     03/30/2021    K 4.1 03/30/2021    K 4.2 03/25/2021     03/30/2021    CO2 25 03/30/2021    BUN 13 03/30/2021    CREATININE 0.6 03/30/2021    GFRAA >60 03/30/2021    LABGLOM >60 03/30/2021    GLUCOSE 91 03/30/2021    PROT 7.1 03/30/2021    CALCIUM 9.0 03/30/2021    BILITOT 0.4 03/30/2021    ALKPHOS 101 03/30/2021    AST 66 03/30/2021    ALT 65 03/30/2021       POC Tests: No results for input(s): POCGLU, POCNA, POCK, POCCL, POCBUN, POCHEMO, POCHCT in the last 72 hours.     Coags:   Lab Results   Component Value Date    PROTIME 13.9 03/24/2021    INR 1.3 03/24/2021    APTT 33.3 06/12/2020       HCG (If Applicable): No results found for: PREGTESTUR, PREGSERUM, HCG, HCGQUANT

## 2021-03-30 NOTE — PROGRESS NOTES
[address] [place] [address] [facility]  ENEDELIA  Progress Note    SUBJECTIVE:  Chief Complaint   Patient presents with    Dizziness     started saturday, sent in by pcp    Chest Pain     right lateral chest, hurts when coughing     Patient is tolerating antibiotics. Ambulating in room. Frustrated that he hasn't heard the results of his esophagram & hasn't been able to eat solid food. Right pleuritic pain is improved. Still with productive cough. Review of systems:  As stated above in the chief complaint, otherwise negative. Medications:  Scheduled Meds:   heparin flush  3 mL Intravenous 2 times per day    sodium chloride flush  10 mL Intravenous 2 times per day    cefTRIAXone (ROCEPHIN) IV  2,000 mg Intravenous Q24H    albuterol  2.5 mg Nebulization 4x daily    nicotine  1 patch Transdermal Daily    fluticasone  1 spray Each Nostril Daily    enoxaparin  40 mg Subcutaneous Daily    buprenorphine-naloxone  1 tablet Sublingual BID     Continuous Infusions:   sodium chloride       PRN Meds:sodium chloride flush, heparin flush, sodium chloride flush, sodium chloride, glycerin moisturizing mouth spray, diphenhydrAMINE, promethazine **OR** ondansetron, polyethylene glycol, acetaminophen **OR** acetaminophen, dextromethorphan-guaiFENesin    OBJECTIVE:  /79   Pulse 78   Temp 98 °F (36.7 °C) (Oral)   Resp 16   Ht 5' 10\" (1.778 m)   Wt 188 lb 7 oz (85.5 kg)   SpO2 95%   BMI 27.04 kg/m²   Temp  Av.4 °F (36.9 °C)  Min: 98 °F (36.7 °C)  Max: 98.9 °F (37.2 °C)  Constitutional: The patient is walking about room. In no distress. Frustrated he hasn't been able to eat solid food yet. Skin: Warm and dry. No rashes were noted. HEENT: Round and reactive pupils. Moist mucous membranes. No ulcerations or thrush. Neck: Supple to movements. Chest: No respiratory distress. On room air. Occasional strong productive cough. Pain in the right pleural space is almost resolved.  Lungs are clear throughout. Cardiovascular: S1 and S2 are rhythmic and regular. No murmurs appreciated. Abdomen: Positive bowel sounds to auscultation. Benign to palpation. No masses felt. Extremities: No edema. Lines: PICC right brachial 3/28/2021    Laboratory and Tests Review:  Lab Results   Component Value Date    WBC 7.6 03/30/2021    WBC 9.4 03/29/2021    WBC 10.5 03/28/2021    HGB 11.9 (L) 03/30/2021    HCT 34.5 (L) 03/30/2021    MCV 98.9 03/30/2021     03/30/2021     Lab Results   Component Value Date    NEUTROABS 7.33 (H) 03/29/2021    NEUTROABS 7.18 03/28/2021    NEUTROABS 7.58 (H) 03/27/2021     No results found for: [facility]  Lab Results   Component Value Date    ALT 65 (H) 03/30/2021    AST 66 (H) 03/30/2021    ALKPHOS 101 03/30/2021    BILITOT 0.4 03/30/2021     Lab Results   Component Value Date     03/30/2021    K 4.1 03/30/2021    K 4.2 03/25/2021     03/30/2021    CO2 25 03/30/2021    BUN 13 03/30/2021    CREATININE 0.6 03/30/2021    CREATININE 0.5 03/29/2021    CREATININE 0.6 03/28/2021    GFRAA >60 03/30/2021    LABGLOM >60 03/30/2021    GLUCOSE 91 03/30/2021    PROT 7.1 03/30/2021    LABALBU 2.9 03/30/2021    CALCIUM 9.0 03/30/2021    BILITOT 0.4 03/30/2021    ALKPHOS 101 03/30/2021    AST 66 03/30/2021    ALT 65 03/30/2021     Lab Results   Component Value Date    CRP 5.0 (H) 03/29/2021    CRP 25.0 (H) 03/24/2021     Lab Results   Component Value Date    SEDRATE 71 (H) 03/29/2021    SEDRATE 81 (H) 03/24/2021     Radiology:  Reviewed   US liver noted     Microbiology:   Blood cultures 3/24/2021 - GPC, Streptococcus pneumoniae by PCR  Blood cultures 3/25/2021 - negative so far   Respiratory Viral Panel with SARS-CoV-2 3/24/2021 - Rhinovirus/Enterovirus detected   Strep/Legionella urine antigens 3/24/2021 - Negative      No results for input(s): PROCAL in the last 72 hours.     ASSESSMENT:  · Community-acquired pneumonia right upper lobe  · Fever with leukocytosis secondary to pneumonia, improved   · Bacteremia with Streptococcus pneumonia  · Invasive pneumococcal disease  · Rhinovirus/Enterovirus infection    PLAN:  · Continue Ceftriaxone 2g IV daily for a total of 3 weeks  · PICC line in place  · Med rec completed   · Repeat blood cultures are negative so far  · Check final cultures  · OK to discharge from ID standpoint   · To follow up in the office. - if his improvement continues & inflammatory markers continue to decrease we may be able to shorten his length of treatment. Garcia Gregory  11:15 AM  3/30/2021     Patient seen and examined. I had a face to face encounter with the patient. Agree with exam.  Assessment and plan as outlined above and directed by me. Addition and corrections were done as deemed appropriate. My exam and plan include: The patient is doing well. He had an esophagram and was found to have a stricture. Knee is upset because he is not receiving any solid food today. Antibiotics have been reconciled x3 weeks.     Victory Hunt  3/30/2021  1:07 PM

## 2021-03-30 NOTE — PROGRESS NOTES
treatment  -Unremarkable ultrasound liver appearance     3.  Alcohol abuse  -Patient strongly advised to completely abstain from alcohol given history of hepatitis C  -Currently unremarkable liver profile  -Ultrasound liver as above     4.  COPD/community-acquired pneumonia/invasive pneumococcal disease  -Plan for Rocephin 2 g IV x3 weeks per ID recommendations  -Per admitting/pulmonology/ID    Padilla Huang MD  3/30/2021  5:29 PM    NOTE:  This report was transcribed using voice recognition software. Every effort was made to ensure accuracy; however, inadvertent computerized transcription errors may be present.

## 2021-03-30 NOTE — PROGRESS NOTES
Pulmonary Progress Note    Admit Date: 3/24/2021                            PCP: Macey Aldrich MD  Active Problems:    Pneumonia due to organism    Bacteremia    Alcohol withdrawal syndrome without complication ([address]    Dysphagia    Alcohol abuse  Resolved Problems:    * No resolved hospital problems. *      Subjective:  Resting in bed  Feels better, breathing much better  Still with cough and R sided pain when coughing  On room air  Ready to go home    Medications:   sodium chloride          heparin flush  3 mL Intravenous 2 times per day    sodium chloride flush  10 mL Intravenous 2 times per day    cefTRIAXone (ROCEPHIN) IV  2,000 mg Intravenous Q24H    albuterol  2.5 mg Nebulization 4x daily    nicotine  1 patch Transdermal Daily    fluticasone  1 spray Each Nostril Daily    enoxaparin  40 mg Subcutaneous Daily    buprenorphine-naloxone  1 tablet Sublingual BID       Vitals:  VITALS:  /79   Pulse 78   Temp 98 °F (36.7 °C) (Oral)   Resp 16   Ht 5' 10\" (1.778 m)   Wt 188 lb 7 oz (85.5 kg)   SpO2 95%   BMI 27.04 kg/m²   24HR INTAKE/OUTPUT:    No intake or output data in the 24 hours ending 21 0808  CURRENT PULSE OXIMETRY:  SpO2: 95 %  24HR PULSE OXIMETRY RANGE:  SpO2  Av.5 %  Min: 95 %  Max: 96 %  CVP:    VENT SETTINGS:   Vent Information  SpO2: 95 %  Additional Respiratory  Assessments  Pulse: 78  Resp: 16  SpO2: 95 %      EXAM:  General: No distress. Alert. ENT: Pharynx clear. Neck: Trachea midline. Resp: No accessory muscle use. No crackles. No wheezing. No rhonchi. CV: Regular rate. Regular rhythm. No mumur or rub. No edema. ABD: Non-tender. Non-distended. Normal bowel sounds. Skin: Warm and dry. No rash on exposed extremities. M/S: No cyanosis. No joint deformity. No clubbing. Neuro: Awake. Follows commands. No obvious deficits. .    I/O: No intake/output data recorded. No intake/output data recorded.      Results:  CBC:   Recent Labs     21  0640 03/29/21  0415 03/30/21  0500   WBC 10.5 9.4 7.6   HGB 12.0* 11.3* 11.9*   HCT 35.0* 32.7* 34.5*   MCV 97.0 97.9 98.9    216 247     BMP:   Recent Labs     03/28/21  0640 03/29/21  0415 03/30/21  0500    132 136   K 4.2 4.1 4.1    101 101   CO2 22 24 25   BUN 18 15 13   CREATININE 0.6* 0.5* 0.6*     LFT:   Recent Labs     03/28/21  0640 03/29/21  0415 03/30/21  0500   ALKPHOS 109 99 101   ALT 30 37 65*   AST 29 40* 66*   PROT 7.4 6.7 7.1   BILITOT 0.4 0.3 0.4   LABALBU 3.0* 2.6* 2.9*       Films:  Ct Chest Wo Contrast 03/24/21: Large densely consolidated right upper lobe infiltrate most compatible with pneumonia in the acute setting. Recommend follow-up chest CT in 3 months to exclude underlying mass in the region of greatest consolidation PE cannot be excluded without IV contrast Nonobstructing left renal calculi     Xr Chest Portable: 3/24/2021 Right upper lobe infiltrate. Repeat CT scan is recommended in 10-14 days to confirm partial resolution and exclude underlying pathology. Given that the infiltrate abuts the minor fissure and abuts the right paratracheal region underlying pathology cannot be excluded. CXR 03/29/21: Impression Interval improvement in right upper lobe opacity. Small right-sided pleural effusion. Fl Modified Barium Swallow W Video: 3/25/2021 Abnormal swallowing mechanism with laryngeal penetration of thin liquid and nectar consistency barium. No barium aspiration. Please see separate speech pathology report for full discussion of findings and recommendations. Speech pathology rec: Compensatory strategies recommended: Chin tuck, Small bites/sips and Alternate solids and liquids    03/28/21 US Liver: Impression The liver appears sonographically unremarkable. No acute abnormality seen in the right upper quadrant abdomen. There is a right pleural effusion.        Assessment:  56 yom smoker with progressive dyspnea and congestion for a couple years began having chest congestion and shortness of breath after being exposed to his sick grandchildren. Complicated by severe right sided back pain and fevers as high as 101.4. · New RUL pneumonia   · Bacteremia with blood cultures growing Streptococcus pneumoniae. · + rhinovirus  · Suspect COPD  · 1.5 ppd smoker and ETOH abuse  · New back pain  · Hepatitis C  · R pleural effusion (on liver US)      Plan:  · Ceftriaxone per ID (started 3/25) - to continue for a min 3-4 weeks  · CXR with small R pleural effusion - not enough for thoracentesis. · Esophagram results pending - per surgery may require outpt EGD  · Sputum results pending  · Continue albuterol nebs   · Continue nicoderm and suboxone  · Room air, O2 as needed - pox 95%  · Encouraged IS  · Outpt PFT's  · Follow up with pulmonary 3-4 weeks with CXR and PFT's. Call [phone] to schedule appt. BENITA Ball-CNP    Seen and evaluated, agree with above A/P  Esophagogram with distal stricture, will need EGD. From Pulm POV stable for procedure or d/c home. Pulm will sign off, call prn   F/up 3-4 weeks with chest x rays.

## 2021-03-30 NOTE — PLAN OF CARE
Problem: Daily Care:  Goal: Daily care needs are met  Description: Daily care needs are met  3/30/2021 0954 by Mariano Marrero RN  Outcome: Met This Shift  3/29/2021 2354 by Nydia Bailey RN  Outcome: Met This Shift     Problem: Pain:  Goal: Patient's pain/discomfort is manageable  Description: Patient's pain/discomfort is manageable  Outcome: Met This Shift  Goal: Pain level will decrease  Description: Pain level will decrease  3/30/2021 0954 by Mariano Marrero RN  Outcome: Met This Shift  3/29/2021 2354 by Nydia Bailey RN  Outcome: Met This Shift  Goal: Control of acute pain  Description: Control of acute pain  Outcome: Met This Shift  Goal: Control of chronic pain  Description: Control of chronic pain  Outcome: Met This Shift

## 2021-03-30 NOTE — PLAN OF CARE
UGI series noted. Patient previously tolerating diet. Restart diet with plan for endoscopy either in the hospital tomorrow versus outpatient by the end of the week depending on disposition.     Ambar Kothari MD  3/30/2021  1:08 PM

## 2021-03-30 NOTE — PROGRESS NOTES
Reviewed with patient again that the physicians orders are to stay on clear diet until results of the Esophagram are available, as this is for his safety. I called radiology and results will not be available until tomorrow am when the radiologist is back in. Patient does not want to wait to eat solid food. He understands that it is against the doctors orders and said he will take ownership of eating outside solid food.

## 2021-03-31 ENCOUNTER — ANESTHESIA (OUTPATIENT)
Dept: ENDOSCOPY | Age: 61
DRG: 871 | End: 2021-03-31
Payer: COMMERCIAL

## 2021-03-31 VITALS
BODY MASS INDEX: 26.98 KG/M2 | WEIGHT: 188.44 LBS | SYSTOLIC BLOOD PRESSURE: 98 MMHG | DIASTOLIC BLOOD PRESSURE: 67 MMHG | OXYGEN SATURATION: 94 % | RESPIRATION RATE: 18 BRPM | HEART RATE: 91 BPM | HEIGHT: 70 IN | TEMPERATURE: 96.3 F

## 2021-03-31 VITALS — OXYGEN SATURATION: 98 % | SYSTOLIC BLOOD PRESSURE: 105 MMHG | DIASTOLIC BLOOD PRESSURE: 73 MMHG

## 2021-03-31 LAB
ALBUMIN SERPL-MCNC: 3 G/DL (ref 3.5–5.2)
ALP BLD-CCNC: 122 U/L (ref 40–129)
ALT SERPL-CCNC: 86 U/L (ref 0–40)
ANION GAP SERPL CALCULATED.3IONS-SCNC: 8 MMOL/L (ref 7–16)
AST SERPL-CCNC: 81 U/L (ref 0–39)
BILIRUB SERPL-MCNC: 0.3 MG/DL (ref 0–1.2)
BUN BLDV-MCNC: 19 MG/DL (ref 8–23)
CALCIUM SERPL-MCNC: 8.9 MG/DL (ref 8.6–10.2)
CHLORIDE BLD-SCNC: 99 MMOL/L (ref 98–107)
CO2: 26 MMOL/L (ref 22–29)
CREAT SERPL-MCNC: 0.7 MG/DL (ref 0.7–1.2)
GFR AFRICAN AMERICAN: >60
GFR NON-AFRICAN AMERICAN: >60 ML/MIN/1.73
GLUCOSE BLD-MCNC: 141 MG/DL (ref 74–99)
POTASSIUM SERPL-SCNC: 4.4 MMOL/L (ref 3.5–5)
SODIUM BLD-SCNC: 133 MMOL/L (ref 132–146)
TOTAL PROTEIN: 7.5 G/DL (ref 6.4–8.3)

## 2021-03-31 PROCEDURE — 3700000001 HC ADD 15 MINUTES (ANESTHESIA): Performed by: INTERNAL MEDICINE

## 2021-03-31 PROCEDURE — 6360000002 HC RX W HCPCS: Performed by: INTERNAL MEDICINE

## 2021-03-31 PROCEDURE — 94640 AIRWAY INHALATION TREATMENT: CPT

## 2021-03-31 PROCEDURE — 88342 IMHCHEM/IMCYTCHM 1ST ANTB: CPT

## 2021-03-31 PROCEDURE — 0DB68ZX EXCISION OF STOMACH, VIA NATURAL OR ARTIFICIAL OPENING ENDOSCOPIC, DIAGNOSTIC: ICD-10-PCS | Performed by: INTERNAL MEDICINE

## 2021-03-31 PROCEDURE — 2709999900 HC NON-CHARGEABLE SUPPLY: Performed by: INTERNAL MEDICINE

## 2021-03-31 PROCEDURE — 7100000010 HC PHASE II RECOVERY - FIRST 15 MIN: Performed by: INTERNAL MEDICINE

## 2021-03-31 PROCEDURE — 2580000003 HC RX 258: Performed by: INTERNAL MEDICINE

## 2021-03-31 PROCEDURE — 6360000002 HC RX W HCPCS: Performed by: SPECIALIST

## 2021-03-31 PROCEDURE — 6370000000 HC RX 637 (ALT 250 FOR IP): Performed by: INTERNAL MEDICINE

## 2021-03-31 PROCEDURE — 2580000003 HC RX 258: Performed by: NURSE ANESTHETIST, CERTIFIED REGISTERED

## 2021-03-31 PROCEDURE — 99239 HOSP IP/OBS DSCHRG MGMT >30: CPT | Performed by: INTERNAL MEDICINE

## 2021-03-31 PROCEDURE — 36415 COLL VENOUS BLD VENIPUNCTURE: CPT

## 2021-03-31 PROCEDURE — 6360000002 HC RX W HCPCS: Performed by: NURSE ANESTHETIST, CERTIFIED REGISTERED

## 2021-03-31 PROCEDURE — 3609017700 HC EGD DILATION GASTRIC/DUODENAL STRICTURE: Performed by: INTERNAL MEDICINE

## 2021-03-31 PROCEDURE — 80053 COMPREHEN METABOLIC PANEL: CPT

## 2021-03-31 PROCEDURE — 3700000000 HC ANESTHESIA ATTENDED CARE: Performed by: INTERNAL MEDICINE

## 2021-03-31 PROCEDURE — 7100000011 HC PHASE II RECOVERY - ADDTL 15 MIN: Performed by: INTERNAL MEDICINE

## 2021-03-31 PROCEDURE — 88305 TISSUE EXAM BY PATHOLOGIST: CPT

## 2021-03-31 RX ORDER — POLYETHYLENE GLYCOL 3350 17 G/17G
17 POWDER, FOR SOLUTION ORAL DAILY PRN
Qty: 527 G | Refills: 1 | Status: SHIPPED | OUTPATIENT
Start: 2021-03-31 | End: 2021-04-30

## 2021-03-31 RX ORDER — PROPOFOL 10 MG/ML
INJECTION, EMULSION INTRAVENOUS PRN
Status: DISCONTINUED | OUTPATIENT
Start: 2021-03-31 | End: 2021-03-31 | Stop reason: SDUPTHER

## 2021-03-31 RX ORDER — SODIUM CHLORIDE 9 MG/ML
INJECTION, SOLUTION INTRAVENOUS CONTINUOUS PRN
Status: DISCONTINUED | OUTPATIENT
Start: 2021-03-31 | End: 2021-03-31 | Stop reason: SDUPTHER

## 2021-03-31 RX ORDER — PANTOPRAZOLE SODIUM 40 MG/1
40 TABLET, DELAYED RELEASE ORAL
Qty: 60 TABLET | Refills: 2 | Status: SHIPPED | OUTPATIENT
Start: 2021-03-31 | End: 2021-09-20

## 2021-03-31 RX ADMIN — FLUTICASONE PROPIONATE 1 SPRAY: 50 SPRAY, METERED NASAL at 07:48

## 2021-03-31 RX ADMIN — PROPOFOL 100 MG: 10 INJECTION, EMULSION INTRAVENOUS at 12:30

## 2021-03-31 RX ADMIN — DEXTROMETHORPHAN HBR AND GUAIFENESIN 0.5 TABLET: 20; 400 TABLET, FILM COATED ORAL at 04:23

## 2021-03-31 RX ADMIN — SODIUM CHLORIDE: 9 INJECTION, SOLUTION INTRAVENOUS at 12:26

## 2021-03-31 RX ADMIN — ALBUTEROL SULFATE 2.5 MG: 2.5 SOLUTION RESPIRATORY (INHALATION) at 09:00

## 2021-03-31 RX ADMIN — Medication 300 UNITS: at 07:49

## 2021-03-31 RX ADMIN — Medication 300 UNITS: at 04:24

## 2021-03-31 RX ADMIN — DICLOFENAC SODIUM 2 G: 10 GEL TOPICAL at 04:25

## 2021-03-31 RX ADMIN — ACETAMINOPHEN 650 MG: 325 TABLET ORAL at 04:14

## 2021-03-31 RX ADMIN — SODIUM CHLORIDE, PRESERVATIVE FREE 10 ML: 5 INJECTION INTRAVENOUS at 07:50

## 2021-03-31 RX ADMIN — ACETAMINOPHEN 650 MG: 325 TABLET ORAL at 10:56

## 2021-03-31 RX ADMIN — ALBUTEROL SULFATE 2.5 MG: 2.5 SOLUTION RESPIRATORY (INHALATION) at 16:35

## 2021-03-31 RX ADMIN — BUPRENORPHINE AND NALOXONE 1 TABLET: 8; 2 TABLET SUBLINGUAL at 07:49

## 2021-03-31 RX ADMIN — ACETAMINOPHEN 650 MG: 325 TABLET ORAL at 18:32

## 2021-03-31 ASSESSMENT — PAIN - FUNCTIONAL ASSESSMENT: PAIN_FUNCTIONAL_ASSESSMENT: PREVENTS OR INTERFERES SOME ACTIVE ACTIVITIES AND ADLS

## 2021-03-31 ASSESSMENT — LIFESTYLE VARIABLES: SMOKING_STATUS: 1

## 2021-03-31 ASSESSMENT — PAIN SCALES - GENERAL: PAINLEVEL_OUTOF10: 4

## 2021-03-31 ASSESSMENT — PAIN DESCRIPTION - FREQUENCY: FREQUENCY: INTERMITTENT

## 2021-03-31 ASSESSMENT — ENCOUNTER SYMPTOMS: SHORTNESS OF BREATH: 0

## 2021-03-31 ASSESSMENT — PAIN DESCRIPTION - PROGRESSION: CLINICAL_PROGRESSION: NOT CHANGED

## 2021-03-31 ASSESSMENT — PAIN DESCRIPTION - DESCRIPTORS: DESCRIPTORS: ACHING;SORE;DISCOMFORT

## 2021-03-31 NOTE — PROGRESS NOTES
[address] [place] [address] [facility]  [facility]  Progress Note    SUBJECTIVE:  Chief Complaint   Patient presents with    Dizziness     started saturday, sent in by pcp    Chest Pain     right lateral chest, hurts when coughing     Patient is awaiting EGD today. Tolerating antibiotics. Had episode of \"severe\" right sided pleuritic pain over night - improved today     Review of systems:  As stated above in the chief complaint, otherwise negative. Medications:  Scheduled Meds:   heparin flush  3 mL Intravenous 2 times per day    sodium chloride flush  10 mL Intravenous 2 times per day    cefTRIAXone (ROCEPHIN) IV  2,000 mg Intravenous Q24H    albuterol  2.5 mg Nebulization 4x daily    nicotine  1 patch Transdermal Daily    fluticasone  1 spray Each Nostril Daily    enoxaparin  40 mg Subcutaneous Daily    buprenorphine-naloxone  1 tablet Sublingual BID     Continuous Infusions:   sodium chloride       PRN Meds:diclofenac sodium, sodium chloride flush, heparin flush, sodium chloride flush, sodium chloride, glycerin moisturizing mouth spray, diphenhydrAMINE, promethazine **OR** ondansetron, polyethylene glycol, acetaminophen **OR** acetaminophen, dextromethorphan-guaiFENesin    OBJECTIVE:  /72   Pulse 88   Temp 98.2 °F (36.8 °C) (Oral)   Resp 18   Ht 5' 10\" (1.778 m)   Wt 188 lb 7 oz (85.5 kg)   SpO2 96%   BMI 27.04 kg/m²   Temp  Av.4 °F (36.9 °C)  Min: 98.2 °F (36.8 °C)  Max: 98.5 °F (36.9 °C)  Constitutional: The patient is lying in bed. In no distress. Asking for tylenol. Skin: Warm and dry. No rashes were noted. HEENT: Round and reactive pupils. Moist mucous membranes. No ulcerations or thrush. Neck: Supple to movements. Chest: No respiratory distress. On room air. Occasional strong productive cough. Lungs are clear throughout. Had increase in right pleural pain overnight but it is improved today   Cardiovascular: S1 and S2 are rhythmic and regular.  No murmurs appreciated. Abdomen: Positive bowel sounds to auscultation. Benign to palpation. No masses felt. Extremities: No edema. Lines: PICC right brachial 3/28/2021    Laboratory and Tests Review:  Lab Results   Component Value Date    WBC 7.6 03/30/2021    WBC 9.4 03/29/2021    WBC 10.5 03/28/2021    HGB 11.9 (L) 03/30/2021    HCT 34.5 (L) 03/30/2021    MCV 98.9 03/30/2021     03/30/2021     Lab Results   Component Value Date    NEUTROABS 6.16 03/30/2021    NEUTROABS 7.33 (H) 03/29/2021    NEUTROABS 7.18 03/28/2021     No results found for: CRPHS  Lab Results   Component Value Date    ALT 86 (H) 03/31/2021    AST 81 (H) 03/31/2021    ALKPHOS 122 03/31/2021    BILITOT 0.3 03/31/2021     Lab Results   Component Value Date     03/31/2021    K 4.4 03/31/2021    K 4.2 03/25/2021    CL 99 03/31/2021    CO2 26 03/31/2021    BUN 19 03/31/2021    CREATININE 0.7 03/31/2021    CREATININE 0.6 03/30/2021    CREATININE 0.5 03/29/2021    GFRAA >60 03/31/2021    LABGLOM >60 03/31/2021    GLUCOSE 141 03/31/2021    PROT 7.5 03/31/2021    LABALBU 3.0 03/31/2021    CALCIUM 8.9 03/31/2021    BILITOT 0.3 03/31/2021    ALKPHOS 122 03/31/2021    AST 81 03/31/2021    ALT 86 03/31/2021     Lab Results   Component Value Date    CRP 5.0 (H) 03/29/2021    CRP 25.0 (H) 03/24/2021     Lab Results   Component Value Date    SEDRATE 71 (H) 03/29/2021    SEDRATE 81 (H) 03/24/2021     Radiology:  Reviewed   US liver noted     Microbiology:   Blood cultures 3/24/2021 - GPC, Streptococcus pneumoniae by PCR  Blood cultures 3/25/2021 - negative so far   Respiratory Viral Panel with SARS-CoV-2 3/24/2021 - Rhinovirus/Enterovirus detected   Strep/Legionella urine antigens 3/24/2021 - Negative      No results for input(s): PROCAL in the last 72 hours.     ASSESSMENT:  · Community-acquired pneumonia right upper lobe  · Fever with leukocytosis secondary to pneumonia, improved   · Bacteremia with Streptococcus pneumonia  · Invasive pneumococcal disease  · Rhinovirus/Enterovirus infection    PLAN:  · Continue Ceftriaxone 2g IV daily for a total of 3 weeks  · For EGD today & he is hopeful for discharge later today   · PICC line in place  · Med rec completed   · OK to discharge from ID standpoint   · To follow up in the office. - if his improvement continues & inflammatory markers continue to decrease we may be able to shorten his length of treatment. Zuleyma Steward  10:52 AM  3/31/2021     Patient seen and examined. I had a face to face encounter with the patient. Agree with exam.  Assessment and plan as outlined above and directed by me. Addition and corrections were done as deemed appropriate. My exam and plan include: As above, he had a sharp pleuritic chest pain last night. He is still having some discomfort when he takes a deep breath. He is to have an EGD today. Continue Ceftriaxone.     Ysabel Milks  3/31/2021  12:01 PM

## 2021-03-31 NOTE — PROGRESS NOTES
Pt asking to be discharged since his scope is completed and the physicians have signed off. I called the attending a few different times theres no answer. Saray Gray is on the unit said that he spoke with Dr Vicenta Fabry who had said he planned on discharging him this evening.

## 2021-03-31 NOTE — PROGRESS NOTES
[facility] Hospitalist   Progress Note    Admitting Date and Time: 3/24/2021 12:24 PM  Admit Dx: Pneumonia due to organism [J18.9]    Subjective:    Patient was admitted with Pneumonia due to organism [J18.9]. Patient denies fever, chills, cp, sob, n/v.     heparin flush  3 mL Intravenous 2 times per day    sodium chloride flush  10 mL Intravenous 2 times per day    cefTRIAXone (ROCEPHIN) IV  2,000 mg Intravenous Q24H    albuterol  2.5 mg Nebulization 4x daily    nicotine  1 patch Transdermal Daily    fluticasone  1 spray Each Nostril Daily    enoxaparin  40 mg Subcutaneous Daily    buprenorphine-naloxone  1 tablet Sublingual BID     sodium chloride flush, 10 mL, PRN  heparin flush, 3 mL, PRN  sodium chloride flush, 10 mL, PRN  sodium chloride, 25 mL, PRN  glycerin moisturizing mouth spray, 2 spray, PRN  diphenhydrAMINE, 50 mg, Nightly PRN  promethazine, 12.5 mg, Q6H PRN    Or  ondansetron, 4 mg, Q6H PRN  polyethylene glycol, 17 g, Daily PRN  acetaminophen, 650 mg, Q6H PRN    Or  acetaminophen, 650 mg, Q6H PRN  dextromethorphan-guaiFENesin, 0.5 tablet, Q8H PRN         Objective:        PHYSICAL EXAM:    Vitals:  /70   Pulse 99   Temp 98.5 °F (36.9 °C) (Oral)   Resp 16   Ht 5' 10\" (1.778 m)   Wt 188 lb 7 oz (85.5 kg)   SpO2 97%   BMI 27.04 kg/m²     General:  Appears comfortable. Answers questions appropriately and cooperative with exam  HEENT:  Mucous membranes moist. No erythema, rhinorrhea, or post-nasal drip noted. Neck:  No carotid bruits. Heart:  Rhythm regular at rate of 98  Lungs:  CTA. No wheeze, rales, or rhonchi  Abdomen:  Positive bowel sounds positive. Soft. Non-tender. No guarding, rebound or rigidity. Breast/Rectal/Genitourinary: not pertinent. Extremities:  Negative for lower extremity edema  Skin:  Warm and dry  Vascular: 2/4 Dorsalis Pedis pulses bilaterally. Neuro:  Cranial nerves 2-12 grossly intact, no focal weakness or change in sensation noted. Extraocular muscles intact. Pupils equal, round, reactive to light. Recent Labs     03/28/21  0640 03/29/21  0415 03/30/21  0500    132 136   K 4.2 4.1 4.1    101 101   CO2 22 24 25   BUN 18 15 13   CREATININE 0.6* 0.5* 0.6*   GLUCOSE 96 103* 91   CALCIUM 9.3 8.7 9.0       Recent Labs     03/28/21  0640 03/29/21  0415 03/30/21  0500   WBC 10.5 9.4 7.6   RBC 3.61* 3.34* 3.49*   HGB 12.0* 11.3* 11.9*   HCT 35.0* 32.7* 34.5*   MCV 97.0 97.9 98.9   MCH 33.2 33.8 34.1   MCHC 34.3 34.6* 34.5   RDW 12.3 12.2 12.1    216 247   MPV 9.5 9.7 10.3       CBC with Differential:    Lab Results   Component Value Date    WBC 7.6 03/30/2021    RBC 3.49 03/30/2021    HGB 11.9 03/30/2021    HCT 34.5 03/30/2021     03/30/2021    MCV 98.9 03/30/2021    MCH 34.1 03/30/2021    MCHC 34.5 03/30/2021    RDW 12.1 03/30/2021    NRBC 0.0 03/29/2021    METASPCT 1.7 03/29/2021    LYMPHOPCT 10.0 03/30/2021    MONOPCT 4.0 03/30/2021    MYELOPCT 1.0 03/30/2021    BASOPCT 0.0 03/30/2021    MONOSABS 0.30 03/30/2021    LYMPHSABS 0.99 03/30/2021    EOSABS 0.15 03/30/2021    BASOSABS 0.00 03/30/2021     CMP:    Lab Results   Component Value Date     03/30/2021    K 4.1 03/30/2021    K 4.2 03/25/2021     03/30/2021    CO2 25 03/30/2021    BUN 13 03/30/2021    CREATININE 0.6 03/30/2021    GFRAA >60 03/30/2021    LABGLOM >60 03/30/2021    GLUCOSE 91 03/30/2021    PROT 7.1 03/30/2021    LABALBU 2.9 03/30/2021    CALCIUM 9.0 03/30/2021    BILITOT 0.4 03/30/2021    ALKPHOS 101 03/30/2021    AST 66 03/30/2021    ALT 65 03/30/2021        Radiology:   FL ESOPHAGRAM   Final Result   1. Presence of a smooth concentric narrowing of the distal esophagus just   above the esophagogastric junction which do not allowed the passage of the   barium pill. 2. This represents a smooth outlined esophageal stricture of moderate degree. Can be manifestation of chronic reflux esophagitis.       3.  Could consider further evaluation with endoscopy for reassurance. XR CHEST PORTABLE   Final Result   Interval improvement in right upper lobe opacity. Small right-sided pleural effusion. US LIVER   Final Result   The liver appears sonographically unremarkable. No acute abnormality seen in   the right upper quadrant abdomen. There is a right pleural effusion. FL MODIFIED BARIUM SWALLOW W VIDEO   Final Result   Abnormal swallowing mechanism with laryngeal penetration of thin liquid and   nectar consistency barium. No barium aspiration. Please see separate speech pathology report for full discussion of findings   and recommendations. CT CHEST WO CONTRAST   Final Result   Large densely consolidated right upper lobe infiltrate most compatible with   pneumonia in the acute setting. Recommend follow-up chest CT in 3 months to   exclude underlying mass in the region of greatest consolidation      PE cannot be excluded without IV contrast      Nonobstructing left renal calculi         XR CHEST PORTABLE   Final Result   Right upper lobe infiltrate. Repeat CT scan is recommended in 10-14 days to   confirm partial resolution and exclude underlying pathology. Given that the   infiltrate abuts the minor fissure and abuts the right paratracheal region   underlying pathology cannot be excluded. Assessment:    Active Problems:    Pneumonia due to organism    Bacteremia    Alcohol withdrawal syndrome without complication (HCC)    Dysphagia    Alcohol abuse  Resolved Problems:    * No resolved hospital problems. *      Plan:  1. Bacteremia strep pneumoniae with invasive pneumococcal disease abx per ID. Transition to iv as outpt with picc. 2. Bacterial pneumonia continue rocephin per ID  3. Possible copd pulmonary following neb per pulm  4. Concurrent rhinovirus/enterovirus infection supportive care. 5. Dysphagia slp following continue diet.  Spoke with slp and concerned that pt may have regurgitation. Will consult gi  6. Hypotension favor hypovolemia due to insensible losses of fever as well as recently placed on steroids and ua noted glucosuria.  Will continue iv fluids. 7. Hyperglycemia.  Pt did receive steroids. Pt denies dm and that his a1c was reported low to him, however, will recheck a1c  8. htn hold bp med  9. Dehydration continue iv fluids  10. Alcohol abuse  rec cessation  11. Probable alcohol withdrawal symptoms appear to be mild but will order ciwa scale and monitor closely. Do not notice any symptoms today. 12. Tobacco abuse rec cessation  13. Hyperkalemia improved  14. Acidosis/elevated lactic acid level monitor  15. raeann continue iv fluids.   16. Hyponatremia monitor  17. Back pain monitor and tx prn  18. Hep C monitor  19. Sepsis(pt had fever prior to admission and had tachycardia which was in part due to hypovolemia and leukocytosis which could be affected by steroids but given bacteremia, pt with sepsis)POA supportive care and tx underlying infection. improved    Discussed findings with pt as well as plan for scope. Pt agrees with obtaining egd    Discussed with Dr Lyndsey Null    Chronic dysphagia with abnormal esophagram and background hx of combination of drinking and smoking, pt to have egd tomorrow with hopeful discharge afterwards.      Electronically signed by Elma Ann DO on 3/30/2021 at 8:57 PM

## 2021-03-31 NOTE — CARE COORDINATION
Pt with abnormal esophogram; for EGD today; set up with [facility] for iv atb's. Can open when medically discharged. Await clearance for discharge by medical team. Gisel Weathers.

## 2021-03-31 NOTE — ANESTHESIA POSTPROCEDURE EVALUATION
Department of Anesthesiology  Postprocedure Note    Patient: Pedro Cedeno  MRN: [id number]  YOB: 1960  Date of evaluation: 3/31/2021  Time:  6:29 PM     Procedure Summary     Date: 03/31/21 Room / Location: [address]    Anesthesia Start: 1226 Anesthesia Stop: 0061    Procedure: EGD BIOPSY and AGUERO DILATATION (N/A ) Diagnosis: (/)    Surgeons: Tiffanie Paniagua DO Responsible Provider: Asad Marie MD    Anesthesia Type: MAC ASA Status: 3          Anesthesia Type: MAC    Argent[place] Phase I:      Argentina Phase II: [place] Score: 10    Last vitals: Reviewed and per EMR flowsheets.        Anesthesia Post Evaluation

## 2021-03-31 NOTE — ANESTHESIA POSTPROCEDURE EVALUATION
Department of Anesthesiology  Postprocedure Note    Patient: Soraida Ayala  MRN: [id number]  YOB: 1960  Date of evaluation: 3/31/2021  Time:  6:22 PM     Procedure Summary     Date: 03/31/21 Room / Location: [address]    Anesthesia Start: 1226 Anesthesia Stop: 5250    Procedure: EGD BIOPSY and AGUERO DILATATION (N/A ) Diagnosis: (/)    Surgeons: Michell Dawkins DO Responsible Provider: Bernadine Valles MD    Anesthesia Type: MAC ASA Status: 3          Anesthesia Type: MAC    Ar[place] Phase I:      Argentina Phase II: Argentina Score: 10    Last vitals: Reviewed and per EMR flowsheets.        Anesthesia Post Evaluation    Patient location during evaluation: PACU  Patient participation: complete - patient participated  Level of consciousness: awake and alert  Airway patency: patent  Nausea & Vomiting: no nausea and no vomiting  Complications: no  Cardiovascular status: hemodynamically stable  Respiratory status: acceptable  Hydration status: stable

## 2021-03-31 NOTE — PLAN OF CARE
Pertinent notes/labs reviewed. EGD findings discussed in detail with the patient  Continue PPI twice a day dosing. Plan for repeat upper endoscopy in 8 to 12 weeks. Okay to be discharged from GI POV with follow-up in the office in 2 to 3 weeks after discharge -patient to call for appointment and with questions/concerns at [phone]. Thank you for the opportunity participate in the care of Mr. Chung Panda.     Mikki Chang MD  3/31/2021  3:45 PM

## 2021-04-01 ENCOUNTER — TELEPHONE (OUTPATIENT)
Dept: FAMILY MEDICINE CLINIC | Age: 61
End: 2021-04-01

## 2021-04-01 DIAGNOSIS — Z72.0 TOBACCO ABUSE: Primary | ICD-10-CM

## 2021-04-01 NOTE — TELEPHONE ENCOUNTER
[facility] Transitions Initial Follow Up Call    Outreach made within 2 business days of discharge: Yes    Patient: Earl Vasquez Patient : 1960   MRN: [id number]  Reason for Admission: There are no discharge diagnoses documented for the most recent discharge. Discharge Date: 3/31/21       Spoke with: Elisabeth Cord    Discharge department/facility: home    TCM Interactive Patient Contact:  Spoke with pt, said he is doing well after hospital visit and states that [facility] was there with him now and would a return phone call about Nicoderm patch. Appointment with PCP within 7-14 days.     Follow Up  Future Appointments   Date Time Provider Jamarcus Burns   2021 11:30 AM [facility] [place] [place]

## 2021-04-01 NOTE — TELEPHONE ENCOUNTER
Urvashi from [address] [facility] calling to let you know that pt will be on IV Rocephin for the next 3 wks.

## 2021-04-01 NOTE — DISCHARGE SUMMARY
[facility] Physician Discharge Summary       [facility]  [address]        [place], [address] [facility] [address]  [address] [phone]    In 2 weeks  For outpatient follow up    [address], [address] [facility] [address]ise[address] [place] [id number]    Schedule an appointment as soon as possible for a visit  follow up    Fifi Schaeffer DO  [address]  [phone]    Schedule an appointment as soon as possible for a visit  follow up      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  1960    Admit date: 3/24/2021    Discharge date and time:  3/31/2021  8:00 PM    Admission Diagnoses: Active Problems:    Pneumonia due to organism    Bacteremia    Alcohol withdrawal syndrome without complication ([address]    Dysphagia    Alcohol abuse  Resolved Problems:    * No resolved hospital problems. *      Discharge Diagnoses: Active Problems:    Pneumonia due to organism    Bacteremia    Alcohol withdrawal syndrome without complication ([address]    Dysphagia    Alcohol abuse  Resolved Problems:    * No resolved hospital problems.  *    Bacteremia strep pneumoniae  Bacterial pneumonia  Possible copd  Concurrent rhinovirus/enterovirus  Pleural effusion  dysphagia  Gastritis  Duodenal ulcer  Gastric ulcers  presbyesophagus  Hypotension/hypovolemia  Hyperglycemia  htn  Dehydration  Alcohol abuse probable alcohol withdrawal  Tobacco abuse  Hyperkalemia  Acidosis/elevated lactic acid level  raeann  Hyponatremia  Back pain possibly related to referred pain due to pnemonia  Hep C  sepsis      Consults:  IP CONSULT TO PULMONOLOGY  IP CONSULT TO INFECTIOUS DISEASES  IP CONSULT TO SOCIAL WORK  IP CONSULT TO GI  IP CONSULT TO IV TEAM  IP CONSULT TO HOME CARE NEEDS    Procedures: egd  IMPRESSION AND PLAN:      1.  4 small clean base antral ulcerations. 8 mm duodenal bulb ulceration. Biopsies as above. No fresh or old blood. [address] dilation. Esophagus:     Presbyesophagus. Mucosa is normal.  GEJ at 40 cm. [address] dilation.      Stomach:         Antrum reveals 4 small <5 mm clean base ulcerations. Biopsy ulcer edge. Biopsy r/o H. Pylori. Gastric body reveals moderate gastritis. Retroflexed views show normal fundus and cardia.     Duodenum:     Bulb reveals an 8 mm clean base ulceration. Second portion of duodenum is normal.      Hospital Course: Patient was admitted with Pneumonia due to organism [J18.9]. Patient is a 56 y.o. male who presents to hospital for dizziness. Pt states that he has been having dizziness and seeing spots at times for 2 weeks. This has worsened in last 24-48 hours. Pt has been coughing up phlegm but believes it is from post nasal gtt which he has been having. Pt recounts that he had similar experience with all these symptoms a year ago at this time. Everything improved but still has persistent productive cough. Pt does admit that it is worse in morning. Pt denies fevers, chills,n/v, abd pain, diarrhea, constipation, melena, hematochezia, change in urination, dysuria, or hematuria. Last cigarette was today. Pt notes that he has a 24 ounce beer and couple of shots a fireball a day and does not have withdrawal symptoms if he should not have it. Pt smoked for about 50 years. Pt denies muscle or joint pain. Pt also relays that he does have difficulty with his food lately and has to be careful that he does not choke.      Pt notes that he went to his pcp office and was noted to have low bp.  According to records, he had received steroids during that visit.      Pt would be a fair historian but would note that he denied aches or pains but did complain of muscle cramps and had to have right shoulder injected yesterday at the pcp's office. Also, pt was noted to have 101.4 temp noted in office but pt had denied having fever. Also noted that pt had been avoiding alcohol and cramps have improved. Pt seen and examined by consultants. Pt had procedure as above. Pt had been given iv fluids and iv antibiotics and improved. Pt was recommended to stop smoking and alcohol. On day of discharge, pt denied fevers, chills,n/v. Discharge planning d/w pt. Time given for questions and all questions answered. Pt to f/u with pulmonary. Pt discharged on iv abx and to f/u with ID as an outpt. Discharge Exam:  Vitals:    03/31/21 1250 03/31/21 1305 03/31/21 1322 03/31/21 1345   BP: 109/75 100/77 105/71 98/67   Pulse: 96 97 84 91   Resp: 18 18 18 18   Temp: 98.1 °F (36.7 °C)   96.3 °F (35.7 °C)   TempSrc:    Oral   SpO2: 96% 96% 94%    Weight:       Height:           Skin: warm and dry, no rash or erythema  Pulmonary/Chest: clear to auscultation bilaterally- no wheezes, rales or rhonchi, normal air movement, no respiratory distress  Cardiovascular: rhythm reg at rate of 92  Abdomen: soft, non-tender, non-distended, normal bowel sounds, no masses or organomegaly  Extremities: no cyanosis, no clubbing and no edema  I/O last 3 completed shifts: In: 100 [I.V.:100]  Out: -   No intake/output data recorded. LABS:  Recent Labs     03/29/21 0415 03/30/21  0500 03/31/21  0420    136 133   K 4.1 4.1 4.4    101 99   CO2 24 25 26   BUN 15 13 19   CREATININE 0.5* 0.6* 0.7   GLUCOSE 103* 91 141*   CALCIUM 8.7 9.0 8.9       Recent Labs     03/29/21  0415 03/30/21  0500   WBC 9.4 7.6   RBC 3.34* 3.49*   HGB 11.3* 11.9*   HCT 32.7* 34.5*   MCV 97.9 98.9   MCH 33.8 34.1   MCHC 34.6* 34.5   RDW 12.2 12.1    247   MPV 9.7 10.3       No results for input(s): POCGLU in the last 72 hours.     CMP:    Lab Results   Component Value Date     03/31/2021    K 4.4 03/31/2021 K 4.2 03/25/2021    CL 99 03/31/2021    CO2 26 03/31/2021    BUN 19 03/31/2021    CREATININE 0.7 03/31/2021    GFRAA >60 03/31/2021    LABGLOM >60 03/31/2021    GLUCOSE 141 03/31/2021    PROT 7.5 03/31/2021    LABALBU 3.0 03/31/2021    CALCIUM 8.9 03/31/2021    BILITOT 0.3 03/31/2021    ALKPHOS 122 03/31/2021    AST 81 03/31/2021    ALT 86 03/31/2021       Imaging:   FL ESOPHAGRAM   Final Result   1. Presence of a smooth concentric narrowing of the distal esophagus just   above the esophagogastric junction which do not allowed the passage of the   barium pill. 2. This represents a smooth outlined esophageal stricture of moderate degree. Can be manifestation of chronic reflux esophagitis. 3.  Could consider further evaluation with endoscopy for reassurance. XR CHEST PORTABLE   Final Result   Interval improvement in right upper lobe opacity. Small right-sided pleural effusion. US LIVER   Final Result   The liver appears sonographically unremarkable. No acute abnormality seen in   the right upper quadrant abdomen. There is a right pleural effusion. FL MODIFIED BARIUM SWALLOW W VIDEO   Final Result   Abnormal swallowing mechanism with laryngeal penetration of thin liquid and   nectar consistency barium. No barium aspiration. Please see separate speech pathology report for full discussion of findings   and recommendations. CT CHEST WO CONTRAST   Final Result   Large densely consolidated right upper lobe infiltrate most compatible with   pneumonia in the acute setting. Recommend follow-up chest CT in 3 months to   exclude underlying mass in the region of greatest consolidation      PE cannot be excluded without IV contrast      Nonobstructing left renal calculi         XR CHEST PORTABLE   Final Result   Right upper lobe infiltrate. Repeat CT scan is recommended in 10-14 days to   confirm partial resolution and exclude underlying pathology.   Given that the

## 2021-04-02 RX ORDER — NICOTINE 21 MG/24HR
1 PATCH, TRANSDERMAL 24 HOURS TRANSDERMAL EVERY 24 HOURS
Qty: 30 PATCH | Refills: 3 | Status: SHIPPED | COMMUNITY
Start: 2021-04-02 | End: 2021-04-02 | Stop reason: SDUPTHER

## 2021-04-02 RX ORDER — NICOTINE 21 MG/24HR
1 PATCH, TRANSDERMAL 24 HOURS TRANSDERMAL EVERY 24 HOURS
Qty: 30 PATCH | Refills: 3 | Status: SHIPPED
Start: 2021-04-02 | End: 2021-11-07

## 2021-04-02 NOTE — ADT AUTH CERT
[facility] [de-identified]  CVG Subscriber Name/Sex/Relation Subscriber  Subscriber Address/Phone Subscriber Emp/Emp Phone   1. [facility]   [id number] Emanuel Gonzalez - Male   (Self) 1960 [address], [place]  [address]   [phone](M) OTHER    Utilization Reviews       Pneumonia - Care Day 7 (3/30/2021) by Eric Santos LPN       Review Entered Review Status   2021 12:48 Completed      Criteria Review      Care Day: 7 Care Date: 3/30/2021 Level of Care: Telemetry    Guideline Day 3    Clinical Status    (X) * Hemodynamic stability    2021 12:48 PM EDT by Kayley Seo      138/79 79 98.0 16 95% RA    (X) * Afebrile or temperature acceptable for next level of care    (X) * Tachypnea absent    (X) * Hypoxemia absent    (X) * Mental status at baseline    (X) * Antibiotic regimen acceptable for next level of care    2021 12:48 PM EDT by Kayley Seo      Planned OP    (X) * Discharge plans and education understood    Activity    (X) * Ambulatory or acceptable for next level of care    Routes    (X) * Oral hydration, medications, and diet    Interventions    (X) * Oxygen absent or at baseline need    (X) * Isolation not indicated, or is performable at next level of care    (X) WBC    (X) Incentive spirometry    Medications    ( ) Oral antibiotics    2021 12:48 PM EDT by Kayley Seo      IV    * Milestone   Additional Notes   3/30/2021      Vitals: 138/79 79 98.0 16 95% RA       ** **Pulmonology Note   EXAM:   General: No distress. Alert. ENT: Pharynx clear. Neck: Trachea midline. Resp: No accessory muscle use. No crackles. No wheezing. No rhonchi. CV: Regular rate. Regular rhythm. No mumur or rub. No edema. ABD: Non-tender. Non-distended.  Normal bowel sounds. Skin: Warm and dry. No rash on exposed extremities. M/S: No cyanosis. No joint deformity. No clubbing. Neuro: Awake. Follows commands. No obvious deficits.    Assessment:   56 yom smoker with progressive dyspnea and congestion for a couple years began having chest congestion and shortness of breath after being exposed to his sick grandchildren. Complicated by severe right sided back pain and fevers as high as 101. 4.       · New RUL pneumonia    · Bacteremia with blood cultures growing Streptococcus pneumoniae. · + rhinovirus   · Suspect COPD   · 1.5 ppd smoker and ETOH abuse   · New back pain   · Hepatitis C   · R pleural effusion (on liver US)           Plan:   · Ceftriaxone per ID (started 3/25) - to continue for a min 3-4 weeks   · CXR with small R pleural effusion - not enough for thoracentesis. · Esophagram results pending - per surgery may require outpt EGD   · Sputum results pending   · Continue albuterol nebs    · Continue nicoderm and suboxone   · Room air, O2 as needed - pox 95%   · Encouraged IS   · Outpt PFT's      ** ** ID Note   ASSESSMENT:   · Community-acquired pneumonia right upper lobe   · Fever with leukocytosis secondary to pneumonia, improved    · Bacteremia with Streptococcus pneumonia   · Invasive pneumococcal disease   · Rhinovirus/Enterovirus infection       PLAN:   · Continue Ceftriaxone 2g IV daily for a total of 3 weeks   · PICC line in place   · Med rec completed    · Repeat blood cultures are negative so far   · Check final cultures   · OK to discharge from ID standpoint    · To follow up in the office. - if his improvement continues & inflammatory markers continue to decrease we may be able to shorten his length of treatment      ** ** Gastroenterology Note   UGI series noted. Patient previously tolerating diet. Restart diet with plan for endoscopy either in the hospital tomorrow versus outpatient by the end of the week depending on disposition.    ASSESSMENT/PLAN:       1.  Dysphagia   -Chronic dysphagia for at least 1 to 2 years   -Broad differential diagnosis including malignancy   -Plan for endoscopy tomorrow       2.  Hepatitis C   -Elevated viral load -Treatment naïve   -Genotype 2B   -Plan for further follow-up as outpatient for treatment   -Unremarkable ultrasound liver appearance       3.  Alcohol abuse   -Patient strongly advised to completely abstain from alcohol given history of hepatitis C   -Currently unremarkable liver profile   -Ultrasound liver as above       4.  COPD/community-acquired pneumonia/invasive pneumococcal disease   -Plan for Rocephin 2 g IV x3 weeks per ID recommendations   -Per admitting/pulmonology/ID      ** **IM Note   Assessment:       Active Problems:     Pneumonia due to organism     Bacteremia     Alcohol withdrawal syndrome without complication ([facility] [address]     [facility]     Alcohol abuse   Resolved Problems:     * No resolved hospital problems. *           Plan:   1. Bacteremia strep pneumoniae with invasive pneumococcal disease abx per ID. Transition to iv as outpt with picc.    2. Bacterial pneumonia continue rocephin per ID   3. Possible copd pulmonary following neb per pulm   4. Concurrent rhinovirus/enterovirus infection supportive care. 5. Dysphagia slp following continue diet. Spoke with slp and concerned that pt may have regurgitation. Will consult gi   6. Hypotension favor hypovolemia due to insensible losses of fever as well as recently placed on steroids and ua noted glucosuria.  Will continue iv fluids. 7. Hyperglycemia.  Pt did receive steroids. Pt denies dm and that his a1c was reported low to him, however, will recheck a1c   8. htn hold bp med   9. Dehydration continue iv fluids   10. Alcohol abuse  rec cessation   11. Probable alcohol withdrawal symptoms appear to be mild but will order ciwa scale and monitor closely. Do not notice any symptoms today. 12. Tobacco abuse rec cessation   13. Hyperkalemia improved   14. Acidosis/elevated lactic acid level monitor   15. raeann continue iv fluids.    16. Hyponatremia monitor   17. Back pain monitor and tx prn   18. Hep C monitor   19.  Sepsis(pt had fever prior to admission and had tachycardia which was in part due to hypovolemia and leukocytosis which could be affected by steroids but given bacteremia, pt with sepsis)POA supportive care and tx underlying infection. improved       Discussed findings with pt as well as plan for scope. Pt agrees with obtaining egd       Discussed with Dr Bud Morocho       Chronic dysphagia with abnormal esophagram and background hx of combination of drinking and smoking, pt to have egd tomorrow with hopeful discharge afterwards.        Labs:   RBC: 3.49 (L)   Hemoglobin Quant: 11.9 (L)   Hematocrit: 34.5 (L)   Lymphocyte %: 10.0 (L)   Lymphocytes Absolute: 0.99 (L)      Medications:   albuterol (PROVENTIL) nebulizer solution 2.5 mg    Dose: 2.5 mg   Freq: 4 TIMES DAILY Route: NEBULIZATION      buprenorphine-naloxone (SUBOXONE) 8-2 MG SL tablet 1 tablet    Dose: 1 tablet   Freq: 2 TIMES DAILY Route: SL      cefTRIAXone (ROCEPHIN) 2,000 mg in sterile water 20 mL IV syringe    Dose: 2,000 mg   Freq: EVERY 24 HOURS Route: IV      enoxaparin (LOVENOX) injection 40 mg    Dose: 40 mg   Freq: DAILY Route: SC      fluticasone (FLONASE) 50 MCG/ACT nasal spray 1 spray    Dose: 1 spray   Freq: DAILY Route: EACH Nostril      heparin flush 100 UNIT/ML injection 300 Units    Dose: 3 mL   Freq: 2 times per day Route: IV      nicotine (NICODERM CQ) 14 MG/24HR 1 patch    Dose: 1 patch   Freq: DAILY Route: TD      acetaminophen (TYLENOL) tablet 650 mg    Dose: 650 mg   Freq: EVERY 6 HOURS PRN Route: PO x1      diphenhydrAMINE (BENADRYL) tablet 50 mg    Dose: 50 mg   Freq: NIGHTLY PRN Route: PO   PRN Reason: Sleep x1      Pneumonia - Care Day 5 (3/28/2021) by Corey Burgess LPN       Review Entered Review Status   4/2/2021 12:47 Completed      Criteria Review      Care Day: 5 Care Date: 3/28/2021 Level of Care: Telemetry    Guideline Day 3    Clinical Status    (X) * Hemodynamic stability    4/2/2021 12:47 PM EDT by Damian Sosa      145/89 82 18 99.3 98% RA    ( ) * Afebrile or temperature acceptable for next level of care    4/2/2021 12:47 PM EDT by Philip Black      Low grade 99.3    (X) * Tachypnea absent    (X) * Hypoxemia absent    (X) * Mental status at baseline    ( ) * Antibiotic regimen acceptable for next level of care    4/2/2021 12:47 PM EDT by Marie Goodrich remains on IV antibiotics    ( ) * Discharge plans and education understood    Activity    (X) * Ambulatory or acceptable for next level of care    4/2/2021 12:47 PM EDT by Missy Yeager to be placed    Routes    (X) * Oral hydration, medications, and diet    Interventions    (X) * Oxygen absent or at baseline need    4/2/2021 12:47 PM EDT by Philip Black      O2 at needed, currently on room air, maintain pox > 92%    (X) * Isolation not indicated, or is performable at next level of care    (X) WBC    (X) Incentive spirometry    4/2/2021 12:47 PM EDT by Philip Black      yes    Medications    ( ) Oral antibiotics    4/2/2021 12:47 PM EDT by Philip Black      IV Rocephin    * Milestone   Additional Notes   3/28/2021      Vitals:145/89 82 18 99.3 98% RA       ** **Pulmonology Note   Subjective:   Sitting in bed watching TV   Breathing is better, slightly dyspneic when ambulating   Still with cough, able to expectorate    Still with pain   No CP/tightness, wheezing   On room air   EXAM:   General: No distress. Alert. ENT: Pharynx clear. Neck: Trachea midline. Resp: No accessory muscle use. No crackles. No wheezing. No rhonchi. CV: Regular rate. Regular rhythm. No mumur or rub. No edema. ABD: Non-tender. Non-distended.  Normal bowel sounds. Skin: Warm and dry. No rash on exposed extremities. M/S: No cyanosis. No joint deformity. No clubbing. Neuro: Awake. Follows commands.  No obvious deficits   Assessment:   56 yom smoker with progressive dyspnea and congestion for a couple years began having chest congestion and shortness of breath after being exposed to his sick grandchildren. Complicated by severe right sided back pain and fevers as high as 101. 4.       · New RUL pneumonia    · Bacteremia with blood cultures growing Streptococcus pneumoniae. · + rhinovirus   · Suspect COPD   · 1.5 ppd smoker and ETOH abuse   · New back pain   · Hepatitis C           Plan:   · On ceftriaxone per ID (started 3/25) - to continue for a min 3-4 weeks   · For PICC line   · Send sputum   · Continue albuterol nebs    · Continue nicoderm and suboxone   · DVT prophylaxis on lovenox   · O2 at needed, currently on room air, maintain pox > 92%   · IS - please make sure pt gets.     · Outpt PFT's      ** **ID Note   ASSESSMENT:   · Community-acquired pneumonia right upper lobe   · Fever with leukocytosis secondary to pneumonia, improved    · Bacteremia with Streptococcus pneumonia   · Invasive pneumococcal disease   · Rhinovirus/Enterovirus infection       PLAN:   · Continue Ceftriaxone 2g IV daily for a total of 3-4 weeks    · Awaiting PICC line placement   · Repeat blood cultures are negative so far   · GI following; has ordered US of liver & esophagram    · Check final cultures   · Monitor labs- WBC 10.5 today       ** ** Gastroenterology Note   ASSESSMENT/PLAN:   1.  [id number]   -Chronic dysphagia for at least 1 to 2 years   -Broad differential diagnosis including malignancy   -Patient will require further evaluation with upper endoscopy - likely nonemergent outpatient procedure  given chronicity of symptoms and current acute pneumonia unless significant high-grade obstruction   -Pending esophagram       2.  Hepatitis C   -Elevated viral load   -Treatment naïve   -Genotype 2B   -Plan for further follow-up as outpatient for treatment   -Pending n dedicated liver imaging however no clinical evidence of decompensated cirrhosis       3.  Alcohol abuse   -Patient strongly advised to completely abstain from alcohol given history of hepatitis C   -Currently unremarkable liver profile   -Obtain dedicated liver imaging as above       4.  COPD/community-acquired pneumonia/invasive pneumococcal disease   -Per admitting/pulmonology/ID      ** ** IM Note   Assessment:       Active Problems:     Pneumonia due to organism     Bacteremia     Alcohol withdrawal syndrome without complication ([address]   Resolved Problems:     * No resolved hospital problems. *           Plan:   1. Bacteremia strep pneumoniae with invasive pneumococcal disease asked ID to see pt    2. Bacterial pneumonia continue rocephin per ID   3. Possible copd pulmonary following neb per pulm   4. Concurrent rhinovirus/enterovirus infection supportive care. 5. Dysphagia slp following continue diet. Spoke with slp and concerned that pt may have regurgitation. Will consult gi   6. Hypotension favor hypovolemia due to insensible losses of fever as well as recently placed on steroids and ua noted glucosuria.  Will continue iv fluids. 7. Hyperglycemia.  Pt did receive steroids. Pt denies dm and that his a1c was reported low to him, however, will recheck a1c   8. htn hold bp med   9. Dehydration continue iv fluids   10. Alcohol abuse  rec cessation   11. Probable alcohol withdrawal symptoms appear to be mild but will order ciwa scale and monitor closely. Do not notice any symptoms today. 12. Tobacco abuse rec cessation   13. Hyperkalemia improved   14. Acidosis/elevated lactic acid level monitor   15. raeann continue iv fluids.    16. Hyponatremia monitor   17. Back pain monitor and tx prn   18. Hep C monitor   19.  Sepsis(pt had fever prior to admission and had tachycardia which was in part due to hypovolemia and leukocytosis which could be affected by steroids but given bacteremia, pt with sepsis)POA supportive care and tx underlying infection. improved      Labs:   Creatinine: 0.6 (L)   Albumin: 3.0 (L)   RBC: 3.61 (L)   Hemoglobin Quant: 12.0 (L)   Hematocrit: 35.0 (L)   Lymphocyte %: 13.7 (L)   Monocytes %: 12.1 (H)   Lymphocytes Absolute: 1.44 (L)

## 2021-04-08 ENCOUNTER — OFFICE VISIT (OUTPATIENT)
Dept: FAMILY MEDICINE CLINIC | Age: 61
End: 2021-04-08
Payer: COMMERCIAL

## 2021-04-08 VITALS
BODY MASS INDEX: 26.48 KG/M2 | SYSTOLIC BLOOD PRESSURE: 119 MMHG | DIASTOLIC BLOOD PRESSURE: 93 MMHG | WEIGHT: 185 LBS | HEART RATE: 95 BPM | RESPIRATION RATE: 20 BRPM | TEMPERATURE: 95 F | HEIGHT: 70 IN

## 2021-04-08 DIAGNOSIS — J18.9 PNEUMONIA DUE TO ORGANISM: ICD-10-CM

## 2021-04-08 DIAGNOSIS — R78.81 BACTEREMIA: ICD-10-CM

## 2021-04-08 DIAGNOSIS — R79.89 ELEVATED LFTS: ICD-10-CM

## 2021-04-08 DIAGNOSIS — R13.10 DYSPHAGIA, UNSPECIFIED TYPE: Primary | ICD-10-CM

## 2021-04-08 LAB
ALBUMIN SERPL-MCNC: 3.4 G/DL (ref 3.5–5.2)
ALP BLD-CCNC: 147 U/L (ref 40–129)
ALT SERPL-CCNC: 214 U/L (ref 0–40)
ANION GAP SERPL CALCULATED.3IONS-SCNC: 13 MMOL/L (ref 7–16)
AST SERPL-CCNC: 180 U/L (ref 0–39)
BILIRUB SERPL-MCNC: 0.4 MG/DL (ref 0–1.2)
BUN BLDV-MCNC: 17 MG/DL (ref 8–23)
CALCIUM SERPL-MCNC: 9 MG/DL (ref 8.6–10.2)
CHLORIDE BLD-SCNC: 100 MMOL/L (ref 98–107)
CO2: 24 MMOL/L (ref 22–29)
CREAT SERPL-MCNC: 0.7 MG/DL (ref 0.7–1.2)
GFR AFRICAN AMERICAN: >60
GFR NON-AFRICAN AMERICAN: >60 ML/MIN/1.73
GLUCOSE BLD-MCNC: 100 MG/DL (ref 74–99)
POTASSIUM SERPL-SCNC: 4.7 MMOL/L (ref 3.5–5)
SODIUM BLD-SCNC: 137 MMOL/L (ref 132–146)
TOTAL PROTEIN: 8.2 G/DL (ref 6.4–8.3)

## 2021-04-08 PROCEDURE — 99495 TRANSJ CARE MGMT MOD F2F 14D: CPT | Performed by: FAMILY MEDICINE

## 2021-04-08 NOTE — PROGRESS NOTES
Post-Discharge Transitional Care Management Services or Hospital Follow Up      Gabe Noonan   YOB: 1960    Date of Office Visit:  4/8/2021  Date of Hospital Admission: 3/24/21  Date of Hospital Discharge: 3/31/21  Risk of hospital readmission (high >=14%. Medium >=10%) :Readmission Risk Score: 14      Care management risk score Rising risk (score 2-5) and Complex Care (Scores >=6): 5     Non face to face  following discharge, date last encounter closed (first attempt may have been earlier): 4/1/2021  2:36 PM    Call initiated 2 business days of discharge: Yes    Patient Active Problem List   Diagnosis    Inguinal hernia, right    Cervical radiculopathy    Chronic hepatitis C without hepatic coma ([address]    Right shoulder pain    Right subclavian artery occlusion    Essential hypertension    Sinus tachycardia    Benign paroxysmal positional vertigo due to bilateral vestibular disorder    Hypotension    Pneumonia due to organism    Bacterial pneumonia    Acidosis    JOSIE (acute kidney injury) ([address]    Bacteremia    Alcohol withdrawal syndrome without complication ([address]    Dysphagia    Alcohol abuse       No Known Allergies    Medications listed as ordered at the time of discharge from [facility] Medication Instructions CONRADO:    Printed on:04/08/21 1313   Medication Information                      albuterol sulfate HFA (VENTOLIN HFA) 108 (90 Base) MCG/ACT inhaler  Inhale 2 puffs into the lungs 4 times daily as needed for Wheezing             buprenorphine-naloxone (SUBOXONE) 8-2 MG FILM SL film  Place 1 Film under the tongue 2 times daily.               cefTRIAXone (ROCEPHIN) infusion  Infuse 2,000 mg intravenously every 24 hours for 21 days Compound per protocol             dextromethorphan-guaiFENesin (MUCINEX DM)  MG per extended release tablet  Take 0.5 tablets by mouth every 12 hours as needed             diphenhydrAMINE (BENADRYL) 25 MG tablet  Take 50 mg by mouth nightly as needed for Sleep             fluticasone (FLONASE) 50 MCG/ACT nasal spray  1 spray by Each Nostril route daily             Multiple Vitamins-Minerals (THERAPEUTIC MULTIVITAMIN-MINERALS) tablet  Take 1 tablet by mouth daily             nicotine (NICODERM CQ) 21 MG/24HR  Place 1 patch onto the skin every 24 hours             pantoprazole (PROTONIX) 40 MG tablet  Take 1 tablet by mouth 2 times daily (before meals)             polyethylene glycol (GLYCOLAX) 17 g packet  Take 17 g by mouth daily as needed for Constipation                   Medications marked \"taking\" at this time  Outpatient Medications Marked as Taking for the 4/8/21 encounter (Office Visit) with Js Gonzalez MD   Medication Sig Dispense Refill    nicotine (NICODERM CQ) 21 MG/24HR Place 1 patch onto the skin every 24 hours 30 patch 3    polyethylene glycol (GLYCOLAX) 17 g packet Take 17 g by mouth daily as needed for Constipation 527 g 1    pantoprazole (PROTONIX) 40 MG tablet Take 1 tablet by mouth 2 times daily (before meals) 60 tablet 2    cefTRIAXone (ROCEPHIN) infusion Infuse 2,000 mg intravenously every 24 hours for 21 days Compound per protocol 42 g 0    fluticasone (FLONASE) 50 MCG/ACT nasal spray 1 spray by Each Nostril route daily      Multiple Vitamins-Minerals (THERAPEUTIC MULTIVITAMIN-MINERALS) tablet Take 1 tablet by mouth daily      dextromethorphan-guaiFENesin (MUCINEX DM)  MG per extended release tablet Take 0.5 tablets by mouth every 12 hours as needed      diphenhydrAMINE (BENADRYL) 25 MG tablet Take 50 mg by mouth nightly as needed for Sleep      albuterol sulfate HFA (VENTOLIN HFA) 108 (90 Base) MCG/ACT inhaler Inhale 2 puffs into the lungs 4 times daily as needed for Wheezing 3 Inhaler 1    buprenorphine-naloxone (SUBOXONE) 8-2 MG FILM SL film Place 1 Film under the tongue 2 times daily.            Medications patient taking as of now reconciled against medications ordered at time of hospital discharge: Yes    Chief Complaint   Patient presents with    Follow-Up from Hospital       History of Present illness, Inpatient course:   Hospital Course: Patient was admitted with Pneumonia due to organism [J18.9]. Patient is a 57 y. o. male who presents to hospital for dizziness. Pt states that he has been having dizziness and seeing spots at times for 2 weeks. This has worsened in last 24-48 hours. Pt has been coughing up phlegm but believes it is from post nasal gtt which he has been having. Pt recounts that he had similar experience with all these symptoms a year ago at this time. Everything improved but still has persistent productive cough. Pt does admit that it is worse in morning.  Pt denies fevers, chills,n/v, abd pain, diarrhea, constipation, melena, hematochezia, change in urination, dysuria, or hematuria. Last cigarette was today. Pt notes that he has a 24 ounce beer and couple of shots a fireball a day and does not have withdrawal symptoms if he should not have it. Pt smoked for about 50 years. Pt denies muscle or joint pain. Pt also relays that he does have difficulty with his food lately and has to be careful that he does not choke.      Pt notes that he went to his pcp office and was noted to have low bp. According to records, he had received steroids during that visit.      Pt would be a fair historian but would note that he denied aches or pains but did complain of muscle cramps and had to have right shoulder injected yesterday at the pcp's office. Also, pt was noted to have 101.4 temp noted in office but pt had denied having fever. Also noted that pt had been avoiding alcohol and cramps have improved.      Pt seen and examined by consultants. Pt had procedure as above. Pt had been given iv fluids and iv antibiotics and improved. Pt was recommended to stop smoking and alcohol. On day of discharge, pt denied fevers, chills,n/v. Discharge planning d/w pt.  Time given for questions and all questions answered. Pt to f/u with pulmonary. Pt discharged on iv abx and to f/u with ID as an outpt.       Interval history/Current status:   Feels better. No fever. Has line in and getting rocephin. On protonix for ulcers bid  On miralax for constipation  Has nicotine patch. Liver markers up. Seeing GI tomorrow. Getting blood drawn by home health twice weekly. Seeing ID on 16th. Esophogram:  IMPRESSION:  1. Presence of a smooth concentric narrowing of the distal esophagus just  above the esophagogastric junction which do not allowed the passage of the  barium pill.     2. This represents a smooth outlined esophageal stricture of moderate degree. Can be manifestation of chronic reflux esophagitis.     3. Could consider further evaluation with endoscopy for reassurance.     Pt states when he tucks chin with swallowing he doesn't have a problem. Has been doing throat exercises and taking time eating. Vitals:    04/08/21 1138 04/08/21 1139   BP: (!) 121/91 (!) 119/93   Pulse: 95    Resp: 20    Temp: 95 °F (35 °C)    TempSrc: Temporal    Weight: 185 lb (83.9 kg)    Height: 5' 10\" (1.778 m)      Body mass index is 26.54 kg/m². Wt Readings from Last 3 Encounters:   04/08/21 185 lb (83.9 kg)   03/30/21 188 lb 7 oz (85.5 kg)   03/23/21 193 lb (87.5 kg)     BP Readings from Last 3 Encounters:   04/08/21 (!) 119/93   03/31/21 105/73   03/31/21 98/67        Objective:  BP (!) 119/93   Pulse 95   Temp 95 °F (35 °C) (Temporal)   Resp 20   Ht 5' 10\" (1.778 m)   Wt 185 lb (83.9 kg)   BMI 26.54 kg/m²   General appearance: NAD, alert and interacting appropriately  HEENT: NCAT, PERRLA, EOMI   Resp: CTAB, no WRC  CVS: RRR, no MRG  Abdomen: BS +, SNDNT  Extremities: No clubbing, cyanosis, or edema. Warm. Dry.      Danielle Goldman was seen today for follow-up from hospital.    Diagnoses and all orders for this visit:    Dysphagia, unspecified type    Bacteremia    Pneumonia due to organism    Elevated LFTs     CPM dysphagia Continue Abx, f/u w/ ID  F/u w/ GI  Patient Instructions   Call home health, and let me know if they luz liver markers with today's blood.             Medical Decision Making: moderate complexity

## 2021-04-09 LAB
ANISOCYTOSIS: ABNORMAL
BASOPHILS ABSOLUTE: 0.05 E9/L (ref 0–0.2)
BASOPHILS RELATIVE PERCENT: 0.9 % (ref 0–2)
BURR CELLS: ABNORMAL
EOSINOPHILS ABSOLUTE: 0.27 E9/L (ref 0.05–0.5)
EOSINOPHILS RELATIVE PERCENT: 5.3 % (ref 0–6)
HCT VFR BLD CALC: 41.5 % (ref 37–54)
HEMOGLOBIN: 13 G/DL (ref 12.5–16.5)
LYMPHOCYTES ABSOLUTE: 1.43 E9/L (ref 1.5–4)
LYMPHOCYTES RELATIVE PERCENT: 28.3 % (ref 20–42)
MCH RBC QN AUTO: 32.6 PG (ref 26–35)
MCHC RBC AUTO-ENTMCNC: 31.3 % (ref 32–34.5)
MCV RBC AUTO: 104 FL (ref 80–99.9)
METAMYELOCYTES RELATIVE PERCENT: 2.7 % (ref 0–1)
MONOCYTES ABSOLUTE: 0.87 E9/L (ref 0.1–0.95)
MONOCYTES RELATIVE PERCENT: 16.8 % (ref 2–12)
NEUTROPHILS ABSOLUTE: 2.5 E9/L (ref 1.8–7.3)
NEUTROPHILS RELATIVE PERCENT: 46 % (ref 43–80)
PDW BLD-RTO: 12.6 FL (ref 11.5–15)
PLATELET # BLD: 439 E9/L (ref 130–450)
PMV BLD AUTO: 9.7 FL (ref 7–12)
POIKILOCYTES: ABNORMAL
RBC # BLD: 3.99 E12/L (ref 3.8–5.8)
WBC # BLD: 5.1 E9/L (ref 4.5–11.5)

## 2021-04-19 ENCOUNTER — TELEPHONE (OUTPATIENT)
Dept: FAMILY MEDICINE CLINIC | Age: 61
End: 2021-04-19

## 2021-04-22 ENCOUNTER — TELEPHONE (OUTPATIENT)
Dept: FAMILY MEDICINE CLINIC | Age: 61
End: 2021-04-22

## 2021-04-22 NOTE — TELEPHONE ENCOUNTER
Patient needs copies printed out of most recent CBC and CMP, Pt said they were done on [id number] when they removed the Pic line. Please call after these labs are reviewed     He is also waiting to hear from GI. Will be heading back to work Monday.

## 2021-06-21 DIAGNOSIS — R06.02 SOB (SHORTNESS OF BREATH): ICD-10-CM

## 2021-06-21 DIAGNOSIS — R05.9 COUGH: ICD-10-CM

## 2021-06-22 RX ORDER — ALBUTEROL SULFATE 90 UG/1
AEROSOL, METERED RESPIRATORY (INHALATION)
Qty: 25.5 G | Refills: 3 | Status: SHIPPED
Start: 2021-06-22 | End: 2021-11-23 | Stop reason: SDUPTHER

## 2021-07-01 ENCOUNTER — APPOINTMENT (OUTPATIENT)
Dept: GENERAL RADIOLOGY | Age: 61
End: 2021-07-01
Payer: COMMERCIAL

## 2021-07-01 ENCOUNTER — HOSPITAL ENCOUNTER (EMERGENCY)
Age: 61
Discharge: HOME | End: 2021-07-01
Attending: FAMILY MEDICINE
Payer: COMMERCIAL

## 2021-07-01 ENCOUNTER — APPOINTMENT (OUTPATIENT)
Dept: CT IMAGING | Age: 61
End: 2021-07-01
Payer: COMMERCIAL

## 2021-07-01 VITALS
WEIGHT: 195 LBS | BODY MASS INDEX: 27.92 KG/M2 | OXYGEN SATURATION: 97 % | HEIGHT: 70 IN | RESPIRATION RATE: 18 BRPM | HEART RATE: 73 BPM | DIASTOLIC BLOOD PRESSURE: 75 MMHG | TEMPERATURE: 96.9 F | SYSTOLIC BLOOD PRESSURE: 104 MMHG

## 2021-07-01 DIAGNOSIS — I95.9 HYPOTENSION, UNSPECIFIED HYPOTENSION TYPE: ICD-10-CM

## 2021-07-01 DIAGNOSIS — E86.0 DEHYDRATION: Primary | ICD-10-CM

## 2021-07-01 LAB
ALBUMIN SERPL-MCNC: 3.6 G/DL (ref 3.5–5.2)
ALP BLD-CCNC: 111 U/L (ref 40–129)
ALT SERPL-CCNC: 70 U/L (ref 0–40)
ANION GAP SERPL CALCULATED.3IONS-SCNC: 6 MMOL/L (ref 7–16)
AST SERPL-CCNC: 70 U/L (ref 0–39)
BILIRUB SERPL-MCNC: 0.5 MG/DL (ref 0–1.2)
BUN BLDV-MCNC: 28 MG/DL (ref 6–23)
CALCIUM SERPL-MCNC: 9.2 MG/DL (ref 8.6–10.2)
CHLORIDE BLD-SCNC: 102 MMOL/L (ref 98–107)
CO2: 25 MMOL/L (ref 22–29)
CREAT SERPL-MCNC: 1.1 MG/DL (ref 0.7–1.2)
GFR AFRICAN AMERICAN: >60
GFR NON-AFRICAN AMERICAN: >60 ML/MIN/1.73
GLUCOSE BLD-MCNC: 101 MG/DL (ref 74–99)
HCT VFR BLD CALC: 44.6 % (ref 37–54)
HEMOGLOBIN: 15.7 G/DL (ref 12.5–16.5)
MAGNESIUM: 2.1 MG/DL (ref 1.6–2.6)
MCH RBC QN AUTO: 34.7 PG (ref 26–35)
MCHC RBC AUTO-ENTMCNC: 35.2 % (ref 32–34.5)
MCV RBC AUTO: 98.7 FL (ref 80–99.9)
PDW BLD-RTO: 12.7 FL (ref 11.5–15)
PLATELET # BLD: 178 E9/L (ref 130–450)
PMV BLD AUTO: 10.2 FL (ref 7–12)
POTASSIUM SERPL-SCNC: 4.9 MMOL/L (ref 3.5–5)
RBC # BLD: 4.52 E12/L (ref 3.8–5.8)
REASON FOR REJECTION: NORMAL
REJECTED TEST: NORMAL
SODIUM BLD-SCNC: 133 MMOL/L (ref 132–146)
TOTAL PROTEIN: 7.4 G/DL (ref 6.4–8.3)
WBC # BLD: 6 E9/L (ref 4.5–11.5)

## 2021-07-01 PROCEDURE — 83735 ASSAY OF MAGNESIUM: CPT

## 2021-07-01 PROCEDURE — 36415 COLL VENOUS BLD VENIPUNCTURE: CPT

## 2021-07-01 PROCEDURE — 85027 COMPLETE CBC AUTOMATED: CPT

## 2021-07-01 PROCEDURE — 70450 CT HEAD/BRAIN W/O DYE: CPT

## 2021-07-01 PROCEDURE — 99284 EMERGENCY DEPT VISIT MOD MDM: CPT

## 2021-07-01 PROCEDURE — 2580000003 HC RX 258: Performed by: FAMILY MEDICINE

## 2021-07-01 PROCEDURE — 80053 COMPREHEN METABOLIC PANEL: CPT

## 2021-07-01 PROCEDURE — 71045 X-RAY EXAM CHEST 1 VIEW: CPT

## 2021-07-01 PROCEDURE — 96360 HYDRATION IV INFUSION INIT: CPT

## 2021-07-01 PROCEDURE — 96361 HYDRATE IV INFUSION ADD-ON: CPT

## 2021-07-01 RX ORDER — 0.9 % SODIUM CHLORIDE 0.9 %
1000 INTRAVENOUS SOLUTION INTRAVENOUS ONCE
Status: COMPLETED | OUTPATIENT
Start: 2021-07-01 | End: 2021-07-01

## 2021-07-01 RX ADMIN — SODIUM CHLORIDE 1000 ML: 9 INJECTION, SOLUTION INTRAVENOUS at 13:15

## 2021-07-01 NOTE — ED PROVIDER NOTES
[address] [facility]  Department of Emergency Medicine   ED  Encounter Note  Admit Date/RoomTime: 2021 11:50 AM  ED Room:     NAME: Britton Friedman  : 1960  MRN: [id number]     Chief Complaint:  Dizziness (States dizziness, lightheaded, blurry vision, states onset monday)    History of Present Illness        Britton Friedman is a 56 y.o. old male who presents to the emergency department by private vehicle, for gradual onset, persistent, intermittent episodes dizziness and Lightheaded which began 5 days worse today {  Since recognized his symptoms have been intermittent. He has associated symptoms of blurred vision at times and denies any no other pertinent symptoms. The symptoms are aggravated by Heat and sweating. He has a history of hypertension but it was then controlled and continued without medication he did start taking lisinopril again 2 days ago and notices worsening symptoms. He takes no blood thinning agents. Vertebrobasilar CVA Symptoms:    Vertigo: no (0)   Diplopia: no (0)   Decreased Vision: no (0)   Oscillopsia: no (0)   Ataxia: no (0)   Precipitated by moving one upper extremity with  Decreased BP (subclavian steal syndrome):       no (0)   Total:    0     .  ROS   Pertinent positives and negatives are stated within HPI, all other systems reviewed and are negative. Past Medical History:  has a past medical history of Hepatitis C and Herniated disc, cervical.    Surgical History:  has a past surgical history that includes Anterior cruciate ligament repair (); Neck surgery (); shoulder surgery (); Skin cancer excision (); Anterior cruciate ligament repair (); Colonoscopy (); Inguinal hernia repair (2014); Inguinal hernia repair (Right, 14); picc line insertion nurse (3/28/2021); and Upper gastrointestinal endoscopy (N/A, 3/31/2021). Social History:  reports that he has been smoking cigarettes.  He has a 20.00 pack-year smoking history. He has never used smokeless tobacco. He reports current alcohol use of about 38.0 standard drinks of alcohol per week. He reports current drug use. Drug: Opiates . Family History: family history includes Cancer in his father; Heart Attack (age of onset: 68) in his mother; Parkinsonism in his paternal uncle. Allergies: Patient has no known allergies. Physical Exam   Oxygen Saturation Interpretation: Normal.        ED Triage Vitals   BP Temp Temp Source Pulse Resp SpO2 Height Weight   07/01/21 1135 07/01/21 1135 07/01/21 1135 07/01/21 1135 07/01/21 1135 07/01/21 1135 07/01/21 1157 07/01/21 1157   133/62 96.9 °F (36.1 °C) Infrared 103 18 97 % 5' 10\" (1.778 m) 195 lb (88.5 kg)           Constitutional:  Level of Consciousness: Awake and alert. ETOH: No.         Distress: none,  cooperative. Eyes:  PERRL, EOMI, no discharge or conjunctival injection. Ears:  External ears without lesions. Throat:  Pharynx without injection, exudate, or tonsillar hypertrophy. Airway patient. Neck:  Normal ROM. Supple. Respiratory:  Clear to auscultation and breath sounds equal.  CV:  Regular rate and rhythm, normal heart sounds, without pathological murmurs, ectopy, gallops, or rubs. GI:  Abdomen Soft, nontender, good bowel sounds. No firm or pulsatile mass. Back:  No costovertebral tenderness. Integument:  Normal turgor. Warm, dry, without visible rash, unless noted elsewhere. Lymphatic: no lymphadenopathy noted  Neurological:  Oriented. Motor functions intact.     Lab / Imaging Results   (All laboratory and radiology results have been personally reviewed by myself)  Labs:  Results for orders placed or performed during the hospital encounter of 07/01/21   CBC   Result Value Ref Range    WBC 6.0 4.5 - 11.5 E9/L    RBC 4.52 3.80 - 5.80 E12/L    Hemoglobin 15.7 12.5 - 16.5 g/dL    Hematocrit 44.6 37.0 - 54.0 %    MCV 98.7 80.0 - 99.9 fL    MCH 34.7 26.0 - 35.0 pg    MCHC 35.2 (H) 32.0 - 34.5 %    RDW this time and are agreeable with the plan. Assessment      1. Dehydration    2. Hypotension, unspecified hypotension type      This patient's ED course included: a personal history and physicial examination, re-evaluation prior to disposition, multiple bedside re-evaluations, IV medications, cardiac monitoring, continuous pulse oximetry and complex medical decision making and emergency management  This patient has remained hemodynamically stable, improved and been closely monitored during their ED course. Plan   Discharged home. Patient condition is good. New Medications     New Prescriptions    No medications on file     Electronically signed by Mahad Espinoza MD   DD: 7/1/21  **This report was transcribed using voice recognition software. Every effort was made to ensure accuracy; however, inadvertent computerized transcription errors may be present.   END OF PROVIDER NOTE        Mahad Espinoza MD  07/01/21 0698

## 2021-07-20 ENCOUNTER — TELEPHONE (OUTPATIENT)
Dept: FAMILY MEDICINE CLINIC | Age: 61
End: 2021-07-20

## 2021-07-20 NOTE — TELEPHONE ENCOUNTER
Please have pt come in to discuss, thanks.      Electronically signed by Evelyne Perez MD on 7/20/2021 at 5:47 PM

## 2021-09-20 ENCOUNTER — NURSE TRIAGE (OUTPATIENT)
Dept: OTHER | Facility: CLINIC | Age: 61
End: 2021-09-20

## 2021-09-20 ENCOUNTER — OFFICE VISIT (OUTPATIENT)
Dept: PRIMARY CARE CLINIC | Age: 61
End: 2021-09-20
Payer: COMMERCIAL

## 2021-09-20 VITALS
RESPIRATION RATE: 20 BRPM | SYSTOLIC BLOOD PRESSURE: 135 MMHG | HEIGHT: 70 IN | TEMPERATURE: 97.8 F | WEIGHT: 207 LBS | OXYGEN SATURATION: 95 % | HEART RATE: 98 BPM | DIASTOLIC BLOOD PRESSURE: 85 MMHG | BODY MASS INDEX: 29.63 KG/M2

## 2021-09-20 DIAGNOSIS — R06.02 SHORTNESS OF BREATH: ICD-10-CM

## 2021-09-20 DIAGNOSIS — J18.9 PNEUMONIA OF LEFT LOWER LOBE DUE TO INFECTIOUS ORGANISM: Primary | ICD-10-CM

## 2021-09-20 DIAGNOSIS — R09.89 CHEST CONGESTION: ICD-10-CM

## 2021-09-20 DIAGNOSIS — R05.9 COUGH: ICD-10-CM

## 2021-09-20 DIAGNOSIS — F17.200 TOBACCO DEPENDENCE: ICD-10-CM

## 2021-09-20 LAB
INFLUENZA A ANTIGEN, POC: NEGATIVE
INFLUENZA B ANTIGEN, POC: NEGATIVE

## 2021-09-20 PROCEDURE — 4004F PT TOBACCO SCREEN RCVD TLK: CPT | Performed by: FAMILY MEDICINE

## 2021-09-20 PROCEDURE — G8427 DOCREV CUR MEDS BY ELIG CLIN: HCPCS | Performed by: FAMILY MEDICINE

## 2021-09-20 PROCEDURE — G8419 CALC BMI OUT NRM PARAM NOF/U: HCPCS | Performed by: FAMILY MEDICINE

## 2021-09-20 PROCEDURE — 3017F COLORECTAL CA SCREEN DOC REV: CPT | Performed by: FAMILY MEDICINE

## 2021-09-20 PROCEDURE — 99214 OFFICE O/P EST MOD 30 MIN: CPT | Performed by: FAMILY MEDICINE

## 2021-09-20 PROCEDURE — 87804 INFLUENZA ASSAY W/OPTIC: CPT | Performed by: FAMILY MEDICINE

## 2021-09-20 RX ORDER — LEVOFLOXACIN 750 MG/1
750 TABLET ORAL DAILY
Qty: 5 TABLET | Refills: 0 | Status: SHIPPED
Start: 2021-09-20 | End: 2021-09-28 | Stop reason: SDUPTHER

## 2021-09-20 ASSESSMENT — ENCOUNTER SYMPTOMS
RHINORRHEA: 0
PHOTOPHOBIA: 0
EYE REDNESS: 0
COUGH: 1
SHORTNESS OF BREATH: 1
GASTROINTESTINAL NEGATIVE: 1
EYE DISCHARGE: 0

## 2021-09-20 NOTE — PROGRESS NOTES
Dorothea Yanes (:  1960) is a 56 y.o. male,Established patient, here for evaluation of the following chief complaint(s):  Cough, Chest Congestion (for 4 days ), Tinnitus (bilateral ear ringing ), and Shortness of Breath         ASSESSMENT/PLAN:  1. Pneumonia of left lower lobe due to infectious organism  2. Cough  -     POCT Influenza A/B Antigen  -     COVID-19 Ambulatory; Future  -     XR CHEST STANDARD (2 VW); Future  3. Chest congestion  -     POCT Influenza A/B Antigen  -     COVID-19 Ambulatory; Future  -     XR CHEST STANDARD (2 VW); Future  4. Shortness of breath  -     POCT Influenza A/B Antigen  -     COVID-19 Ambulatory; Future  -     XR CHEST STANDARD (2 VW); Future  5. Tobacco dependence  Will use over-the-counter remedies for his cough. Patient is recommended to stop smoking. He states he does have nicotine patches and will start using them. Return in about 2 weeks (around 10/4/2021). Subjective   SUBJECTIVE/OBJECTIVE:  Patient presents with a 4-day history of cough and chest congestion. He is also had some mild tinnitus and he has had shortness of breath. Patient denies any fever sweats or chills. Patient does state that he feels just like he did when he got pneumonia earlier this year in March requiring hospitalization. Review of Systems   Constitutional: Negative for chills, fatigue and fever. HENT: Negative for congestion, mouth sores, postnasal drip and rhinorrhea. Eyes: Negative for photophobia, discharge and redness. Respiratory: Positive for cough and shortness of breath. Cardiovascular: Negative for chest pain. Gastrointestinal: Negative. Genitourinary: Negative for difficulty urinating, dysuria, hematuria and urgency. Neurological: Negative for headaches. Psychiatric/Behavioral: Negative for sleep disturbance. Objective   Physical Exam  Vitals and nursing note reviewed. Constitutional:       Appearance: Normal appearance.    HENT: Right Ear: Tympanic membrane and ear canal normal.      Left Ear: Tympanic membrane and ear canal normal.      Nose: No congestion or rhinorrhea. Mouth/Throat:      Mouth: Mucous membranes are moist.      Pharynx: Oropharynx is clear. Eyes:      Conjunctiva/sclera: Conjunctivae normal.   Cardiovascular:      Rate and Rhythm: Normal rate and regular rhythm. Heart sounds: Normal heart sounds. Pulmonary:      Effort: Pulmonary effort is normal.      Breath sounds: Normal breath sounds. Musculoskeletal:      Cervical back: Neck supple. Skin:     General: Skin is warm. Neurological:      Mental Status: He is alert. He is disoriented. Psychiatric:         Mood and Affect: Mood normal.          EXAMINATION:   TWO XRAY VIEWS OF THE CHEST       9/20/2021 10:55 am       COMPARISON:   1 July 2021       HISTORY:   ORDERING SYSTEM PROVIDED HISTORY: Cough   TECHNOLOGIST PROVIDED HISTORY:   Reason for exam:->cough and SOB for 3 days       FINDINGS:   There is pneumonia at superior segment of the left lower lobe.  Heart and   pulmonary vascularity are normal.  Neither costophrenic angle is blunted. An electronic signature was used to authenticate this note. --Deneen Bowling.  Danette Taylor

## 2021-09-20 NOTE — TELEPHONE ENCOUNTER
Received call from April at [facility] with Red Flag Complaint. Brief description of triage: Cough, congestion    Triage indicates for patient to Be seen in office today. Pt. Notified, if unable to be seen, please go to walk in clinic or [facility]. States understanding. Care advice provided, patient verbalizes understanding; denies any other questions or concerns; instructed to call back for any new or worsening symptoms. Writer sent message to [facility] for appointment scheduling. Carlie FRANZ acknowledged message. Attention Provider: Thank you for allowing me to participate in the care of your patient. The patient was connected to triage in response to information provided to the E[facility]. Please do not respond through this encounter as the response is not directed to a shared pool. Reason for Disposition   SEVERE coughing spells (e.g., whooping sound after coughing, vomiting after coughing)    Answer Assessment - Initial Assessment Questions  1. ONSET: \"When did the cough begin? \"       Saturday night    2. SEVERITY: \"How bad is the cough today? \"       Coughing up phlegm, OTC medications have helped, SOB     3. RESPIRATORY DISTRESS: \"Describe your breathing. \"       SOB intermittently, worsened after coughing     4. FEVER: \"Do you have a fever? \" If so, ask: \"What is your temperature, how was it measured, and when did it start? \"      No fever (99.4)    5. HEMOPTYSIS: \"Are you coughing up any blood? \" If so ask: \"How much? \" (flecks, streaks, tablespoons, etc.)      No blood     6. TREATMENT: \"What have you done so far to treat the cough? \" (e.g., meds, fluids, humidifier)      OTC decongestants, drinking plenty of fluids, sudafed     7. CARDIAC HISTORY: \"Do you have any history of heart disease? \" (e.g., heart attack, congestive heart failure)       Denies     8. LUNG HISTORY: \"Do you have any history of lung disease? \"  (e.g., pulmonary embolus, asthma, emphysema)      No     9.  PE RISK FACTORS: \"Do you have a history of blood clots? \" (or: recent major surgery, recent prolonged travel, bedridden)      No     10. OTHER SYMPTOMS: \"Do you have any other symptoms? (e.g., runny nose, wheezing, chest pain)        Tennitis, Wheezing,  Denies runny nose and chest pain     11. PREGNANCY: \"Is there any chance you are pregnant? \" \"When was your last menstrual period? \"        N/A    12. TRAVEL: \"Have you traveled out of the country in the last month? \" (e.g., travel history, exposures)        no    Protocols used: COUGH-ADULT-[place]

## 2021-09-24 ENCOUNTER — TELEPHONE (OUTPATIENT)
Dept: FAMILY MEDICINE CLINIC | Age: 61
End: 2021-09-24

## 2021-09-24 NOTE — TELEPHONE ENCOUNTER
Pt called for Covid results.  Informed test was negative and explained for Pt to finish meds and reminded him to about appt on 10/7

## 2021-09-27 ENCOUNTER — TELEPHONE (OUTPATIENT)
Dept: FAMILY MEDICINE CLINIC | Age: 61
End: 2021-09-27

## 2021-09-27 NOTE — TELEPHONE ENCOUNTER
Per [employer],     Pt called for [employer].  Informed test was negative and explained for Pt to finish meds and reminded him to about appt on 10/7

## 2021-09-27 NOTE — TELEPHONE ENCOUNTER
Patient was in our walk in on 9/20/21 and was dx with pneumonia in his left lower lobe and was given Levaquin , he said it is not completely gone and is asking if you can call in another refill , please advise

## 2021-09-28 DIAGNOSIS — J18.9 PNEUMONIA DUE TO INFECTIOUS ORGANISM, UNSPECIFIED LATERALITY, UNSPECIFIED PART OF LUNG: Primary | ICD-10-CM

## 2021-09-28 RX ORDER — LEVOFLOXACIN 750 MG/1
750 TABLET ORAL DAILY
Qty: 5 TABLET | Refills: 0 | Status: SHIPPED
Start: 2021-09-28 | End: 2021-11-23 | Stop reason: SDUPTHER

## 2021-10-25 ENCOUNTER — TELEPHONE (OUTPATIENT)
Dept: FAMILY MEDICINE CLINIC | Age: 61
End: 2021-10-25

## 2021-10-25 DIAGNOSIS — G89.29 CHRONIC RIGHT SHOULDER PAIN: Primary | ICD-10-CM

## 2021-10-25 DIAGNOSIS — R06.02 SOB (SHORTNESS OF BREATH): ICD-10-CM

## 2021-10-25 DIAGNOSIS — M25.511 CHRONIC RIGHT SHOULDER PAIN: Primary | ICD-10-CM

## 2021-10-25 DIAGNOSIS — R05.9 COUGH: ICD-10-CM

## 2021-10-25 RX ORDER — ALBUTEROL SULFATE 90 UG/1
AEROSOL, METERED RESPIRATORY (INHALATION)
Qty: 25.5 G | Refills: 3 | Status: CANCELLED | OUTPATIENT
Start: 2021-10-25

## 2021-10-25 RX ORDER — GABAPENTIN 300 MG/1
300 CAPSULE ORAL 2 TIMES DAILY
Qty: 60 CAPSULE | Refills: 0 | Status: SHIPPED | OUTPATIENT
Start: 2021-10-25 | End: 2022-06-15

## 2021-11-07 ENCOUNTER — APPOINTMENT (OUTPATIENT)
Dept: GENERAL RADIOLOGY | Age: 61
End: 2021-11-07
Payer: COMMERCIAL

## 2021-11-07 ENCOUNTER — HOSPITAL ENCOUNTER (EMERGENCY)
Age: 61
Discharge: HOME | End: 2021-11-07
Attending: STUDENT IN AN ORGANIZED HEALTH CARE EDUCATION/TRAINING PROGRAM
Payer: COMMERCIAL

## 2021-11-07 ENCOUNTER — APPOINTMENT (OUTPATIENT)
Dept: CT IMAGING | Age: 61
End: 2021-11-07
Payer: COMMERCIAL

## 2021-11-07 VITALS
HEIGHT: 70 IN | HEART RATE: 84 BPM | RESPIRATION RATE: 17 BRPM | OXYGEN SATURATION: 99 % | SYSTOLIC BLOOD PRESSURE: 142 MMHG | WEIGHT: 195 LBS | BODY MASS INDEX: 27.92 KG/M2 | TEMPERATURE: 98.8 F | DIASTOLIC BLOOD PRESSURE: 86 MMHG

## 2021-11-07 DIAGNOSIS — J18.9 PNEUMONIA OF LEFT LUNG DUE TO INFECTIOUS ORGANISM, UNSPECIFIED PART OF LUNG: ICD-10-CM

## 2021-11-07 DIAGNOSIS — R07.9 CHEST PAIN, UNSPECIFIED TYPE: Primary | ICD-10-CM

## 2021-11-07 LAB
ALBUMIN SERPL-MCNC: 3.4 G/DL (ref 3.5–5.2)
ALP BLD-CCNC: 144 U/L (ref 40–129)
ALT SERPL-CCNC: 61 U/L (ref 0–40)
ANION GAP SERPL CALCULATED.3IONS-SCNC: 11 MMOL/L (ref 7–16)
AST SERPL-CCNC: 71 U/L (ref 0–39)
BASOPHILS ABSOLUTE: 0.1 E9/L (ref 0–0.2)
BASOPHILS RELATIVE PERCENT: 1.3 % (ref 0–2)
BILIRUB SERPL-MCNC: 0.5 MG/DL (ref 0–1.2)
BUN BLDV-MCNC: 12 MG/DL (ref 6–23)
CALCIUM SERPL-MCNC: 8.8 MG/DL (ref 8.6–10.2)
CHLORIDE BLD-SCNC: 102 MMOL/L (ref 98–107)
CO2: 25 MMOL/L (ref 22–29)
CREAT SERPL-MCNC: 0.5 MG/DL (ref 0.7–1.2)
EOSINOPHILS ABSOLUTE: 0.49 E9/L (ref 0.05–0.5)
EOSINOPHILS RELATIVE PERCENT: 6.5 % (ref 0–6)
GFR AFRICAN AMERICAN: >60
GFR NON-AFRICAN AMERICAN: >60 ML/MIN/1.73
GLUCOSE BLD-MCNC: 89 MG/DL (ref 74–99)
HCT VFR BLD CALC: 41.4 % (ref 37–54)
HEMOGLOBIN: 14.2 G/DL (ref 12.5–16.5)
IMMATURE GRANULOCYTES #: 0.03 E9/L
IMMATURE GRANULOCYTES %: 0.4 % (ref 0–5)
LYMPHOCYTES ABSOLUTE: 1.26 E9/L (ref 1.5–4)
LYMPHOCYTES RELATIVE PERCENT: 16.7 % (ref 20–42)
MCH RBC QN AUTO: 32.9 PG (ref 26–35)
MCHC RBC AUTO-ENTMCNC: 34.3 % (ref 32–34.5)
MCV RBC AUTO: 95.8 FL (ref 80–99.9)
MONOCYTES ABSOLUTE: 1.05 E9/L (ref 0.1–0.95)
MONOCYTES RELATIVE PERCENT: 13.9 % (ref 2–12)
NEUTROPHILS ABSOLUTE: 4.62 E9/L (ref 1.8–7.3)
NEUTROPHILS RELATIVE PERCENT: 61.2 % (ref 43–80)
PDW BLD-RTO: 12.4 FL (ref 11.5–15)
PLATELET # BLD: 279 E9/L (ref 130–450)
PMV BLD AUTO: 8.6 FL (ref 7–12)
POTASSIUM REFLEX MAGNESIUM: 3.9 MMOL/L (ref 3.5–5)
PRO-BNP: 151 PG/ML (ref 0–125)
RBC # BLD: 4.32 E12/L (ref 3.8–5.8)
SODIUM BLD-SCNC: 138 MMOL/L (ref 132–146)
TOTAL PROTEIN: 7.8 G/DL (ref 6.4–8.3)
TROPONIN, HIGH SENSITIVITY: 24 NG/L (ref 0–11)
TROPONIN, HIGH SENSITIVITY: 25 NG/L (ref 0–11)
WBC # BLD: 7.6 E9/L (ref 4.5–11.5)

## 2021-11-07 PROCEDURE — 84484 ASSAY OF TROPONIN QUANT: CPT

## 2021-11-07 PROCEDURE — 36415 COLL VENOUS BLD VENIPUNCTURE: CPT

## 2021-11-07 PROCEDURE — 83880 ASSAY OF NATRIURETIC PEPTIDE: CPT

## 2021-11-07 PROCEDURE — 85025 COMPLETE CBC W/AUTO DIFF WBC: CPT

## 2021-11-07 PROCEDURE — 6370000000 HC RX 637 (ALT 250 FOR IP): Performed by: STUDENT IN AN ORGANIZED HEALTH CARE EDUCATION/TRAINING PROGRAM

## 2021-11-07 PROCEDURE — 71046 X-RAY EXAM CHEST 2 VIEWS: CPT

## 2021-11-07 PROCEDURE — 99284 EMERGENCY DEPT VISIT MOD MDM: CPT

## 2021-11-07 PROCEDURE — 80053 COMPREHEN METABOLIC PANEL: CPT

## 2021-11-07 PROCEDURE — 93005 ELECTROCARDIOGRAM TRACING: CPT | Performed by: STUDENT IN AN ORGANIZED HEALTH CARE EDUCATION/TRAINING PROGRAM

## 2021-11-07 RX ORDER — DOXYCYCLINE HYCLATE 100 MG/1
100 CAPSULE ORAL ONCE
Status: COMPLETED | OUTPATIENT
Start: 2021-11-07 | End: 2021-11-07

## 2021-11-07 RX ORDER — DOXYCYCLINE HYCLATE 100 MG
100 TABLET ORAL 2 TIMES DAILY
Qty: 14 TABLET | Refills: 0 | Status: SHIPPED | OUTPATIENT
Start: 2021-11-07 | End: 2021-11-14

## 2021-11-07 RX ORDER — SODIUM CHLORIDE 0.9 % (FLUSH) 0.9 %
5-40 SYRINGE (ML) INJECTION 2 TIMES DAILY
Status: DISCONTINUED | OUTPATIENT
Start: 2021-11-07 | End: 2021-11-07 | Stop reason: HOSPADM

## 2021-11-07 RX ADMIN — DOXYCYCLINE HYCLATE 100 MG: 100 CAPSULE ORAL at 20:21

## 2021-11-08 NOTE — ED NOTES
Patient signed Devota Lick form as well as assigned nurse and placed in paper chart.      Eva Pang RN  11/07/21 2024

## 2021-11-08 NOTE — ED PROVIDER NOTES
Department of Emergency Medicine   ED  Provider Note  Admit Date/RoomTime: 11/7/2021  4:38 PM  ED Room: 15/15          History of Present Illness:  11/7/21, Time: 10:26 PM EST  Chief Complaint   Patient presents with    Chest Pain     pain in left chest, \"lung lobe\", pt was placed on antibiotics but in past 2 weeks \"pain is more pronounced. \"  No pain unless he moves or coughes. Hallie Pleitez is a 59 y.o. male presenting to the ED for chest pain. The patient states for the past 2 to 3 weeks he has been having chest pain. It is constant. It is left-sided but sometimes radiates to his back. He describes it as a soreness. This is worse when he takes a deep breath or coughs. Certain movements also exacerbate it. Nothing seems to improve it. He does have a cough that is productive of green sputum. Denies any shortness of breath or fever. No abdominal pain, nausea or vomiting. No history of trauma. Patient denies history of blood clots, recent travel, hormone use or leg swelling. No numbness, tingling or weakness. Review of Systems:   Constitutional symptoms: Denies fever  Eyes: Denies eye pain  Ears, Nose, Mouth, Throat: Denies ear pain  Cardiovascular: Positive for chest pain  Respiratory: Denies shortness of breath. Positive for cough  Gastrointestinal: Denies blood per rectum  Genitourinary: Denies hematuria  Skin: Denies rashes  Neurological: Denies headache  Musculoskeletal: Denies extremity pain    --------------------------------------------- PAST HISTORY ---------------------------------------------  Past Medical History:  has a past medical history of Hepatitis C and Herniated disc, cervical.    Past Surgical History:  has a past surgical history that includes Anterior cruciate ligament repair (1990s); Neck surgery (1992); shoulder surgery (2007); Skin cancer excision (2007); Anterior cruciate ligament repair (2009); Colonoscopy (2009); Inguinal hernia repair (5/20/2014);  Inguinal hernia repair (Right, 5/20/14); picc line insertion nurse (3/28/2021); and Upper gastrointestinal endoscopy (N/A, 3/31/2021). Social History:  reports that he has been smoking cigarettes. He has a 20.00 pack-year smoking history. He has never used smokeless tobacco. He reports current alcohol use of about 38.0 standard drinks of alcohol per week. He reports current drug use. Drug: Opiates . Family History: family history includes Cancer in his father; Heart Attack (age of onset: 68) in his mother; Parkinsonism in his paternal uncle. . Unless otherwise noted, family history is non contributory    The patients home medications have been reviewed. Allergies: Patient has no known allergies. I have reviewed the past medical history, past surgical history, social history, and family history    ---------------------------------------------------PHYSICAL EXAM--------------------------------------    General: The patient is conversational and lying comfortably in bed. Head: Normocephalic and atraumatic. Eyes: Sclera non-icteric and no conjunctival injection  ENT: Nasal and oral membranes moist  Neck: Trachea midline. No JVD  Respiratory: Lungs clear to auscultation bilaterally. Patient speaking in full sentences. Cardiovascular: Regular rate. No pedal edema. Gastrointestinal:  Abdomen is soft and nondistended. Bowel sounds present. There is no tenderness. There is no guarding or rebound. Musculoskeletal: No deformity. No tenderness in midline spine. No step-offs or deformities. No paraspinal muscle tenderness. Symmetric movement of the extremities. No calf tenderness. Skin: Skin warm and dry. No rashes. Neurologic: No gross motor deficits. No aphasia. Psychiatric: Normal affect.    -------------------------------------------------- RESULTS -------------------------------------------------  I have personally reviewed all laboratory and imaging results for this patient. Results are listed below. LABS: (Lab results interpreted by me)  Results for orders placed or performed during the hospital encounter of 11/07/21   CBC Auto Differential   Result Value Ref Range    WBC 7.6 4.5 - 11.5 E9/L    RBC 4.32 3.80 - 5.80 E12/L    Hemoglobin 14.2 12.5 - 16.5 g/dL    Hematocrit 41.4 37.0 - 54.0 %    MCV 95.8 80.0 - 99.9 fL    MCH 32.9 26.0 - 35.0 pg    MCHC 34.3 32.0 - 34.5 %    RDW 12.4 11.5 - 15.0 fL    Platelets 383 580 - 538 E9/L    MPV 8.6 7.0 - 12.0 fL    Neutrophils % 61.2 43.0 - 80.0 %    Immature Granulocytes % 0.4 0.0 - 5.0 %    Lymphocytes % 16.7 (L) 20.0 - 42.0 %    Monocytes % 13.9 (H) 2.0 - 12.0 %    Eosinophils % 6.5 (H) 0.0 - 6.0 %    Basophils % 1.3 0.0 - 2.0 %    Neutrophils Absolute 4.62 1.80 - 7.30 E9/L    Immature Granulocytes # 0.03 E9/L    Lymphocytes Absolute 1.26 (L) 1.50 - 4.00 E9/L    Monocytes Absolute 1.05 (H) 0.10 - 0.95 E9/L    Eosinophils Absolute 0.49 0.05 - 0.50 E9/L    Basophils Absolute 0.10 0.00 - 0.20 E9/L   Comprehensive Metabolic Panel w/ Reflex to MG   Result Value Ref Range    Sodium 138 132 - 146 mmol/L    Potassium reflex Magnesium 3.9 3.5 - 5.0 mmol/L    Chloride 102 98 - 107 mmol/L    CO2 25 22 - 29 mmol/L    Anion Gap 11 7 - 16 mmol/L    Glucose 89 74 - 99 mg/dL    BUN 12 6 - 23 mg/dL    CREATININE 0.5 (L) 0.7 - 1.2 mg/dL    GFR Non-African American >60 >=60 mL/min/1.73    GFR African American >60     Calcium 8.8 8.6 - 10.2 mg/dL    Total Protein 7.8 6.4 - 8.3 g/dL    Albumin 3.4 (L) 3.5 - 5.2 g/dL    Total Bilirubin 0.5 0.0 - 1.2 mg/dL    Alkaline Phosphatase 144 (H) 40 - 129 U/L    ALT 61 (H) 0 - 40 U/L    AST 71 (H) 0 - 39 U/L   Troponin   Result Value Ref Range    Troponin, High Sensitivity 24 (H) 0 - 11 ng/L   Brain Natriuretic Peptide   Result Value Ref Range    Pro- (H) 0 - 125 pg/mL   Troponin   Result Value Ref Range    Troponin, High Sensitivity 25 (H) 0 - 11 ng/L   EKG 12 Lead   Result Value Ref Range    Ventricular Rate 96 BPM    Atrial Rate 96 BPM P-R Interval 158 ms    QRS Duration 84 ms    Q-T Interval 352 ms    QTc Calculation (Bazett) 444 ms    P Axis 1 degrees    R Axis -13 degrees    T Axis 27 degrees   ,     RADIOLOGY:  Interpreted by Radiologist unless otherwise specified  XR CHEST (2 VW)   Final Result   Left perihilar infiltrates. ------------------------- NURSING NOTES AND VITALS REVIEWED ---------------------------   The nursing notes within the ED encounter and vital signs as below have been reviewed by myself  BP (!) 142/86   Pulse 84   Temp 98.8 °F (37.1 °C)   Resp 17   Ht 5' 10\" (1.778 m)   Wt 195 lb (88.5 kg)   SpO2 99%   BMI 27.98 kg/m²     Oxygen Saturation Interpretation: Normal    The patients available past medical records and past encounters were reviewed. ------------------------------ ED COURSE/MEDICAL DECISION MAKING----------------------  Medications   doxycycline hyclate (VIBRAMYCIN) capsule 100 mg (100 mg Oral Given 11/7/21 2021)       Medical Decision Making: This is a 59 y.o. male presenting to the ED for chest pain. On initial presentation, the patient's vital signs are interpreted as hypertensive, afebrile and saturating well on room air. Based on history and physical exam, we have a broad differential.  Based on the patient's age affect risk factors we did consider ACS, arrhythmia or pneumonia. Review of EMR shows the patient had a course of antibiotics for pneumonia in September. He was started on levofloxacin. As he does endorse worsening pain with deep inspiration we cannot exclude PE. At this time will obtain chest x-ray, EKG and laboratory studies. We will obtain CT PE.    A 12-lead EKG was performed and interpreted by myself. The rate is 96 with normal sinus rhythm and normal axis. The patient does have RSR prime in V1. The GA interval is 150, QRS interval is 80, and QTC interval is 4[birth year]1944. No acute ST elevation or depression. Good R wave progression.   This is normal sinus rhythm with nonspecific abnormality. Chest x-ray shows left perihilar infiltrates. This is interpreted by radiology. The patient's laboratory studies show electrolytes within normal limits. BNP minimally elevated at 151. Troponin is 24 and will be repeated. Alkaline phosphatase, ALT and AST elevated with hypoalbuminemia. No leukocytosis or anemia. Repeat troponin minimally uptrending to 25. Nursing staff informed me that the patient wants to leave [address] He has had multiple attempts for IV access and CT scan. I have evaluated the patient. He no longer wants to wait in the emergency department. He does not want be poked. I discussed that we cannot exclude pulmonary embolism. We did discuss that he has pneumonia. He is agreeable to a course of antibiotics. He will be started on doxycycline and given a prescription for this. He is instructed to follow his primary care physician as soon as possible. He is saturating 99% on room air in no respiratory distress. This patient has chosen to leave against medical advice. I, Dr. Kristen Krishna, the Emergency Physician has personally explained to them that choosing to do so may result in permanent bodily harm, disability, disfigurement, or death. I discussed at length that without further evaluation and monitoring there may be unforeseen circumstances and deterioration causing permanent bodily harm or death as a result of their choice. They are alert, oriented, and have the capacity and are competent at this time to make this decision. They state that they are aware of the serious risks as explained, but they continue to wish to leave against medical advice. I urged the patient to return to the hospital as soon as possible to complete their evaluation and treatment.     This patient's ED course included: a personal history and physicial examination and re-evaluation prior to disposition    This patient has remained hemodynamically stable during their ED course. Consultations:  None    The cardiac monitor revealed sinus rhythm with a heart rate in the 80s as interpreted by me. The cardiac monitor was ordered secondary to the patient's chest pain and to monitor the patient for dysrhythmia. CPT [id number]    Oxygen Saturation Interpretation: 99 % on room air. Normal    Counseling:   I have spoken with the patient and discussed todays results, in addition to providing specific details for the plan of care and counseling regarding the diagnosis and prognosis. Questions are answered at this time and they are agreeable with the plan.     --------------------------------- IMPRESSION AND DISPOSITION ---------------------------------    IMPRESSION  1. Chest pain, unspecified type    2. Pneumonia of left lung due to infectious organism, unspecified part of lung        DISPOSITION  Disposition: Left AMA  Patient condition is fair    I, Dr. Teresa Baig, am the primary provider of record    NOTE: This report was transcribed using voice recognition software.  Every effort was made to ensure accuracy; however, inadvertent computerized transcription errors may be present        Teresa Baig MD  11/08/21 4179

## 2021-11-10 LAB
EKG ATRIAL RATE: 96 BPM
EKG P AXIS: 1 DEGREES
EKG P-R INTERVAL: 158 MS
EKG Q-T INTERVAL: 352 MS
EKG QRS DURATION: 84 MS
EKG QTC CALCULATION (BAZETT): 444 MS
EKG R AXIS: -13 DEGREES
EKG T AXIS: 27 DEGREES
EKG VENTRICULAR RATE: 96 BPM

## 2021-11-10 PROCEDURE — 93010 ELECTROCARDIOGRAM REPORT: CPT | Performed by: INTERNAL MEDICINE

## 2021-11-23 ENCOUNTER — OFFICE VISIT (OUTPATIENT)
Dept: FAMILY MEDICINE CLINIC | Age: 61
End: 2021-11-23
Payer: COMMERCIAL

## 2021-11-23 VITALS
BODY MASS INDEX: 29.06 KG/M2 | HEART RATE: 121 BPM | OXYGEN SATURATION: 92 % | SYSTOLIC BLOOD PRESSURE: 134 MMHG | WEIGHT: 203 LBS | RESPIRATION RATE: 20 BRPM | TEMPERATURE: 100.6 F | HEIGHT: 70 IN | DIASTOLIC BLOOD PRESSURE: 84 MMHG

## 2021-11-23 DIAGNOSIS — R50.9 FEVER, UNSPECIFIED FEVER CAUSE: ICD-10-CM

## 2021-11-23 DIAGNOSIS — M25.511 CHRONIC RIGHT SHOULDER PAIN: ICD-10-CM

## 2021-11-23 DIAGNOSIS — R79.89 ELEVATED LFTS: ICD-10-CM

## 2021-11-23 DIAGNOSIS — J18.9 PNEUMONIA DUE TO INFECTIOUS ORGANISM, UNSPECIFIED LATERALITY, UNSPECIFIED PART OF LUNG: ICD-10-CM

## 2021-11-23 DIAGNOSIS — R05.9 COUGH: Primary | ICD-10-CM

## 2021-11-23 DIAGNOSIS — R05.9 COUGH: ICD-10-CM

## 2021-11-23 DIAGNOSIS — R06.02 SOB (SHORTNESS OF BREATH): ICD-10-CM

## 2021-11-23 DIAGNOSIS — G89.29 CHRONIC RIGHT SHOULDER PAIN: ICD-10-CM

## 2021-11-23 DIAGNOSIS — B19.20 HEPATITIS C VIRUS INFECTION WITHOUT HEPATIC COMA, UNSPECIFIED CHRONICITY: ICD-10-CM

## 2021-11-23 DIAGNOSIS — R07.81 PLEURITIC CHEST PAIN: Primary | ICD-10-CM

## 2021-11-23 LAB
Lab: NORMAL
PERFORMING INSTRUMENT: NORMAL
QC PASS/FAIL: NORMAL
SARS-COV-2, POC: NORMAL

## 2021-11-23 PROCEDURE — G8419 CALC BMI OUT NRM PARAM NOF/U: HCPCS | Performed by: FAMILY MEDICINE

## 2021-11-23 PROCEDURE — G8484 FLU IMMUNIZE NO ADMIN: HCPCS | Performed by: FAMILY MEDICINE

## 2021-11-23 PROCEDURE — 3017F COLORECTAL CA SCREEN DOC REV: CPT | Performed by: FAMILY MEDICINE

## 2021-11-23 PROCEDURE — 4004F PT TOBACCO SCREEN RCVD TLK: CPT | Performed by: FAMILY MEDICINE

## 2021-11-23 PROCEDURE — 99215 OFFICE O/P EST HI 40 MIN: CPT | Performed by: FAMILY MEDICINE

## 2021-11-23 PROCEDURE — 87426 SARSCOV CORONAVIRUS AG IA: CPT | Performed by: FAMILY MEDICINE

## 2021-11-23 PROCEDURE — G8427 DOCREV CUR MEDS BY ELIG CLIN: HCPCS | Performed by: FAMILY MEDICINE

## 2021-11-23 RX ORDER — ALBUTEROL SULFATE 90 UG/1
AEROSOL, METERED RESPIRATORY (INHALATION)
Qty: 25.5 G | Refills: 3 | Status: SHIPPED | OUTPATIENT
Start: 2021-11-23

## 2021-11-23 RX ORDER — LEVOFLOXACIN 750 MG/1
750 TABLET ORAL DAILY
Qty: 14 TABLET | Refills: 0 | Status: SHIPPED | OUTPATIENT
Start: 2021-11-23 | End: 2021-12-07

## 2021-11-23 NOTE — PROGRESS NOTES
FM Progress Note    Subjective:   Cough. Chronic, but seems different since he was here last. Sob. In ER on 11/7/21:     Impression   Left perihilar infiltrates. Doxy given in ER. Pt not sure it helped. Still having above sxs. Febrile. Chronic hepatitis C. Elevated LFTs. Spoke with gastroenterologist Dr. Naif Alex. Patient was given blood work to get in April 2021, but has not gotten yet. Continued right shoulder pain. Numbness and pain with right arm elevation. Told possible thoracic outlet obstruction in the past.    Health Maintenance Due   Topic Date Due    Hepatitis A vaccine (1 of 2 - Risk 2-dose series) Never done    Hepatitis B vaccine (1 of 3 - Risk 3-dose series) Never done    Colon cancer screen colonoscopy  Never done    Shingles Vaccine (1 of 2) Never done    Pneumococcal 0-64 years Vaccine (1 of 2 - PPSV23) 05/20/2021    Flu vaccine (1) Never done         Objective:   /84 (Site: Right Upper Arm, Position: Sitting, Cuff Size: Large Adult)   Pulse 121   Temp 100.6 °F (38.1 °C) (Temporal)   Resp 20   Ht 5' 10\" (1.778 m)   Wt 203 lb (92.1 kg)   SpO2 92%   BMI 29.13 kg/m²   General appearance: NAD, alert and interacting appropriately. Scleral icterus. HEENT: NCAT, PERRLA, EOMI   Resp: CTAB, no WRC  CVS: RRR, no MRG  Abdomen: BS +, SNDNT  Extremities: No clubbing, cyanosis, or edema. Warm. Dry. I have reviewed this patient's previous records. I have reviewed this patient's labs. I have reviewed this patient's imaging reports. I have reviewed this patient's medications. Assessment/Plan:    Jessica Cofefy was seen today for cough, dizziness and shoulder pain. Diagnoses and all orders for this visit:    Pleuritic chest pain  -     CT CHEST W WO CONTRAST;  Future  -     T-Spot TB Test; Future    SOB (shortness of breath)  -     albuterol sulfate  (90 Base) MCG/ACT inhaler; inhale 2 puffs by mouth four times a day if needed for wheezing  -     CT CHEST W WO CONTRAST; Future  -     T-Spot TB Test; Future    Cough  -     albuterol sulfate  (90 Base) MCG/ACT inhaler; inhale 2 puffs by mouth four times a day if needed for wheezing  -     CT CHEST W WO CONTRAST; Future  -     T-Spot TB Test; Future    Chronic right shoulder pain  -     CT CHEST W WO CONTRAST; Future  -     XR SHOULDER RIGHT (MIN 2 VIEWS); Future    Pneumonia due to infectious organism, unspecified laterality, unspecified part of lung  -     levoFLOXacin (LEVAQUIN) 750 MG tablet; Take 1 tablet by mouth daily for 14 days    Fever, unspecified fever cause  -     CT CHEST W WO CONTRAST; Future  -     levoFLOXacin (LEVAQUIN) 750 MG tablet; Take 1 tablet by mouth daily for 14 days  -     T-Spot TB Test; Future  -     CBC Auto Differential; Future    Elevated LFTs  -     HEPATIC FUNCTION PANEL; Future    Hepatitis C virus infection without hepatic coma, unspecified chronicity    Hepatitis C not controlled. Elevated LFTs not controlled. Fever and pneumonia versus pleural effusion versus mass not controlled. Right shoulder pain not controlled. There are no Patient Instructions on file for this visit. Pt to contact us in 1 wk with update. Pt advised to get GI labs done so hepatitis C treatment can be approved.         Electronically signed by Jade Tineo MD on 11/23/2021 at 2:17 PM

## 2021-11-24 DIAGNOSIS — R05.9 COUGH: ICD-10-CM

## 2021-11-24 DIAGNOSIS — R06.02 SOB (SHORTNESS OF BREATH): ICD-10-CM

## 2021-11-24 DIAGNOSIS — R50.9 FEVER, UNSPECIFIED FEVER CAUSE: ICD-10-CM

## 2021-11-25 LAB
SARS-COV-2: NOT DETECTED
SOURCE: NORMAL

## 2021-11-29 DIAGNOSIS — M25.511 CHRONIC RIGHT SHOULDER PAIN: Primary | ICD-10-CM

## 2021-11-29 DIAGNOSIS — G89.29 CHRONIC RIGHT SHOULDER PAIN: Primary | ICD-10-CM

## 2021-12-09 ENCOUNTER — HOSPITAL ENCOUNTER (OUTPATIENT)
Dept: CT IMAGING | Age: 61
Discharge: HOME | End: 2021-12-11
Payer: COMMERCIAL

## 2021-12-09 DIAGNOSIS — R50.9 FEVER, UNSPECIFIED FEVER CAUSE: ICD-10-CM

## 2021-12-09 DIAGNOSIS — R06.02 SOB (SHORTNESS OF BREATH): ICD-10-CM

## 2021-12-09 DIAGNOSIS — M25.511 CHRONIC RIGHT SHOULDER PAIN: ICD-10-CM

## 2021-12-09 DIAGNOSIS — R05.9 COUGH: ICD-10-CM

## 2021-12-09 DIAGNOSIS — G89.29 CHRONIC RIGHT SHOULDER PAIN: ICD-10-CM

## 2021-12-09 DIAGNOSIS — R07.81 PLEURITIC CHEST PAIN: ICD-10-CM

## 2021-12-09 PROCEDURE — 71270 CT THORAX DX C-/C+: CPT

## 2021-12-09 PROCEDURE — 6360000004 HC RX CONTRAST MEDICATION: Performed by: RADIOLOGY

## 2021-12-09 RX ADMIN — IOPAMIDOL 75 ML: 755 INJECTION, SOLUTION INTRAVENOUS at 16:20

## 2021-12-11 DIAGNOSIS — J18.1 CONSOLIDATION LUNG (HCC): ICD-10-CM

## 2021-12-11 DIAGNOSIS — R06.02 SOB (SHORTNESS OF BREATH): ICD-10-CM

## 2021-12-11 DIAGNOSIS — R05.9 COUGH: Primary | ICD-10-CM

## 2021-12-16 ENCOUNTER — TELEPHONE (OUTPATIENT)
Dept: FAMILY MEDICINE CLINIC | Age: 61
End: 2021-12-16

## 2021-12-22 DIAGNOSIS — R06.02 SOB (SHORTNESS OF BREATH): ICD-10-CM

## 2021-12-22 DIAGNOSIS — J18.1 CONSOLIDATION LUNG (HCC): Primary | ICD-10-CM

## 2021-12-22 DIAGNOSIS — R05.9 COUGH: ICD-10-CM

## 2021-12-22 NOTE — TELEPHONE ENCOUNTER
Attempted to reach patient. No answer. Unable to leave voicemail. VMbox isn't set up.  Will try again later

## 2022-03-10 ENCOUNTER — TELEPHONE (OUTPATIENT)
Dept: FAMILY MEDICINE CLINIC | Age: 62
End: 2022-03-10

## 2022-03-10 ENCOUNTER — OFFICE VISIT (OUTPATIENT)
Dept: PRIMARY CARE CLINIC | Age: 62
End: 2022-03-10
Payer: COMMERCIAL

## 2022-03-10 VITALS
WEIGHT: 205 LBS | DIASTOLIC BLOOD PRESSURE: 93 MMHG | TEMPERATURE: 97.3 F | RESPIRATION RATE: 20 BRPM | OXYGEN SATURATION: 100 % | BODY MASS INDEX: 29.35 KG/M2 | SYSTOLIC BLOOD PRESSURE: 146 MMHG | HEIGHT: 70 IN | HEART RATE: 101 BPM

## 2022-03-10 DIAGNOSIS — M54.50 THORACOLUMBAR BACK PAIN: ICD-10-CM

## 2022-03-10 DIAGNOSIS — J18.1 CONSOLIDATION LUNG (HCC): Primary | ICD-10-CM

## 2022-03-10 DIAGNOSIS — M54.6 THORACOLUMBAR BACK PAIN: ICD-10-CM

## 2022-03-10 DIAGNOSIS — M54.6 ACUTE RIGHT-SIDED THORACIC BACK PAIN: ICD-10-CM
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
## 2022-03-10 DIAGNOSIS — M54.41 ACUTE RIGHT-SIDED LOW BACK PAIN WITH RIGHT-SIDED SCIATICA: ICD-10-CM

## 2022-03-10 PROCEDURE — G8484 FLU IMMUNIZE NO ADMIN: HCPCS | Performed by: NURSE PRACTITIONER

## 2022-03-10 PROCEDURE — 96372 THER/PROPH/DIAG INJ SC/IM: CPT | Performed by: NURSE PRACTITIONER

## 2022-03-10 PROCEDURE — 3017F COLORECTAL CA SCREEN DOC REV: CPT | Performed by: NURSE PRACTITIONER

## 2022-03-10 PROCEDURE — 4004F PT TOBACCO SCREEN RCVD TLK: CPT | Performed by: NURSE PRACTITIONER

## 2022-03-10 PROCEDURE — G8419 CALC BMI OUT NRM PARAM NOF/U: HCPCS | Performed by: NURSE PRACTITIONER

## 2022-03-10 PROCEDURE — 99213 OFFICE O/P EST LOW 20 MIN: CPT | Performed by: NURSE PRACTITIONER

## 2022-03-10 PROCEDURE — G8427 DOCREV CUR MEDS BY ELIG CLIN: HCPCS | Performed by: NURSE PRACTITIONER

## 2022-03-10 RX ORDER — TIZANIDINE 4 MG/1
4 TABLET ORAL EVERY 8 HOURS PRN
Qty: 15 TABLET | Refills: 0 | Status: SHIPPED | OUTPATIENT
Start: 2022-03-10 | End: 2022-03-15

## 2022-03-10 RX ORDER — TRAMADOL HYDROCHLORIDE 50 MG/1
50 TABLET ORAL EVERY 8 HOURS PRN
Qty: 6 TABLET | Refills: 0 | Status: SHIPPED | OUTPATIENT
Start: 2022-03-10 | End: 2022-03-12

## 2022-03-10 RX ORDER — DEXAMETHASONE SODIUM PHOSPHATE 10 MG/ML
10 INJECTION INTRAMUSCULAR; INTRAVENOUS ONCE
Status: COMPLETED | OUTPATIENT
Start: 2022-03-10 | End: 2022-03-10

## 2022-03-10 RX ADMIN — DEXAMETHASONE SODIUM PHOSPHATE 10 MG: 10 INJECTION INTRAMUSCULAR; INTRAVENOUS at 14:48

## 2022-03-10 NOTE — PROGRESS NOTES
Chief Complaint:   Lower Back Pain (Injured last friday. patient states that he's dealing with intense burning pain mainly on right ride)      History of Present Illness   Source of history provided by:  patient. Theodore Friday is a 59 y.o. old male who has a past medical history of:   Past Medical History:   Diagnosis Date    Hepatitis C     liver bx wnl. genotype is the best possible    Herniated disc, cervical          Pt presents to the [facility] care for mid/lower back pain for the past 6 days. The pain was caused by accidental fall from approximately 4-5''. Pt has not had back problems in the past.   Pt states the pain is localized to right side of back . Pt describes a burning like pain that is constant, pain does increase while sedentary. Pt denies any bowel/bladder incontinence, N/T, abdominal pain, hematuria, N/V/D, fever, chills, HA, neck pain, recent illness, dysuria, or lethargy. ROS    Unless otherwise stated in this report or unable to obtain because of the patient's clinical or mental status as evidenced by the medical record, this patients's positive and negative responses for Review of Systems, constitutional, psych, eyes, ENT, cardiovascular, respiratory, gastrointestinal, neurological, genitourinary, musculoskeletal, integument systems and systems related to the presenting problem are either stated in the preceding or were not pertinent or were negative for the symptoms and/or complaints related to the medical problem.     Past Medical History:   Past Surgical History:   Procedure Laterality Date    ANTERIOR CRUCIATE LIGAMENT REPAIR  1990s    left, [facility] ANTERIOR CRUCIATE LIGAMENT REPAIR  2009    right ACL repair, Dr. Pastora Atwood COLONOSCOPY  2009    [facility]  5/[facility]    right indirect inguinal herniorrhaphy with mesh, Dr. Maryellen Shepherd Right 5/20/14    with mesh    NECK SURGERY  1992    neck fusion C6 and C7, [place]    PICC LINE INSERTION NURSE  3/28/2021         SHOULDER SURGERY  2007    left, removal spur, Dr. Unruly Mansfield  2007    melenoma, left clavical area, [facility]    UPPER GASTROINTESTINAL ENDOSCOPY N/A 3/31/2021    EGD BIOPSY and AGUERO DILATATION performed by Mell Downey DO at [facility] History:  reports that he has been smoking cigarettes. He has a 20.00 pack-year smoking history. He has never used smokeless tobacco. He reports current alcohol use of about 38.0 standard drinks of alcohol per week. He reports current drug use. Drug: Opiates . Family History: family history includes Cancer in his father; Heart Attack (age of onset: 68) in his mother; Parkinsonism in his paternal uncle. Allergies: Patient has no known allergies. Physical Exam         VS:  BP (!) 146/93 (Site: Left Upper Arm, Position: Sitting, Cuff Size: Large Adult)   Pulse 101   Temp 97.3 °F (36.3 °C) (Temporal)   Resp 20   Ht 5' 10\" (1.778 m)   Wt 205 lb (93 kg)   SpO2 100%   BMI 29.41 kg/m²    Oxygen Saturation Interpretation: Normal.    Constitutional:  Alert, development consistent with age. HEENT:  NC/NT. Airway patent. Eyes PERRL. External ears normal.  Pharynx without erythema or exudate. Neck:  Normal ROM. Supple. No TTP. Lungs:  Clear to auscultation and breath sounds equal.  Heart:  Regular rate and rhythm, normal heart sounds, without pathological murmurs, ectopy, gallops, or rubs. Abdomen:  Soft, nontender, good bowel sounds. No firm or pulsatile mass. Back: Tenderness: TTP over lumbar/thoracic spine and right lumbar/thoracic paraspinal muscles                Swelling: No edema. Range of Motion: Decreased ROM d/t pain, slow in changing positions from sitting to standing            Skin:  No bruising, redness, abrasions, or rashes. Skin:  Normal turgor. Warm, dry, without visible rash. Neurological:  Alert and oriented.   Motor functions intact. Lab / Imaging Results   (All laboratory and radiology results have been personally reviewed by myself)  Labs:      Imaging: All Radiology results interpreted by Radiologist unless otherwise noted. Assessment / Plan     Impression(s):  1. Fall, initial encounter    2. Acute right-sided low back pain with right-sided sciatica    3. Acute right-sided thoracic back pain        Disposition:  Disposition: Xray's today. Decadron injection now, refusing Zanaflex at this time, further treatment plan will be based on xray's once received      Pt advised to follow up with PCP in 7-10 days for continued care and management. ER if changes or worse. Advised to take all medications as directed.

## 2022-03-10 NOTE — TELEPHONE ENCOUNTER
PT WAS SEEN IN WALK IN ON 03/10/22. When checking out pt said dr Rosalva Brunner needs to order a chest ct scan w contrast of his lungs. please advise

## 2022-04-18 ENCOUNTER — TELEPHONE (OUTPATIENT)
Dept: FAMILY MEDICINE CLINIC | Age: 62
End: 2022-04-18

## 2022-06-15 ENCOUNTER — OFFICE VISIT (OUTPATIENT)
Dept: PRIMARY CARE CLINIC | Age: 62
End: 2022-06-15
Payer: COMMERCIAL

## 2022-06-15 VITALS
DIASTOLIC BLOOD PRESSURE: 80 MMHG | BODY MASS INDEX: 28.06 KG/M2 | HEIGHT: 70 IN | WEIGHT: 196 LBS | TEMPERATURE: 99.8 F | OXYGEN SATURATION: 96 % | HEART RATE: 111 BPM | RESPIRATION RATE: 16 BRPM | SYSTOLIC BLOOD PRESSURE: 132 MMHG

## 2022-06-15 DIAGNOSIS — S61.431A PUNCTURE WOUND OF RIGHT HAND, FOREIGN BODY PRESENCE UNSPECIFIED, INITIAL ENCOUNTER: ICD-10-CM

## 2022-06-15 DIAGNOSIS — S61.431A PUNCTURE WOUND OF RIGHT HAND, FOREIGN BODY PRESENCE UNSPECIFIED, INITIAL ENCOUNTER: Primary | ICD-10-CM

## 2022-06-15 PROCEDURE — G8427 DOCREV CUR MEDS BY ELIG CLIN: HCPCS | Performed by: NURSE PRACTITIONER

## 2022-06-15 PROCEDURE — 3017F COLORECTAL CA SCREEN DOC REV: CPT | Performed by: NURSE PRACTITIONER

## 2022-06-15 PROCEDURE — 99213 OFFICE O/P EST LOW 20 MIN: CPT | Performed by: NURSE PRACTITIONER

## 2022-06-15 PROCEDURE — G8419 CALC BMI OUT NRM PARAM NOF/U: HCPCS | Performed by: NURSE PRACTITIONER

## 2022-06-15 PROCEDURE — 96372 THER/PROPH/DIAG INJ SC/IM: CPT | Performed by: NURSE PRACTITIONER

## 2022-06-15 PROCEDURE — 4004F PT TOBACCO SCREEN RCVD TLK: CPT | Performed by: NURSE PRACTITIONER

## 2022-06-15 RX ORDER — CEFTRIAXONE 500 MG/1
500 INJECTION, POWDER, FOR SOLUTION INTRAMUSCULAR; INTRAVENOUS ONCE
Status: COMPLETED | OUTPATIENT
Start: 2022-06-15 | End: 2022-06-15

## 2022-06-15 RX ORDER — CEPHALEXIN 500 MG/1
500 CAPSULE ORAL 3 TIMES DAILY
Qty: 30 CAPSULE | Refills: 0 | Status: SHIPPED | OUTPATIENT
Start: 2022-06-15 | End: 2022-06-25

## 2022-06-15 RX ORDER — SULFAMETHOXAZOLE AND TRIMETHOPRIM 800; 160 MG/1; MG/1
1 TABLET ORAL 2 TIMES DAILY
Qty: 20 TABLET | Refills: 0 | Status: SHIPPED | OUTPATIENT
Start: 2022-06-15 | End: 2022-06-25

## 2022-06-15 RX ADMIN — CEFTRIAXONE 500 MG: 500 INJECTION, POWDER, FOR SOLUTION INTRAMUSCULAR; INTRAVENOUS at 16:52

## 2022-06-15 NOTE — PROGRESS NOTES
Chief Complaint:   Puncture Wound (Rt hand since yesterday)      History of Present Illness   Source of history provided by:  patient. Miles Wells is a 59 y.o. old male who has a past medical history of:   Past Medical History:   Diagnosis Date    Hepatitis C     liver bx wnl. genotype is the best possible    Herniated disc, cervical         Pt  presents to the [facility] care for complaint of puncture wound by a nail  to top of right hand that occurred yesterday. Pt is UTD w/TDAP. Pt has been keeping area clean w/antibacterial soap and peroxide. Pt states the area is warm to touch, mildly painful and swollen, and has no noted streaking. Denies any fever, chills, HA, recent illness, myalgias, vomiting, or lethargy. ROS    Unless otherwise stated in this report or unable to obtain because of the patient's clinical or mental status as evidenced by the medical record, this patients's positive and negative responses for Review of Systems, constitutional, psych, eyes, ENT, cardiovascular, respiratory, gastrointestinal, neurological, genitourinary, musculoskeletal, integument systems and systems related to the presenting problem are either stated in the preceding or were not pertinent or were negative for the symptoms and/or complaints related to the medical problem. 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 Social History:  reports that he has been smoking cigarettes. He has a 20.00 pack-year smoking history. He has never used smokeless tobacco. He reports current alcohol use of about 38.0 standard drinks of alcohol per week.  He reports current drug use. Drug: Opiates . Family History: family history includes Cancer in his father; Heart Attack (age of onset: 68) in his mother; Parkinsonism in his paternal uncle. Allergies: Patient has no known allergies. Physical Exam         VS:  /80   Pulse (!) 111   Temp 99.8 °F (37.7 °C)   Resp 16   Ht 5' 10\" (1.778 m)   Wt 196 lb (88.9 kg)   SpO2 96%   BMI 28.12 kg/m²    Oxygen Saturation Interpretation: Normal.    Constitutional:  Alert, development consistent with age. HEENT:  NC/NT. Airway patent. Eyes:  PERRL, EOMI, no discharge. Mouth:  Mucous membranes moist without lesions, tongue and gums normal.  Throat:    Airway patient. Neck:  Supple. No lymphadenopathy. Lungs:  Clear to auscultation and breath sounds equal.  Heart:  Regular rate and rhythm. Skin:  Normal turgor and appropriately dry to touch. Right mid dorsal  area puncture site. Moderate erythema/edema surrounding wound. Mild TTP over same area and  warm to touch. No excoriations, macules, papules, or bullae noted. Light yellow drainage noted. no lymphangitic streaking. Wound culture obtained, area cleaned w/surgical wound cleanser and applied sterile dressing  Neurological:  Orientation age-appropriate unless noted elseware. Motor functions intact. Lab / Imaging Results   (All laboratory and radiology results have been personally reviewed by myself)  Labs:      Imaging: All Radiology results interpreted by Radiologist unless otherwise noted. Assessment / Plan     Impression(s):  1. Puncture wound of right hand, foreign body presence unspecified, initial encounter      Disposition:  Disposition: Wound Culture sent to lab, Rocephin 500 mg IM now, Start Keflex and Bactrim as ordered.  Keep area clean w/antibacterial soap, advised to return to walk in care or ED immediately w/any worsening as discussed, provided pt w/dressing supplies

## 2022-06-18 LAB
ORGANISM: ABNORMAL
WOUND/ABSCESS: ABNORMAL
WOUND/ABSCESS: ABNORMAL

## 2022-06-21 DIAGNOSIS — S61.431A PUNCTURE WOUND OF RIGHT HAND, FOREIGN BODY PRESENCE UNSPECIFIED, INITIAL ENCOUNTER: ICD-10-CM

## 2022-06-21 DIAGNOSIS — T14.8XXA PUNCTURE: Primary | ICD-10-CM

## 2022-07-06 ENCOUNTER — HOSPITAL ENCOUNTER (OUTPATIENT)
Dept: CT IMAGING | Age: 62
Discharge: HOME | End: 2022-07-08
Payer: COMMERCIAL

## 2022-07-06 DIAGNOSIS — G89.29 CHRONIC LEFT-SIDED THORACIC BACK PAIN: ICD-10-CM

## 2022-07-06 DIAGNOSIS — J18.9 PNEUMONIA OF LEFT LUNG DUE TO INFECTIOUS ORGANISM, UNSPECIFIED PART OF LUNG: ICD-10-CM

## 2022-07-06 DIAGNOSIS — M54.6 CHRONIC LEFT-SIDED THORACIC BACK PAIN: ICD-10-CM

## 2022-07-06 PROCEDURE — 71250 CT THORAX DX C-: CPT

## 2022-08-08 ENCOUNTER — OFFICE VISIT (OUTPATIENT)
Dept: PRIMARY CARE CLINIC | Age: 62
End: 2022-08-08
Payer: COMMERCIAL

## 2022-08-08 VITALS
DIASTOLIC BLOOD PRESSURE: 76 MMHG | SYSTOLIC BLOOD PRESSURE: 111 MMHG | BODY MASS INDEX: 27.11 KG/M2 | HEIGHT: 70 IN | RESPIRATION RATE: 20 BRPM | TEMPERATURE: 97.6 F | WEIGHT: 189.4 LBS | OXYGEN SATURATION: 97 % | HEART RATE: 104 BPM

## 2022-08-08 DIAGNOSIS — L72.3 SEBACEOUS CYST OF FINGER OF RIGHT HAND: Primary | ICD-10-CM

## 2022-08-08 PROCEDURE — 4004F PT TOBACCO SCREEN RCVD TLK: CPT | Performed by: STUDENT IN AN ORGANIZED HEALTH CARE EDUCATION/TRAINING PROGRAM

## 2022-08-08 PROCEDURE — 99213 OFFICE O/P EST LOW 20 MIN: CPT | Performed by: STUDENT IN AN ORGANIZED HEALTH CARE EDUCATION/TRAINING PROGRAM

## 2022-08-08 PROCEDURE — G8419 CALC BMI OUT NRM PARAM NOF/U: HCPCS | Performed by: STUDENT IN AN ORGANIZED HEALTH CARE EDUCATION/TRAINING PROGRAM

## 2022-08-08 PROCEDURE — 3017F COLORECTAL CA SCREEN DOC REV: CPT | Performed by: STUDENT IN AN ORGANIZED HEALTH CARE EDUCATION/TRAINING PROGRAM

## 2022-08-08 PROCEDURE — G8427 DOCREV CUR MEDS BY ELIG CLIN: HCPCS | Performed by: STUDENT IN AN ORGANIZED HEALTH CARE EDUCATION/TRAINING PROGRAM

## 2022-08-08 SDOH — ECONOMIC STABILITY: FOOD INSECURITY: WITHIN THE PAST 12 MONTHS, YOU WORRIED THAT YOUR FOOD WOULD RUN OUT BEFORE YOU GOT MONEY TO BUY MORE.: NEVER TRUE

## 2022-08-08 SDOH — ECONOMIC STABILITY: FOOD INSECURITY: WITHIN THE PAST 12 MONTHS, THE FOOD YOU BOUGHT JUST DIDN'T LAST AND YOU DIDN'T HAVE MONEY TO GET MORE.: NEVER TRUE

## 2022-08-08 ASSESSMENT — SOCIAL DETERMINANTS OF HEALTH (SDOH): HOW HARD IS IT FOR YOU TO PAY FOR THE VERY BASICS LIKE FOOD, HOUSING, MEDICAL CARE, AND HEATING?: NOT HARD AT ALL

## 2022-08-08 NOTE — PROGRESS NOTES
Patient:  Melisa Chin 59 y.o. male     08/08/22      Chiefcomplaint:   Chief Complaint   Patient presents with    Puncture Wound     History of Present Illness   Ptient with history of puncture wound to right hand. Cx + for mrsa. Completed abx. Has residual swelling and hardness on site of wound. Wanted it looked at. No pain or systemic sx. No new redness. Health Maintenance Due   Topic Date Due    Hepatitis A vaccine (1 of 2 - Risk 2-dose series) Never done    Hepatitis B vaccine (1 of 3 - Risk 3-dose series) Never done    Colorectal Cancer Screen  Never done    Shingles vaccine (1 of 2) Never done    Low dose CT lung screening  Never done    Prostate Specific Antigen (PSA) Screening or Monitoring  10/15/2016    COVID-19 Vaccine (3 - Booster for Moderna series) 12/09/2021    Depression Screen  02/04/2022    Pneumococcal 0-64 years Vaccine (2 - PPSV23 or PCV20) 03/25/2022      History:  Prior to Visit Medications    Medication Sig Taking? Authorizing Provider   Multiple Vitamins-Minerals (THERAPEUTIC MULTIVITAMIN-MINERALS) tablet Take 1 tablet by mouth daily Yes Historical Provider, MD   diphenhydrAMINE (BENADRYL) 25 MG tablet Take 50 mg by mouth nightly as needed for Sleep Yes Historical Provider, MD   buprenorphine-naloxone (SUBOXONE) 8-2 MG FILM SL film Place 1 Film under the tongue 2 times daily. Yes Historical Provider, MD   albuterol sulfate  (90 Base) MCG/ACT inhaler inhale 2 puffs by mouth four times a day if needed for wheezing  Patient not taking: No sig reported  Mike Blanco MD   Diclofenac Sodium (VOLTAREN PO) Take by mouth  Patient not taking: No sig reported  Historical Provider, MD   gabapentin (NEURONTIN) 300 MG capsule Take 1 capsule by mouth 2 times daily for 180 days.  Intended supply: 90 days  Patient not taking: Reported on 6/29/2022  Mike Blanco MD   fluticasone Frosty Cuna) 50 MCG/ACT nasal spray 1 spray by Each Nostril route daily  Patient not taking: No sig reported  Historical Provider, MD   dextromethorphan-guaiFENesin (MUCINEX DM)  MG per extended release tablet Take 0.5 tablets by mouth every 12 hours as needed  Patient not taking: No sig reported  Historical Provider, MD      Past Medical History:   Diagnosis Date    Hepatitis C     liver bx wnl. genotype is the best possible    Herniated disc, cervical      Physical Exam   Vitals: /76 (Site: Left Upper Arm, Position: Sitting, Cuff Size: Medium Adult)   Pulse (!) 104   Temp 97.6 °F (36.4 °C) (Temporal)   Resp 20   Ht 5' 10\" (1.778 m)   Wt 189 lb 6.4 oz (85.9 kg)   SpO2 97%   BMI 27.18 kg/m²   General Appearance: Alert, oriented, no acute distress  HEENT: No scleral icterus. No visible discharge from eyes  Neck: Not rigid. No visible masses  Extremities:  No edema  Skin: No rashes. No jaundice. Patient has what probably amounts to persistent chronic cyst in area of previous trauma. There is no evidence of infection. Neuro: Alert and oriented        Psych: Appropriate mood and appropriate affect    Assessment and Plan   Sebaceous cyst of finger of right hand  Appears to be cyst in area of previous trauma. No indication for new intervention. No infection or cellulitic appearance. Will monitor. Pt declines xray today but I don't believe there is a good indication for it at this time. Can fu with hand surgeon if he would like cyst removed. No follow-ups on file. Will Paul, DO     This document may have been prepared at least partially through the use of voice recognition software. Although effort is taken to assure the accuracy of this document, it is possible that grammatical, syntax,  or spelling errors may occur.

## 2022-09-22 NOTE — TELEPHONE ENCOUNTER
Does that mean pt is taking total of 20 mg twice daily? Thanks.
Ordered, thanks.
Detail Level: Detailed

## 2022-11-05 ENCOUNTER — APPOINTMENT (OUTPATIENT)
Dept: GENERAL RADIOLOGY | Age: 62
DRG: 082 | End: 2022-11-05
Payer: COMMERCIAL

## 2022-11-05 ENCOUNTER — HOSPITAL ENCOUNTER (INPATIENT)
Age: 62
LOS: 25 days | Discharge: NURSING, REHAB OR LONG-TERM CARE | DRG: 082 | End: 2022-11-30
Attending: EMERGENCY MEDICINE | Admitting: SURGERY
Payer: COMMERCIAL

## 2022-11-05 ENCOUNTER — APPOINTMENT (OUTPATIENT)
Dept: CT IMAGING | Age: 62
DRG: 082 | End: 2022-11-05
Payer: COMMERCIAL

## 2022-11-05 DIAGNOSIS — U07.1 COVID-19: ICD-10-CM

## 2022-11-05 DIAGNOSIS — I60.9 SUBARACHNOID BLEED (HCC): ICD-10-CM

## 2022-11-05 DIAGNOSIS — S02.119A CLOSED FRACTURE OF OCCIPITAL BONE, UNSPECIFIED LATERALITY, UNSPECIFIED OCCIPITAL FRACTURE TYPE, INITIAL ENCOUNTER (HCC): ICD-10-CM

## 2022-11-05 DIAGNOSIS — R57.9 SHOCK (HCC): ICD-10-CM

## 2022-11-05 DIAGNOSIS — F10.929 ACUTE ALCOHOLIC INTOXICATION WITH COMPLICATION (HCC): ICD-10-CM

## 2022-11-05 DIAGNOSIS — J96.01 ACUTE RESPIRATORY FAILURE WITH HYPOXIA (HCC): ICD-10-CM

## 2022-11-05 DIAGNOSIS — S06.5XAA SUBDURAL HEMATOMA: Primary | ICD-10-CM

## 2022-11-05 DIAGNOSIS — N17.9 AKI (ACUTE KIDNEY INJURY) (HCC): ICD-10-CM

## 2022-11-05 LAB
ACETAMINOPHEN LEVEL: 7.9 MCG/ML (ref 10–30)
ALBUMIN SERPL-MCNC: 4.1 G/DL (ref 3.5–5.2)
ALP BLD-CCNC: 130 U/L (ref 40–129)
ALT SERPL-CCNC: 72 U/L (ref 0–40)
AMMONIA: 24 UMOL/L (ref 16–60)
AMPHETAMINE SCREEN, URINE: NOT DETECTED
ANION GAP SERPL CALCULATED.3IONS-SCNC: 15 MMOL/L (ref 7–16)
AST SERPL-CCNC: 103 U/L (ref 0–39)
BACTERIA: ABNORMAL /HPF
BARBITURATE SCREEN URINE: NOT DETECTED
BASOPHILS ABSOLUTE: 0.04 E9/L (ref 0–0.2)
BASOPHILS RELATIVE PERCENT: 1.5 % (ref 0–2)
BENZODIAZEPINE SCREEN, URINE: NOT DETECTED
BILIRUB SERPL-MCNC: 0.4 MG/DL (ref 0–1.2)
BILIRUBIN URINE: NEGATIVE
BLOOD, URINE: ABNORMAL
BUN BLDV-MCNC: 31 MG/DL (ref 6–23)
CALCIUM SERPL-MCNC: 10.1 MG/DL (ref 8.6–10.2)
CANNABINOID SCREEN URINE: NOT DETECTED
CHLORIDE BLD-SCNC: 101 MMOL/L (ref 98–107)
CLARITY: CLEAR
CO2: 20 MMOL/L (ref 22–29)
COCAINE METABOLITE SCREEN URINE: NOT DETECTED
COLOR: YELLOW
CREAT SERPL-MCNC: 2.1 MG/DL (ref 0.7–1.2)
EOSINOPHILS ABSOLUTE: 0.05 E9/L (ref 0.05–0.5)
EOSINOPHILS RELATIVE PERCENT: 1.8 % (ref 0–6)
ETHANOL: 117 MG/DL (ref 0–0.08)
FENTANYL SCREEN, URINE: NOT DETECTED
GFR SERPL CREATININE-BSD FRML MDRD: 34 ML/MIN/1.73
GLUCOSE BLD-MCNC: 122 MG/DL (ref 74–99)
GLUCOSE URINE: NEGATIVE MG/DL
HCT VFR BLD CALC: 45.2 % (ref 37–54)
HEMOGLOBIN: 15.3 G/DL (ref 12.5–16.5)
HYALINE CASTS: ABNORMAL /LPF (ref 0–2)
IMMATURE GRANULOCYTES #: 0.01 E9/L
IMMATURE GRANULOCYTES %: 0.4 % (ref 0–5)
KETONES, URINE: NEGATIVE MG/DL
LACTIC ACID: 1.9 MMOL/L (ref 0.5–2.2)
LEUKOCYTE ESTERASE, URINE: NEGATIVE
LYMPHOCYTES ABSOLUTE: 0.86 E9/L (ref 1.5–4)
LYMPHOCYTES RELATIVE PERCENT: 31.3 % (ref 20–42)
Lab: NORMAL
MCH RBC QN AUTO: 33.5 PG (ref 26–35)
MCHC RBC AUTO-ENTMCNC: 33.8 % (ref 32–34.5)
MCV RBC AUTO: 98.9 FL (ref 80–99.9)
METHADONE SCREEN, URINE: NOT DETECTED
MONOCYTES ABSOLUTE: 0.57 E9/L (ref 0.1–0.95)
MONOCYTES RELATIVE PERCENT: 20.7 % (ref 2–12)
NEUTROPHILS ABSOLUTE: 1.22 E9/L (ref 1.8–7.3)
NEUTROPHILS RELATIVE PERCENT: 44.3 % (ref 43–80)
NITRITE, URINE: NEGATIVE
OPIATE SCREEN URINE: NOT DETECTED
OXYCODONE URINE: NOT DETECTED
PDW BLD-RTO: 12.3 FL (ref 11.5–15)
PH UA: 6 (ref 5–9)
PHENCYCLIDINE SCREEN URINE: NOT DETECTED
PLATELET # BLD: 144 E9/L (ref 130–450)
PMV BLD AUTO: 9.6 FL (ref 7–12)
POTASSIUM SERPL-SCNC: 3.3 MMOL/L (ref 3.5–5)
PROTEIN UA: 100 MG/DL
RBC # BLD: 4.57 E12/L (ref 3.8–5.8)
RBC UA: ABNORMAL /HPF (ref 0–2)
SALICYLATE, SERUM: <0.3 MG/DL (ref 0–30)
SARS-COV-2, NAAT: DETECTED
SODIUM BLD-SCNC: 136 MMOL/L (ref 132–146)
SPECIFIC GRAVITY UA: >=1.03 (ref 1–1.03)
TOTAL PROTEIN: 8.6 G/DL (ref 6.4–8.3)
TRICYCLIC ANTIDEPRESSANTS SCREEN SERUM: NEGATIVE NG/ML
UROBILINOGEN, URINE: 0.2 E.U./DL
WBC # BLD: 2.8 E9/L (ref 4.5–11.5)
WBC UA: ABNORMAL /HPF (ref 0–5)

## 2022-11-05 PROCEDURE — 70450 CT HEAD/BRAIN W/O DYE: CPT

## 2022-11-05 PROCEDURE — 72170 X-RAY EXAM OF PELVIS: CPT

## 2022-11-05 PROCEDURE — 80179 DRUG ASSAY SALICYLATE: CPT

## 2022-11-05 PROCEDURE — 80143 DRUG ASSAY ACETAMINOPHEN: CPT

## 2022-11-05 PROCEDURE — 72125 CT NECK SPINE W/O DYE: CPT

## 2022-11-05 PROCEDURE — 6360000002 HC RX W HCPCS: Performed by: EMERGENCY MEDICINE

## 2022-11-05 PROCEDURE — 99285 EMERGENCY DEPT VISIT HI MDM: CPT

## 2022-11-05 PROCEDURE — 80307 DRUG TEST PRSMV CHEM ANLYZR: CPT

## 2022-11-05 PROCEDURE — 2580000003 HC RX 258: Performed by: EMERGENCY MEDICINE

## 2022-11-05 PROCEDURE — 82140 ASSAY OF AMMONIA: CPT

## 2022-11-05 PROCEDURE — 85025 COMPLETE CBC W/AUTO DIFF WBC: CPT

## 2022-11-05 PROCEDURE — 71045 X-RAY EXAM CHEST 1 VIEW: CPT

## 2022-11-05 PROCEDURE — 6360000002 HC RX W HCPCS: Performed by: STUDENT IN AN ORGANIZED HEALTH CARE EDUCATION/TRAINING PROGRAM

## 2022-11-05 PROCEDURE — 82077 ASSAY SPEC XCP UR&BREATH IA: CPT

## 2022-11-05 PROCEDURE — 80053 COMPREHEN METABOLIC PANEL: CPT

## 2022-11-05 PROCEDURE — 83605 ASSAY OF LACTIC ACID: CPT

## 2022-11-05 PROCEDURE — 93005 ELECTROCARDIOGRAM TRACING: CPT | Performed by: STUDENT IN AN ORGANIZED HEALTH CARE EDUCATION/TRAINING PROGRAM

## 2022-11-05 PROCEDURE — 2580000003 HC RX 258: Performed by: STUDENT IN AN ORGANIZED HEALTH CARE EDUCATION/TRAINING PROGRAM

## 2022-11-05 PROCEDURE — 81001 URINALYSIS AUTO W/SCOPE: CPT

## 2022-11-05 PROCEDURE — 87635 SARS-COV-2 COVID-19 AMP PRB: CPT

## 2022-11-05 PROCEDURE — 2000000000 HC ICU R&B

## 2022-11-05 RX ORDER — SODIUM CHLORIDE 9 MG/ML
INJECTION, SOLUTION INTRAVENOUS CONTINUOUS
Status: DISCONTINUED | OUTPATIENT
Start: 2022-11-05 | End: 2022-11-09

## 2022-11-05 RX ORDER — LEVETIRACETAM 5 MG/ML
500 INJECTION INTRAVASCULAR EVERY 12 HOURS
Status: DISCONTINUED | OUTPATIENT
Start: 2022-11-06 | End: 2022-11-06

## 2022-11-05 RX ORDER — ONDANSETRON 2 MG/ML
4 INJECTION INTRAMUSCULAR; INTRAVENOUS EVERY 6 HOURS PRN
Status: DISCONTINUED | OUTPATIENT
Start: 2022-11-05 | End: 2022-11-30 | Stop reason: HOSPADM

## 2022-11-05 RX ORDER — SODIUM CHLORIDE 0.9 % (FLUSH) 0.9 %
10 SYRINGE (ML) INJECTION EVERY 12 HOURS SCHEDULED
Status: DISCONTINUED | OUTPATIENT
Start: 2022-11-05 | End: 2022-11-30 | Stop reason: HOSPADM

## 2022-11-05 RX ORDER — ONDANSETRON 4 MG/1
4 TABLET, ORALLY DISINTEGRATING ORAL EVERY 8 HOURS PRN
Status: DISCONTINUED | OUTPATIENT
Start: 2022-11-05 | End: 2022-11-30 | Stop reason: HOSPADM

## 2022-11-05 RX ORDER — GAUZE BANDAGE 2" X 2"
100 BANDAGE TOPICAL DAILY
Status: DISCONTINUED | OUTPATIENT
Start: 2022-11-06 | End: 2022-11-06 | Stop reason: SDUPTHER

## 2022-11-05 RX ORDER — 0.9 % SODIUM CHLORIDE 0.9 %
1000 INTRAVENOUS SOLUTION INTRAVENOUS ONCE
Status: COMPLETED | OUTPATIENT
Start: 2022-11-05 | End: 2022-11-05

## 2022-11-05 RX ORDER — SODIUM CHLORIDE 0.9 % (FLUSH) 0.9 %
10 SYRINGE (ML) INJECTION PRN
Status: DISCONTINUED | OUTPATIENT
Start: 2022-11-05 | End: 2022-11-30 | Stop reason: HOSPADM

## 2022-11-05 RX ORDER — POLYETHYLENE GLYCOL 3350 17 G/17G
17 POWDER, FOR SOLUTION ORAL DAILY
Status: DISCONTINUED | OUTPATIENT
Start: 2022-11-05 | End: 2022-11-21

## 2022-11-05 RX ORDER — FOLIC ACID 5 MG/ML
1 INJECTION, SOLUTION INTRAMUSCULAR; INTRAVENOUS; SUBCUTANEOUS DAILY
Status: DISCONTINUED | OUTPATIENT
Start: 2022-11-06 | End: 2022-11-15

## 2022-11-05 RX ORDER — SODIUM CHLORIDE 9 MG/ML
INJECTION, SOLUTION INTRAVENOUS PRN
Status: DISCONTINUED | OUTPATIENT
Start: 2022-11-05 | End: 2022-11-30 | Stop reason: HOSPADM

## 2022-11-05 RX ORDER — ACETAMINOPHEN 325 MG/1
650 TABLET ORAL EVERY 4 HOURS PRN
Status: DISCONTINUED | OUTPATIENT
Start: 2022-11-05 | End: 2022-11-30 | Stop reason: HOSPADM

## 2022-11-05 RX ADMIN — SODIUM CHLORIDE 2000 MG: 9 INJECTION, SOLUTION INTRAVENOUS at 21:22

## 2022-11-05 RX ADMIN — SODIUM CHLORIDE 1000 ML: 9 INJECTION, SOLUTION INTRAVENOUS at 19:12

## 2022-11-05 RX ADMIN — ONDANSETRON 4 MG: 2 INJECTION INTRAMUSCULAR; INTRAVENOUS at 21:22

## 2022-11-05 ASSESSMENT — PAIN SCALES - GENERAL
PAINLEVEL_OUTOF10: 7
PAINLEVEL_OUTOF10: 4

## 2022-11-05 ASSESSMENT — PAIN DESCRIPTION - LOCATION
LOCATION: HEAD
LOCATION: HEAD;SHOULDER

## 2022-11-05 ASSESSMENT — PAIN - FUNCTIONAL ASSESSMENT
PAIN_FUNCTIONAL_ASSESSMENT: NONE - DENIES PAIN
PAIN_FUNCTIONAL_ASSESSMENT: 0-10
PAIN_FUNCTIONAL_ASSESSMENT: 0-10

## 2022-11-05 ASSESSMENT — PAIN DESCRIPTION - ONSET: ONSET: ON-GOING

## 2022-11-05 ASSESSMENT — PAIN DESCRIPTION - ORIENTATION: ORIENTATION: LEFT

## 2022-11-05 ASSESSMENT — PAIN DESCRIPTION - FREQUENCY: FREQUENCY: CONTINUOUS

## 2022-11-05 ASSESSMENT — PAIN DESCRIPTION - DESCRIPTORS: DESCRIPTORS: ACHING;SORE

## 2022-11-05 ASSESSMENT — PAIN DESCRIPTION - PAIN TYPE: TYPE: ACUTE PAIN

## 2022-11-05 NOTE — ED PROVIDER NOTES
HPI  57 y.o. male presenting for fall. Incident occurred today . Symptoms  have been persistent and moderate in severity. They are worsened by nothing and relieved by nothing. Per report, patient was unwitnessed fall at work. Positive loss of consciousness with hematoma to posterior head. He was reportedly A&O x2. However on my exam, patient is alert and oriented to person, place, and year. He denies falling and states he is here for follow-up. He complains of pain in the back of his head that he attributes to the cervical collar. He complains of any pain anywhere else. He denies any drug or alcohol use.      --------------------------------------------- PAST HISTORY ---------------------------------------------  Past Medical History:  has a past medical history of Hepatitis C and Herniated disc, cervical.    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 Social History:  reports that he has been smoking cigarettes. He started smoking about 52 years ago. He has a 25.00 pack-year smoking history. He has never used smokeless tobacco. He reports current alcohol use of about 38.0 standard drinks per week. He reports current drug use. Drug: Opiates . Family History: family history includes Cancer in his father; Heart Attack (age of onset: 68) in his mother; Parkinsonism in his paternal uncle. The patients home medications have been reviewed. Allergies: Patient has no known allergies. Review of Systems   Unable to perform ROS: Mental status change      Physical Exam  Constitutional:       General: He is not in acute distress. Appearance: Normal appearance. He is not ill-appearing. HENT:      Head: Normocephalic. Comments: Abrasion posterior scalp     Right Ear: External ear normal.      Left Ear: External ear normal.      Nose: Nose normal.   Eyes:      Extraocular Movements: Extraocular movements intact. Conjunctiva/sclera: Conjunctivae normal.      Pupils: Pupils are equal, round, and reactive to light. Neck:      Comments: C-collar in place; no midline tenderness to palpation, step-offs or deformities  Cardiovascular:      Rate and Rhythm: Normal rate and regular rhythm. Pulmonary:      Effort: Pulmonary effort is normal. No respiratory distress. Breath sounds: Normal breath sounds. No stridor. No wheezing, rhonchi or rales. Abdominal:      General: There is no distension. Palpations: Abdomen is soft. Tenderness: There is no abdominal tenderness. Musculoskeletal:         General: No swelling or deformity. Comments: No midline spinal tenderness to palpation, step-offs, or deformities   Skin:     General: Skin is warm and dry. Neurological:      General: No focal deficit present. Mental Status: He is alert.       Comments: AAO to person, place, and year, but not purpose; patient moves all extremities equally, strength is equal bilaterally, no sensory deficits reported   Psychiatric:         Mood and Affect: Mood normal.        Procedures          -------------------------------------------------- RESULTS -------------------------------------------------    LABS:  Results for orders placed or performed during the hospital encounter of 11/05/22   COVID-19, Rapid    Specimen: Nasopharyngeal Swab   Result Value Ref Range    SARS-CoV-2, NAAT DETECTED (A) Not Detected   CBC with Auto Differential   Result Value Ref Range    WBC 2.8 (L) 4.5 - 11.5 E9/L    RBC 4.57 3.80 - 5.80 E12/L    Hemoglobin 15.3 12.5 - 16.5 g/dL    Hematocrit 45.2 37.0 - 54.0 %    MCV 98.9 80.0 - 99.9 fL    MCH 33.5 26.0 - 35.0 pg    MCHC 33.8 32.0 - 34.5 %    RDW 12.3 11.5 - 15.0 fL    Platelets 262 749 - 577 E9/L    MPV 9.6 7.0 - 12.0 fL    Neutrophils % 44.3 43.0 - 80.0 %    Immature Granulocytes % 0.4 0.0 - 5.0 %    Lymphocytes % 31.3 20.0 - 42.0 %    Monocytes % 20.7 (H) 2.0 - 12.0 %    Eosinophils % 1.8 0.0 - 6.0 %    Basophils % 1.5 0.0 - 2.0 %    Neutrophils Absolute 1.22 (L) 1.80 - 7.30 E9/L    Immature Granulocytes # 0.01 E9/L    Lymphocytes Absolute 0.86 (L) 1.50 - 4.00 E9/L    Monocytes Absolute 0.57 0.10 - 0.95 E9/L    Eosinophils Absolute 0.05 0.05 - 0.50 E9/L    Basophils Absolute 0.04 0.00 - 0.20 E9/L   CMP   Result Value Ref Range    Sodium 136 132 - 146 mmol/L    Potassium 3.3 (L) 3.5 - 5.0 mmol/L    Chloride 101 98 - 107 mmol/L    CO2 20 (L) 22 - 29 mmol/L    Anion Gap 15 7 - 16 mmol/L    Glucose 122 (H) 74 - 99 mg/dL    BUN 31 (H) 6 - 23 mg/dL    Creatinine 2.1 (H) 0.7 - 1.2 mg/dL    Est, Glom Filt Rate 34 >=60 mL/min/1.73    Calcium 10.1 8.6 - 10.2 mg/dL    Total Protein 8.6 (H) 6.4 - 8.3 g/dL    Albumin 4.1 3.5 - 5.2 g/dL    Total Bilirubin 0.4 0.0 - 1.2 mg/dL    Alkaline Phosphatase 130 (H) 40 - 129 U/L    ALT 72 (H) 0 - 40 U/L     (H) 0 - 39 U/L   Lactic Acid   Result Value Ref Range    Lactic Acid 1.9 0.5 - 2.2 mmol/L   Urinalysis with Microscopic   Result Value Ref Range    Color, UA Yellow Straw/Yellow    Clarity, UA Clear Clear    Glucose, Ur Negative Negative mg/dL    Bilirubin Urine Negative Negative    Ketones, Urine Negative Negative mg/dL    Specific Gravity, UA >=1.030 1.005 - 1.030    Blood, Urine MODERATE (A) Negative    pH, UA 6.0 5.0 - 9.0    Protein,  (A) Negative mg/dL    Urobilinogen, Urine 0.2 <2.0 E.U./dL    Nitrite, Urine Negative Negative    Leukocyte Esterase, Urine Negative Negative    Hyaline Casts, UA 3-5 (A) 0 - 2 /LPF    WBC, UA 1-3 0 - 5 /HPF    RBC, UA 5-10 (A) 0 - 2 /HPF    Bacteria, UA FEW (A) None Seen /HPF   Ammonia   Result Value Ref Range    Ammonia 24.0 16.0 - 60.0 umol/L   Serum Drug Screen   Result Value Ref Range    Ethanol Lvl 117 mg/dL    Acetaminophen Level 7.9 (L) 10.0 - 84.7 mcg/mL    Salicylate, Serum <6.4 0.0 - 30.0 mg/dL    TCA Scrn NEGATIVE Cutoff:300 ng/mL   URINE DRUG SCREEN   Result Value Ref Range    Amphetamine Screen, Urine NOT DETECTED Negative <1000 ng/mL    Barbiturate Screen, Ur NOT DETECTED Negative < 200 ng/mL    Benzodiazepine Screen, Urine NOT DETECTED Negative < 200 ng/mL    Cannabinoid Scrn, Ur NOT DETECTED Negative < 50ng/mL    Cocaine Metabolite Screen, Urine NOT DETECTED Negative < 300 ng/mL    Opiate Scrn, Ur NOT DETECTED Negative < 300ng/mL    PCP Screen, Urine NOT DETECTED Negative < 25 ng/mL    Methadone Screen, Urine NOT DETECTED Negative <300 ng/mL    Oxycodone Urine NOT DETECTED Negative <100 ng/mL    FENTANYL SCREEN, URINE NOT DETECTED Negative <1 ng/mL    Drug Screen Comment: see below    TEG   Result Value Ref Range    R (Reaction Time) 4.9 (L) 5.0 - 10.0 min    K (Clotting Time) 1.1 1.0 - 3.0 min    Angle (Clot Strength) 73.9 59.0 - 74.0 degree    MA (Max Amplitude) 63.4 50.0 - 70.0 mm    G-TEG 8.7 4.5 - 11.0 K d/sc    EPL-TEG 0.0 0.0 - 15.0 %    LY30 (Fibrinolysis) 0.0 0.0 - 8.0 %   Protime-INR   Result Value Ref Range    Protime 11.8 9.3 - 12.4 sec    INR 1.1    EKG 12 Lead   Result Value Ref Range    Ventricular Rate 82 BPM    Atrial Rate 82 BPM    P-R Interval 156 ms    QRS Duration 100 ms    Q-T Interval 396 ms    QTc Calculation (Bazett) 462 ms    P Axis 33 degrees    R Axis -5 degrees    T Axis 53 degrees       RADIOLOGY:  CT HEAD WO CONTRAST   Final Result   1. Enlarging bifrontal parenchymal contusions and newly developed bitemporal   parenchymal contusions. Stable small bilateral subarachnoid and left   subdural hematomas.    2. Increasing left greater than right frontal predominant mass effect with   progressive effacement of the left lateral ventricle and increasing 5 mm left   right midline shift at the foramen of Ankit, previously 3 mm. The findings were sent to the Radiology Results [address] at 1:01   am on 11/6/2022 to be communicated to a licensed caregiver. RECOMMENDATIONS:   Careful clinical correlation and follow up recommended. CTA NECK W CONTRAST   Final Result   Unremarkable CTA of the neck. RECOMMENDATIONS:   Careful clinical correlation and follow up recommended. CT HEAD WO CONTRAST   Final Result   HEAD:      Multicompartmental intracranial hemorrhages as described above, the largest   of which are a 9 mm thickness left subdural hematoma and a 2 cm left frontal   hemorrhagic contusion. 3 mm of rightward midline shift. Questionable 3 mm   thickness right frontotemporal subdural hematoma; recommend attention on   short interval follow-up. Nondisplaced bilateral occipital bone fractures. CERVICAL SPINE:      No acute fracture or traumatic malalignment. Degenerative changes      Critical results were called by Dr. Cristian Webb MD to Dr. Valentina Finney on 11/5/2022 at 18:43. CT CSpine W/O Contrast   Final Result   HEAD:      Multicompartmental intracranial hemorrhages as described above, the largest   of which are a 9 mm thickness left subdural hematoma and a 2 cm left frontal   hemorrhagic contusion. 3 mm of rightward midline shift. Questionable 3 mm   thickness right frontotemporal subdural hematoma; recommend attention on   short interval follow-up. Nondisplaced bilateral occipital bone fractures. CERVICAL SPINE:      No acute fracture or traumatic malalignment. Degenerative changes      Critical results were called by Dr. Cristian Webb MD to Dr. Valentina Finney on 11/5/2022 at 18:43. XR PELVIS (1-2 VIEWS)   Final Result   Unremarkable AP pelvis with no fracture or subluxation. XR CHEST 1 VIEW   Final Result   No acute process. CT HEAD WO CONTRAST    (Results Pending)       EKG:   This EKG is signed and interpreted by me. Rate: 82  Rhythm: Sinus  Interpretation: Incomplete right bundle branch block, no acute ST elevations or depressions, no acute T wave changes, normal intervals  Comparison: stable as compared to patient's most recent EKG      ------------------------- NURSING NOTES AND VITALS REVIEWED ---------------------------  Date / Time Roomed:  11/5/2022  3:50 PM  ED Bed Assignment:  6074/0580-U    The nursing notes within the ED encounter and vital signs as below have been reviewed. Patient Vitals for the past 24 hrs:   BP Temp Temp src Pulse Resp SpO2 Weight   11/06/22 0258 (!) 159/95 97.2 °F (36.2 °C) Oral 83 22 95 % --   11/06/22 0240 122/69 98.4 °F (36.9 °C) -- 57 12 100 % --   11/05/22 2327 (!) 139/92 -- -- 93 17 99 % --   11/05/22 1919 134/82 -- -- 68 16 99 % --   11/05/22 1651 -- 98.6 °F (37 °C) -- -- -- -- --   11/05/22 1531 110/72 (!) 95.1 °F (35.1 °C) Oral 92 18 93 % 195 lb (88.5 kg)       Oxygen Saturation Interpretation: Normal      MDM  Number of Diagnoses or Management Options  Acute alcoholic intoxication with complication (HCC)  JOSIE (acute kidney injury) ([facility]  Closed fracture of occipital bone, unspecified laterality, unspecified occipital fracture type, initial encounter ([address]  COVID-19  Subarachnoid bleed ([facility] [address]  Subdural hematoma  Diagnosis management comments: 57-year-old male brought by EMS for evaluation after a fall. CT head showed subdural hematoma and subarachnoid hemorrhages with fractures of occipital bone. Ethanol level 117. Rapid COVID-positive. Patient had JOSIE with creatinine 2.1, previously 0.5 approximately 1 year ago. Patient was given Keppra in the ED and trauma surgery was consulted. BP was closely monitored in the ED. Patient admitted by trauma surgery.             --------------------------------- ADDITIONAL PROVIDER NOTES ---------------------------------    This patient's ED course included: a personal history and physicial examination, re-evaluation prior to disposition, multiple bedside re-evaluations, IV medications, cardiac monitoring, continuous pulse oximetry, and complex medical decision making and emergency management    This patient has remained hemodynamically stable during their ED course. Diagnosis:  1. Subdural hematoma    2. Subarachnoid bleed (HCC)    3. Closed fracture of occipital bone, unspecified laterality, unspecified occipital fracture type, initial encounter ([facility]    4. JOSIE (acute kidney injury) ([facility]    5. COVID-19    6. Acute alcoholic intoxication with complication ([facility]        Disposition:  Patient's disposition: Admit to CCU/ICU  Patient's condition is stable.           Pauline Garcia MD  Resident  11/06/22 7267

## 2022-11-05 NOTE — LETTER
[address] [place] [address] Dr Department Medicaid  CERTIFICATION OF NECESSITY  FOR NON-EMERGENCY TRANSPORTATION   BY GROUND AMBULANCE      Individual Information   1. Name: Madhav Charisse 2. [address] [place] [address] Dr Medicaid Billing Number:     3. Address: [address] [facility] [address]      Transportation Provider Information   4. Provider Name:     5. [address] [place] [address] Dr Medicaid Provider Number:   National Provider Identifier (NPI):       Certification  7. Criteria:  During transport, this individual requires:  [x] Medical treatment or continuous     supervision by an EMT. [] The administration or regulation of oxygen by another person. [] Supervised protective restraint. 8. Period Beginning Date:    5. Length  [] Not more than 1 day(s)  [] One Year     Additional Information Relevant to Certification   10. Comments or Explanations, If Necessary or Appropriate    Traumatic brain injury. Acute resp failure requiring o2, Positive COVID and Haemophilus influenza pneumonia. Certifying Practitioner Information   11. Name of Practitioner:  Luciano Madrid MD   12. [address] [place] [address] Dr Medicaid Provider Number, If Applicable:   [address] Provider Identifier (NPI):       Signature Information   14. Date of Signature:   13. Name of Person Signin. Signature and Professional Designation:       [facility] [id number]  Rev. 2015     [address] [place] [address] Admission Date/Time: 22   Hospital Account: [de-identified]    MRN: [id number]    Patient:  Madhav Mcqueen   Contact Serial #: [id number]            ENCOUNTER          Patient Class: I Private Enc?   No Unit  BD: [address] [facility] [address]   Hospital Service: SATNAM   ADM DX: Subdural hematoma [S06.5*   ADM Provider: Luciano Madrid MD   Procedure:     ATT Provider: Luciano Madrid MD   REF Provider:        PATIENT                 Name: Madhav Mcqueen : 1960 (57 yrs)   Address: [address] [place] [address] Sex: Male   [place] city: [place] [address]         Marital Status: Single   Employer: [employer]         Taoism: Confucianism   Primary Care Provider: Sophie Seymour MD         Primary Phone: [phone]   EMERGENCY CONTACT   Contact Name Legal Guardian? Relationship to Patient Home Phone Work Phone   1. Altagracia Garrido  2. ALISA PÉREZ    No Child  Other    [phone]              GUARANTOR            Guarantor: Damon Nettles     : 1960   Address: [address] [place] [address] Sex: Male     [place] [address]     Relation to Patient: Self       Home Phone: [phone]   Guarantor ID: [id number]       Work Phone:     Guarantor Employer: [employer]         Status: FULL TIME      COVERAGE        PRIMARY INSURANCE   Payor: [employer] Plan: [employer] [address]   [place] [address]       Group Number: [id number] Insurance Type: INDEMNITY   Subscriber Name: Zurdo Rao : 1960   Subscriber ID: [id number] Pat. Rel. to Sub: Self   SECONDARY INSURANCE   Payor:   Plan:     Payor Address:  ,           Group Number:   Insurance Type:     Subscriber Name:   Subscriber :     Subscriber ID:   Pat.  Rel. to Sub:

## 2022-11-06 ENCOUNTER — APPOINTMENT (OUTPATIENT)
Dept: CT IMAGING | Age: 62
DRG: 082 | End: 2022-11-06
Payer: COMMERCIAL

## 2022-11-06 ENCOUNTER — APPOINTMENT (OUTPATIENT)
Dept: GENERAL RADIOLOGY | Age: 62
DRG: 082 | End: 2022-11-06
Payer: COMMERCIAL

## 2022-11-06 LAB
ANGLE (CLOT STRENGTH): 73.9 DEGREE (ref 59–74)
ANION GAP SERPL CALCULATED.3IONS-SCNC: 13 MMOL/L (ref 7–16)
BUN BLDV-MCNC: 27 MG/DL (ref 6–23)
CALCIUM IONIZED: 1.26 MMOL/L (ref 1.15–1.33)
CALCIUM SERPL-MCNC: 9.4 MG/DL (ref 8.6–10.2)
CHLORIDE BLD-SCNC: 105 MMOL/L (ref 98–107)
CO2: 22 MMOL/L (ref 22–29)
CREAT SERPL-MCNC: 0.8 MG/DL (ref 0.7–1.2)
EKG ATRIAL RATE: 82 BPM
EKG P AXIS: 33 DEGREES
EKG P-R INTERVAL: 156 MS
EKG Q-T INTERVAL: 396 MS
EKG QRS DURATION: 100 MS
EKG QTC CALCULATION (BAZETT): 462 MS
EKG R AXIS: -5 DEGREES
EKG T AXIS: 53 DEGREES
EKG VENTRICULAR RATE: 82 BPM
EPL-TEG: 0 % (ref 0–15)
G-TEG: 8.7 K D/SC (ref 4.5–11)
GFR SERPL CREATININE-BSD FRML MDRD: >60 ML/MIN/1.73
GLUCOSE BLD-MCNC: 169 MG/DL (ref 74–99)
HCT VFR BLD CALC: 42.4 % (ref 37–54)
HEMOGLOBIN: 14.3 G/DL (ref 12.5–16.5)
INR BLD: 1.1
K (CLOTTING TIME): 1.1 MIN (ref 1–3)
LY30 (FIBRINOLYSIS): 0 % (ref 0–8)
MA (MAX AMPLITUDE): 63.4 MM (ref 50–70)
MAGNESIUM: 1.8 MG/DL (ref 1.6–2.6)
MCH RBC QN AUTO: 34.4 PG (ref 26–35)
MCHC RBC AUTO-ENTMCNC: 33.7 % (ref 32–34.5)
MCV RBC AUTO: 101.9 FL (ref 80–99.9)
PDW BLD-RTO: 12.7 FL (ref 11.5–15)
PHOSPHORUS: 2.9 MG/DL (ref 2.5–4.5)
PLATELET # BLD: 136 E9/L (ref 130–450)
PMV BLD AUTO: 9.6 FL (ref 7–12)
POTASSIUM REFLEX MAGNESIUM: 4.4 MMOL/L (ref 3.5–5)
PROTHROMBIN TIME: 11.8 SEC (ref 9.3–12.4)
R (REACTION TIME): 4.9 MIN (ref 5–10)
RBC # BLD: 4.16 E12/L (ref 3.8–5.8)
SODIUM BLD-SCNC: 140 MMOL/L (ref 132–146)
WBC # BLD: 2.6 E9/L (ref 4.5–11.5)

## 2022-11-06 PROCEDURE — 2500000003 HC RX 250 WO HCPCS: Performed by: STUDENT IN AN ORGANIZED HEALTH CARE EDUCATION/TRAINING PROGRAM

## 2022-11-06 PROCEDURE — 2580000003 HC RX 258

## 2022-11-06 PROCEDURE — 84100 ASSAY OF PHOSPHORUS: CPT

## 2022-11-06 PROCEDURE — 6360000002 HC RX W HCPCS

## 2022-11-06 PROCEDURE — 87081 CULTURE SCREEN ONLY: CPT

## 2022-11-06 PROCEDURE — 85576 BLOOD PLATELET AGGREGATION: CPT

## 2022-11-06 PROCEDURE — 2500000003 HC RX 250 WO HCPCS

## 2022-11-06 PROCEDURE — 6360000002 HC RX W HCPCS: Performed by: STUDENT IN AN ORGANIZED HEALTH CARE EDUCATION/TRAINING PROGRAM

## 2022-11-06 PROCEDURE — 83735 ASSAY OF MAGNESIUM: CPT

## 2022-11-06 PROCEDURE — 2700000000 HC OXYGEN THERAPY PER DAY

## 2022-11-06 PROCEDURE — 2000000000 HC ICU R&B

## 2022-11-06 PROCEDURE — 70450 CT HEAD/BRAIN W/O DYE: CPT

## 2022-11-06 PROCEDURE — 80048 BASIC METABOLIC PNL TOTAL CA: CPT

## 2022-11-06 PROCEDURE — 2580000003 HC RX 258: Performed by: STUDENT IN AN ORGANIZED HEALTH CARE EDUCATION/TRAINING PROGRAM

## 2022-11-06 PROCEDURE — 94667 MNPJ CHEST WALL 1ST: CPT

## 2022-11-06 PROCEDURE — 85384 FIBRINOGEN ACTIVITY: CPT

## 2022-11-06 PROCEDURE — 99254 IP/OBS CNSLTJ NEW/EST MOD 60: CPT | Performed by: NEUROLOGICAL SURGERY

## 2022-11-06 PROCEDURE — 6370000000 HC RX 637 (ALT 250 FOR IP): Performed by: STUDENT IN AN ORGANIZED HEALTH CARE EDUCATION/TRAINING PROGRAM

## 2022-11-06 PROCEDURE — 85027 COMPLETE CBC AUTOMATED: CPT

## 2022-11-06 PROCEDURE — 85347 COAGULATION TIME ACTIVATED: CPT

## 2022-11-06 PROCEDURE — 93010 ELECTROCARDIOGRAM REPORT: CPT | Performed by: INTERNAL MEDICINE

## 2022-11-06 PROCEDURE — 82330 ASSAY OF CALCIUM: CPT

## 2022-11-06 PROCEDURE — 6360000004 HC RX CONTRAST MEDICATION: Performed by: RADIOLOGY

## 2022-11-06 PROCEDURE — 70498 CT ANGIOGRAPHY NECK: CPT

## 2022-11-06 PROCEDURE — 85610 PROTHROMBIN TIME: CPT

## 2022-11-06 PROCEDURE — 71045 X-RAY EXAM CHEST 1 VIEW: CPT

## 2022-11-06 PROCEDURE — 36415 COLL VENOUS BLD VENIPUNCTURE: CPT

## 2022-11-06 PROCEDURE — 94669 MECHANICAL CHEST WALL OSCILL: CPT

## 2022-11-06 RX ORDER — LEVETIRACETAM 5 MG/ML
500 INJECTION INTRAVASCULAR EVERY 12 HOURS
Status: DISCONTINUED | OUTPATIENT
Start: 2022-11-06 | End: 2022-11-09

## 2022-11-06 RX ORDER — LABETALOL HYDROCHLORIDE 5 MG/ML
10 INJECTION, SOLUTION INTRAVENOUS EVERY 10 MIN PRN
Status: DISCONTINUED | OUTPATIENT
Start: 2022-11-06 | End: 2022-11-21

## 2022-11-06 RX ORDER — HYDRALAZINE HYDROCHLORIDE 20 MG/ML
10 INJECTION INTRAMUSCULAR; INTRAVENOUS EVERY 10 MIN PRN
Status: DISCONTINUED | OUTPATIENT
Start: 2022-11-06 | End: 2022-11-07

## 2022-11-06 RX ORDER — POTASSIUM CHLORIDE 7.45 MG/ML
10 INJECTION INTRAVENOUS
Status: COMPLETED | OUTPATIENT
Start: 2022-11-06 | End: 2022-11-06

## 2022-11-06 RX ORDER — PHENOBARBITAL SODIUM 65 MG/ML
130 INJECTION INTRAMUSCULAR EVERY 6 HOURS SCHEDULED
Status: DISCONTINUED | OUTPATIENT
Start: 2022-11-06 | End: 2022-11-09

## 2022-11-06 RX ORDER — LORAZEPAM 2 MG/ML
2 INJECTION INTRAMUSCULAR EVERY 4 HOURS PRN
Status: ACTIVE | OUTPATIENT
Start: 2022-11-06 | End: 2022-11-07

## 2022-11-06 RX ORDER — MAGNESIUM SULFATE IN WATER 40 MG/ML
2000 INJECTION, SOLUTION INTRAVENOUS ONCE
Status: COMPLETED | OUTPATIENT
Start: 2022-11-06 | End: 2022-11-06

## 2022-11-06 RX ORDER — BUPRENORPHINE HYDROCHLORIDE AND NALOXONE HYDROCHLORIDE DIHYDRATE 8; 2 MG/1; MG/1
1 TABLET SUBLINGUAL 2 TIMES DAILY
Status: DISCONTINUED | OUTPATIENT
Start: 2022-11-06 | End: 2022-11-30 | Stop reason: HOSPADM

## 2022-11-06 RX ORDER — LANOLIN ALCOHOL/MO/W.PET/CERES
100 CREAM (GRAM) TOPICAL DAILY
Status: DISCONTINUED | OUTPATIENT
Start: 2022-11-07 | End: 2022-11-21

## 2022-11-06 RX ADMIN — POTASSIUM CHLORIDE 10 MEQ: 7.46 INJECTION, SOLUTION INTRAVENOUS at 11:25

## 2022-11-06 RX ADMIN — PHENOBARBITAL SODIUM 130 MG: 65 INJECTION INTRAMUSCULAR; INTRAVENOUS at 17:17

## 2022-11-06 RX ADMIN — POTASSIUM CHLORIDE 10 MEQ: 7.46 INJECTION, SOLUTION INTRAVENOUS at 06:51

## 2022-11-06 RX ADMIN — Medication 10 ML: at 08:21

## 2022-11-06 RX ADMIN — POTASSIUM PHOSPHATE, MONOBASIC AND POTASSIUM PHOSPHATE, DIBASIC 20 MMOL: 224; 236 INJECTION, SOLUTION, CONCENTRATE INTRAVENOUS at 08:19

## 2022-11-06 RX ADMIN — FOLIC ACID 1 MG: 5 INJECTION, SOLUTION INTRAMUSCULAR; INTRAVENOUS; SUBCUTANEOUS at 10:33

## 2022-11-06 RX ADMIN — LEVETIRACETAM 500 MG: 5 INJECTION INTRAVENOUS at 08:47

## 2022-11-06 RX ADMIN — SODIUM CHLORIDE: 9 INJECTION, SOLUTION INTRAVENOUS at 03:25

## 2022-11-06 RX ADMIN — POTASSIUM CHLORIDE 10 MEQ: 7.46 INJECTION, SOLUTION INTRAVENOUS at 10:34

## 2022-11-06 RX ADMIN — BUPRENORPHINE AND NALOXONE 1 TABLET: 8; 2 TABLET SUBLINGUAL at 08:22

## 2022-11-06 RX ADMIN — POTASSIUM CHLORIDE 10 MEQ: 7.46 INJECTION, SOLUTION INTRAVENOUS at 09:16

## 2022-11-06 RX ADMIN — BUPRENORPHINE AND NALOXONE 1 TABLET: 8; 2 TABLET SUBLINGUAL at 20:06

## 2022-11-06 RX ADMIN — PHENOBARBITAL SODIUM 130 MG: 65 INJECTION INTRAMUSCULAR; INTRAVENOUS at 06:41

## 2022-11-06 RX ADMIN — IOPAMIDOL 60 ML: 755 INJECTION, SOLUTION INTRAVENOUS at 01:26

## 2022-11-06 RX ADMIN — BISACODYL 5 MG: 5 TABLET, COATED ORAL at 10:41

## 2022-11-06 RX ADMIN — LEVETIRACETAM 500 MG: 5 INJECTION INTRAVENOUS at 20:07

## 2022-11-06 RX ADMIN — Medication 100 MG: at 10:41

## 2022-11-06 RX ADMIN — POTASSIUM CHLORIDE 10 MEQ: 7.46 INJECTION, SOLUTION INTRAVENOUS at 08:18

## 2022-11-06 RX ADMIN — PHYTONADIONE 10 MG: 10 INJECTION, EMULSION INTRAMUSCULAR; INTRAVENOUS; SUBCUTANEOUS at 03:26

## 2022-11-06 RX ADMIN — MAGNESIUM SULFATE HEPTAHYDRATE 2000 MG: 40 INJECTION, SOLUTION INTRAVENOUS at 06:42

## 2022-11-06 RX ADMIN — POLYETHYLENE GLYCOL 3350 17 G: 17 POWDER, FOR SOLUTION ORAL at 08:21

## 2022-11-06 RX ADMIN — PHENOBARBITAL SODIUM 130 MG: 65 INJECTION INTRAMUSCULAR; INTRAVENOUS at 11:21

## 2022-11-06 RX ADMIN — SODIUM CHLORIDE: 9 INJECTION, SOLUTION INTRAVENOUS at 20:08

## 2022-11-06 RX ADMIN — Medication 10 ML: at 20:06

## 2022-11-06 ASSESSMENT — PAIN SCALES - GENERAL: PAINLEVEL_OUTOF10: 0

## 2022-11-06 NOTE — PROGRESS NOTES
Pt  got out of bed x2 to urinate. Rn did teaching on the urinal being on his bedside and to call for help. Rn is sitting out side of the bed due to pt is unable to follow instructions and rn is trying not to use restraints.

## 2022-11-06 NOTE — PROGRESS NOTES
[facility]  SURGICAL INTENSIVE CARE UNIT (SICU)  ATTENDING PHYSICIAN CRITICAL CARE PROGRESS NOTE     I have examined the patient, reviewed the record, and discussed the case with the APN/ resident. Please refer to the APN/ resident's note. I agree with the assessment and plan. I have reviewed all relevant labs and imaging data. The following summarizes my clinical findings and independent assessment. CC:  critical care management after fall    Hospital Course/Overnight Events:  11/5--presented after unwitnessed fall; found to have SDH/SAH/IPH--worse on repeat head CT  11/6--no issues overnight; admitted to SICU    Pt denies pain or headache.     Asleep but arousable  Follows commands  Hrt:  regular rate/rhythm; no murmur  Lungs:  fairly clear bilaterally  Abd:  soft; BS active; NT/ND  Skin:  warm/dry; abrasions to bilateral feet  Ext:  moves all 4 ext; strength 5/5 bilateral upper/lower ext    Labs personally reviewed  Films personally reviewed/interpreted--SDH/SAH/IPH; fairly clear CXR    Patient Active Problem List    Diagnosis Date Noted    SAH (subarachnoid hemorrhage) ([facility] 11/05/2022    Dysphagia     Alcohol abuse     Alcohol withdrawal syndrome without complication (HCC)     Bacteremia     Pneumonia due to organism 03/24/2021    Bacterial pneumonia     Acidosis     JOSIE (acute kidney injury) ([address]     Hypotension 06/17/2020    Benign paroxysmal positional vertigo due to bilateral vestibular disorder 06/13/2020    Essential hypertension 06/12/2020    Sinus tachycardia 06/12/2020    Right subclavian artery occlusion 12/03/2015    Right shoulder pain 11/12/2015    Chronic hepatitis C without hepatic coma ([facility] [address] 10/16/2015    Inguinal hernia, right 05/08/2014    Cervical radiculopathy 05/08/2014     S/p fall  SDH/SAH/IPH--monitor neuro exam; keppra for seizure prophylaxis  COVID +/rhinovirus +--supportive care  Elevated LFTs--monitor labs  Check swallow eval and start PO as able  EtOH abuse--cont phenobarb  DVT risk--PCDs    Discussed with Dr. Brendon Benitez    Pt is at risk for neurologic/metabolic/hemodynamic/respiratory deterioration for which I am actively managing; requires ICU care    Lakshmi Castillo MD, FACS  11/6/2022  9:30 AM    Critical care time exclusive of teaching and procedures = 39 minutes

## 2022-11-06 NOTE — ED NOTES
[address] has not been verified by pharmacy for over an hour. Pharmacy contacted by this RN.  Waiting to hear back      [place], RN  11/05/22 2009

## 2022-11-06 NOTE — PROGRESS NOTES
Trauma Tertiary Survey    Admit Date: 11/5/2022  Hospital day 1    CC:  Fall    Alcohol pre-screening:  Men: How many times in the past year have you had 5 or more drinks in a day?  1 or more    How much do you drink on a daily basis? 6 per day    Drug Pre-screening:    How many times in the past year have you used a recreational drug or used a prescription medication for non medical reasons? No    Mood Prescreening:    During the past two weeks, have you been bothered by little interest or pleasure doing things? No  During the past two weeks, have you been bothered by feeling down, depressed or hopeless? No      Scheduled Meds: PHENobarbital  130 mg IntraVENous 4 times per day    buprenorphine-naloxone  1 tablet SubLINGual BID    levETIRAcetam  500 mg IntraVENous Q12H    sodium chloride flush  10 mL IntraVENous 2 times per day    polyethylene glycol  17 g Oral Daily    bisacodyl  5 mg Oral Daily    thiamine mononitrate  100 mg Oral Daily    folic acid  1 mg IntraVENous Daily     Continuous Infusions:   sodium chloride      sodium chloride 100 mL/hr at 11/06/22 1357     PRN Meds:labetalol, hydrALAZINE, sodium chloride flush, sodium chloride, ondansetron **OR** ondansetron, acetaminophen    Subjective:     Patient somewhat somnolent this morning, he is following commands. Passed a speech evaluation at bedside with nursing today. Moving all extremities and no weakness noted.      Objective:   Patient Vitals for the past 8 hrs:   BP Temp Temp src Pulse Resp SpO2   11/06/22 1400 (!) 162/95 98 °F (36.7 °C) -- 79 22 94 %   11/06/22 1351 -- -- -- 85 -- --   11/06/22 1300 (!) 162/95 -- -- 91 23 95 %   11/06/22 1200 (!) 155/89 98 °F (36.7 °C) -- 82 22 97 %   11/06/22 1100 (!) 162/97 -- -- 94 29 96 %   11/06/22 1049 -- -- -- 79 23 --   11/06/22 1000 (!) 168/96 98.7 °F (37.1 °C) Axillary 80 23 97 %   11/06/22 0900 (!) 167/100 97.9 °F (36.6 °C) Axillary 81 22 96 %   11/06/22 0800 (!) 154/93 -- -- 93 25 94 %       I/O last 3 completed shifts: In: 372.3 [I.V.:272.2; IV Piggyback:100.1]  Out: -   I/O this shift:  In: 1976.1 [P.O.:30; I.V.:720.3; IV Piggyback:1225.9]  Out: 825 [Urine:825]    Past Medical History:   Diagnosis Date    Hepatitis C     liver bx wnl. genotype is the best possible    Herniated disc, cervical        @[email or web address]@    Radiology:  XR CHEST PORTABLE   Final Result   No acute disease. RECOMMENDATION:   Careful clinical correlation and follow up recommended. CT HEAD WO CONTRAST   Final Result   1. No acute disease. 2. Unchanged from 3 hours prior bifrontal and bi temporal parenchymal   contusions as further outlined above. 3. Unchanged left cerebral overlying subdural hematoma measuring up to 7 mm   in thickness. RECOMMENDATIONS:   Careful clinical correlation and follow up recommended. CT HEAD WO CONTRAST   Final Result   1. Enlarging bifrontal parenchymal contusions and newly developed bitemporal   parenchymal contusions. Stable small bilateral subarachnoid and left   subdural hematomas. 2. Increasing left greater than right frontal predominant mass effect with   progressive effacement of the left lateral ventricle and increasing 5 mm left   right midline shift at the foramen of Monro, previously 3 mm. The findings were sent to the Radiology Results [address] at 1:01   am on 11/6/2022 to be communicated to a licensed caregiver. RECOMMENDATIONS:   Careful clinical correlation and follow up recommended. CTA NECK W CONTRAST   Final Result   Unremarkable CTA of the neck. RECOMMENDATIONS:   Careful clinical correlation and follow up recommended. CT HEAD WO CONTRAST   Final Result   HEAD:      Multicompartmental intracranial hemorrhages as described above, the largest   of which are a 9 mm thickness left subdural hematoma and a 2 cm left frontal   hemorrhagic contusion. 3 mm of rightward midline shift.   Questionable 3 mm   thickness right frontotemporal subdural hematoma; recommend attention on   short interval follow-up. Nondisplaced bilateral occipital bone fractures. CERVICAL SPINE:      No acute fracture or traumatic malalignment. Degenerative changes      Critical results were called by Dr. Marcelle Stafford MD to Dr. Bridgette Liu on 11/5/2022 at 18:43. CT CSpine W/O Contrast   Final Result   HEAD:      Multicompartmental intracranial hemorrhages as described above, the largest   of which are a 9 mm thickness left subdural hematoma and a 2 cm left frontal   hemorrhagic contusion. 3 mm of rightward midline shift. Questionable 3 mm   thickness right frontotemporal subdural hematoma; recommend attention on   short interval follow-up. Nondisplaced bilateral occipital bone fractures. CERVICAL SPINE:      No acute fracture or traumatic malalignment. Degenerative changes      Critical results were called by Dr. Marcelle Stafford MD to Dr. Bridgette Liu on 11/5/2022 at 18:43. XR PELVIS (1-2 VIEWS)   Final Result   Unremarkable AP pelvis with no fracture or subluxation. XR CHEST 1 VIEW   Final Result   No acute process. PHYSICAL EXAM:     Central Nervous System  Loss of consciousness:  Yes    GCS:    Eye:  3 - Opens eyes to loud noise or command  Motor:  6 - Follows simple motor commands  Verbal:  2 - Moans, makes unintelligible sounds    Neuromuscular blockade: No  Pupil size:  Left 3 mm    Right 3 mm  Pupil reaction: Yes    Wiggles fingers: Left Yes Right Yes  Wiggles toes: Left Yes   Right Yes    Hand grasp:   Left  Present      Right  Present  Plantar flexion: Left  Present      Right   Present    PHYSICAL EXAM  General: No apparent distress, comfortable   HEENT: Trachea midline, no masses, Pupils equal round reactive  Chest: Respiratory effort was normal with no retractions or use of accessory muscles.  On 4L NC  Cardiovascular: Extremities warm, well perfused  Abdomen:  Soft and non distended. No tenderness, guarding, rebound, or rigidity   Extremities: Moves all 4 extremeties, No pedal edema     Spine:   Spine Tenderness ROM   Cervical 0/10 Normal   Thoracic 0 /10 Normal   Lumbar 0 /10 Normal     Musculoskeletal:    Joint Tenderness Swelling ROM   Right shoulder Absent absent normal   Left shoulder absent absent normal   Right elbow absent absent normal   Left elbow absent absent normal   Right wrist absent absent normal   Left wrist absent absent normal   Right hand grasp Absent absent normal   Left hand grasp absent absent normal   Right hip absent absent normal   Left hip absent absent normal   Right knee Absent absent normal   Left knee absent absent normal   Right ankle absent absent normal   Left ankle absent absent normal   Right foot Absent absent normal   Left foot absent absent normal       CONSULTS: Neurosurgery    PROCEDURES: none    INJURIES:      Principal Problem:    SAH (subarachnoid hemorrhage) (HCC)  Resolved Problems:    * No resolved hospital problems. *        Assessment/Plan:       Neuro:   SAH/SDH: NSG input appreciated, BP <140, head CT stable on last scan. Keppra BID  etOH abuse: On PNB, level pending 11/9, thiamine, folate, SBI  Occipital fx: no intervention planned   Hx substance abuse: Home suboxone restarted   Pain control: tylenol PRN  CV:   BP goal <140, PRNs  Pulm:  Acute resp insufficiency 2/2 covid/rhinovirus PNA: Continue NC support, wean as able, ics, pulm hygiene, supportive care  GI:  Passed bedside swallow, trial clear liquids today decrease IVF  Bowel regimen   Elevated LFTs, monitor   Renal:   JOSIE: Resolved, monitor BUN/Cr/UOP  Half IVF   ID: afebrile, COVID PNA, + Rhinovirus, supportive care  Endocrine: no acute issues  MSK: no acute issues PT/OT pending    Heme: no acute issues Monitor H/H     Bowel regime: glycolax dulcolax  Pain control/Sedation: tylenol  DVT prophylaxis: SCDs    GI: clears  Glucose protocol: none  Mouth/Eye care: per patient. Lindsay: None     Code status:    Full Code    Patient/Family update:  As available     Disposition:  SICU      Electronically signed by Marlin Flores MD on 11/6/22 at 3:57 PM EST

## 2022-11-06 NOTE — PLAN OF CARE
Problem: Discharge Planning  Goal: Discharge to home or other facility with appropriate resources  Outcome: Progressing     Problem: Skin/Tissue Integrity  Goal: Absence of new skin breakdown  11/6/2022 0755 by Sulma Hughes RN  Outcome: Progressing  11/6/2022 0414 by Nilam Lopez RN  Outcome: Progressing     Problem: Safety - Adult  Goal: Free from fall injury  11/6/2022 0755 by Sulma Hughes RN  Outcome: Progressing  11/6/2022 0414 by Nilam Lopez RN  Outcome: Progressing     Problem: ABCDS Injury Assessment  Goal: Absence of physical injury  11/6/2022 0755 by Sulma Hughes RN  Outcome: Progressing  11/6/2022 0414 by Nilam Lopez RN  Outcome: Progressing

## 2022-11-06 NOTE — PLAN OF CARE
Problem: Discharge Planning  Goal: Discharge to home or other facility with appropriate resources  11/6/2022 1410 by Tapan Baxter RN  Outcome: Progressing  11/6/2022 0755 by Tapan Baxter RN  Outcome: Progressing     Problem: Skin/Tissue Integrity  Goal: Absence of new skin breakdown  11/6/2022 1410 by Tapan Baxter RN  Outcome: Progressing  11/6/2022 0755 by Tapan Baxter RN  Outcome: Progressing  11/6/2022 0414 by Alonso Del Valle RN  Outcome: Progressing     Problem: Safety - Adult  Goal: Free from fall injury  11/6/2022 1410 by Tapan Baxter RN  Outcome: Progressing  11/6/2022 0755 by Tapan Baxter RN  Outcome: Progressing  11/6/2022 0414 by Alonso Del Valle RN  Outcome: Progressing     Problem: ABCDS Injury Assessment  Goal: Absence of physical injury  11/6/2022 1410 by Tapan Baxter RN  Outcome: Progressing  11/6/2022 0755 by Tapan Baxter RN  Outcome: Progressing  11/6/2022 0414 by Alonso Del Valle RN  Outcome: Progressing     Problem: Neurosensory - Adult  Goal: Achieves stable or improved neurological status  Outcome: Progressing  Goal: Absence of seizures  Outcome: Progressing  Goal: Remains free of injury related to seizures activity  Outcome: Progressing  Goal: Achieves maximal functionality and self care  Outcome: Progressing     Problem: Respiratory - Adult  Goal: Achieves optimal ventilation and oxygenation  Outcome: Progressing     Problem: Cardiovascular - Adult  Goal: Maintains optimal cardiac output and hemodynamic stability  Outcome: Progressing  Goal: Absence of cardiac dysrhythmias or at baseline  Outcome: Progressing     Problem: Skin/Tissue Integrity - Adult  Goal: Skin integrity remains intact  Outcome: Progressing  Flowsheets  Taken 11/6/2022 1000  Skin Integrity Remains Intact:   Monitor for areas of redness and/or skin breakdown   Assess vascular access sites hourly   Every 4-6 hours minimum: Change oxygen saturation probe site   Every 4-6 hours: If on nasal continuous positive airway pressure, respiratory therapy assesses nares and determine need for appliance change or resting period  Taken 11/6/2022 0759  Skin Integrity Remains Intact:   Monitor for areas of redness and/or skin breakdown   Assess vascular access sites hourly   Every 4-6 hours minimum: Change oxygen saturation probe site   Every 4-6 hours: If on nasal continuous positive airway pressure, respiratory therapy assesses nares and determine need for appliance change or resting period  Goal: Incisions, wounds, or drain sites healing without S/S of infection  Outcome: Progressing  Goal: Oral mucous membranes remain intact  Outcome: Progressing     Problem: Musculoskeletal - Adult  Goal: Return mobility to safest level of function  Outcome: Progressing  Goal: Maintain proper alignment of affected body part  Outcome: Progressing

## 2022-11-06 NOTE — PLAN OF CARE
Problem: Discharge Planning  Goal: Discharge to home or other facility with appropriate resources  11/6/2022 1813 by Maral Sparks RN  Outcome: Progressing  11/6/2022 1410 by Jay Malin RN  Outcome: Progressing  11/6/2022 0755 by Jay Malin RN  Outcome: Progressing     Problem: Skin/Tissue Integrity  Goal: Absence of new skin breakdown  Description: 1. Monitor for areas of redness and/or skin breakdown  2. Assess vascular access sites hourly  3. Every 4-6 hours minimum:  Change oxygen saturation probe site  4. Every 4-6 hours:  If on nasal continuous positive airway pressure, respiratory therapy assess nares and determine need for appliance change or resting period.   11/6/2022 1813 by Maral Sparks RN  Outcome: Progressing  11/6/2022 1410 by Jay Malin RN  Outcome: Progressing  11/6/2022 0755 by Jay Malin RN  Outcome: Progressing  11/6/2022 0414 by Holden Sigala RN  Outcome: Progressing     Problem: Safety - Adult  Goal: Free from fall injury  11/6/2022 1813 by Maral Sparks RN  Outcome: Progressing  11/6/2022 1410 by Jay Malin RN  Outcome: Progressing  11/6/2022 0755 by Jay Malin RN  Outcome: Progressing  11/6/2022 0414 by Holden Sigala RN  Outcome: Progressing     Problem: ABCDS Injury Assessment  Goal: Absence of physical injury  11/6/2022 1813 by Maral Sparks RN  Outcome: Progressing  11/6/2022 1410 by Jay Malin RN  Outcome: Progressing  11/6/2022 0755 by Jay Malin RN  Outcome: Progressing  11/6/2022 0414 by Holden Sigala RN  Outcome: Progressing     Problem: Neurosensory - Adult  Goal: Achieves stable or improved neurological status  11/6/2022 1813 by Maral Sparks RN  Outcome: Progressing  11/6/2022 1410 by Jay Malin RN  Outcome: Progressing  Goal: Absence of seizures  11/6/2022 1813 by Maral Sparks RN  Outcome: Progressing  11/6/2022 1410 by Jay Malin RN  Outcome: Progressing  Goal: Remains free of injury related to seizures activity  11/6/2022 1813 by Jose Vargas RN  Outcome: Progressing  11/6/2022 1410 by Olman Sparks RN  Outcome: Progressing  Goal: Achieves maximal functionality and self care  11/6/2022 1813 by Jose Vargas RN  Outcome: Progressing  11/6/2022 1410 by Olman Sparks RN  Outcome: Progressing     Problem: Respiratory - Adult  Goal: Achieves optimal ventilation and oxygenation  11/6/2022 1813 by Jose Vargas RN  Outcome: Progressing  11/6/2022 1410 by Olman Sparks RN  Outcome: Progressing     Problem: Cardiovascular - Adult  Goal: Maintains optimal cardiac output and hemodynamic stability  11/6/2022 1813 by Jose Vargas RN  Outcome: Progressing  11/6/2022 1410 by Olman Sparks RN  Outcome: Progressing  Goal: Absence of cardiac dysrhythmias or at baseline  11/6/2022 1813 by Jose Vargas RN  Outcome: Progressing  11/6/2022 1410 by Olman Sparks RN  Outcome: Progressing     Problem: Skin/Tissue Integrity - Adult  Goal: Skin integrity remains intact  11/6/2022 1813 by Jose Vargas RN  Outcome: Progressing  11/6/2022 1410 by Olman Sparks RN  Outcome: Progressing  Flowsheets  Taken 11/6/2022 1000  Skin Integrity Remains Intact:   Monitor for areas of redness and/or skin breakdown   Assess vascular access sites hourly   Every 4-6 hours minimum: Change oxygen saturation probe site   Every 4-6 hours: If on nasal continuous positive airway pressure, respiratory therapy assesses nares and determine need for appliance change or resting period  Taken 11/6/2022 0759  Skin Integrity Remains Intact:   Monitor for areas of redness and/or skin breakdown   Assess vascular access sites hourly   Every 4-6 hours minimum: Change oxygen saturation probe site   Every 4-6 hours: If on nasal continuous positive airway pressure, respiratory therapy assesses nares and determine need for appliance change or resting period  Goal: Incisions, wounds, or drain sites healing without S/S of infection  11/6/2022 1813 by Jose Vargas RN  Outcome: Progressing  11/6/2022 1410 by Jay Malin RN  Outcome: Progressing  Goal: Oral mucous membranes remain intact  11/6/2022 1813 by Maral Sparks RN  Outcome: Progressing  11/6/2022 1410 by Jay Malin RN  Outcome: Progressing     Problem: Musculoskeletal - Adult  Goal: Return mobility to safest level of function  11/6/2022 1813 by Maral Sparks RN  Outcome: Progressing  11/6/2022 1410 by Jay Malin RN  Outcome: Progressing  Goal: Maintain proper alignment of affected body part  11/6/2022 1813 by Maral Sparks RN  Outcome: Progressing  11/6/2022 1410 by Jay Malin RN  Outcome: Progressing

## 2022-11-06 NOTE — ED NOTES
Radiology Procedure Waiver   Name: Kayden Ross  : 1960  MRN: [id number]    Date:  22    Time: 12:16 AM EDT    Benefits of immediately proceeding with Radiology exam(s) without pre-testing outweigh the risks or are not indicated as specified below and therefore the following is/are being waived:    [] Pregnancy test   [] Patients LMP on-time and regular.   [] Patient had Tubal Ligation or has other Contraception Device. [] Patient  is Menopausal or Premenarcheal.    [] Patient had Full or Partial Hysterectomy. [] Protocol for Iodine allergy    [] MRI Questionnaire     [x] BUN/Creatinine   [] Patient age w/no hx of renal dysfunction. [] Patient on Dialysis. [] Recent Normal Labs.   Electronically signed by Brayan Stroud MD on 22 at 12:16 AM EDT               Brayan Stroud MD  Resident  [phone]

## 2022-11-06 NOTE — CONSULTS
Chief Complaint:   Chief Complaint   Patient presents with    Fall     +LOC fell backwards, +head, hematoma posterior head, unwitnessed, now dizzy, confused, A&Ox2 wrong month,president, doesn't remember falling R shoulder pain. Takes BP meds and suboxone, C-collar. HPI:     I had the pleasure of seeing [facility] today in as you know this 77-year-old gentleman presents after an unwitnessed fall at work. Apparently there was loss of consciousness and with head trauma. Alcohol was involved. History was taken from the chart and other providers. Patient is amnestic to the exact details of the event.     Past Medical History:   Diagnosis Date    Hepatitis C     liver bx wnl. genotype is the best possible    Herniated disc, cervical      Past Surgical History:   Procedure Laterality Date    ANTERIOR CRUCIATE LIGAMENT REPAIR  1990s    left, [address] [facility]    ANTERIOR CRUCIATE LIGAMENT REPAIR  2009    right ACL repair, Dr. Donis Needle    COLONOSCOPY  2009    normal, Dr Diana Vail, [place] 1945  5/20/2014    right indirect inguinal herniorrhaphy with mesh, Dr. Carolynn Steele, [facility] Cocking Right 5/20/14    with mesh    NECK SURGERY  1992    neck fusion C6 and C7, [address] [facility]    PICC LINE INSERTION NURSE  3/28/2021         SHOULDER SURGERY  2007    left, removal spur, Dr. Gray Subramanian  2007    melenoma, left clavical area, [facility]    UPPER GASTROINTESTINAL ENDOSCOPY N/A 3/31/2021    EGD BIOPSY and AGUERO DILATATION performed by Azael Parker DO at [facility] ENDOSCOPY      Family History   Problem Relation Age of Onset    Heart Attack Mother 68    Cancer Father     Parkinsonism Paternal Uncle       Social History     Socioeconomic History    Marital status: Single     Spouse name: Not on file    Number of children: Not on file    Years of education: Not on file    Highest education level: Not on file   Occupational History    Not on file   Tobacco Use Smoking status: Every Day     Packs/day: 0.50     Years: 50.00     Pack years: 25.00     Types: Cigarettes     Start date: 1970    Smokeless tobacco: Never   Vaping Use    Vaping Use: Never used   Substance and Sexual Activity    Alcohol use:  Yes     Alcohol/week: 38.0 standard drinks     Types: 18 Cans of beer, 20 Shots of liquor per week     Comment: daily    Drug use: Yes     Types: Opiates     Sexual activity: Not on file   Other Topics Concern    Not on file   Social History Narrative    Not on file     Social Determinants of Health     Financial Resource Strain: Low Risk     Difficulty of Paying Living Expenses: Not hard at all   Food Insecurity: No Food Insecurity    Worried About Running Out of Food in the Last Year: Never true    Ran Out of Food in the Last Year: Never true   Transportation Needs: Not on file   Physical Activity: Not on file   Stress: Not on file   Social Connections: Not on file   Intimate Partner Violence: Not on file   Housing Stability: Not on file       Medications:   Current Facility-Administered Medications   Medication Dose Route Frequency Provider Last Rate Last Admin    PHENobarbital (LUMINAL) injection 130 mg  130 mg IntraVENous 4 times per day Eliseo Parra MD   130 mg at 11/06/22 0641    labetalol (NORMODYNE;TRANDATE) injection 10 mg  10 mg IntraVENous Q10 Min PRN Eliseo Parra MD        hydrALAZINE (APRESOLINE) injection 10 mg  10 mg IntraVENous Q10 Min PRN Eliseo Parra MD        potassium phosphate 20 mmol in dextrose 5 % 500 mL IVPB  20 mmol IntraVENous Once Eliseo Parra .7 mL/hr at 11/06/22 0819 20 mmol at 11/06/22 0819    potassium chloride 10 mEq/100 mL IVPB (Peripheral Line)  10 mEq IntraVENous Q1H Eliseo Parra  mL/hr at 11/06/22 0818 10 mEq at 11/06/22 0818    buprenorphine-naloxone (SUBOXONE) 8-2 MG SL tablet 1 tablet  1 tablet SubLINGual BID Eliseo Parra MD   1 tablet at 11/06/22 0822    levETIRAcetam (KEPPRA) 500 mg/100 mL IVPB 500 mg IntraVENous Q12H Enedina Talamantes MD        sodium chloride flush 0.9 % injection 10 mL  10 mL IntraVENous 2 times per day Ramonia Spore B Capal, DO   10 mL at 11/06/22 0821    sodium chloride flush 0.9 % injection 10 mL  10 mL IntraVENous PRN Elias B Capal, DO        0.9 % sodium chloride infusion   IntraVENous PRN Ramonia Spore B Capal, DO        ondansetron (ZOFRAN-ODT) disintegrating tablet 4 mg  4 mg Oral Q8H PRN Renetta Feeler, DO        Or    ondansetron (ZOFRAN) injection 4 mg  4 mg IntraVENous Q6H PRN Renetta Feeler, DO   4 mg at 11/05/22 2122    polyethylene glycol (GLYCOLAX) packet 17 g  17 g Oral Daily Elias B Capal, DO   17 g at 11/06/22 6517    acetaminophen (TYLENOL) tablet 650 mg  650 mg Oral Q4H PRN Elias B Capal, DO        bisacodyl (DULCOLAX) EC tablet 5 mg  5 mg Oral Daily Elias B Capal, DO        0.9 % sodium chloride infusion   IntraVENous Continuous Elias B Capal,  mL/hr at 11/06/22 0630 Rate Verify at 11/06/22 0630    thiamine mononitrate tablet 100 mg  100 mg Oral Daily Elias B Capal, DO        folic acid injection 1 mg  1 mg IntraVENous Daily Elias B Capal, DO            Allergies:    Blue dyes (parenteral)       Review of Systems:    Unable to obtain secondary to patient's clinical status. Physical Examination:    BP (!) 154/93   Pulse 93   Temp 97.2 °F (36.2 °C) (Oral)   Resp 25   Ht 5' 10\" (1.778 m)   Wt 195 lb (88.5 kg)   SpO2 94%   BMI 27.98 kg/m²      AAO x2; sleepy  Speech clear  PERRL @4; looks at examiner, conjugate  Face symmetric GI/FT  Tongue MDL  SS symm and strong  BARKER-C, preserved power  No pronator drift        ASSESSMENT:    I personally reviewed Monashari Mo Silver's radiographic images, particularly his CT head which shows bifrontal contusions greater than right and bitemporal contusions with tentorial subdural hematoma. These worsened upon repeat as expected but stabilized on third scan.         MEDICAL DECISION MAKING & PLAN:    No acute Nsx intervention at this time. Recommend standard protocol systolic blood pressure less than 140, Keppra for seizure prophylaxis, correction of coagulopathy and avoidance of antiplatelets anticoagulants. Follow-up CT scan per protocol per trauma. Thank you so much for allowing us to participate in the care of this patient. Electronically signed by Yohana Ortega MD on 11/6/2022 at 8:47 AM       NOTE: This report was transcribed using voice recognition software.  Every effort was made to ensure accuracy; however, inadvertent computerized transcription errors may be present

## 2022-11-06 NOTE — H&P
TRAUMA HISTORY & PHYSICAL  Surgical Resident/Advance Practice Nurse  11/5/2022  8:59 PM    PRIMARY SURVEY    CHIEF COMPLAINT:  Trauma consult. Injury occurred just prior to arrival to OSH. Unknown cause of fall. Per report, patient was unwitnessed fall at work. Positive loss of consciousness with hematoma to posterior head. Elevated EtOH. AIRWAY:   Airway Normal  EMS ETT Absent  Noisy respirations Absent  Retractions: Absent  Vomiting/bleeding: Absent      BREATHING:    Midaxillary breath sound left:  Normal  Midaxillary breath sound right:  Normal    Cough sound intensity:  fair   FiO2:  Room air    SMI 1800 mL.       CIRCULATION:   Femerol pulse intensity: Strong  Palpebral conjunctiva: white    Vitals:    11/05/22 1919   BP: 134/82   Pulse: 68   Resp: 16   Temp:    SpO2: 99%       Vitals:    11/05/22 1531 11/05/22 1651 11/05/22 1919   BP: 110/72  134/82   Pulse: 92  68   Resp: 18  16   Temp: (!) 95.1 °F (35.1 °C) 98.6 °F (37 °C)    TempSrc: Oral     SpO2: 93%  99%   Weight: 195 lb (88.5 kg)          FAST EXAM: Deferred    Central Nervous System    GCS Initial 15 minutes   Eye  Motor  Verbal 4 - Opens eyes on own  6 - Follows simple motor commands  4 - Seems confused, disoriented 4 - Opens eyes on own  6 - Follows simple motor commands  4 - Seems confused, disoriented     Neuromuscular blockade: No  Pupil size:  Left 3 mm    Right 3 mm  Pupil reaction: Yes    Wiggles fingers: Left Yes Right Yes  Wiggles toes: Left Yes   Right Yes    Hand grasp:   Left  Present      Right  Present  Plantar flexion: Left  Present      Right   Present    Loss of consciousness:  Yes, unknown    History Obtained From:  Patient & EMS  Private Medical Doctor: Edgardo Doran MD    Pre-exisiting Medical History:  yes    Conditions:   Past Medical History:   Diagnosis Date    Hepatitis C     liver bx wnl. genotype is the best possible    Herniated disc, cervical         Medications:   Current Outpatient Medications Medication Instructions    albuterol sulfate  (90 Base) MCG/ACT inhaler inhale 2 puffs by mouth four times a day if needed for wheezing    buprenorphine-naloxone (SUBOXONE) 8-2 MG FILM SL film 1 Film, SubLINGual, 2 TIMES DAILY    dextromethorphan-guaiFENesin (MUCINEX DM)  MG per extended release tablet 0.5 tablets, EVERY 12 HOURS PRN    Diclofenac Sodium (VOLTAREN PO) Take by mouth    diphenhydrAMINE (BENADRYL) 50 mg, Oral, NIGHTLY PRN    fluticasone (FLONASE) 50 MCG/ACT nasal spray 1 spray, DAILY    gabapentin (NEURONTIN) 300 mg, Oral, 2 TIMES DAILY, Intended supply: 90 days    Multiple Vitamins-Minerals (THERAPEUTIC MULTIVITAMIN-MINERALS) tablet 1 tablet, Oral, DAILY        Allergies: No Known Allergies      Social History:   Tobacco use:  positive for approximately 0.5 packs per day.   Patient advised to quit smoking  Alcohol use:  4 beers per day(s)  Illicit drug use:  no history of illicit drug use    Past Surgical History:    Past Surgical History:   Procedure Laterality Date    ANTERIOR CRUCIATE LIGAMENT REPAIR  1990s    left, [place]    ANTERIOR CRUCIATE LIGAMENT REPAIR  2009    right ACL repair, Dr. Yeny Martinez    COLONOSCOPY  2009    normal, Dr Ashwini Moore, [place] 1945  5/20/2014    right indirect inguinal herniorrhaphy with mesh, Dr. Gavino Resendiz [facility] Right 5/20/14    with mesh    NECK SURGERY  1992    neck fusion C6 and C7, [place]    PICC LINE INSERTION NURSE  3/28/2021         SHOULDER SURGERY  2007    left, removal spur, Dr. Katelin Harris  2007    melenoma, left clavical area, [facility]    UPPER GASTROINTESTINAL ENDOSCOPY N/A 3/31/2021    EGD BIOPSY and AGUERO DILATATION performed by Rhonda Yanes DO at [facility] ENDOSCOPY        Anticoagulant use: None  Antiplatelet use:   None    NSAID use in last 72 hours: yes  Taken PCN in past:  yes  Last food/drink: this afternoon  Last tetanus: unknown    Family History:   No family history of anesthesia complications    Complaints:   Head:  Mild  Neck:   None  Chest:   None  Back:   None  Abdomen:   None  Extremities:   None  Comments:     Review of systems:  All negative unless otherwise noted. SECONDARY SURVEY  Head/scalp: traumatic-- small posterior hematoma     Face: Atraumatic    Eyes/ears/nose: Atraumatic    Pharynx/mouth: Atraumatic    Neck: Atraumatic     Cervical spine tenderness:   Cervical collar in place at time of arrival  Pain:  none  ROM:  Not indicated    Chest wall:  Atraumatic    Heart:  Regular rate & rhythm    Abdomen: Atraumatic. Soft ND  Tenderness:  none    Pelvis: Atraumatic  Tenderness: none    Thoracolumbar spine: Atraumatic  Tenderness:  none    Genitourinary:  Atraumatic. No blood or urine noted    Rectum: Atraumatic. No blood noted. Perineum: Atraumatic. No blood or urine noted. Extremities:   Sensory normal  Motor normal    Distal Pulses  Left arm normal  Right arm normal  Left leg normal  Right leg normal    Capillary refill  Left arm normal  Right arm normal  Left leg normal  Right leg normal    Procedures in ED:   none    In the event of Emergency Blood Transfusion:  Due to the critical condition of this patient, I request the immediate release of blood products for emergency transfusion secondary to shock. I understand the increased risks incurred by the lack of complete transfusion testing.       Radiology: Chest Xray , Pelvic Xray , CT Head , and CT Cervical spine     Consultations:  Neurosurgery    Admission/Diagnosis: trauma, fall,     Plan of Treatment:  Admit to tele  Repeat CT head and CTA neck for occipital fx   NPO, IVF  Pain control  Monitor for withdrawal, may consider starting phenobarbital   Neurosurgery recs     Plan discussed with Dr. Claudine Chow    at 11/5/2022 on 8:59 PM    Electronically signed by Miguel Angel Webb DO on 11/5/2022 at 8:59 PM

## 2022-11-07 PROBLEM — S02.119A CLOSED FRACTURE OF OCCIPITAL BONE (HCC): Status: ACTIVE | Noted: 2022-11-07

## 2022-11-07 PROBLEM — U07.1 COVID-19: Status: ACTIVE | Noted: 2022-11-07

## 2022-11-07 PROBLEM — F10.929 ACUTE ALCOHOLIC INTOXICATION WITH COMPLICATION (HCC): Status: ACTIVE | Noted: 2022-11-07

## 2022-11-07 PROBLEM — S06.5XAA SUBDURAL HEMATOMA: Status: ACTIVE | Noted: 2022-11-07

## 2022-11-07 LAB
ANION GAP SERPL CALCULATED.3IONS-SCNC: 9 MMOL/L (ref 7–16)
BUN BLDV-MCNC: 17 MG/DL (ref 6–23)
CALCIUM IONIZED: 1.17 MMOL/L (ref 1.15–1.33)
CALCIUM SERPL-MCNC: 8.7 MG/DL (ref 8.6–10.2)
CHLORIDE BLD-SCNC: 102 MMOL/L (ref 98–107)
CO2: 26 MMOL/L (ref 22–29)
CREAT SERPL-MCNC: 0.5 MG/DL (ref 0.7–1.2)
GFR SERPL CREATININE-BSD FRML MDRD: >60 ML/MIN/1.73
GLUCOSE BLD-MCNC: 119 MG/DL (ref 74–99)
HCT VFR BLD CALC: 41.4 % (ref 37–54)
HEMOGLOBIN: 14.2 G/DL (ref 12.5–16.5)
MAGNESIUM: 1.9 MG/DL (ref 1.6–2.6)
MCH RBC QN AUTO: 34.9 PG (ref 26–35)
MCHC RBC AUTO-ENTMCNC: 34.3 % (ref 32–34.5)
MCV RBC AUTO: 101.7 FL (ref 80–99.9)
MRSA CULTURE ONLY: NORMAL
PDW BLD-RTO: 12.8 FL (ref 11.5–15)
PHOSPHORUS: 1.1 MG/DL (ref 2.5–4.5)
PLATELET # BLD: 129 E9/L (ref 130–450)
PMV BLD AUTO: 10.1 FL (ref 7–12)
POTASSIUM REFLEX MAGNESIUM: 4.1 MMOL/L (ref 3.5–5)
RBC # BLD: 4.07 E12/L (ref 3.8–5.8)
SODIUM BLD-SCNC: 137 MMOL/L (ref 132–146)
WBC # BLD: 8.5 E9/L (ref 4.5–11.5)

## 2022-11-07 PROCEDURE — 85027 COMPLETE CBC AUTOMATED: CPT

## 2022-11-07 PROCEDURE — 2580000003 HC RX 258

## 2022-11-07 PROCEDURE — 6370000000 HC RX 637 (ALT 250 FOR IP)

## 2022-11-07 PROCEDURE — 80048 BASIC METABOLIC PNL TOTAL CA: CPT

## 2022-11-07 PROCEDURE — 82330 ASSAY OF CALCIUM: CPT

## 2022-11-07 PROCEDURE — 2000000000 HC ICU R&B

## 2022-11-07 PROCEDURE — 6360000002 HC RX W HCPCS: Performed by: STUDENT IN AN ORGANIZED HEALTH CARE EDUCATION/TRAINING PROGRAM

## 2022-11-07 PROCEDURE — 6370000000 HC RX 637 (ALT 250 FOR IP): Performed by: SURGERY

## 2022-11-07 PROCEDURE — 2700000000 HC OXYGEN THERAPY PER DAY

## 2022-11-07 PROCEDURE — 36415 COLL VENOUS BLD VENIPUNCTURE: CPT

## 2022-11-07 PROCEDURE — 6370000000 HC RX 637 (ALT 250 FOR IP): Performed by: STUDENT IN AN ORGANIZED HEALTH CARE EDUCATION/TRAINING PROGRAM

## 2022-11-07 PROCEDURE — 83735 ASSAY OF MAGNESIUM: CPT

## 2022-11-07 PROCEDURE — 2500000003 HC RX 250 WO HCPCS: Performed by: STUDENT IN AN ORGANIZED HEALTH CARE EDUCATION/TRAINING PROGRAM

## 2022-11-07 PROCEDURE — 6360000002 HC RX W HCPCS

## 2022-11-07 PROCEDURE — 99291 CRITICAL CARE FIRST HOUR: CPT | Performed by: SURGERY

## 2022-11-07 PROCEDURE — 2580000003 HC RX 258: Performed by: STUDENT IN AN ORGANIZED HEALTH CARE EDUCATION/TRAINING PROGRAM

## 2022-11-07 PROCEDURE — 84100 ASSAY OF PHOSPHORUS: CPT

## 2022-11-07 PROCEDURE — 2500000003 HC RX 250 WO HCPCS

## 2022-11-07 RX ORDER — ALBUTEROL SULFATE 2.5 MG/3ML
2.5 SOLUTION RESPIRATORY (INHALATION) EVERY 4 HOURS PRN
Status: DISCONTINUED | OUTPATIENT
Start: 2022-11-07 | End: 2022-11-17

## 2022-11-07 RX ORDER — LORAZEPAM 2 MG/ML
1 INJECTION INTRAMUSCULAR EVERY 6 HOURS PRN
Status: DISCONTINUED | OUTPATIENT
Start: 2022-11-07 | End: 2022-11-08

## 2022-11-07 RX ORDER — HYDRALAZINE HYDROCHLORIDE 20 MG/ML
10 INJECTION INTRAMUSCULAR; INTRAVENOUS EVERY 10 MIN PRN
Status: DISCONTINUED | OUTPATIENT
Start: 2022-11-07 | End: 2022-11-19

## 2022-11-07 RX ADMIN — PHENOBARBITAL SODIUM 130 MG: 65 INJECTION INTRAMUSCULAR; INTRAVENOUS at 06:10

## 2022-11-07 RX ADMIN — HYDRALAZINE HYDROCHLORIDE 10 MG: 20 INJECTION INTRAMUSCULAR; INTRAVENOUS at 06:10

## 2022-11-07 RX ADMIN — PHENOBARBITAL SODIUM 130 MG: 65 INJECTION INTRAMUSCULAR; INTRAVENOUS at 17:06

## 2022-11-07 RX ADMIN — LABETALOL HYDROCHLORIDE 10 MG: 5 INJECTION INTRAVENOUS at 02:04

## 2022-11-07 RX ADMIN — BUPRENORPHINE AND NALOXONE 1 TABLET: 8; 2 TABLET SUBLINGUAL at 07:46

## 2022-11-07 RX ADMIN — POLYETHYLENE GLYCOL 3350 17 G: 17 POWDER, FOR SOLUTION ORAL at 07:46

## 2022-11-07 RX ADMIN — PHENOBARBITAL SODIUM 130 MG: 65 INJECTION INTRAMUSCULAR; INTRAVENOUS at 23:05

## 2022-11-07 RX ADMIN — SODIUM CHLORIDE: 9 INJECTION, SOLUTION INTRAVENOUS at 18:10

## 2022-11-07 RX ADMIN — FOLIC ACID 1 MG: 5 INJECTION, SOLUTION INTRAMUSCULAR; INTRAVENOUS; SUBCUTANEOUS at 07:48

## 2022-11-07 RX ADMIN — PHENOBARBITAL SODIUM 130 MG: 65 INJECTION INTRAMUSCULAR; INTRAVENOUS at 10:48

## 2022-11-07 RX ADMIN — HYDRALAZINE HYDROCHLORIDE 10 MG: 20 INJECTION INTRAMUSCULAR; INTRAVENOUS at 20:06

## 2022-11-07 RX ADMIN — Medication 10 ML: at 20:47

## 2022-11-07 RX ADMIN — HYDRALAZINE HYDROCHLORIDE 10 MG: 20 INJECTION INTRAMUSCULAR; INTRAVENOUS at 10:12

## 2022-11-07 RX ADMIN — HYDRALAZINE HYDROCHLORIDE 10 MG: 20 INJECTION INTRAMUSCULAR; INTRAVENOUS at 23:04

## 2022-11-07 RX ADMIN — HYDRALAZINE HYDROCHLORIDE 10 MG: 20 INJECTION INTRAMUSCULAR; INTRAVENOUS at 03:31

## 2022-11-07 RX ADMIN — LEVETIRACETAM 500 MG: 5 INJECTION INTRAVENOUS at 07:44

## 2022-11-07 RX ADMIN — ACETAMINOPHEN 650 MG: 325 TABLET, FILM COATED ORAL at 17:06

## 2022-11-07 RX ADMIN — HYDRALAZINE HYDROCHLORIDE 10 MG: 20 INJECTION INTRAMUSCULAR; INTRAVENOUS at 22:44

## 2022-11-07 RX ADMIN — PHENOBARBITAL SODIUM 130 MG: 65 INJECTION INTRAMUSCULAR; INTRAVENOUS at 00:29

## 2022-11-07 RX ADMIN — LORAZEPAM 1 MG: 2 INJECTION INTRAMUSCULAR; INTRAVENOUS at 13:07

## 2022-11-07 RX ADMIN — LEVETIRACETAM 500 MG: 5 INJECTION INTRAVENOUS at 20:45

## 2022-11-07 RX ADMIN — BUPRENORPHINE AND NALOXONE 1 TABLET: 8; 2 TABLET SUBLINGUAL at 20:50

## 2022-11-07 RX ADMIN — Medication 10 ML: at 07:46

## 2022-11-07 RX ADMIN — BISACODYL 5 MG: 5 TABLET, COATED ORAL at 07:46

## 2022-11-07 RX ADMIN — HYDRALAZINE HYDROCHLORIDE 10 MG: 20 INJECTION INTRAMUSCULAR; INTRAVENOUS at 00:43

## 2022-11-07 RX ADMIN — Medication 250 MG: at 20:06

## 2022-11-07 RX ADMIN — Medication 250 MG: at 17:05

## 2022-11-07 RX ADMIN — HYDRALAZINE HYDROCHLORIDE 10 MG: 20 INJECTION INTRAMUSCULAR; INTRAVENOUS at 23:39

## 2022-11-07 RX ADMIN — HYDRALAZINE HYDROCHLORIDE 10 MG: 20 INJECTION INTRAMUSCULAR; INTRAVENOUS at 09:22

## 2022-11-07 RX ADMIN — HYDRALAZINE HYDROCHLORIDE 10 MG: 20 INJECTION INTRAMUSCULAR; INTRAVENOUS at 21:34

## 2022-11-07 RX ADMIN — Medication 100 MG: at 07:46

## 2022-11-07 RX ADMIN — HYDRALAZINE HYDROCHLORIDE 10 MG: 20 INJECTION INTRAMUSCULAR; INTRAVENOUS at 18:57

## 2022-11-07 NOTE — PLAN OF CARE
Problem: Skin/Tissue Integrity  Goal: Absence of new skin breakdown  Description: 1. Monitor for areas of redness and/or skin breakdown  2. Assess vascular access sites hourly  3. Every 4-6 hours minimum:  Change oxygen saturation probe site  4. Every 4-6 hours:  If on nasal continuous positive airway pressure, respiratory therapy assess nares and determine need for appliance change or resting period.   11/6/2022 2225 by Caroline Bray RN  Outcome: Progressing     Problem: Safety - Adult  Goal: Free from fall injury  11/7/2022 0955 by Sudhir Miranda RN  Outcome: Progressing  Flowsheets (Taken 11/7/2022 0955)  Free From Fall Injury:   Instruct family/caregiver on patient safety   Based on caregiver fall risk screen, instruct family/caregiver to ask for assistance with transferring infant if caregiver noted to have fall risk factors  11/6/2022 2225 by Caroline Bray RN  Outcome: Progressing     Problem: ABCDS Injury Assessment  Goal: Absence of physical injury  11/7/2022 0955 by Sudhir Miranda RN  Outcome: Progressing  [address] (Taken 11/6/2022 2000 by Caroline Bray RN)  Absence of Physical Injury: Implement safety measures based on patient assessment  11/6/2022 2225 by Caroline Bray RN  Outcome: Progressing  Flowsheets (Taken 11/6/2022 2000)  Absence of Physical Injury: Implement safety measures based on patient assessment     Problem: Respiratory - Adult  Goal: Achieves optimal ventilation and oxygenation  Outcome: Progressing  Flowsheets (Taken 11/7/2022 0955)  Achieves optimal ventilation and oxygenation:   Assess for changes in respiratory status   Assess for changes in mentation and behavior   Position to facilitate oxygenation and minimize respiratory effort   Oxygen supplementation based on oxygen saturation or arterial blood gases   Respiratory therapy support as indicated     Problem: Cardiovascular - Adult  Goal: Maintains optimal cardiac output and hemodynamic stability  Outcome: Progressing  Flowsheets (Taken 11/7/2022 0955)  Maintains optimal cardiac output and hemodynamic stability:   Monitor blood pressure and heart rate   Monitor urine output and notify Licensed Independent Practitioner for values outside of normal range   Assess for signs of decreased cardiac output   Administer fluid and/or volume expanders as ordered   Administer vasoactive medications as ordered     Problem: Skin/Tissue Integrity - Adult  Goal: Oral mucous membranes remain intact  Outcome: Progressing  Flowsheets (Taken 11/7/2022 0955)  Oral Mucous Membranes Remain Intact:   Assess oral mucosa and hygiene practices   Implement preventative oral hygiene regimen   Implement oral medicated treatments as ordered     Problem: Safety - Medical Restraint  Goal: Remains free of injury from restraints (Restraint for Interference with Medical Device)  Description: INTERVENTIONS:  1. Determine that other, less restrictive measures have been tried or would not be effective before applying the restraint  2. Evaluate the patient's condition at the time of restraint application  3. Inform patient/family regarding the reason for restraint  4. Q2H: Monitor safety, psychosocial status, comfort, nutrition and hydration  Outcome: Progressing  Flowsheets (Taken 11/7/2022 0800)  Remains free of injury from restraints (restraint for interference with medical device):   Determine that other, less restrictive measures have been tried or would not be effective before applying the restraint   Inform patient/family regarding the reason for restraint   Every 2 hours: Monitor safety, psychosocial status, comfort, nutrition and hydration     Problem: Anxiety  Goal: Will report anxiety at manageable levels  Description: INTERVENTIONS:  1. Administer medication as ordered  2. Teach and rehearse alternative coping skills  3.  Provide emotional support with 1:1 interaction with staff  Outcome: Progressing  Flowsheets (Taken 11/7/2022 0955)  Will report anxiety at manageable levels:   Administer medication as ordered   Provide emotional support with 1:1 interaction with staff     Problem: Behavior  Goal: Pt/Family maintain appropriate behavior and adhere to behavioral management agreement, if implemented  Description: INTERVENTIONS:  1. Assess patient/family's coping skills and  non-compliant behavior (including use of illegal substances)  2. Notify security of behavior or suspected illegal substances which indicate the need for search of the family and/or belongings  3. Encourage verbalization of thoughts and concerns in a socially appropriate manner  4. Utilize positive, consistent limit setting strategies supporting safety of patient, staff and others  5. Encourage participation in the decision making process about the behavioral management agreement  6. If a visitor's behavior poses a threat to safety call refer to organization policy. 7. Initiate consult with , Psychosocial CNS, Spiritual Care as appropriate  Outcome: Progressing  Flowsheets (Taken 11/7/2022 6517)  Patient/family maintains appropriate behavior and adheres to behavioral management agreement, if implemented:   Encourage verbalization of thoughts and concerns in a socially appropriate manner   Utilize positive, consistent limit setting strategies supporting safety of patient, staff and others   Encourage participation in the decision making process about the behavioral management agreement     Plan of care discussed with patient/family. Patient/family incorporated into plan of care. Jessica Orr

## 2022-11-07 NOTE — FLOWSHEET NOTE
Patient will reach at lines and tubes needing to be redirected much of time. Attempting to provide calm environment and reorientation, but patient still assessed to be a risk of harm to self. Will continue to monitor patient and assess for earliest removal capability.

## 2022-11-07 NOTE — PLAN OF CARE
Problem: Discharge Planning  Goal: Discharge to home or other facility with appropriate resources  11/6/2022 1813 by Paulino Quintero RN  Outcome: Progressing  11/6/2022 1410 by Tapan Baxter RN  Outcome: Progressing     Problem: Skin/Tissue Integrity  Goal: Absence of new skin breakdown  Description: 1. Monitor for areas of redness and/or skin breakdown  2. Assess vascular access sites hourly  3. Every 4-6 hours minimum:  Change oxygen saturation probe site  4. Every 4-6 hours:  If on nasal continuous positive airway pressure, respiratory therapy assess nares and determine need for appliance change or resting period.   11/6/2022 2225 by Alonso Del Valle RN  Outcome: Progressing  11/6/2022 1813 by Paulino Quintero RN  Outcome: Progressing  11/6/2022 1410 by Tapan Baxter RN  Outcome: Progressing     Problem: Safety - Adult  Goal: Free from fall injury  11/6/2022 2225 by Alonso Del Valle RN  Outcome: Progressing  11/6/2022 1813 by Paulino Quintero RN  Outcome: Progressing  11/6/2022 1410 by Tapan Baxter RN  Outcome: Progressing     Problem: ABCDS Injury Assessment  Goal: Absence of physical injury  11/6/2022 2225 by Alonso Del Valle RN  Outcome: Progressing  Flowsheets (Taken 11/6/2022 2000)  Absence of Physical Injury: Implement safety measures based on patient assessment  11/6/2022 1813 by Paulino Quintero RN  Outcome: Progressing  11/6/2022 1410 by Tapan Baxter RN  Outcome: Progressing     Problem: Neurosensory - Adult  Goal: Achieves stable or improved neurological status  11/6/2022 1813 by Paulino Quintero RN  Outcome: Progressing  11/6/2022 1410 by Tapan Baxter RN  Outcome: Progressing  Goal: Absence of seizures  11/6/2022 1813 by Paulino Quintero RN  Outcome: Progressing  11/6/2022 1410 by Tapan Baxter RN  Outcome: Progressing  Goal: Remains free of injury related to seizures activity  11/6/2022 1813 by Paulino Quintero RN  Outcome: Progressing  11/6/2022 1410 by Tapan Baxter RN  Outcome: Progressing  Goal: Achieves maximal functionality and self care  11/6/2022 1813 by Prabha Beckett RN  Outcome: Progressing  11/6/2022 1410 by Amairani Herrera RN  Outcome: Progressing     Problem: Respiratory - Adult  Goal: Achieves optimal ventilation and oxygenation  11/6/2022 1813 by Prabha Beckett RN  Outcome: Progressing  11/6/2022 1410 by Amairani Herrera RN  Outcome: Progressing     Problem: Cardiovascular - Adult  Goal: Maintains optimal cardiac output and hemodynamic stability  11/6/2022 1813 by Prabha Beckett RN  Outcome: Progressing  11/6/2022 1410 by Amairani Herrera RN  Outcome: Progressing  Goal: Absence of cardiac dysrhythmias or at baseline  11/6/2022 1813 by Prabha Beckett RN  Outcome: Progressing  11/6/2022 1410 by Amairani Herrera RN  Outcome: Progressing     Problem: Skin/Tissue Integrity - Adult  Goal: Skin integrity remains intact  Recent Flowsheet Documentation  Taken 11/6/2022 2000 by Sp Chase RN  Skin Integrity Remains Intact: Monitor for areas of redness and/or skin breakdown  11/6/2022 1813 by Prabha Beckett RN  Outcome: Progressing  11/6/2022 1410 by Amairani Herrera RN  Outcome: Progressing  Flowsheets  Taken 11/6/2022 1000  Skin Integrity Remains Intact:   Monitor for areas of redness and/or skin breakdown   Assess vascular access sites hourly   Every 4-6 hours minimum: Change oxygen saturation probe site   Every 4-6 hours: If on nasal continuous positive airway pressure, respiratory therapy assesses nares and determine need for appliance change or resting period  Taken 11/6/2022 0759  Skin Integrity Remains Intact:   Monitor for areas of redness and/or skin breakdown   Assess vascular access sites hourly   Every 4-6 hours minimum: Change oxygen saturation probe site   Every 4-6 hours: If on nasal continuous positive airway pressure, respiratory therapy assesses nares and determine need for appliance change or resting period  Goal: Incisions, wounds, or drain sites healing without S/S of infection  11/6/2022 1813 by Sonido Panda RN  Outcome: Progressing  11/6/2022 1410 by Kayley Bardales RN  Outcome: Progressing  Goal: Oral mucous membranes remain intact  11/6/2022 1813 by Sonido Panda RN  Outcome: Progressing  11/6/2022 1410 by Kayley Bardales RN  Outcome: Progressing     Problem: Musculoskeletal - Adult  Goal: Return mobility to safest level of function  11/6/2022 1813 by Sonido Panda RN  Outcome: Progressing  11/6/2022 1410 by Kayley Bardales RN  Outcome: Progressing  Goal: Maintain proper alignment of affected body part  11/6/2022 1813 by Sonido Panda RN  Outcome: Progressing  11/6/2022 1410 by Kayley Bardales RN  Outcome: Progressing

## 2022-11-07 NOTE — PROGRESS NOTES
SURGICAL INTENSIVE CARE  PROGRESS NOTE    Date of admission:  11/5/2022  Reason for ICU transfer:  worsening head CT    SUBJECTIVE:  Patient is disoriented this morning. Intermittently answers questions and follows commands. Reports pain all over. Tolerating clear liquid diet. HOSPITAL COURSE:    11/5--presented after unwitnessed fall; found to have SDH/SAH/IPH--worse on repeat head CT  11/6--no issues overnight; admitted to SICU. 11/7--Pt confused this morning. PHYSICAL EXAM:  /75   Pulse 72   Temp 98.5 °F (36.9 °C) (Oral)   Resp 21   Ht 5' 10\" (1.778 m)   Wt 195 lb (88.5 kg)   SpO2 95%   BMI 27.98 kg/m²     GENERAL:  NAD. A&Ox1. HEAD:  Normocephalic, atraumatic. EYES:   No scleral icterus. PERRLA. LUNGS:  No increased work of breathing. CTAB. On 4L NC.   CARDIOVASCULAR: Warm throughout. Regular rate  ABDOMEN:  Soft, non distended, non tender. No guarding, rigidity, rebound. EXTREMITIES:   MAEx4. Atraumatic. No LE edema. SKIN:  Warm and dry  NEUROLOGIC:  GCS 14    ASSESSMENT / PLAN:  Neuro:  shows bifrontal contusions greater than right and bitemporal contusions with tentorial subdural hematoma stable on scan #3. NSG following. Keppra. PNB. Suboxone. CV: No acute issues. Maintain BP <140. Monitor hemodynamics. Pulm: COVID-19 infection. Supportive care. Wean oxygen as tolerated. Encourage IS/SMI. GI: Passed bedside swallow evaluation. Tolerating clear liquid diet. Bowel regimen. Zofran PRN. Renal: No acute issues. Monitor UOP & Electrolytes. Endocrine: No acute issues. Monitor glucose. MSK: No acute issues. PT/OT. Heme: Thrombocytopenia, continue to monitor. Hemoglobin stable  ID: COVID-19. Supportive care. Hep C    Pain/Analgesia: Tylenol, Suboxone,   Total fluid rate: NS at 50  Bowel regimen: Dulcolax, glycolax  DVT proph:  SCDs  Glucose protocol: Not indicated  Mouth/eye care: As needed per patient  Urine: [place]. Keep in place for fluid monitoring.   CVC sites:  None  Ancillary consults: NSG  Family Update: As available    Disposition: SICU    Electronically signed by Felice Sacks, DO on 11/7/2022 at 5:09 AM

## 2022-11-07 NOTE — DISCHARGE SUMMARY
Physician Discharge Summary     Patient ID:  Jung Ojeda  [id number]  84 y.o.  1960    Admit date: 11/5/2022    Discharge date and time: No discharge date for patient encounter. Admitting Physician: Katie Mccain MD     Admission Diagnoses: Subdural hematoma [S06. 5XAA]  SAH (subarachnoid hemorrhage) (HCC) [I60.9]  Subarachnoid bleed (HCC) [I60.9]  JOSIE (acute kidney injury) ([facility] [X22.4]  Acute alcoholic intoxication with complication ([facility] [P55.608]  Closed fracture of occipital bone, unspecified laterality, unspecified occipital fracture type, initial encounter ([facility] [J21.715W]  COVID-19 [U07.1]    Discharge Diagnoses: Principal Problem:    Subarachnoid bleed ([address]  Active Problems:    Subdural hematoma    Acute alcoholic intoxication with complication ([facility]    Closed fracture of occipital bone (HCC)    COVID-19    Opioid dependence on agonist therapy ([facility]    Delirium    Other constipation    Hypokalemia    Hypomagnesemia  Resolved Problems:    * No resolved hospital problems. *      Admission Condition: stable    Discharged Condition: stable    Indication for Admission: IPH, SDH    Hospital Course/Procedures/Operation/treatments:        11/5--presented after unwitnessed fall; found to have SDH/SAH/IPH--worse on repeat head CT  11/6--no issues overnight; admitted to SICU. 11/7--Pt confused this morning. 11/8--GCS 12 this morning. Agitation overnight, pulled out IV and getting out of restraints. Depakote sprinkles and Precedex added. Hypertensive overnight. Started scheduled Norvasc. 11/9--Continued agitation. Precedex changed to Clonidine for bradycardia. 11/10--Patient with continued agitation but oriented to self. 11/11: Agitated overnight again- increased clonidine. 11/12: Agitation controlled today. 11/13: no acute issues overnight. Tolerating meds with pudding. CorPak removed again yesterday   11/14: Increased confusion and somnolence overnight.  Taken for STAT repeat head CT showed slight increase in right parietal SAH. Stable on repeat head CT.  11/15: Vasotec switched back to Norvasc.  11/16: No acute events overnight. Plan for PEG today. 11/17: Intubated today for increased work of breathing. 11/18: Off of pressors. 11/19: following commands this morning, will try to extubate again today  11/20: no acute issues. On NC 6/7 L. Extubated yesterday. E-lytes replaced   11/22: SLP and PT/OT evaluation   11/23: SLP evaluated patient yesterday with recommendation for puréed consistency solids and nectar thick liquids. Evaluated by PT with score of 8/24. Pending eval for possible placement. 11/24: agitation during the day. Started on pureed diet  11/25: out of restraints overnight   11/26: Continues to be out of restraints this AM. Resting comfortably. Pending placement. 11/27: No events overnight. Remains out of restraints. Conversant and following commands but disoriented. 11/28: remains out of restraints, no events overnight. 11/29: Remains confused. Working on placement    11/30: No acute issues, for DC    Consults:   IP CONSULT TO TRAUMA SURGERY  IP CONSULT TO NEUROSURGERY  IP CONSULT TO SOCIAL WORK  IP CONSULT TO SOCIAL WORK  IP CONSULT TO DIETITIAN  IP CONSULT TO PSYCHIATRY  IP CONSULT TO IV TEAM  IP CONSULT TO IV TEAM  IP CONSULT TO SOCIAL WORK    Significant Diagnostic Studies:   XR PELVIS (1-2 VIEWS)    Result Date: 11/5/2022  EXAMINATION: ONE XRAY VIEW OF THE PELVIS 11/5/2022 5:19 pm COMPARISON: None. HISTORY: ORDERING SYSTEM PROVIDED HISTORY: fall TECHNOLOGIST PROVIDED HISTORY: Reason for exam:->fall What reading provider will be dictating this exam?->CRC FINDINGS: AP views of the pelvis demonstrate no fracture or subluxation. There are mild osteoarthritic changes of the hips without fracture or subluxation. There is a belt buckle and the zipper in the study. Unremarkable AP pelvis with no fracture or subluxation.      CT HEAD WO CONTRAST    Result Date: 11/6/2022  EXAMINATION: CT OF THE HEAD WITHOUT CONTRAST  11/6/2022 4:51 am TECHNIQUE: CT of the head was performed without the administration of intravenous contrast. Automated exposure control, iterative reconstruction, and/or weight based adjustment of the mA/kV was utilized to reduce the radiation dose to as low as reasonably achievable. COMPARISON: 3 hours prior, November 6, 2022, 0128 HISTORY: ORDERING SYSTEM PROVIDED HISTORY: SDH, SAH worsening TECHNOLOGIST PROVIDED HISTORY: Has a \"code stroke\" or \"stroke alert\" been called? ->No Reason for exam:->SDH, SAH worsening What reading provider will be dictating this exam?->CRC FINDINGS: BRAIN/VENTRICLES: Unchanged bifrontal and bitemporal parenchymal contusions, measuring up to 5.4 cm of the left frontal lobe, 3.9 cm right frontal lobe, 2.3 cm left temporal lobe and 1.6 cm right temporal lobe. Unchanged small subarachnoid/subdural hemorrhage. Subdural hemorrhage measuring up to 7 mm thickness overlying the posterior left cerebral hemisphere. Unchanged 5 mm left-to-right midline shift at the foramen of Monro. ORBITS: The visualized portion of the orbits demonstrate no acute abnormality. SINUSES: Small bilateral maxillary sinus fluid levels. SOFT TISSUES/SKULL:  Hairline bilateral occipital skull fractures noted, unchanged. No significant scalp soft tissue abnormality. .     1. No acute disease. 2. Unchanged from 3 hours prior bifrontal and bi temporal parenchymal contusions as further outlined above. 3. Unchanged left cerebral overlying subdural hematoma measuring up to 7 mm in thickness. RECOMMENDATIONS: Careful clinical correlation and follow up recommended.      CT HEAD WO CONTRAST    Result Date: 11/6/2022  EXAMINATION: CT OF THE HEAD WITHOUT CONTRAST  11/6/2022 1:22 am TECHNIQUE: CT of the head was performed without the administration of intravenous contrast. Automated exposure control, iterative reconstruction, and/or weight based adjustment of the mA/kV was utilized to reduce the radiation dose to as low as reasonably achievable. COMPARISON: 7 hours prior HISTORY: ORDERING SYSTEM PROVIDED HISTORY: repeat for [facility] and SDH TECHNOLOGIST PROVIDED HISTORY: Reason for exam:->repeat for [facility] and SDH Has a \"code stroke\" or \"stroke alert\" been called? ->No Decision Support Exception - unselect if not a suspected or confirmed emergency medical condition->Emergency Medical Condition (MA) What reading provider will be dictating this exam?->CRC FINDINGS: BRAIN/VENTRICLES: Increasing bifrontal parenchymal contusions, measuring up to 5.4 cm maximum axial diameter left frontal lobe, previously 2.0 cm, and 3.9 cm right frontal lobe, previously 2.0 cm. In addition, there are newly developed bilateral temporal parenchymal contusions measuring up to 2.3 cm left and 2.6 cm right. Increasing relatively small volume subarachnoid hemorrhage and unchanged left lateral frontal overlying subdural hematoma component measuring up to 7 mm. Progressive effacement of the left greater than right lateral ventricles. Minimal left right midline shift at the foramen of Monro, 6 mm, previously 3 mm. ORBITS: The visualized portion of the orbits demonstrate no acute abnormality. SINUSES: Left greater than right maxillary sinus mucosal thickening and small sphenoid sinus fluid. SOFT TISSUES/SKULL:  No acute abnormality of the visualized skull or soft tissues. 1. Enlarging bifrontal parenchymal contusions and newly developed bitemporal parenchymal contusions. Stable small bilateral subarachnoid and left subdural hematomas. 2. Increasing left greater than right frontal predominant mass effect with progressive effacement of the left lateral ventricle and increasing 5 mm left right midline shift at the foramen of Monro, previously 3 mm. The findings were sent to the Radiology Results [id number] at 1:01 am on 11/6/2022 to be communicated to a licensed caregiver.  RECOMMENDATIONS: Careful clinical correlation and follow up recommended. CT HEAD WO CONTRAST    Result Date: 11/5/2022  EXAMINATION: CT OF THE CERVICAL SPINE WITHOUT CONTRAST; CT OF THE HEAD WITHOUT CONTRAST 11/5/2022 5:41 pm TECHNIQUE: CT of the cervical spine was performed without the administration of intravenous contrast. Multiplanar reformatted images are provided for review. Automated exposure control, iterative reconstruction, and/or weight based adjustment of the mA/kV was utilized to reduce the radiation dose to as low as reasonably achievable.; CT of the head was performed without the administration of intravenous contrast. Automated exposure control, iterative reconstruction, and/or weight based adjustment of the mA/kV was utilized to reduce the radiation dose to as low as reasonably achievable. COMPARISON: None. HISTORY: ORDERING SYSTEM PROVIDED HISTORY: fall TECHNOLOGIST PROVIDED HISTORY: Reason for exam:->fall Decision Support Exception - unselect if not a suspected or confirmed emergency medical condition->Emergency Medical Condition (MA) What reading provider will be dictating this exam?->CRC FINDINGS: HEAD: Parenchyma: Left L1 spheric subdural hematoma measures up to 9 mm over the left parietal lobe (series 601, image 87). There is bilateral cerebral subarachnoid hemorrhage, primarily in the frontal lobes. There are hemorrhagic contusions in the bilateral frontal lobes, left greater than right and measuring up to roughly 2 cm on the left in all dimensions (series 311, image 37 and series 601, image 32). Question trace right temporal/frontal subdural hematoma (series 601 image 67), measuring 3 mm on coronal images. There is roughly 3 mm of rightward midline shift. Chronic microvascular ischemic changes. Ventricles: No evidence of hydrocephalus. Calvarium: Nondisplaced bilateral occipital bone fractures. CERVICAL SPINE: Bones: Vertebral body heights and alignment are maintained. No evidence of acute fracture.  Congenital fusion of the C6 and C7 vertebral bodies. Multilevel degenerative changes cervical discs, facet joints, and uncovertebral joints. Degenerative changes of the atlantoaxial articulation. Mild multilevel spinal stenosis. Multilevel high-grade left-sided neural foraminal stenosis. Prevertebral/other: No significant prevertebral soft tissue swelling. HEAD: Multicompartmental intracranial hemorrhages as described above, the largest of which are a 9 mm thickness left subdural hematoma and a 2 cm left frontal hemorrhagic contusion. 3 mm of rightward midline shift. Questionable 3 mm thickness right frontotemporal subdural hematoma; recommend attention on short interval follow-up. Nondisplaced bilateral occipital bone fractures. CERVICAL SPINE: No acute fracture or traumatic malalignment. Degenerative changes Critical results were called by Dr. Yael Shell MD to Dr. Pablito Garcia on 11/5/2022 at 18:43. CT CSpine W/O Contrast    Result Date: 11/5/2022  EXAMINATION: CT OF THE CERVICAL SPINE WITHOUT CONTRAST; CT OF THE HEAD WITHOUT CONTRAST 11/5/2022 5:41 pm TECHNIQUE: CT of the cervical spine was performed without the administration of intravenous contrast. Multiplanar reformatted images are provided for review. Automated exposure control, iterative reconstruction, and/or weight based adjustment of the mA/kV was utilized to reduce the radiation dose to as low as reasonably achievable.; CT of the head was performed without the administration of intravenous contrast. Automated exposure control, iterative reconstruction, and/or weight based adjustment of the mA/kV was utilized to reduce the radiation dose to as low as reasonably achievable. COMPARISON: None.  HISTORY: ORDERING SYSTEM PROVIDED HISTORY: fall TECHNOLOGIST PROVIDED HISTORY: Reason for exam:->fall Decision Support Exception - unselect if not a suspected or confirmed emergency medical condition->Emergency Medical Condition (MA) What reading provider will be dictating this exam?->CRC FINDINGS: HEAD: Parenchyma: Left L1 spheric subdural hematoma measures up to 9 mm over the left parietal lobe (series 601, image 87). There is bilateral cerebral subarachnoid hemorrhage, primarily in the frontal lobes. There are hemorrhagic contusions in the bilateral frontal lobes, left greater than right and measuring up to roughly 2 cm on the left in all dimensions (series 311, image 37 and series 601, image 32). Question trace right temporal/frontal subdural hematoma (series 601 image 67), measuring 3 mm on coronal images. There is roughly 3 mm of rightward midline shift. Chronic microvascular ischemic changes. Ventricles: No evidence of hydrocephalus. Calvarium: Nondisplaced bilateral occipital bone fractures. CERVICAL SPINE: Bones: Vertebral body heights and alignment are maintained. No evidence of acute fracture. Congenital fusion of the C6 and C7 vertebral bodies. Multilevel degenerative changes cervical discs, facet joints, and uncovertebral joints. Degenerative changes of the atlantoaxial articulation. Mild multilevel spinal stenosis. Multilevel high-grade left-sided neural foraminal stenosis. Prevertebral/other: No significant prevertebral soft tissue swelling. HEAD: Multicompartmental intracranial hemorrhages as described above, the largest of which are a 9 mm thickness left subdural hematoma and a 2 cm left frontal hemorrhagic contusion. 3 mm of rightward midline shift. Questionable 3 mm thickness right frontotemporal subdural hematoma; recommend attention on short interval follow-up. Nondisplaced bilateral occipital bone fractures. CERVICAL SPINE: No acute fracture or traumatic malalignment. Degenerative changes Critical results were called by Dr. Flory Michaels MD to Dr. Leslie Herrera on 11/5/2022 at 18:43.      XR CHEST PORTABLE    Result Date: 11/6/2022  EXAMINATION: ONE XRAY VIEW OF THE CHEST 11/6/2022 6:16 am COMPARISON: November 5, 2022 HISTORY: ORDERING SYSTEM PROVIDED HISTORY: poss aspiration TECHNOLOGIST PROVIDED HISTORY: Reason for exam:->poss aspiration What reading provider will be dictating this exam?->CRC FINDINGS: Normal cardiomediastinal silhouette. Lungs clear. No pneumonitis to suggest aspiration. No pneumothorax or effusion. Osseous thorax intact. No acute disease. RECOMMENDATION: Careful clinical correlation and follow up recommended. XR CHEST 1 VIEW    Result Date: 11/5/2022  EXAMINATION: ONE XRAY VIEW OF THE CHEST 11/5/2022 5:19 pm COMPARISON: None. HISTORY: ORDERING SYSTEM PROVIDED HISTORY: fall TECHNOLOGIST PROVIDED HISTORY: Reason for exam:->fall What reading provider will be dictating this exam?->CRC FINDINGS: The lungs are without acute focal process. There is no effusion or pneumothorax. The cardiomediastinal silhouette is without acute process. The osseous structures are without acute process. No acute process. CTA NECK W CONTRAST    Result Date: 11/6/2022  EXAMINATION: CTA OF THE NECK 11/6/2022 1:22 am TECHNIQUE: CTA of the neck was performed with the administration of intravenous contrast. Multiplanar reformatted images are provided for review. MIP images are provided for review. Stenosis of the internal carotid arteries measured using NASCET criteria. Automated exposure control, iterative reconstruction, and/or weight based adjustment of the mA/kV was utilized to reduce the radiation dose to as low as reasonably achievable. COMPARISON: None. HISTORY: ORDERING SYSTEM PROVIDED HISTORY: occipital fx TECHNOLOGIST PROVIDED HISTORY: Reason for exam:->occipital fx Has a \"code stroke\" or \"stroke alert\" been called? ->No What reading provider will be dictating this exam?->CRC FINDINGS: AORTIC ARCH/ARCH VESSELS: No dissection or arterial injury. No significant stenosis of the brachiocephalic or subclavian arteries. CAROTID ARTERIES: No dissection, arterial injury, or hemodynamically significant stenosis by NASCET criteria. VERTEBRAL ARTERIES: No dissection, arterial injury, or significant stenosis. SOFT TISSUES: The lung apices are clear. No cervical or superior mediastinal lymphadenopathy. The larynx and pharynx are unremarkable. No acute abnormality of the salivary and thyroid glands. BONES: No acute osseous abnormality. Unremarkable CTA of the neck. RECOMMENDATIONS: Careful clinical correlation and follow up recommended. Discharge Exam:  See last PN    Disposition: SNF    In process/preliminary results:  Outstanding Order Results       No orders found from 10/7/2022 to 11/6/2022. Patient Instructions:   Current Discharge Medication List             Details   amLODIPine (NORVASC) 10 MG tablet Take 1 tablet by mouth daily  Qty: 30 tablet, Refills: 3      carvedilol (COREG) 6.25 MG tablet Take 1 tablet by mouth 2 times daily (with meals)  Qty: 60 tablet, Refills: 3      cloNIDine (CATAPRES) 0.2 MG tablet Take 1 tablet by mouth 3 times daily  Qty: 60 tablet, Refills: 3      lansoprazole 3 MG/ML SUSP Take 10 mLs by mouth 2 times daily (before meals)  Qty: 300 mL      senna (SENOKOT) 8.6 MG tablet Take 1 tablet by mouth nightly as needed (constipation)      sucralfate (CARAFATE) 1 GM tablet Take 1 tablet by mouth 4 times daily  Qty: 120 tablet, Refills: 3      Heparin Sodium, Porcine, (HEPARIN, PORCINE,) 11806 UNIT/ML injection Inject 0.5 mLs into the skin in the morning and 0.5 mLs at noon and 0.5 mLs in the evening. valproic acid (DEPAKENE) 250 MG/5ML SOLN oral solution 5 mLs by Per G Tube route every 8 hours  Qty: 600 mL      !! ipratropium-albuterol (DUONEB) 0.5-2.5 (3) MG/3ML SOLN nebulizer solution Inhale 3 mLs into the lungs every 4 hours as needed for Shortness of Breath  Qty: 360 mL      !! ipratropium-albuterol (DUONEB) 0.5-2.5 (3) MG/3ML SOLN nebulizer solution Inhale 3 mLs into the lungs every 4 hours (while awake)  Qty: 360 mL       !! - Potential duplicate medications found.  Please discuss with provider. Details   Multiple Vitamins-Minerals (THERAPEUTIC MULTIVITAMIN-MINERALS) tablet Take 1 tablet by mouth daily      buprenorphine-naloxone (SUBOXONE) 8-2 MG FILM SL film Place 1 Film under the tongue 2 times daily. MILD TRAUMATIC BRAIN INJURY OR CONCUSSION  A mild traumatic brain injury (TBI) is one that causes some degree of injury to the brain causing symptoms ranging from a brief period of confusion to loss of consciousness (being knocked out). There is no major bruising or bleeding in the brain but symptoms can last from hours to months depending on the severity of the injury. Family or friends need to observe any change in behavior for the next 48 hours. Delayed effects from head injury do occur occasionally and can be due to slow bleeding or swelling around the surface of the brain. These effects may occur even if the x-rays/CT scans were normal.  Please observe the following symptoms during the next 24-48 hours. CALL 911 if:  Pupils (black part in the center of the eye) are unequal in size, and this is new. Seizure (convulsion). Not responding to others/won't wake up or very hard to wake up  Faints (passes out)  Vomiting more than 3 times    Notify the TRAUMA CLINIC if any of the following symptoms occur:  Severe headache -- Mild headache may last for days. Report worsening pain or uncontrolled pain with prescribed medicine. Numbness, tingling or weakness -- Present in arms or legs; unsteady walking. Eye Changes/light sensation -- Vision problems; blurred or double-vision; unequal sized pupils. Nausea/Vomiting -- Episodes of vomiting may occur initially after a head injury. Persistent vomiting or difficulty taking medication by mouth. Increased Sleepiness -- Difficulty waking from sleep with increased confusion. Dizziness -- Does not go away or occurs repeatedly. Vomiting may accompany dizziness.   Drainage -- Clear fluid or blood from the nose and ears.  Fever -- Temperature over 101 degrees. Neck Pain. The First Four Weeks  The symptoms below are common after a mild brain injury. They usually get better on their own within a few weeks:   feeling tired or ?low  problems falling or staying asleep  feeling confused, poor concentration, or slow to answer questions  feeling dizzy, poor balance, or poor coordination  being sensitive to light  being sensitive to sounds  ringing in the ears  a mild headache, sometimes with nausea and/or vomiting  being irritable, having mood swings, or feeling somewhat sad or down  Contact the [address] [place] [address] Road if your symptoms are affecting your everyday activities. Remember that letting yourself get too tired can make your symptoms worse. Listen to your body: if doing a certain activity increases your symptoms, take a break from that activity. Build up the amount of time you do an activity and stay under the threshold of symptoms. Long term Effects (Post-Concussive Syndrome)    Notify physician if any of the following persists longer than 2-4 weeks. Difficulty with concentration or attention (easily distracted)  Frequent headaches  Memory problems   Sensitivity to light   Sleeping difficulties      There is a higher risk of having a more serious head injury if:  Previous history of head injury or concussion  Taking medicine that thins your blood, or have a bleeding disorder  Have other neurologic (brain) problems  Have difficulty walking or frequent falls  Active in high impact contact sports, like soccer or football. Activities  Stay away from activities that could cause another head injury (like sports), until the doctor says it's okay. A second blow to the head can cause more damage to the brain  Limit reading, television, video games, etc. the first 48 hours. Your brain needs to rest so that it can recover. You may find that it helps to take time off school or work.    Limit exposure to bright lights, loud noises, and crowds for the first 48 hours, as these can make your symptoms worse  Limit use of screens, such as an IPad, computer, cellphone, TV, etc, as these can make symptoms, especially headaches, worse. Work/School  It is recommended that you wait to return to work/school until after your follow-up appointment with trauma or your family doctor. If you are having no symptoms, please call for an earlier appointment  Some people find it hard to concentrate well so return to your normal activities slowly. Go back to work or school for half days at first, and increase as tolerated. Trauma services can help you with a graduated schedule. If you feel comfortable doing so, tell work or school about your concussion. You may have to adjust your activities, depending on your job or school demands. Rest and Sleep  Rest for the first 24 hours; it's one of the best things to help your brain recover. It's okay to sleep if you want  You don't have to be woken up every few hours. If someone does wake you up, you should awaken easily. Do some light physical activity (housework) or light exercise (walking, stationary bike) as soon as you can tolerate the movement. Strenuous exercise (such as jogging or weight lifting) can make your concussion symptoms worse or last longer. Diet  Return to a normal diet as tolerated, you may want to start with liquids first  Eat healthy meals (including breakfast) and snacks throughout the day as your brain heals. Managing Pain  Tylenol or Ibuprofen are the best meds to take for headaches. Your doctor may have prescribed you medications if your headaches were severe or you have other injuries. Please take as directed. Driving  Your ability to concentrate and react quickly might be affected by the concussion. Please contact trauma services for advice on when to resume driving.   Do not drive if you're concerned about vision problems, slowed thinking, slowed reaction time, reduced attention or poor judgment. Wear sunglasses even during winter if kassandra while driving. The bright light may induce a headache. Alcohol use/Drug use  Don't drink alcohol or use recreational drugs as they may make you feel worse and/or hide the warning signs. Follow-up:  Trauma Clinic: [phone] -- press option 2   [address], [address]    Call [phone], option 2, for any questions/concerns and for follow-up appointment in 2 week(s). Please follow the instructions checked below:    ACTIVITY INSTRUCTIONS  Increase activity as tolerated  No heavy lifting or strenuous activity  Take your incentive spirometer home and use 4-6 times/day   [x]  No driving until cleared by all providers    WOUND/DRESSING INSTRUCTIONS:  You may shower. No sitting in bath tub, hot tub or swimming until cleared by physician. Ice to areas of pain for first 24 hours. Heat to areas of pain after that. Wash areas of lacerations/abrasions with soap & water. Rinse well. Pat dry with clean towel. Apply thin layer of Bacitracin, Neosporin, or triple antibiotic cream to affected area 2-3 times per day. Keep wounds clean and dry. MEDICATION INSTRUCTIONS  Take medication as prescribed. When taking pain medications, you may experience dizziness or drowsiness. Do not drink alcohol or drive when taking these medications. You may experience constipation while taking pain medication. You may take over the counter stool softeners such as docusate (Colace), sennosides S (Senokot-S), or Miralax.   []  You may take Ibuprofen (over the counter) as directed for mild pain. --You may take up to 800mg every 8 hours for pain, please take with food or milk. [x]  You may take acetaminophen (Tylenol) products. Do NOT take more than 4000mg of Tylenol in 24h.    []  Do not take any other acetaminophen (Tylenol) products if you are taking Percocet or Norco, as these contain Tylenol. --Do NOT take more than 4000mg of Tylenol in 24h. OPIOID MEDICATION INSTRUCTIONS  Read the medication guide that is included with your prescription. Take your medication exactly as prescribed. Store medication away from children and in a safe place. Do NOT share your medication with others. Do NOT take medication unless it is prescribed for you. Do NOT drink alcohol while taking opioids (I.e., Norco, Percocet, Oxycodone, etc). Discuss with the Trauma Clinic staff if the dose of medication you are taking does not control your pain and any side effects that you may be having. CALL 911 OR YOUR LOCAL EMERGENCY SERVICE:  --If you take too much medication  --If you have trouble breathing or shortness of breath  --A child has taken this medication. WORK:  You may not return to work until you receive follow-up with the Trauma Clinic or clearance by all consultants. Call the trauma clinic for any of the following or for questions/concerns;  --fever over 101F  --redness, swelling, hardness or warmth at the wound site(s). --Unrelieved nausea/vomiting  --Foul smelling or cloudy drainage at the wound site(s)  --Unrelieved pain or increase in pain  --Increase in shortness of breath    Follow-up:  Trauma Clinic: [phone] [address]  [place], [address] [place]  Follow up:   [address]  [facility] Enrrique romanaré [id number]  Follow up in 2 week(s)      Ana Maria Thomas MD  [address] [place] [address]  [phone]    Follow up in 2 week(s)       Signed:  Electronically signed by BENITA Dickerson CNP on 11/30/2022 at 10:39 AM

## 2022-11-07 NOTE — CARE COORDINATION
Pt admitted after a syncopal episode after getting out of work truck. Found to have sdh/sah/iph. Pt is also Covid positive. He is oriented to person only and requires wrist restraints for pt safety. Spoke with domestic partner. Magaly Mcnulty. They have lived together for the past 21 yrs. She is not sure if he ever made her hcpoa. They live in a 2 story duplex with bedroom on 2nd floor, bath on both floors. Pt is normally independent in adl's. Pcp is Dr Mick Torres and preferred pharmacy is [facility] on [facility]. Dc plan to be determined. Pt/Ot evals pending. Unable to complete sbi at this time d/t confusion.

## 2022-11-08 ENCOUNTER — APPOINTMENT (OUTPATIENT)
Dept: GENERAL RADIOLOGY | Age: 62
DRG: 082 | End: 2022-11-08
Payer: COMMERCIAL

## 2022-11-08 DIAGNOSIS — I60.9 SAH (SUBARACHNOID HEMORRHAGE) (HCC): Primary | ICD-10-CM

## 2022-11-08 LAB
ANION GAP SERPL CALCULATED.3IONS-SCNC: 13 MMOL/L (ref 7–16)
BUN BLDV-MCNC: 14 MG/DL (ref 6–23)
CALCIUM IONIZED: 1.24 MMOL/L (ref 1.15–1.33)
CALCIUM SERPL-MCNC: 8.8 MG/DL (ref 8.6–10.2)
CHLORIDE BLD-SCNC: 104 MMOL/L (ref 98–107)
CO2: 22 MMOL/L (ref 22–29)
CREAT SERPL-MCNC: 0.4 MG/DL (ref 0.7–1.2)
GFR SERPL CREATININE-BSD FRML MDRD: >60 ML/MIN/1.73
GLUCOSE BLD-MCNC: 123 MG/DL (ref 74–99)
HCT VFR BLD CALC: 41 % (ref 37–54)
HEMOGLOBIN: 14.1 G/DL (ref 12.5–16.5)
MAGNESIUM: 1.9 MG/DL (ref 1.6–2.6)
MCH RBC QN AUTO: 34.2 PG (ref 26–35)
MCHC RBC AUTO-ENTMCNC: 34.4 % (ref 32–34.5)
MCV RBC AUTO: 99.5 FL (ref 80–99.9)
PDW BLD-RTO: 12.5 FL (ref 11.5–15)
PHOSPHORUS: 1.1 MG/DL (ref 2.5–4.5)
PHOSPHORUS: 2.1 MG/DL (ref 2.5–4.5)
PLATELET # BLD: 138 E9/L (ref 130–450)
PMV BLD AUTO: 10 FL (ref 7–12)
POTASSIUM SERPL-SCNC: 3.4 MMOL/L (ref 3.5–5)
RBC # BLD: 4.12 E12/L (ref 3.8–5.8)
SODIUM BLD-SCNC: 139 MMOL/L (ref 132–146)
WBC # BLD: 7.3 E9/L (ref 4.5–11.5)

## 2022-11-08 PROCEDURE — 83735 ASSAY OF MAGNESIUM: CPT

## 2022-11-08 PROCEDURE — 2580000003 HC RX 258

## 2022-11-08 PROCEDURE — 99291 CRITICAL CARE FIRST HOUR: CPT | Performed by: SURGERY

## 2022-11-08 PROCEDURE — 2580000003 HC RX 258: Performed by: STUDENT IN AN ORGANIZED HEALTH CARE EDUCATION/TRAINING PROGRAM

## 2022-11-08 PROCEDURE — 2700000000 HC OXYGEN THERAPY PER DAY

## 2022-11-08 PROCEDURE — 6360000002 HC RX W HCPCS

## 2022-11-08 PROCEDURE — 2000000000 HC ICU R&B

## 2022-11-08 PROCEDURE — 82330 ASSAY OF CALCIUM: CPT

## 2022-11-08 PROCEDURE — 6370000000 HC RX 637 (ALT 250 FOR IP): Performed by: STUDENT IN AN ORGANIZED HEALTH CARE EDUCATION/TRAINING PROGRAM

## 2022-11-08 PROCEDURE — 6370000000 HC RX 637 (ALT 250 FOR IP): Performed by: SURGERY

## 2022-11-08 PROCEDURE — 80048 BASIC METABOLIC PNL TOTAL CA: CPT

## 2022-11-08 PROCEDURE — 85027 COMPLETE CBC AUTOMATED: CPT

## 2022-11-08 PROCEDURE — 2500000003 HC RX 250 WO HCPCS

## 2022-11-08 PROCEDURE — 71045 X-RAY EXAM CHEST 1 VIEW: CPT

## 2022-11-08 PROCEDURE — 84100 ASSAY OF PHOSPHORUS: CPT

## 2022-11-08 PROCEDURE — 36415 COLL VENOUS BLD VENIPUNCTURE: CPT

## 2022-11-08 PROCEDURE — 6360000002 HC RX W HCPCS: Performed by: STUDENT IN AN ORGANIZED HEALTH CARE EDUCATION/TRAINING PROGRAM

## 2022-11-08 PROCEDURE — 2500000003 HC RX 250 WO HCPCS: Performed by: STUDENT IN AN ORGANIZED HEALTH CARE EDUCATION/TRAINING PROGRAM

## 2022-11-08 PROCEDURE — 6370000000 HC RX 637 (ALT 250 FOR IP)

## 2022-11-08 RX ORDER — AMLODIPINE BESYLATE 10 MG/1
10 TABLET ORAL DAILY
Status: DISCONTINUED | OUTPATIENT
Start: 2022-11-09 | End: 2022-11-14

## 2022-11-08 RX ORDER — DIVALPROEX SODIUM 125 MG/1
125 CAPSULE, COATED PELLETS ORAL EVERY 8 HOURS SCHEDULED
Status: DISCONTINUED | OUTPATIENT
Start: 2022-11-08 | End: 2022-11-10

## 2022-11-08 RX ORDER — LIDOCAINE HYDROCHLORIDE 20 MG/ML
JELLY TOPICAL ONCE
Status: DISCONTINUED | OUTPATIENT
Start: 2022-11-08 | End: 2022-11-10

## 2022-11-08 RX ORDER — POTASSIUM CHLORIDE 7.45 MG/ML
10 INJECTION INTRAVENOUS
Status: COMPLETED | OUTPATIENT
Start: 2022-11-08 | End: 2022-11-08

## 2022-11-08 RX ORDER — OXYMETAZOLINE HYDROCHLORIDE 0.05 G/100ML
2 SPRAY NASAL ONCE
Status: COMPLETED | OUTPATIENT
Start: 2022-11-08 | End: 2022-11-08

## 2022-11-08 RX ORDER — AMLODIPINE BESYLATE 10 MG/1
10 TABLET ORAL ONCE
Status: COMPLETED | OUTPATIENT
Start: 2022-11-08 | End: 2022-11-12

## 2022-11-08 RX ORDER — HEPARIN SODIUM 10000 [USP'U]/ML
5000 INJECTION, SOLUTION INTRAVENOUS; SUBCUTANEOUS EVERY 8 HOURS
Status: DISCONTINUED | OUTPATIENT
Start: 2022-11-08 | End: 2022-11-30 | Stop reason: HOSPADM

## 2022-11-08 RX ADMIN — Medication 10 ML: at 07:59

## 2022-11-08 RX ADMIN — SODIUM CHLORIDE: 9 INJECTION, SOLUTION INTRAVENOUS at 14:39

## 2022-11-08 RX ADMIN — POLYETHYLENE GLYCOL 3350 17 G: 17 POWDER, FOR SOLUTION ORAL at 07:59

## 2022-11-08 RX ADMIN — Medication 10 MEQ: at 15:33

## 2022-11-08 RX ADMIN — HYDRALAZINE HYDROCHLORIDE 10 MG: 20 INJECTION INTRAMUSCULAR; INTRAVENOUS at 08:09

## 2022-11-08 RX ADMIN — LEVETIRACETAM 500 MG: 5 INJECTION INTRAVENOUS at 08:06

## 2022-11-08 RX ADMIN — Medication 250 MG: at 07:58

## 2022-11-08 RX ADMIN — HYDRALAZINE HYDROCHLORIDE 10 MG: 20 INJECTION INTRAMUSCULAR; INTRAVENOUS at 06:12

## 2022-11-08 RX ADMIN — PHENOBARBITAL SODIUM 130 MG: 65 INJECTION INTRAMUSCULAR; INTRAVENOUS at 17:51

## 2022-11-08 RX ADMIN — HYDRALAZINE HYDROCHLORIDE 10 MG: 20 INJECTION INTRAMUSCULAR; INTRAVENOUS at 02:04

## 2022-11-08 RX ADMIN — PHENOBARBITAL SODIUM 130 MG: 65 INJECTION INTRAMUSCULAR; INTRAVENOUS at 05:03

## 2022-11-08 RX ADMIN — HEPARIN SODIUM 5000 UNITS: 10000 INJECTION INTRAVENOUS; SUBCUTANEOUS at 20:10

## 2022-11-08 RX ADMIN — Medication 10 MEQ: at 14:31

## 2022-11-08 RX ADMIN — Medication 250 MG: at 17:51

## 2022-11-08 RX ADMIN — BISACODYL 5 MG: 5 TABLET, COATED ORAL at 07:59

## 2022-11-08 RX ADMIN — HYDRALAZINE HYDROCHLORIDE 10 MG: 20 INJECTION INTRAMUSCULAR; INTRAVENOUS at 03:04

## 2022-11-08 RX ADMIN — Medication 10 MEQ: at 16:00

## 2022-11-08 RX ADMIN — SODIUM CHLORIDE 1.4 MCG/KG/HR: 9 INJECTION, SOLUTION INTRAVENOUS at 12:39

## 2022-11-08 RX ADMIN — Medication 10 MEQ: at 13:30

## 2022-11-08 RX ADMIN — PHENOBARBITAL SODIUM 130 MG: 65 INJECTION INTRAMUSCULAR; INTRAVENOUS at 11:40

## 2022-11-08 RX ADMIN — LEVETIRACETAM 500 MG: 5 INJECTION INTRAVENOUS at 21:22

## 2022-11-08 RX ADMIN — HYDRALAZINE HYDROCHLORIDE 10 MG: 20 INJECTION INTRAMUSCULAR; INTRAVENOUS at 20:10

## 2022-11-08 RX ADMIN — Medication 250 MG: at 14:35

## 2022-11-08 RX ADMIN — Medication 250 MG: at 20:13

## 2022-11-08 RX ADMIN — SODIUM CHLORIDE 1 MCG/KG/HR: 9 INJECTION, SOLUTION INTRAVENOUS at 22:17

## 2022-11-08 RX ADMIN — BUPRENORPHINE AND NALOXONE 1 TABLET: 8; 2 TABLET SUBLINGUAL at 20:13

## 2022-11-08 RX ADMIN — Medication 100 MG: at 07:59

## 2022-11-08 RX ADMIN — HYDRALAZINE HYDROCHLORIDE 10 MG: 20 INJECTION INTRAMUSCULAR; INTRAVENOUS at 10:31

## 2022-11-08 RX ADMIN — FOLIC ACID 1 MG: 5 INJECTION, SOLUTION INTRAMUSCULAR; INTRAVENOUS; SUBCUTANEOUS at 11:38

## 2022-11-08 RX ADMIN — DIVALPROEX SODIUM 125 MG: 125 CAPSULE ORAL at 14:40

## 2022-11-08 RX ADMIN — Medication 2 SPRAY: at 14:34

## 2022-11-08 RX ADMIN — HYDRALAZINE HYDROCHLORIDE 10 MG: 20 INJECTION INTRAMUSCULAR; INTRAVENOUS at 11:37

## 2022-11-08 RX ADMIN — HEPARIN SODIUM 5000 UNITS: 10000 INJECTION INTRAVENOUS; SUBCUTANEOUS at 12:40

## 2022-11-08 RX ADMIN — DIVALPROEX SODIUM 125 MG: 125 CAPSULE ORAL at 07:59

## 2022-11-08 RX ADMIN — DIVALPROEX SODIUM 125 MG: 125 CAPSULE ORAL at 21:30

## 2022-11-08 RX ADMIN — HYDRALAZINE HYDROCHLORIDE 10 MG: 20 INJECTION INTRAMUSCULAR; INTRAVENOUS at 00:14

## 2022-11-08 RX ADMIN — BUPRENORPHINE AND NALOXONE 1 TABLET: 8; 2 TABLET SUBLINGUAL at 07:58

## 2022-11-08 RX ADMIN — HYDRALAZINE HYDROCHLORIDE 10 MG: 20 INJECTION INTRAMUSCULAR; INTRAVENOUS at 05:07

## 2022-11-08 NOTE — PROGRESS NOTES
Pt has red elbows and heels and all safety measures put in place but pt will not leave them on. Pt broke several sets of restraints and keeps digging his heels and elbows in the bed.

## 2022-11-08 NOTE — PROGRESS NOTES
OCCUPATIONAL THERAPY    Date:2022  Patient Name: Wellington Mcdaniel  MRN: [id number]  : 1960  Room: [facility]              Chart reviewed. Pt not following commands this am.  Will re-attempt at later time. Thank you for consult.     Jose Marquez, OTR/L [id number]

## 2022-11-08 NOTE — CARE COORDINATION
Pt remains confused, requiring 4 pt restraints for pt safety. O2 4L. Remains in isolation for mrsa and covid. Dc plan to be determined once pt able to participate in therapy evals. Will need sbi completed when pt alert and oriented.

## 2022-11-08 NOTE — FLOWSHEET NOTE
Patient remains agitated, restless, and difficult to reorient/redirect. Continues to kick legs out of bed and at staff. Is a safety risk to self and others, bilateral ankle restraints placed at this time. Continued use of bilateral wrist restraints. Will update wife in the morning and will continue to monitor and redirect/reorient as able.

## 2022-11-08 NOTE — PROGRESS NOTES
Physical Therapy  Physical Therapy Attempt    Name: John Garrett  : 1960  MRN: [id number]      Date of Service: 2022  Chart reviewed. Per critical care note, pt is not answering questions nor following commands this morning. Will hold initial evaluation and re-attempt as able.     Radha Hernández, PT, DPT  [id number]

## 2022-11-08 NOTE — FLOWSHEET NOTE
Patient remains agitated/restless and confused, will pull at lines and attempt to get out of bed despite verbal redirection. Continued use of bilateral wrist restraints for patient's safety.

## 2022-11-08 NOTE — PLAN OF CARE
Problem: Discharge Planning  Goal: Discharge to home or other facility with appropriate resources  11/8/2022 0953 by Gerardo Garcia RN  Outcome: Progressing  11/8/2022 0935 by Gerardo Garcia RN  Outcome: Progressing     Problem: Skin/Tissue Integrity  Goal: Absence of new skin breakdown  11/8/2022 0953 by Gerardo Garcia RN  Outcome: Progressing  11/8/2022 0935 by Gerardo Garcia RN  Outcome: Progressing     Problem: Safety - Adult  Goal: Free from fall injury  11/8/2022 0953 by Gerardo Garcia RN  Outcome: Progressing  11/8/2022 0935 by Gerardo Garcia RN  Outcome: Progressing     Problem: ABCDS Injury Assessment  Goal: Absence of physical injury  11/8/2022 0953 by Gerardo Garcia RN  Outcome: Progressing  11/8/2022 0935 by Gerardo Garcia RN  Outcome: Progressing     Problem: Neurosensory - Adult  Goal: Achieves stable or improved neurological status  11/8/2022 0953 by Gerardo Garcia RN  Outcome: Progressing  11/8/2022 0935 by Gerardo Garcia RN  Outcome: Progressing     Problem: Respiratory - Adult  Goal: Achieves optimal ventilation and oxygenation  11/8/2022 0953 by Gerardo Garcia RN  Outcome: Progressing  11/8/2022 0935 by Gerardo Garcia RN  Outcome: Progressing     Problem: Cardiovascular - Adult  Goal: Maintains optimal cardiac output and hemodynamic stability  11/8/2022 0953 by Gerardo Garcia RN  Outcome: Progressing  11/8/2022 0935 by Gerardo Garcia RN  Outcome: Progressing  Flowsheets (Taken 11/7/2022 2000 by Lee Sorenson RN)  Maintains optimal cardiac output and hemodynamic stability: Monitor blood pressure and heart rate  Goal: Absence of cardiac dysrhythmias or at baseline  Recent Flowsheet Documentation  Taken 11/7/2022 2000 by Lee Sorenson RN  Absence of cardiac dysrhythmias or at baseline: Monitor cardiac rate and rhythm     Problem: Skin/Tissue Integrity - Adult  Goal: Skin integrity remains intact  11/8/2022 0953 by Gerardo Garcia RN  Outcome: Progressing  11/8/2022 0935 by Tika Ocampo RN  Outcome: Progressing  Flowsheets (Taken 11/7/2022 2000 by Anthony Hinojosa RN)  Skin Integrity Remains Intact:   Monitor for areas of redness and/or skin breakdown   Assess vascular access sites hourly     Problem: Musculoskeletal - Adult  Goal: Return mobility to safest level of function  11/8/2022 0953 by Tika Ocampo RN  Outcome: Progressing  11/8/2022 0935 by Tika Ocampo RN  Outcome: Progressing  Flowsheets (Taken 11/7/2022 2000 by Anthony Hinojosa RN)  Return Mobility to Safest Level of Function:   Obtain physical therapy/occupational therapy consults as needed   Apply continuous passive motion per provider or physical therapy orders to increase flexion toward goal   Instruct patient/family in ordered activity level     Problem: Gastrointestinal - Adult  Goal: Minimal or absence of nausea and vomiting  11/8/2022 0953 by Tika Ocampo RN  Outcome: Progressing  11/8/2022 0935 by Tika Ocampo RN  Outcome: Progressing  Flowsheets (Taken 11/7/2022 2000 by Anthony Hinojosa RN)  Minimal or absence of nausea and vomiting: Administer IV fluids as ordered to ensure adequate hydration  Goal: Maintains adequate nutritional intake  Recent Flowsheet Documentation  Taken 11/7/2022 2000 by Anthony Hinojosa RN  Maintains adequate nutritional intake: Monitor percentage of each meal consumed     Problem: Genitourinary - Adult  Goal: Absence of urinary retention  11/8/2022 0953 by Tika Ocampo RN  Outcome: Progressing  11/8/2022 0935 by Tika Ocampo RN  Outcome: Progressing     Problem: Infection - Adult  Goal: Absence of infection at discharge  11/8/2022 0953 by iTka Ocampo RN  Outcome: Progressing  11/8/2022 0935 by Tika Ocampo RN  Outcome: Progressing  Goal: Absence of infection during hospitalization  Recent Flowsheet Documentation  Taken 11/7/2022 2000 by Anthony Hinojosa RN  Absence of infection during hospitalization:   Assess and monitor for signs and symptoms of infection   Monitor lab/diagnostic results     Problem: Metabolic/Fluid and Electrolytes - Adult  Goal: Electrolytes maintained within normal limits  11/8/2022 0953 by Tika Ocampo RN  Outcome: Progressing  11/8/2022 0935 by Tika Ocampo RN  Outcome: Progressing  Flowsheets (Taken 11/7/2022 2000 by Anthony Hinojosa RN)  Electrolytes maintained within normal limits:   Monitor labs and assess patient for signs and symptoms of electrolyte imbalances   Administer electrolyte replacement as ordered  Goal: Hemodynamic stability and optimal renal function maintained  Recent Flowsheet Documentation  Taken 11/7/2022 2000 by Anthony Hinojosa RN  Hemodynamic stability and optimal renal function maintained:   Monitor labs and assess for signs and symptoms of volume excess or deficit   Monitor intake, output and patient weight     Problem: Hematologic - Adult  Goal: Maintains hematologic stability  11/8/2022 0953 by Tika Ocampo RN  Outcome: Progressing  11/8/2022 0935 by Tika Ocampo RN  Outcome: Progressing     Problem: Safety - Medical Restraint  Goal: Remains free of injury from restraints (Restraint for Interference with Medical Device)  11/8/2022 0953 by Tika Ocampo RN  Outcome: Progressing  11/8/2022 0935 by Tika Ocampo RN  Outcome: Progressing     Problem: Anxiety  Goal: Will report anxiety at manageable levels  11/8/2022 0953 by Tika Ocampo RN  Outcome: Progressing  11/8/2022 0935 by Tika Ocampo RN  Outcome: Progressing     Problem: Behavior  Goal: Pt/Family maintain appropriate behavior and adhere to behavioral management agreement, if implemented  11/8/2022 0953 by Tika Ocampo RN  Outcome: Progressing  11/8/2022 0935 by Tika Ocampo RN  Outcome: Progressing     Problem: Chronic Conditions and Co-morbidities  Goal: Patient's chronic conditions and co-morbidity symptoms are monitored and maintained or improved  11/8/2022 0953 by Tika Ocampo RN  Outcome: Progressing  11/8/2022 3594 by Wagner Arcos RN  Outcome: Progressing  Flowsheets (Taken 11/7/2022 2000 by Rigo Redd, RN)  Care Plan - Patient's Chronic Conditions and Co-Morbidity Symptoms are Monitored and Maintained or Improved: Monitor and assess patient's chronic conditions and comorbid symptoms for stability, deterioration, or improvement     Problem: Pain  Goal: Verbalizes/displays adequate comfort level or baseline comfort level  11/8/2022 0953 by Wagner Arcos RN  Outcome: Progressing  11/8/2022 0935 by Wagner Arcos RN  Outcome: Progressing  Flowsheets (Taken 11/8/2022 0800)  Verbalizes/displays adequate comfort level or baseline comfort level:   Encourage patient to monitor pain and request assistance   Assess pain using appropriate pain scale   Administer analgesics based on type and severity of pain and evaluate response   Implement non-pharmacological measures as appropriate and evaluate response   Consider cultural and social influences on pain and pain management

## 2022-11-08 NOTE — PROGRESS NOTES
SURGICAL INTENSIVE CARE  PROGRESS NOTE    Date of admission:  11/5/2022  Reason for ICU transfer:  worsening head CT    SUBJECTIVE:  Patient is agitated, in 4 point restraints. Does not answer questions or follow commands. Moving extremities and opens eyes spontaneously. HOSPITAL COURSE:    11/5--presented after unwitnessed fall; found to have SDH/SAH/IPH--worse on repeat head CT  11/6--no issues overnight; admitted to SICU. 11/7--Pt confused this morning. 11/8--Pt with increasing confusion and agitation overnight. Pulled out two IV's, in 4-point restraints. PHYSICAL EXAM:  BP (!) 172/95   Pulse 61   Temp 96.8 °F (36 °C) (Axillary)   Resp 16   Ht 5' 10\" (1.778 m)   Wt 195 lb (88.5 kg)   SpO2 96%   BMI 27.98 kg/m²     GENERAL:  NAD. Disoriented. Not following commands. HEAD:  Normocephalic, atraumatic. EYES:   No scleral icterus. PERRLA. LUNGS:  No increased work of breathing. CTAB. On 4L NC.   CARDIOVASCULAR: Warm throughout. Regular rate  ABDOMEN:  Soft, non distended, non tender. No guarding, rigidity, rebound. EXTREMITIES:   MAEx4. Atraumatic. No LE edema. SKIN:  Warm and dry  NEUROLOGIC:  GCS 12    ASSESSMENT / PLAN:  Neuro:  shows bifrontal contusions greater than right and bitemporal contusions with tentorial subdural hematoma and occipital fx stable on scan #3. NSG following. Keppra. ETOH on PNB. Suboxone. Depakote sprinkles and Precedex for increasing agitation. CV: Hypertensive. Maintain BP <140. Scheduled Norvasc. Hydralazine and labetalol PRNs. Monitor hemodynamics. Pulm: COVID-19 infection. Supportive care. Wean oxygen as tolerated. Encourage IS/SMI. GI: NG tube feeds with increasing fluctuating mental status. Bowel regimen. Zofran PRN. Renal: No acute issues. Monitor UOP & Electrolytes. Endocrine: No acute issues. Monitor glucose. MSK: No acute issues. PT/OT. Heme: Platelets improved. Hemoglobin stable. continue to monitor. Hemoglobin stable  ID: COVID-19. Supportive care. Hep C    Pain/Analgesia: Tylenol, Suboxone,   Total fluid rate: NS at 50  Bowel regimen: Dulcolax, glycolax  DVT proph:  SQ heparin  Glucose protocol: Not indicated  Mouth/eye care: As needed per patient  Urine:   Condom catheter. Keep in place for fluid monitoring.   CVC sites:  None  Ancillary consults: UZIEL  Family Update: As available    Disposition: SICU    Electronically signed by Lakshmi Espinal DO on 11/8/2022 at 6:25 AM

## 2022-11-08 NOTE — FLOWSHEET NOTE
Pt is in 4 point soft restraints due to he is pulling off leads, restraints, iv and external cath. Pt is moving his legs over the rails and trying to get out of bed and kicking. Pt has to be in 4 points restraints to avoid injury. Rn will continue to monitor for need.

## 2022-11-09 ENCOUNTER — APPOINTMENT (OUTPATIENT)
Dept: GENERAL RADIOLOGY | Age: 62
DRG: 082 | End: 2022-11-09
Payer: COMMERCIAL

## 2022-11-09 ENCOUNTER — APPOINTMENT (OUTPATIENT)
Dept: ULTRASOUND IMAGING | Age: 62
DRG: 082 | End: 2022-11-09
Payer: COMMERCIAL

## 2022-11-09 LAB
ANION GAP SERPL CALCULATED.3IONS-SCNC: 11 MMOL/L (ref 7–16)
BUN BLDV-MCNC: 16 MG/DL (ref 6–23)
CALCIUM IONIZED: 1.21 MMOL/L (ref 1.15–1.33)
CALCIUM SERPL-MCNC: 8.8 MG/DL (ref 8.6–10.2)
CHLORIDE BLD-SCNC: 109 MMOL/L (ref 98–107)
CO2: 20 MMOL/L (ref 22–29)
CREAT SERPL-MCNC: 0.4 MG/DL (ref 0.7–1.2)
GFR SERPL CREATININE-BSD FRML MDRD: >60 ML/MIN/1.73
GLUCOSE BLD-MCNC: 178 MG/DL (ref 74–99)
HCT VFR BLD CALC: 42.9 % (ref 37–54)
HEMOGLOBIN: 15 G/DL (ref 12.5–16.5)
MAGNESIUM: 2.2 MG/DL (ref 1.6–2.6)
MCH RBC QN AUTO: 35 PG (ref 26–35)
MCHC RBC AUTO-ENTMCNC: 35 % (ref 32–34.5)
MCV RBC AUTO: 100 FL (ref 80–99.9)
PDW BLD-RTO: 12.3 FL (ref 11.5–15)
PHENOBARBITAL LEVEL: 21.9 MCG/ML (ref 15–40)
PHOSPHORUS: 1.9 MG/DL (ref 2.5–4.5)
PHOSPHORUS: 2.5 MG/DL (ref 2.5–4.5)
PLATELET # BLD: 162 E9/L (ref 130–450)
PMV BLD AUTO: 10 FL (ref 7–12)
POTASSIUM REFLEX MAGNESIUM: 4.2 MMOL/L (ref 3.5–5)
RBC # BLD: 4.29 E12/L (ref 3.8–5.8)
REASON FOR REJECTION: NORMAL
REJECTED TEST: NORMAL
SODIUM BLD-SCNC: 140 MMOL/L (ref 132–146)
WBC # BLD: 6.2 E9/L (ref 4.5–11.5)

## 2022-11-09 PROCEDURE — 85027 COMPLETE CBC AUTOMATED: CPT

## 2022-11-09 PROCEDURE — 6360000002 HC RX W HCPCS

## 2022-11-09 PROCEDURE — 99291 CRITICAL CARE FIRST HOUR: CPT | Performed by: SURGERY

## 2022-11-09 PROCEDURE — 6370000000 HC RX 637 (ALT 250 FOR IP): Performed by: SURGERY

## 2022-11-09 PROCEDURE — 83735 ASSAY OF MAGNESIUM: CPT

## 2022-11-09 PROCEDURE — 76937 US GUIDE VASCULAR ACCESS: CPT

## 2022-11-09 PROCEDURE — 80184 ASSAY OF PHENOBARBITAL: CPT

## 2022-11-09 PROCEDURE — C1751 CATH, INF, PER/CENT/MIDLINE: HCPCS

## 2022-11-09 PROCEDURE — 2700000000 HC OXYGEN THERAPY PER DAY

## 2022-11-09 PROCEDURE — 6370000000 HC RX 637 (ALT 250 FOR IP): Performed by: STUDENT IN AN ORGANIZED HEALTH CARE EDUCATION/TRAINING PROGRAM

## 2022-11-09 PROCEDURE — 02HV33Z INSERTION OF INFUSION DEVICE INTO SUPERIOR VENA CAVA, PERCUTANEOUS APPROACH: ICD-10-PCS | Performed by: SURGERY

## 2022-11-09 PROCEDURE — 2580000003 HC RX 258: Performed by: STUDENT IN AN ORGANIZED HEALTH CARE EDUCATION/TRAINING PROGRAM

## 2022-11-09 PROCEDURE — 80048 BASIC METABOLIC PNL TOTAL CA: CPT

## 2022-11-09 PROCEDURE — 05HB33Z INSERTION OF INFUSION DEVICE INTO RIGHT BASILIC VEIN, PERCUTANEOUS APPROACH: ICD-10-PCS | Performed by: SURGERY

## 2022-11-09 PROCEDURE — 6360000002 HC RX W HCPCS: Performed by: STUDENT IN AN ORGANIZED HEALTH CARE EDUCATION/TRAINING PROGRAM

## 2022-11-09 PROCEDURE — 2580000003 HC RX 258

## 2022-11-09 PROCEDURE — 6370000000 HC RX 637 (ALT 250 FOR IP)

## 2022-11-09 PROCEDURE — 82330 ASSAY OF CALCIUM: CPT

## 2022-11-09 PROCEDURE — 36410 VNPNXR 3YR/> PHY/QHP DX/THER: CPT

## 2022-11-09 PROCEDURE — 93971 EXTREMITY STUDY: CPT

## 2022-11-09 PROCEDURE — 2500000003 HC RX 250 WO HCPCS: Performed by: STUDENT IN AN ORGANIZED HEALTH CARE EDUCATION/TRAINING PROGRAM

## 2022-11-09 PROCEDURE — 74018 RADEX ABDOMEN 1 VIEW: CPT

## 2022-11-09 PROCEDURE — 84100 ASSAY OF PHOSPHORUS: CPT

## 2022-11-09 PROCEDURE — 36415 COLL VENOUS BLD VENIPUNCTURE: CPT

## 2022-11-09 PROCEDURE — 2000000000 HC ICU R&B

## 2022-11-09 RX ORDER — CLONIDINE HYDROCHLORIDE 0.1 MG/1
0.1 TABLET ORAL 2 TIMES DAILY
Status: DISCONTINUED | OUTPATIENT
Start: 2022-11-09 | End: 2022-11-10

## 2022-11-09 RX ORDER — LEVETIRACETAM 500 MG/1
500 TABLET ORAL 2 TIMES DAILY
Status: COMPLETED | OUTPATIENT
Start: 2022-11-09 | End: 2022-11-12

## 2022-11-09 RX ORDER — PHENOBARBITAL 97.2 MG/1
97.2 TABLET ORAL 2 TIMES DAILY
Status: DISCONTINUED | OUTPATIENT
Start: 2022-11-09 | End: 2022-11-14

## 2022-11-09 RX ADMIN — HYDRALAZINE HYDROCHLORIDE 10 MG: 20 INJECTION INTRAMUSCULAR; INTRAVENOUS at 12:50

## 2022-11-09 RX ADMIN — PHENOBARBITAL 129.6 MG: 97.2 TABLET ORAL at 08:44

## 2022-11-09 RX ADMIN — Medication 2 PACKET: at 21:00

## 2022-11-09 RX ADMIN — Medication 10 ML: at 21:00

## 2022-11-09 RX ADMIN — CLONIDINE HYDROCHLORIDE 0.1 MG: 0.1 TABLET ORAL at 21:00

## 2022-11-09 RX ADMIN — HEPARIN SODIUM 5000 UNITS: 10000 INJECTION INTRAVENOUS; SUBCUTANEOUS at 20:30

## 2022-11-09 RX ADMIN — CLONIDINE HYDROCHLORIDE 0.1 MG: 0.1 TABLET ORAL at 08:05

## 2022-11-09 RX ADMIN — Medication 10 ML: at 00:39

## 2022-11-09 RX ADMIN — Medication 100 MG: at 08:04

## 2022-11-09 RX ADMIN — BUPRENORPHINE AND NALOXONE 1 TABLET: 8; 2 TABLET SUBLINGUAL at 21:00

## 2022-11-09 RX ADMIN — Medication 2 PACKET: at 16:13

## 2022-11-09 RX ADMIN — AMLODIPINE BESYLATE 10 MG: 10 TABLET ORAL at 08:04

## 2022-11-09 RX ADMIN — DIVALPROEX SODIUM 125 MG: 125 CAPSULE ORAL at 06:24

## 2022-11-09 RX ADMIN — DIVALPROEX SODIUM 125 MG: 125 CAPSULE ORAL at 21:00

## 2022-11-09 RX ADMIN — PHENOBARBITAL 97.2 MG: 97.2 TABLET ORAL at 21:00

## 2022-11-09 RX ADMIN — POLYETHYLENE GLYCOL 3350 17 G: 17 POWDER, FOR SOLUTION ORAL at 08:51

## 2022-11-09 RX ADMIN — Medication 2 PACKET: at 12:22

## 2022-11-09 RX ADMIN — HEPARIN SODIUM 5000 UNITS: 10000 INJECTION INTRAVENOUS; SUBCUTANEOUS at 12:22

## 2022-11-09 RX ADMIN — Medication 2 PACKET: at 08:13

## 2022-11-09 RX ADMIN — PHENOBARBITAL 129.6 MG: 97.2 TABLET ORAL at 13:55

## 2022-11-09 RX ADMIN — FOLIC ACID 1 MG: 5 INJECTION, SOLUTION INTRAMUSCULAR; INTRAVENOUS; SUBCUTANEOUS at 12:34

## 2022-11-09 RX ADMIN — LEVETIRACETAM 500 MG: 500 TABLET, FILM COATED ORAL at 21:00

## 2022-11-09 RX ADMIN — PHENOBARBITAL SODIUM 130 MG: 65 INJECTION INTRAMUSCULAR; INTRAVENOUS at 00:32

## 2022-11-09 RX ADMIN — DIVALPROEX SODIUM 125 MG: 125 CAPSULE ORAL at 13:56

## 2022-11-09 RX ADMIN — HEPARIN SODIUM 5000 UNITS: 10000 INJECTION INTRAVENOUS; SUBCUTANEOUS at 04:00

## 2022-11-09 RX ADMIN — BUPRENORPHINE AND NALOXONE 1 TABLET: 8; 2 TABLET SUBLINGUAL at 08:04

## 2022-11-09 RX ADMIN — LEVETIRACETAM 500 MG: 500 TABLET, FILM COATED ORAL at 08:44

## 2022-11-09 RX ADMIN — SODIUM CHLORIDE: 9 INJECTION, SOLUTION INTRAVENOUS at 12:39

## 2022-11-09 ASSESSMENT — PAIN SCALES - GENERAL
PAINLEVEL_OUTOF10: 0
PAINLEVEL_OUTOF10: 4

## 2022-11-09 NOTE — FLOWSHEET NOTE
Patient continues to reach for ngt and tries to pull of monitor leads/pull out iv sites and tries to climb out of bed unable to redirect at this time will continue to monitor

## 2022-11-09 NOTE — PROGRESS NOTES
Attempted to release soft wrist restraints when patient tried to pull at NGT. Patient attempts to get out of bed also when soft ankle restraints are released. Bilateral soft wrist and ankle restraints reapplied.

## 2022-11-09 NOTE — PLAN OF CARE
Problem: Skin/Tissue Integrity  Goal: Absence of new skin breakdown  Description: 1. Monitor for areas of redness and/or skin breakdown  2. Assess vascular access sites hourly  3. Every 4-6 hours minimum:  Change oxygen saturation probe site  4. Every 4-6 hours:  If on nasal continuous positive airway pressure, respiratory therapy assess nares and determine need for appliance change or resting period.   11/9/2022 0915 by Martha Kilpatrick RN  Outcome: Progressing  11/9/2022 0054 by Kevin Cisneros RN  Outcome: Progressing     Problem: Safety - Adult  Goal: Free from fall injury  11/9/2022 0915 by Martha Kilpatrick RN  Outcome: Progressing  11/9/2022 0054 by Kevin Cisneros RN  Outcome: Progressing     Problem: ABCDS Injury Assessment  Goal: Absence of physical injury  Outcome: Progressing     Problem: Neurosensory - Adult  Goal: Achieves stable or improved neurological status  Outcome: Progressing  Goal: Absence of seizures  Outcome: Progressing  Goal: Remains free of injury related to seizures activity  Outcome: Progressing  Goal: Achieves maximal functionality and self care  Outcome: Progressing     Problem: Respiratory - Adult  Goal: Achieves optimal ventilation and oxygenation  Outcome: Progressing     Problem: Cardiovascular - Adult  Goal: Maintains optimal cardiac output and hemodynamic stability  Outcome: Progressing  Goal: Absence of cardiac dysrhythmias or at baseline  Outcome: Progressing     Problem: Skin/Tissue Integrity - Adult  Goal: Skin integrity remains intact  Outcome: Progressing  Flowsheets  Taken 11/9/2022 0800 by Martha Kilpatrick RN  Skin Integrity Remains Intact: Monitor for areas of redness and/or skin breakdown  Taken 11/8/2022 2000 by Kacie Greenfield RN  Skin Integrity Remains Intact:   Monitor for areas of redness and/or skin breakdown   Every 4-6 hours minimum: Change oxygen saturation probe site   Assess vascular access sites hourly  Goal: Incisions, wounds, or drain sites healing without S/S of infection  Outcome: Progressing  Goal: Oral mucous membranes remain intact  Outcome: Progressing     Problem: Musculoskeletal - Adult  Goal: Return mobility to safest level of function  Outcome: Progressing  Goal: Maintain proper alignment of affected body part  Outcome: Progressing  Goal: Return ADL status to a safe level of function  Outcome: Progressing     Problem: Gastrointestinal - Adult  Goal: Minimal or absence of nausea and vomiting  Outcome: Progressing  Goal: Maintains or returns to baseline bowel function  Outcome: Progressing  Goal: Maintains adequate nutritional intake  Outcome: Progressing  Flowsheets (Taken 11/8/2022 2000 by Marcela Blankenship RN)  Maintains adequate nutritional intake: Monitor intake and output, weight and lab values  Goal: Establish and maintain optimal ostomy function  Outcome: Completed     Problem: Genitourinary - Adult  Goal: Absence of urinary retention  Outcome: Progressing  Goal: Urinary catheter remains patent  Outcome: Completed     Problem: Metabolic/Fluid and Electrolytes - Adult  Goal: Electrolytes maintained within normal limits  Outcome: Progressing  Goal: Hemodynamic stability and optimal renal function maintained  Outcome: Progressing  Goal: Glucose maintained within prescribed range  Outcome: Progressing     Problem: Hematologic - Adult  Goal: Maintains hematologic stability  Outcome: Progressing     Problem: Safety - Medical Restraint  Goal: Remains free of injury from restraints (Restraint for Interference with Medical Device)  Description: INTERVENTIONS:  1. Determine that other, less restrictive measures have been tried or would not be effective before applying the restraint  2. Evaluate the patient's condition at the time of restraint application  3. Inform patient/family regarding the reason for restraint  4.  Q2H: Monitor safety, psychosocial status, comfort, nutrition and hydration  11/9/2022 0915 by Varghese Claudio RN  Outcome: Progressing  Flowsheets (Taken 11/9/2022 0800)  Remains free of injury from restraints (restraint for interference with medical device): Every 2 hours: Monitor safety, psychosocial status, comfort, nutrition and hydration  11/9/2022 0054 by Salud Schwartz RN  Outcome: Progressing  Flowsheets (Taken 11/9/2022 0000)  Remains free of injury from restraints (restraint for interference with medical device): Every 2 hours: Monitor safety, psychosocial status, comfort, nutrition and hydration  11/8/2022 2248 by Olive Hidalgo RN  Outcome: Progressing     Problem: Anxiety  Goal: Will report anxiety at manageable levels  Description: INTERVENTIONS:  1. Administer medication as ordered  2. Teach and rehearse alternative coping skills  3. Provide emotional support with 1:1 interaction with staff  Outcome: Not Progressing     Problem: Behavior  Goal: Pt/Family maintain appropriate behavior and adhere to behavioral management agreement, if implemented  Description: INTERVENTIONS:  1. Assess patient/family's coping skills and  non-compliant behavior (including use of illegal substances)  2. Notify security of behavior or suspected illegal substances which indicate the need for search of the family and/or belongings  3. Encourage verbalization of thoughts and concerns in a socially appropriate manner  4. Utilize positive, consistent limit setting strategies supporting safety of patient, staff and others  5. Encourage participation in the decision making process about the behavioral management agreement  6. If a visitor's behavior poses a threat to safety call refer to organization policy.   7. Initiate consult with , Psychosocial CNS, Spiritual Care as appropriate  Outcome: Not Progressing     Problem: Chronic Conditions and Co-morbidities  Goal: Patient's chronic conditions and co-morbidity symptoms are monitored and maintained or improved  Outcome: Progressing     Problem: Pain  Goal: Verbalizes/displays adequate comfort level or baseline comfort level  Outcome: Progressing     Problem: Anxiety  Goal: Will report anxiety at manageable levels  Description: INTERVENTIONS:  1. Administer medication as ordered  2. Teach and rehearse alternative coping skills  3. Provide emotional support with 1:1 interaction with staff  Outcome: Not Progressing     Problem: Behavior  Goal: Pt/Family maintain appropriate behavior and adhere to behavioral management agreement, if implemented  Description: INTERVENTIONS:  1. Assess patient/family's coping skills and  non-compliant behavior (including use of illegal substances)  2. Notify security of behavior or suspected illegal substances which indicate the need for search of the family and/or belongings  3. Encourage verbalization of thoughts and concerns in a socially appropriate manner  4. Utilize positive, consistent limit setting strategies supporting safety of patient, staff and others  5. Encourage participation in the decision making process about the behavioral management agreement  6. If a visitor's behavior poses a threat to safety call refer to organization policy.   7. Initiate consult with , Psychosocial CNS, Spiritual Care as appropriate  Outcome: Not Progressing

## 2022-11-09 NOTE — PROGRESS NOTES
Patient despite right wrist restraint pulled NGT out to 50cm. NGT advanced to 69cm. Portable chest xray ordered.

## 2022-11-09 NOTE — PROGRESS NOTES
Physical Therapy  Physical Therapy Attempt    Name: John Garrett  : 1960  MRN: [id number]      Date of Service: 2022  Chart reviewed. Spoke with RN - pt was agitated earlier and now lethargic. Pt does not consistently follow commands. Will re-attempt as able.     Radha Hernández, PT, DPT  [id number]

## 2022-11-09 NOTE — PROGRESS NOTES
MIDLINEPlacement 11/9/2022    Product number: [id number]   Lot Number: [id number]      Ultrasound: yes   Right Basilic vein:                Upper Arm Circumference: 32cm    Size: 4.5f    Exposed Length: 2cm    Internal Length: 13cm   Cut: 0   Vein Measurement: .55cm    Umair Goins RN  11/9/2022  11:43 AM

## 2022-11-09 NOTE — PLAN OF CARE
Problem: Safety - Medical Restraint  Goal: Remains free of injury from restraints (Restraint for Interference with Medical Device)  Description: INTERVENTIONS:  1. Determine that other, less restrictive measures have been tried or would not be effective before applying the restraint  2. Evaluate the patient's condition at the time of restraint application  3. Inform patient/family regarding the reason for restraint  4.  Q2H: Monitor safety, psychosocial status, comfort, nutrition and hydration  11/8/2022 2248 by Oliver Foster RN  Outcome: Progressing

## 2022-11-09 NOTE — FLOWSHEET NOTE
Patient intermittently agitated/restless and reach for essential lines/tubes, and kick legs out of bed and at staff. Continued use of bilateral wrist and ankle restraints for patient's safety. Will continue to reorient and redirect patient as able.

## 2022-11-09 NOTE — PROGRESS NOTES
Comprehensive Nutrition Assessment    Type and Reason for Visit:  Initial, Consult (TF Recs)    Nutrition Recommendations/Plan:     Continue NPO, Modify Tube Feeding to better meet est needs:     Standard with fiber (Jevity 1.5) @ 55 ml/hr + 1 protein modular daily. Will provide 1320 ml tv, 1980 kcals, 84 gm pro (2080 kcals & 110 gm pro w/ mod), 1003 ml free water  Regimen meets 100% calorie & protein needs       Malnutrition Assessment:  Malnutrition Status:  Insufficient data (11/09/22 1034)    Context:  Acute Illness     Findings of the 6 clinical characteristics of malnutrition:  Energy Intake:  Mild decrease in energy intake  Weight Loss:  No significant weight loss (per EMR review)     Body Fat Loss:  Unable to assess (d/t covid iso)     Muscle Mass Loss:  Unable to assess (d/t covid iso)    Fluid Accumulation:  No significant fluid accumulation     Strength:  Not Performed    Nutrition Assessment:    Pt admit s/p fall +[facility] also found to have covid/ rhinovirus PNA. Noted ETOH abuse w/ elevated LFT's. PO diet + EN support initiated d/t AMS/ agitation. Will provide updated TF recs and monitor. Nutrition Related Findings:    Pt disoriented/ agitation, MAP WNL, +I/O's 4L, +1 nonpitting edema, active BS, NGT w/ TF     Wound Type: None       Current Nutrition Intake & Therapies:    Average Meal Intake: NPO    Current Tube Feeding (TF) Orders:  Feeding Route: Nasogastric  Formula: Standard with Fiber  Schedule: Continuous  Feeding Regimen: 40 ml/hr, running at goal  Water Flushes: 30 ml q 4 hr = 180 ml water  Current TF & Flush Orders Provides: 960 ml tv, 1440 kcals, 61 gm pro, 730 ml free water, 910 ml total water w/ flushes    Anthropometric Measures:  Height: 5' 10\" (177.8 cm)  Ideal Body Weight (IBW): 166 lbs (75 kg)    Admission Body Weight: 195 lb (88.5 kg) (11/6 first measured)  Current Body Weight: 195 lb (88.5 kg) (11/6 actual), 117.5 % IBW.     Current BMI (kg/m2): 28  Usual Body Weight: 189 lb 6 oz (85.9 kg) (EMR measured wt 6/29/22)  % Weight Change (Calculated): 3, wt hx stable                     BMI Categories: Overweight (BMI 25.0-29. 9)    Estimated Daily Nutrient Needs:  Energy Requirements Based On: Formula  Weight Used for Energy Requirements: Current  Energy (kcal/day): MSJ 1682 x 1.2 SF= 9438-1085  Weight Used for Protein Requirements: Ideal  Protein (g/day): 1.3-1.5 g/kg IBW;   Fluid (ml/day): per neuro/ critical care    Nutrition Diagnosis:   Inadequate oral intake related to cognitive or neurological impairment as evidenced by nutrition support - enteral nutrition    Nutrition Interventions:   Nutrition Education/Counseling: Education not appropriate  Coordination of Nutrition Care: Continue to monitor while inpatient    Goals:    Goals: Tolerate nutrition support at goal rate      Nutrition Monitoring and Evaluation:   Food/Nutrient Intake Outcomes: Enteral Nutrition Intake/Tolerance  Physical Signs/Symptoms Outcomes: Biochemical Data, Nutrition Focused Physical Findings, Skin, Weight, GI Status, Fluid Status or Edema, Hemodynamic Status    Discharge Planning:     Too soon to determine     Edgar Callahan RD, LD  Contact: Ext [phone]

## 2022-11-09 NOTE — PROGRESS NOTES
Physician Progress Note      PATIENT:               Durga Jaramillo  CSN #:                  [id number]  :                       1960  ADMIT DATE:       2022 3:50 PM  [address] [place] DATE:  RESPONDING  PROVIDER #:        Katarina Vazquez MD          QUERY TEXT:    Pt admitted with SDH/SAH/IPH. Pt noted to have CT findings of 3mm rightward   midline shift and increasing mass effect and midline shift on repeat CT scan. If clinically significant, please document in progress notes and discharge   summary if you are evaluating/treating any of the following: The medical record reflects the following:  Risk Factors: Fall with LOC; SDH/SAH/IPH; Clinical Indicators:  22: multicompartmental intracranial hemorrhages. 3mm of rightward   midline shirt. 22: Repeat CT Head: Enlarging bifrontal parenchymal contusions and newly   developed bitemporal parenchymal contusions. stable small bilateral   subarachnoid and left subdural hematoma. Increasing left greater than right   frontal predominant mass effect with progressive effacement of the left   lateral ventricle and increasing 5mm left right midline shift at the foramen   of [place], previously 3mm  Treatment: Neurosurgery consult; Neurological exams; Serial CT scans; Keppra   IV; Labetalol IV X 16 doses; Hydralazine IV X 1    Thank Calli Pineda BSN, R.N.  Clinical Documentation Improvement  [phone]  Options provided:  -- Brain compression  -- Traumatic brain compression  -- Other - I will add my own diagnosis  -- Disagree - Not applicable / Not valid  -- Disagree - Clinically unable to determine / Unknown  -- Refer to Clinical Documentation Reviewer    PROVIDER RESPONSE TEXT:    This patient has brain compression. Query created by: Noemy Ornelas on 2022 8:15 AM      Electronically signed by:   Katarina Vazquez MD 2022 12:05 PM

## 2022-11-09 NOTE — PROGRESS NOTES
SURGICAL INTENSIVE CARE  PROGRESS NOTE    Date of admission:  11/5/2022  Reason for ICU transfer:  worsening head CT    SUBJECTIVE:  Patient is agitated, in 4 point restraints. Does know his name but otherwise disoriented. Moving extremities and opens eyes spontaneously. HOSPITAL COURSE:    11/5--presented after unwitnessed fall; found to have SDH/SAH/IPH--worse on repeat head CT  11/6--no issues overnight; admitted to SICU. 11/7--Pt confused this morning. 11/8--Pt with increasing confusion and agitation overnight. Pulled out two IV's, in 4-point restraints. Started on Norvasc, Depakote sprinkles and precedex. 11/9--Precedex switched to clonidine due to bradycardia. Remains A&Ox1 not following commands. Again pulling out IV's, in 4-point restraints. PHYSICAL EXAM:  BP (!) 144/87   Pulse (!) 41   Temp (P) 98 °F (36.7 °C)   Resp 17   Ht 5' 10\" (1.778 m)   Wt 195 lb (88.5 kg)   SpO2 95%   BMI 27.98 kg/m²     GENERAL:  NAD. Disoriented. Not following commands. HEAD:  Normocephalic, atraumatic. EYES:   No scleral icterus. PERRLA. LUNGS:  No increased work of breathing. CTAB. On RA. CARDIOVASCULAR: Warm throughout. Bradycardic in the 40s. ABDOMEN:  Soft, non distended, non tender. No guarding, rigidity, rebound. EXTREMITIES:   MAEx4. Atraumatic. No LE edema. SKIN:  Warm and dry  NEUROLOGIC:  GCS 13    ASSESSMENT / PLAN:  Neuro:  shows bifrontal contusions greater than right and bitemporal contusions with tentorial subdural hematoma and occipital fx stable on scan #3. GCS 13. NSG following. Keppra. ETOH on PNB. Suboxone. Depakote sprinkles and Clonidine for increasing agitation. CV: Hypertensive. Maintain BP <140. Scheduled Norvasc. Hydralazine and labetalol PRNs. Monitor hemodynamics. Pulm: COVID-19 infection. Supportive care. Wean oxygen as tolerated. Encourage IS/SMI. GI: NG tube feeds with increasing fluctuating mental status. Bowel regimen. Zofran PRN. Renal: No acute issues. Monitor UOP & Electrolytes. Endocrine: No acute issues. Monitor glucose. MSK: No acute issues. PT/OT. Heme: Platelets improved. Hemoglobin stable. continue to monitor. Hemoglobin stable  ID: COVID-19. Supportive care. Hep C    Pain/Analgesia: Tylenol, Suboxone,   Total fluid rate: NS at 50  Bowel regimen: Dulcolax, glycolax  DVT proph:  SQ heparin  Glucose protocol: Not indicated  Mouth/eye care: As needed per patient  Urine:   Condom catheter. Keep in place for fluid monitoring.   CVC sites:  None  Ancillary consults: UZIEL  Family Update: As available    Disposition: SICU    Electronically signed by Hamida Berumen DO on 11/9/2022 at 5:19 AM

## 2022-11-10 ENCOUNTER — APPOINTMENT (OUTPATIENT)
Dept: GENERAL RADIOLOGY | Age: 62
DRG: 082 | End: 2022-11-10
Payer: COMMERCIAL

## 2022-11-10 LAB
ANION GAP SERPL CALCULATED.3IONS-SCNC: 9 MMOL/L (ref 7–16)
BUN BLDV-MCNC: 12 MG/DL (ref 6–23)
CALCIUM IONIZED: 1.21 MMOL/L (ref 1.15–1.33)
CALCIUM SERPL-MCNC: 8.8 MG/DL (ref 8.6–10.2)
CHLORIDE BLD-SCNC: 107 MMOL/L (ref 98–107)
CO2: 29 MMOL/L (ref 22–29)
CREAT SERPL-MCNC: 0.5 MG/DL (ref 0.7–1.2)
GFR SERPL CREATININE-BSD FRML MDRD: >60 ML/MIN/1.73
GLUCOSE BLD-MCNC: 161 MG/DL (ref 74–99)
HCT VFR BLD CALC: 41.6 % (ref 37–54)
HEMOGLOBIN: 14.2 G/DL (ref 12.5–16.5)
MAGNESIUM: 1.8 MG/DL (ref 1.6–2.6)
MCH RBC QN AUTO: 34 PG (ref 26–35)
MCHC RBC AUTO-ENTMCNC: 34.1 % (ref 32–34.5)
MCV RBC AUTO: 99.5 FL (ref 80–99.9)
PDW BLD-RTO: 12.5 FL (ref 11.5–15)
PHOSPHORUS: 2.7 MG/DL (ref 2.5–4.5)
PHOSPHORUS: 3 MG/DL (ref 2.5–4.5)
PLATELET # BLD: 158 E9/L (ref 130–450)
PMV BLD AUTO: 10.6 FL (ref 7–12)
POTASSIUM REFLEX MAGNESIUM: 3.3 MMOL/L (ref 3.5–5)
RBC # BLD: 4.18 E12/L (ref 3.8–5.8)
SODIUM BLD-SCNC: 145 MMOL/L (ref 132–146)
WBC # BLD: 5.5 E9/L (ref 4.5–11.5)

## 2022-11-10 PROCEDURE — 84100 ASSAY OF PHOSPHORUS: CPT

## 2022-11-10 PROCEDURE — 2580000003 HC RX 258: Performed by: STUDENT IN AN ORGANIZED HEALTH CARE EDUCATION/TRAINING PROGRAM

## 2022-11-10 PROCEDURE — 74018 RADEX ABDOMEN 1 VIEW: CPT

## 2022-11-10 PROCEDURE — 2000000000 HC ICU R&B

## 2022-11-10 PROCEDURE — 85027 COMPLETE CBC AUTOMATED: CPT

## 2022-11-10 PROCEDURE — 83735 ASSAY OF MAGNESIUM: CPT

## 2022-11-10 PROCEDURE — 6370000000 HC RX 637 (ALT 250 FOR IP): Performed by: SURGERY

## 2022-11-10 PROCEDURE — 99291 CRITICAL CARE FIRST HOUR: CPT | Performed by: SURGERY

## 2022-11-10 PROCEDURE — 6360000002 HC RX W HCPCS: Performed by: STUDENT IN AN ORGANIZED HEALTH CARE EDUCATION/TRAINING PROGRAM

## 2022-11-10 PROCEDURE — 2700000000 HC OXYGEN THERAPY PER DAY

## 2022-11-10 PROCEDURE — 6370000000 HC RX 637 (ALT 250 FOR IP): Performed by: STUDENT IN AN ORGANIZED HEALTH CARE EDUCATION/TRAINING PROGRAM

## 2022-11-10 PROCEDURE — 6370000000 HC RX 637 (ALT 250 FOR IP)

## 2022-11-10 PROCEDURE — 6360000002 HC RX W HCPCS

## 2022-11-10 PROCEDURE — 36415 COLL VENOUS BLD VENIPUNCTURE: CPT

## 2022-11-10 PROCEDURE — 2500000003 HC RX 250 WO HCPCS

## 2022-11-10 PROCEDURE — 82330 ASSAY OF CALCIUM: CPT

## 2022-11-10 PROCEDURE — 80048 BASIC METABOLIC PNL TOTAL CA: CPT

## 2022-11-10 PROCEDURE — 2500000003 HC RX 250 WO HCPCS: Performed by: STUDENT IN AN ORGANIZED HEALTH CARE EDUCATION/TRAINING PROGRAM

## 2022-11-10 RX ORDER — MAGNESIUM SULFATE IN WATER 40 MG/ML
2000 INJECTION, SOLUTION INTRAVENOUS ONCE
Status: COMPLETED | OUTPATIENT
Start: 2022-11-10 | End: 2022-11-10

## 2022-11-10 RX ORDER — CLONIDINE HYDROCHLORIDE 0.1 MG/1
0.1 TABLET ORAL 3 TIMES DAILY
Status: DISCONTINUED | OUTPATIENT
Start: 2022-11-10 | End: 2022-11-11

## 2022-11-10 RX ORDER — DIVALPROEX SODIUM 125 MG/1
250 CAPSULE, COATED PELLETS ORAL EVERY 8 HOURS SCHEDULED
Status: DISCONTINUED | OUTPATIENT
Start: 2022-11-10 | End: 2022-11-22 | Stop reason: CLARIF

## 2022-11-10 RX ORDER — POTASSIUM BICARBONATE 25 MEQ/1
75 TABLET, EFFERVESCENT ORAL ONCE
Status: DISCONTINUED | OUTPATIENT
Start: 2022-11-10 | End: 2022-11-10 | Stop reason: SDUPTHER

## 2022-11-10 RX ADMIN — HEPARIN SODIUM 5000 UNITS: 10000 INJECTION INTRAVENOUS; SUBCUTANEOUS at 05:29

## 2022-11-10 RX ADMIN — BUPRENORPHINE AND NALOXONE 1 TABLET: 8; 2 TABLET SUBLINGUAL at 11:19

## 2022-11-10 RX ADMIN — FOLIC ACID 1 MG: 5 INJECTION, SOLUTION INTRAMUSCULAR; INTRAVENOUS; SUBCUTANEOUS at 11:21

## 2022-11-10 RX ADMIN — HYDRALAZINE HYDROCHLORIDE 10 MG: 20 INJECTION INTRAMUSCULAR; INTRAVENOUS at 18:10

## 2022-11-10 RX ADMIN — SODIUM CHLORIDE: 9 INJECTION, SOLUTION INTRAVENOUS at 16:53

## 2022-11-10 RX ADMIN — DIVALPROEX SODIUM 250 MG: 125 CAPSULE, COATED PELLETS ORAL at 20:37

## 2022-11-10 RX ADMIN — POTASSIUM BICARBONATE 40 MEQ: 782 TABLET, EFFERVESCENT ORAL at 11:33

## 2022-11-10 RX ADMIN — HYDRALAZINE HYDROCHLORIDE 10 MG: 20 INJECTION INTRAMUSCULAR; INTRAVENOUS at 05:27

## 2022-11-10 RX ADMIN — Medication 500 MG: at 11:21

## 2022-11-10 RX ADMIN — HYDRALAZINE HYDROCHLORIDE 10 MG: 20 INJECTION INTRAMUSCULAR; INTRAVENOUS at 05:38

## 2022-11-10 RX ADMIN — HYDRALAZINE HYDROCHLORIDE 10 MG: 20 INJECTION INTRAMUSCULAR; INTRAVENOUS at 09:30

## 2022-11-10 RX ADMIN — HYDRALAZINE HYDROCHLORIDE 10 MG: 20 INJECTION INTRAMUSCULAR; INTRAVENOUS at 06:48

## 2022-11-10 RX ADMIN — LEVETIRACETAM 500 MG: 500 TABLET, FILM COATED ORAL at 11:19

## 2022-11-10 RX ADMIN — LEVETIRACETAM 500 MG: 500 TABLET, FILM COATED ORAL at 20:08

## 2022-11-10 RX ADMIN — HEPARIN SODIUM 5000 UNITS: 10000 INJECTION INTRAVENOUS; SUBCUTANEOUS at 20:10

## 2022-11-10 RX ADMIN — HYDRALAZINE HYDROCHLORIDE 10 MG: 20 INJECTION INTRAMUSCULAR; INTRAVENOUS at 01:58

## 2022-11-10 RX ADMIN — HYDRALAZINE HYDROCHLORIDE 10 MG: 20 INJECTION INTRAMUSCULAR; INTRAVENOUS at 20:49

## 2022-11-10 RX ADMIN — CLONIDINE HYDROCHLORIDE 0.1 MG: 0.1 TABLET ORAL at 20:08

## 2022-11-10 RX ADMIN — Medication 2 PACKET: at 20:09

## 2022-11-10 RX ADMIN — Medication 100 MG: at 11:19

## 2022-11-10 RX ADMIN — HYDRALAZINE HYDROCHLORIDE 10 MG: 20 INJECTION INTRAMUSCULAR; INTRAVENOUS at 03:02

## 2022-11-10 RX ADMIN — HEPARIN SODIUM 5000 UNITS: 10000 INJECTION INTRAVENOUS; SUBCUTANEOUS at 11:34

## 2022-11-10 RX ADMIN — Medication 10 ML: at 11:20

## 2022-11-10 RX ADMIN — BUPRENORPHINE AND NALOXONE 1 TABLET: 8; 2 TABLET SUBLINGUAL at 20:10

## 2022-11-10 RX ADMIN — PHENOBARBITAL 97.2 MG: 97.2 TABLET ORAL at 11:18

## 2022-11-10 RX ADMIN — PHENOBARBITAL 97.2 MG: 97.2 TABLET ORAL at 20:08

## 2022-11-10 RX ADMIN — HYDRALAZINE HYDROCHLORIDE 10 MG: 20 INJECTION INTRAMUSCULAR; INTRAVENOUS at 16:30

## 2022-11-10 RX ADMIN — AMLODIPINE BESYLATE 10 MG: 10 TABLET ORAL at 11:33

## 2022-11-10 RX ADMIN — DIVALPROEX SODIUM 250 MG: 125 CAPSULE, COATED PELLETS ORAL at 14:12

## 2022-11-10 RX ADMIN — ACETAMINOPHEN 650 MG: 325 TABLET, FILM COATED ORAL at 20:36

## 2022-11-10 RX ADMIN — POTASSIUM BICARBONATE 20 MEQ: 782 TABLET, EFFERVESCENT ORAL at 14:12

## 2022-11-10 RX ADMIN — LABETALOL HYDROCHLORIDE 10 MG: 5 INJECTION INTRAVENOUS at 03:16

## 2022-11-10 RX ADMIN — MAGNESIUM SULFATE HEPTAHYDRATE 2000 MG: 40 INJECTION, SOLUTION INTRAVENOUS at 16:54

## 2022-11-10 RX ADMIN — HYDRALAZINE HYDROCHLORIDE 10 MG: 20 INJECTION INTRAMUSCULAR; INTRAVENOUS at 12:13

## 2022-11-10 RX ADMIN — LABETALOL HYDROCHLORIDE 10 MG: 5 INJECTION INTRAVENOUS at 18:31

## 2022-11-10 RX ADMIN — Medication 500 MG: at 18:38

## 2022-11-10 RX ADMIN — BISACODYL 5 MG: 5 TABLET, COATED ORAL at 11:19

## 2022-11-10 RX ADMIN — HYDRALAZINE HYDROCHLORIDE 10 MG: 20 INJECTION INTRAMUSCULAR; INTRAVENOUS at 14:19

## 2022-11-10 RX ADMIN — Medication 500 MG: at 14:11

## 2022-11-10 RX ADMIN — HYDRALAZINE HYDROCHLORIDE 10 MG: 20 INJECTION INTRAMUSCULAR; INTRAVENOUS at 10:30

## 2022-11-10 RX ADMIN — Medication 10 ML: at 20:11

## 2022-11-10 RX ADMIN — DIVALPROEX SODIUM 125 MG: 125 CAPSULE ORAL at 05:30

## 2022-11-10 RX ADMIN — POLYETHYLENE GLYCOL 3350 17 G: 17 POWDER, FOR SOLUTION ORAL at 11:18

## 2022-11-10 RX ADMIN — CLONIDINE HYDROCHLORIDE 0.1 MG: 0.1 TABLET ORAL at 11:19

## 2022-11-10 ASSESSMENT — PAIN DESCRIPTION - DESCRIPTORS: DESCRIPTORS: DULL

## 2022-11-10 ASSESSMENT — PAIN SCALES - GENERAL: PAINLEVEL_OUTOF10: 3

## 2022-11-10 ASSESSMENT — PAIN DESCRIPTION - ORIENTATION: ORIENTATION: RIGHT

## 2022-11-10 ASSESSMENT — PAIN DESCRIPTION - LOCATION: LOCATION: BACK;CHEST

## 2022-11-10 NOTE — CARE COORDINATION
Still requiring 4 pt restraints for pt safety. Oriented to person. Intermittently follows commands. Attempting to adjust po meds for agitation. Ng  for tf's. Remains in isolation. Dc plan to be determined when able to participate in pt/ot evals. Still unable to complete sbi d/t confusion.

## 2022-11-10 NOTE — FLOWSHEET NOTE
Patient continues to reach for lines and tubes. Teaching was provided about the need for these devices but pt continued to reach. 4 point restraints placed.  Will continue to monitor

## 2022-11-10 NOTE — PLAN OF CARE
Problem: Skin/Tissue Integrity  Goal: Absence of new skin breakdown  Description: 1. Monitor for areas of redness and/or skin breakdown  2. Assess vascular access sites hourly  3. Every 4-6 hours minimum:  Change oxygen saturation probe site  4. Every 4-6 hours:  If on nasal continuous positive airway pressure, respiratory therapy assess nares and determine need for appliance change or resting period. Outcome: Progressing     Problem: Safety - Adult  Goal: Free from fall injury  Outcome: Progressing     Problem: ABCDS Injury Assessment  Goal: Absence of physical injury  Outcome: Progressing     Problem: Respiratory - Adult  Goal: Achieves optimal ventilation and oxygenation  Outcome: Progressing     Problem: Cardiovascular - Adult  Goal: Maintains optimal cardiac output and hemodynamic stability  Outcome: Progressing     Problem: Skin/Tissue Integrity - Adult  Goal: Skin integrity remains intact  Outcome: Progressing     Problem: Safety - Medical Restraint  Goal: Remains free of injury from restraints (Restraint for Interference with Medical Device)  Description: INTERVENTIONS:  1. Determine that other, less restrictive measures have been tried or would not be effective before applying the restraint  2. Evaluate the patient's condition at the time of restraint application  3. Inform patient/family regarding the reason for restraint  4.  Q2H: Monitor safety, psychosocial status, comfort, nutrition and hydration  Outcome: Progressing     Problem: Gastrointestinal - Adult  Goal: Minimal or absence of nausea and vomiting  Outcome: Progressing     Problem: Genitourinary - Adult  Goal: Absence of urinary retention  Outcome: Progressing

## 2022-11-10 NOTE — PROGRESS NOTES
SURGICAL INTENSIVE CARE  PROGRESS NOTE    Date of admission:  11/5/2022  Reason for ICU transfer:  worsening head CT    SUBJECTIVE:  Patient is agitated, in 4 point restraints. Does know his name but otherwise disoriented. Moving extremities and opens eyes spontaneously. Reports his pain as a 1/10. HOSPITAL COURSE:    11/5--presented after unwitnessed fall; found to have SDH/SAH/IPH--worse on repeat head CT  11/6--no issues overnight; admitted to SICU. 11/7--Pt confused this morning. 11/8--Pt with increasing confusion and agitation overnight. Pulled out two IV's, in 4-point restraints. Started on Norvasc, Depakote sprinkles and precedex. 11/9--Precedex switched to clonidine due to bradycardia. Remains A&Ox1 not following commands. Again pulling out IV's, in 4-point restraints. 11/10--Pt remains in 4-point restraints. A&Ox1. Follows commands intermittently today. Bradycardia resolved, continued hypertension with agitation. PHYSICAL EXAM:  BP (!) 122/98   Pulse (!) 103   Temp 98 °F (36.7 °C) (Axillary)   Resp 22   Ht 5' 10\" (1.778 m)   Wt 195 lb (88.5 kg)   SpO2 94%   BMI 27.98 kg/m²     GENERAL:  NAD. A&Ox1. Follows some commands  HEAD:  Normocephalic, atraumatic. EYES:   No scleral icterus. PERRLA. LUNGS:  No increased work of breathing. CTAB. On 2 L NC.  CARDIOVASCULAR: Warm throughout. RRR. ABDOMEN:  Soft, non distended, non tender. No guarding, rigidity, rebound. EXTREMITIES:   MAEx4. Atraumatic. No LE edema. SKIN:  Warm and dry  NEUROLOGIC:  GCS 14    ASSESSMENT / PLAN:  Neuro:  shows bifrontal contusions greater than right and bitemporal contusions with tentorial subdural hematoma and occipital fx stable on scan #3. GCS 14. NSG following. Keppra. ETOH on PNB. Suboxone. Depakote sprinkles and Clonidine for increasing agitation. CV: Hypertensive. Maintain BP <140. Scheduled Norvasc. Hydralazine and labetalol PRNs. Monitor hemodynamics. Pulm: COVID-19 infection. Supportive care. Wean oxygen as tolerated. Encourage IS/SMI. GI: NG tube feeds with increasing fluctuating mental status. Bowel regimen. Zofran PRN. Renal: No acute issues. Monitor UOP & Electrolytes. Endocrine: No acute issues. Monitor glucose. MSK: No acute issues. PT/OT. Heme: Platelets improved. Hemoglobin stable. continue to monitor. Hemoglobin stable  ID: COVID-19. Supportive care. Hep C    Pain/Analgesia: Tylenol, Suboxone,   Total fluid rate: None  Bowel regimen: Dulcolax, glycolax  DVT proph:  SQ heparin  Glucose protocol: Not indicated  Mouth/eye care: As needed per patient  Urine:   Condom catheter. Keep in place for fluid monitoring.   CVC sites:  None  Ancillary consults: NSG  Family Update: As available    Disposition: SICU    Electronically signed by Wing Valdemar DO on 11/10/2022 at 5:27 AM

## 2022-11-11 LAB
ANION GAP SERPL CALCULATED.3IONS-SCNC: 11 MMOL/L (ref 7–16)
BUN BLDV-MCNC: 12 MG/DL (ref 6–23)
CALCIUM SERPL-MCNC: 9.1 MG/DL (ref 8.6–10.2)
CHLORIDE BLD-SCNC: 105 MMOL/L (ref 98–107)
CO2: 26 MMOL/L (ref 22–29)
CREAT SERPL-MCNC: 0.4 MG/DL (ref 0.7–1.2)
GFR SERPL CREATININE-BSD FRML MDRD: >60 ML/MIN/1.73
GLUCOSE BLD-MCNC: 139 MG/DL (ref 74–99)
HCT VFR BLD CALC: 42.1 % (ref 37–54)
HEMOGLOBIN: 14.7 G/DL (ref 12.5–16.5)
MAGNESIUM: 2.3 MG/DL (ref 1.6–2.6)
MCH RBC QN AUTO: 35.1 PG (ref 26–35)
MCHC RBC AUTO-ENTMCNC: 34.9 % (ref 32–34.5)
MCV RBC AUTO: 100.5 FL (ref 80–99.9)
PDW BLD-RTO: 12.4 FL (ref 11.5–15)
PHOSPHORUS: 3.6 MG/DL (ref 2.5–4.5)
PLATELET # BLD: 184 E9/L (ref 130–450)
PMV BLD AUTO: 10.5 FL (ref 7–12)
POTASSIUM REFLEX MAGNESIUM: 3.6 MMOL/L (ref 3.5–5)
RBC # BLD: 4.19 E12/L (ref 3.8–5.8)
SODIUM BLD-SCNC: 142 MMOL/L (ref 132–146)
WBC # BLD: 8.2 E9/L (ref 4.5–11.5)

## 2022-11-11 PROCEDURE — 6370000000 HC RX 637 (ALT 250 FOR IP)

## 2022-11-11 PROCEDURE — 6360000002 HC RX W HCPCS: Performed by: STUDENT IN AN ORGANIZED HEALTH CARE EDUCATION/TRAINING PROGRAM

## 2022-11-11 PROCEDURE — 36415 COLL VENOUS BLD VENIPUNCTURE: CPT

## 2022-11-11 PROCEDURE — 2580000003 HC RX 258: Performed by: STUDENT IN AN ORGANIZED HEALTH CARE EDUCATION/TRAINING PROGRAM

## 2022-11-11 PROCEDURE — 2000000000 HC ICU R&B

## 2022-11-11 PROCEDURE — 6360000002 HC RX W HCPCS

## 2022-11-11 PROCEDURE — 99291 CRITICAL CARE FIRST HOUR: CPT | Performed by: SURGERY

## 2022-11-11 PROCEDURE — 84100 ASSAY OF PHOSPHORUS: CPT

## 2022-11-11 PROCEDURE — 80048 BASIC METABOLIC PNL TOTAL CA: CPT

## 2022-11-11 PROCEDURE — 2700000000 HC OXYGEN THERAPY PER DAY

## 2022-11-11 PROCEDURE — 6370000000 HC RX 637 (ALT 250 FOR IP): Performed by: STUDENT IN AN ORGANIZED HEALTH CARE EDUCATION/TRAINING PROGRAM

## 2022-11-11 PROCEDURE — 85027 COMPLETE CBC AUTOMATED: CPT

## 2022-11-11 PROCEDURE — 6370000000 HC RX 637 (ALT 250 FOR IP): Performed by: SURGERY

## 2022-11-11 PROCEDURE — 83735 ASSAY OF MAGNESIUM: CPT

## 2022-11-11 PROCEDURE — 2500000003 HC RX 250 WO HCPCS: Performed by: STUDENT IN AN ORGANIZED HEALTH CARE EDUCATION/TRAINING PROGRAM

## 2022-11-11 RX ORDER — CLONIDINE HYDROCHLORIDE 0.1 MG/1
0.1 TABLET ORAL ONCE
Status: COMPLETED | OUTPATIENT
Start: 2022-11-11 | End: 2022-11-11

## 2022-11-11 RX ORDER — CLONIDINE HYDROCHLORIDE 0.1 MG/1
0.2 TABLET ORAL 3 TIMES DAILY
Status: DISCONTINUED | OUTPATIENT
Start: 2022-11-11 | End: 2022-11-30 | Stop reason: HOSPADM

## 2022-11-11 RX ADMIN — FOLIC ACID 1 MG: 5 INJECTION, SOLUTION INTRAMUSCULAR; INTRAVENOUS; SUBCUTANEOUS at 08:52

## 2022-11-11 RX ADMIN — HEPARIN SODIUM 5000 UNITS: 10000 INJECTION INTRAVENOUS; SUBCUTANEOUS at 11:38

## 2022-11-11 RX ADMIN — HYDRALAZINE HYDROCHLORIDE 10 MG: 20 INJECTION INTRAMUSCULAR; INTRAVENOUS at 11:38

## 2022-11-11 RX ADMIN — HEPARIN SODIUM 5000 UNITS: 10000 INJECTION INTRAVENOUS; SUBCUTANEOUS at 20:30

## 2022-11-11 RX ADMIN — CLONIDINE HYDROCHLORIDE 0.2 MG: 0.2 TABLET ORAL at 08:43

## 2022-11-11 RX ADMIN — ACETAMINOPHEN 650 MG: 325 TABLET, FILM COATED ORAL at 02:31

## 2022-11-11 RX ADMIN — CLONIDINE HYDROCHLORIDE 0.2 MG: 0.2 TABLET ORAL at 20:59

## 2022-11-11 RX ADMIN — Medication 500 MG: at 20:58

## 2022-11-11 RX ADMIN — Medication 500 MG: at 08:42

## 2022-11-11 RX ADMIN — DIVALPROEX SODIUM 250 MG: 125 CAPSULE, COATED PELLETS ORAL at 15:22

## 2022-11-11 RX ADMIN — Medication 10 ML: at 21:00

## 2022-11-11 RX ADMIN — DIVALPROEX SODIUM 250 MG: 125 CAPSULE, COATED PELLETS ORAL at 06:34

## 2022-11-11 RX ADMIN — POLYETHYLENE GLYCOL 3350 17 G: 17 POWDER, FOR SOLUTION ORAL at 08:44

## 2022-11-11 RX ADMIN — BISACODYL 5 MG: 5 TABLET, COATED ORAL at 08:52

## 2022-11-11 RX ADMIN — CLONIDINE HYDROCHLORIDE 0.2 MG: 0.2 TABLET ORAL at 15:22

## 2022-11-11 RX ADMIN — AMLODIPINE BESYLATE 10 MG: 10 TABLET ORAL at 08:43

## 2022-11-11 RX ADMIN — DIVALPROEX SODIUM 250 MG: 125 CAPSULE, COATED PELLETS ORAL at 21:30

## 2022-11-11 RX ADMIN — Medication 100 MG: at 08:43

## 2022-11-11 RX ADMIN — Medication 500 MG: at 19:13

## 2022-11-11 RX ADMIN — CLONIDINE HYDROCHLORIDE 0.1 MG: 0.1 TABLET ORAL at 03:35

## 2022-11-11 RX ADMIN — BUPRENORPHINE AND NALOXONE 1 TABLET: 8; 2 TABLET SUBLINGUAL at 08:43

## 2022-11-11 RX ADMIN — PHENOBARBITAL 97.2 MG: 97.2 TABLET ORAL at 08:42

## 2022-11-11 RX ADMIN — HEPARIN SODIUM 5000 UNITS: 10000 INJECTION INTRAVENOUS; SUBCUTANEOUS at 03:35

## 2022-11-11 RX ADMIN — LEVETIRACETAM 500 MG: 500 TABLET, FILM COATED ORAL at 08:44

## 2022-11-11 RX ADMIN — BUPRENORPHINE AND NALOXONE 1 TABLET: 8; 2 TABLET SUBLINGUAL at 20:58

## 2022-11-11 RX ADMIN — Medication 10 ML: at 08:45

## 2022-11-11 RX ADMIN — HYDRALAZINE HYDROCHLORIDE 10 MG: 20 INJECTION INTRAMUSCULAR; INTRAVENOUS at 22:35

## 2022-11-11 RX ADMIN — HYDRALAZINE HYDROCHLORIDE 10 MG: 20 INJECTION INTRAMUSCULAR; INTRAVENOUS at 01:09

## 2022-11-11 RX ADMIN — Medication 500 MG: at 15:21

## 2022-11-11 RX ADMIN — HYDRALAZINE HYDROCHLORIDE 10 MG: 20 INJECTION INTRAMUSCULAR; INTRAVENOUS at 02:31

## 2022-11-11 RX ADMIN — PHENOBARBITAL 97.2 MG: 97.2 TABLET ORAL at 20:59

## 2022-11-11 RX ADMIN — LEVETIRACETAM 500 MG: 500 TABLET, FILM COATED ORAL at 20:59

## 2022-11-11 ASSESSMENT — PAIN DESCRIPTION - ORIENTATION: ORIENTATION: RIGHT

## 2022-11-11 ASSESSMENT — PAIN SCALES - GENERAL
PAINLEVEL_OUTOF10: 0
PAINLEVEL_OUTOF10: 3

## 2022-11-11 ASSESSMENT — PAIN DESCRIPTION - DESCRIPTORS: DESCRIPTORS: ACHING

## 2022-11-11 ASSESSMENT — PAIN DESCRIPTION - LOCATION: LOCATION: BACK;CHEST

## 2022-11-11 NOTE — PLAN OF CARE
Problem: Skin/Tissue Integrity  Goal: Absence of new skin breakdown  Description: 1. Monitor for areas of redness and/or skin breakdown  2. Assess vascular access sites hourly  3. Every 4-6 hours minimum:  Change oxygen saturation probe site  4. Every 4-6 hours:  If on nasal continuous positive airway pressure, respiratory therapy assess nares and determine need for appliance change or resting period. Outcome: Progressing     Problem: Safety - Adult  Goal: Free from fall injury  Outcome: Progressing     Problem: ABCDS Injury Assessment  Goal: Absence of physical injury  Outcome: Progressing     Problem: Cardiovascular - Adult  Goal: Maintains optimal cardiac output and hemodynamic stability  Outcome: Progressing     Problem: Musculoskeletal - Adult  Goal: Return mobility to safest level of function  Outcome: Progressing     Problem: Safety - Medical Restraint  Goal: Remains free of injury from restraints (Restraint for Interference with Medical Device)  Description: INTERVENTIONS:  1. Determine that other, less restrictive measures have been tried or would not be effective before applying the restraint  2. Evaluate the patient's condition at the time of restraint application  3. Inform patient/family regarding the reason for restraint  4.  Q2H: Monitor safety, psychosocial status, comfort, nutrition and hydration  Outcome: Progressing  Flowsheets  Taken 11/10/2022 1800 by Rodrigo Villalba RN  Remains free of injury from restraints (restraint for interference with medical device): Every 2 hours: Monitor safety, psychosocial status, comfort, nutrition and hydration  Taken 11/10/2022 1600 by Rodrigo Villalba RN  Remains free of injury from restraints (restraint for interference with medical device): Every 2 hours: Monitor safety, psychosocial status, comfort, nutrition and hydration  Taken 11/10/2022 1400 by Rodrigo Villalba RN  Remains free of injury from restraints (restraint for interference with medical device): Every 2 hours: Monitor safety, psychosocial status, comfort, nutrition and hydration  Taken 11/10/2022 1200 by Gloris Dance, RN  Remains free of injury from restraints (restraint for interference with medical device): Every 2 hours: Monitor safety, psychosocial status, comfort, nutrition and hydration  Taken 11/10/2022 1057 by Gloris Dance, RN  Remains free of injury from restraints (restraint for interference with medical device): Every 2 hours: Monitor safety, psychosocial status, comfort, nutrition and hydration

## 2022-11-11 NOTE — PROGRESS NOTES
SURGICAL INTENSIVE CARE  PROGRESS NOTE    Date of admission:  11/5/2022  Reason for ICU transfer:  worsening head CT    SUBJECTIVE:  Patient is resting, in 4 point restraints. Asks to be left alone. Opens eyes and follows commands. HOSPITAL COURSE:    11/5--presented after unwitnessed fall; found to have SDH/SAH/IPH--worse on repeat head CT  11/6--no issues overnight; admitted to SICU. 11/7--Pt confused this morning. 11/8--Pt with increasing confusion and agitation overnight. Pulled out two IV's, in 4-point restraints. Started on Norvasc, Depakote sprinkles and precedex. 11/9--Precedex switched to clonidine due to bradycardia. Remains A&Ox1 not following commands. Again pulling out IV's, in 4-point restraints. 11/10--Pt remains in 4-point restraints. A&Ox1. Follows commands intermittently today. Bradycardia resolved, continued hypertension with agitation. 11/11--Pt requiring Hydralazine PRN frequently overnight. Clonidine increased. Continued agitation. PHYSICAL EXAM:  BP (!) 139/91   Pulse 60   Temp 98.2 °F (36.8 °C) (Oral)   Resp 19   Ht 5' 10\" (1.778 m)   Wt 195 lb (88.5 kg)   SpO2 93%   BMI 27.98 kg/m²     GENERAL:  NAD. Follows some commands  HEAD:  Normocephalic, atraumatic. EYES:   No scleral icterus. PERRLA. LUNGS:  No increased work of breathing. CTAB. On 2 L NC.  CARDIOVASCULAR: Warm throughout. RRR. ABDOMEN:  Soft, non distended, non tender. No guarding, rigidity, rebound. EXTREMITIES:   MAEx4. Atraumatic. No LE edema. SKIN:  Warm and dry  NEUROLOGIC:  GCS 14    ASSESSMENT / PLAN:  Neuro:  shows bifrontal contusions greater than right and bitemporal contusions with tentorial subdural hematoma and occipital fx stable on scan #3. GCS 14. NSG following. Keppra. ETOH on PNB. Suboxone. Depakote sprinkles and Clonidine for increasing agitation. CV: Hypertensive. Maintain BP <140. Scheduled Norvasc and Clonidine. Hydralazine and labetalol PRNs. Monitor hemodynamics.    Pulm: COVID-19 infection. Supportive care. Wean oxygen as tolerated. Encourage IS/SMI. GI: NG tube again pulled out overnight. Will attempt bedside swallow today. Bowel regimen. Zofran PRN. Renal: No acute issues. Monitor UOP & Electrolytes. Endocrine: No acute issues. Monitor glucose. MSK: No acute issues. PT/OT. Heme: Platelets improved. Hemoglobin stable. continue to monitor. Hemoglobin stable  ID: COVID-19. Supportive care. Hep C    Pain/Analgesia: Tylenol, Suboxone,   Total fluid rate: None  Bowel regimen: Dulcolax, glycolax  DVT proph:  SQ heparin  Glucose protocol: Not indicated  Mouth/eye care: As needed per patient  Urine:   Condom catheter. Keep in place for fluid monitoring.   CVC sites:  None  Ancillary consults: UZIEL  Family Update: As available    Disposition: SICU    Electronically signed by Lynn Murrieta DO on 11/11/2022 at 5:55 AM

## 2022-11-12 LAB
ANION GAP SERPL CALCULATED.3IONS-SCNC: 14 MMOL/L (ref 7–16)
BUN BLDV-MCNC: 14 MG/DL (ref 6–23)
CALCIUM IONIZED: 1.21 MMOL/L (ref 1.15–1.33)
CALCIUM SERPL-MCNC: 9.4 MG/DL (ref 8.6–10.2)
CHLORIDE BLD-SCNC: 102 MMOL/L (ref 98–107)
CO2: 23 MMOL/L (ref 22–29)
CREAT SERPL-MCNC: 0.5 MG/DL (ref 0.7–1.2)
GFR SERPL CREATININE-BSD FRML MDRD: >60 ML/MIN/1.73
GLUCOSE BLD-MCNC: 92 MG/DL (ref 74–99)
HCT VFR BLD CALC: 41.7 % (ref 37–54)
HEMOGLOBIN: 14.1 G/DL (ref 12.5–16.5)
MAGNESIUM: 1.9 MG/DL (ref 1.6–2.6)
MCH RBC QN AUTO: 34.2 PG (ref 26–35)
MCHC RBC AUTO-ENTMCNC: 33.8 % (ref 32–34.5)
MCV RBC AUTO: 101.2 FL (ref 80–99.9)
PDW BLD-RTO: 12.3 FL (ref 11.5–15)
PLATELET # BLD: 183 E9/L (ref 130–450)
PMV BLD AUTO: 11 FL (ref 7–12)
POTASSIUM REFLEX MAGNESIUM: 3.8 MMOL/L (ref 3.5–5)
RBC # BLD: 4.12 E12/L (ref 3.8–5.8)
SODIUM BLD-SCNC: 139 MMOL/L (ref 132–146)
WBC # BLD: 7.3 E9/L (ref 4.5–11.5)

## 2022-11-12 PROCEDURE — 36415 COLL VENOUS BLD VENIPUNCTURE: CPT

## 2022-11-12 PROCEDURE — 6370000000 HC RX 637 (ALT 250 FOR IP): Performed by: STUDENT IN AN ORGANIZED HEALTH CARE EDUCATION/TRAINING PROGRAM

## 2022-11-12 PROCEDURE — 36592 COLLECT BLOOD FROM PICC: CPT

## 2022-11-12 PROCEDURE — 6360000002 HC RX W HCPCS: Performed by: STUDENT IN AN ORGANIZED HEALTH CARE EDUCATION/TRAINING PROGRAM

## 2022-11-12 PROCEDURE — 6370000000 HC RX 637 (ALT 250 FOR IP): Performed by: SURGERY

## 2022-11-12 PROCEDURE — 6360000002 HC RX W HCPCS

## 2022-11-12 PROCEDURE — 83735 ASSAY OF MAGNESIUM: CPT

## 2022-11-12 PROCEDURE — 2580000003 HC RX 258: Performed by: STUDENT IN AN ORGANIZED HEALTH CARE EDUCATION/TRAINING PROGRAM

## 2022-11-12 PROCEDURE — 2500000003 HC RX 250 WO HCPCS: Performed by: STUDENT IN AN ORGANIZED HEALTH CARE EDUCATION/TRAINING PROGRAM

## 2022-11-12 PROCEDURE — 2500000003 HC RX 250 WO HCPCS

## 2022-11-12 PROCEDURE — 2700000000 HC OXYGEN THERAPY PER DAY

## 2022-11-12 PROCEDURE — 99291 CRITICAL CARE FIRST HOUR: CPT | Performed by: SURGERY

## 2022-11-12 PROCEDURE — 85027 COMPLETE CBC AUTOMATED: CPT

## 2022-11-12 PROCEDURE — 6370000000 HC RX 637 (ALT 250 FOR IP)

## 2022-11-12 PROCEDURE — 2000000000 HC ICU R&B

## 2022-11-12 PROCEDURE — 82330 ASSAY OF CALCIUM: CPT

## 2022-11-12 PROCEDURE — 80048 BASIC METABOLIC PNL TOTAL CA: CPT

## 2022-11-12 RX ADMIN — BISACODYL 5 MG: 5 TABLET, COATED ORAL at 08:47

## 2022-11-12 RX ADMIN — CLONIDINE HYDROCHLORIDE 0.2 MG: 0.2 TABLET ORAL at 08:52

## 2022-11-12 RX ADMIN — HYDRALAZINE HYDROCHLORIDE 10 MG: 20 INJECTION INTRAMUSCULAR; INTRAVENOUS at 06:04

## 2022-11-12 RX ADMIN — HYDRALAZINE HYDROCHLORIDE 10 MG: 20 INJECTION INTRAMUSCULAR; INTRAVENOUS at 04:01

## 2022-11-12 RX ADMIN — METOPROLOL TARTRATE 25 MG: 25 TABLET, FILM COATED ORAL at 20:58

## 2022-11-12 RX ADMIN — Medication 500 MG: at 12:28

## 2022-11-12 RX ADMIN — METOPROLOL TARTRATE 25 MG: 25 TABLET, FILM COATED ORAL at 09:29

## 2022-11-12 RX ADMIN — CLONIDINE HYDROCHLORIDE 0.2 MG: 0.2 TABLET ORAL at 20:58

## 2022-11-12 RX ADMIN — HEPARIN SODIUM 5000 UNITS: 10000 INJECTION INTRAVENOUS; SUBCUTANEOUS at 12:14

## 2022-11-12 RX ADMIN — Medication 500 MG: at 08:45

## 2022-11-12 RX ADMIN — HYDRALAZINE HYDROCHLORIDE 10 MG: 20 INJECTION INTRAMUSCULAR; INTRAVENOUS at 01:13

## 2022-11-12 RX ADMIN — HYDRALAZINE HYDROCHLORIDE 10 MG: 20 INJECTION INTRAMUSCULAR; INTRAVENOUS at 18:08

## 2022-11-12 RX ADMIN — DIVALPROEX SODIUM 250 MG: 125 CAPSULE, COATED PELLETS ORAL at 14:30

## 2022-11-12 RX ADMIN — BUPRENORPHINE AND NALOXONE 1 TABLET: 8; 2 TABLET SUBLINGUAL at 20:58

## 2022-11-12 RX ADMIN — HEPARIN SODIUM 5000 UNITS: 10000 INJECTION INTRAVENOUS; SUBCUTANEOUS at 20:58

## 2022-11-12 RX ADMIN — HYDRALAZINE HYDROCHLORIDE 10 MG: 20 INJECTION INTRAMUSCULAR; INTRAVENOUS at 16:33

## 2022-11-12 RX ADMIN — HYDRALAZINE HYDROCHLORIDE 10 MG: 20 INJECTION INTRAMUSCULAR; INTRAVENOUS at 15:01

## 2022-11-12 RX ADMIN — PHENOBARBITAL 97.2 MG: 97.2 TABLET ORAL at 08:45

## 2022-11-12 RX ADMIN — LABETALOL HYDROCHLORIDE 10 MG: 5 INJECTION INTRAVENOUS at 09:32

## 2022-11-12 RX ADMIN — CLONIDINE HYDROCHLORIDE 0.2 MG: 0.2 TABLET ORAL at 14:42

## 2022-11-12 RX ADMIN — Medication 100 MG: at 08:46

## 2022-11-12 RX ADMIN — Medication 500 MG: at 16:30

## 2022-11-12 RX ADMIN — PHENOBARBITAL 97.2 MG: 97.2 TABLET ORAL at 20:58

## 2022-11-12 RX ADMIN — SODIUM CHLORIDE: 9 INJECTION, SOLUTION INTRAVENOUS at 18:25

## 2022-11-12 RX ADMIN — DIVALPROEX SODIUM 250 MG: 125 CAPSULE, COATED PELLETS ORAL at 20:58

## 2022-11-12 RX ADMIN — HYDRALAZINE HYDROCHLORIDE 10 MG: 20 INJECTION INTRAMUSCULAR; INTRAVENOUS at 12:31

## 2022-11-12 RX ADMIN — Medication 2 PACKET: at 20:58

## 2022-11-12 RX ADMIN — POLYETHYLENE GLYCOL 3350 17 G: 17 POWDER, FOR SOLUTION ORAL at 08:45

## 2022-11-12 RX ADMIN — DIVALPROEX SODIUM 250 MG: 125 CAPSULE, COATED PELLETS ORAL at 06:01

## 2022-11-12 RX ADMIN — LEVETIRACETAM 500 MG: 500 TABLET, FILM COATED ORAL at 20:58

## 2022-11-12 RX ADMIN — HYDRALAZINE HYDROCHLORIDE 10 MG: 20 INJECTION INTRAMUSCULAR; INTRAVENOUS at 10:53

## 2022-11-12 RX ADMIN — FOLIC ACID 1 MG: 5 INJECTION, SOLUTION INTRAMUSCULAR; INTRAVENOUS; SUBCUTANEOUS at 08:47

## 2022-11-12 RX ADMIN — AMLODIPINE BESYLATE 10 MG: 10 TABLET ORAL at 08:47

## 2022-11-12 RX ADMIN — HEPARIN SODIUM 5000 UNITS: 10000 INJECTION INTRAVENOUS; SUBCUTANEOUS at 04:09

## 2022-11-12 RX ADMIN — BUPRENORPHINE AND NALOXONE 1 TABLET: 8; 2 TABLET SUBLINGUAL at 08:46

## 2022-11-12 RX ADMIN — HYDRALAZINE HYDROCHLORIDE 10 MG: 20 INJECTION INTRAMUSCULAR; INTRAVENOUS at 19:06

## 2022-11-12 RX ADMIN — LEVETIRACETAM 500 MG: 500 TABLET, FILM COATED ORAL at 08:46

## 2022-11-12 ASSESSMENT — PAIN SCALES - GENERAL
PAINLEVEL_OUTOF10: 0
PAINLEVEL_OUTOF10: 0

## 2022-11-12 NOTE — PLAN OF CARE
Problem: Safety - Medical Restraint  Goal: Remains free of injury from restraints (Restraint for Interference with Medical Device)  Description: INTERVENTIONS:  1. Determine that other, less restrictive measures have been tried or would not be effective before applying the restraint  2. Evaluate the patient's condition at the time of restraint application  3. Inform patient/family regarding the reason for restraint  4. Q2H: Monitor safety, psychosocial status, comfort, nutrition and hydration  11/12/2022 1507 by Shalonda Lopez RN  Outcome: Not Progressing  Flowsheets (Taken 11/12/2022 1000)  Remains free of injury from restraints (restraint for interference with medical device): Every 2 hours: Monitor safety, psychosocial status, comfort, nutrition and hydration   . ............................................................ Problem: Safety - Medical Restraint  Goal: Remains free of injury from restraints (Restraint for Interference with Medical Device)  Description: INTERVENTIONS:  1. Determine that other, less restrictive measures have been tried or would not be effective before applying the restraint  2. Evaluate the patient's condition at the time of restraint application  3. Inform patient/family regarding the reason for restraint  4.  Q2H: Monitor safety, psychosocial status, comfort, nutrition and hydration  11/12/2022 1507 by Shalonda Lopez RN  Outcome: Not Progressing  Flowsheets (Taken 11/12/2022 1000)  Remains free of injury from restraints (restraint for interference with medical device): Every 2 hours: Monitor safety, psychosocial status, comfort, nutrition and hydration  11/12/2022 0838 by Shalonda Lopez RN  Outcome: Not Progressing  Flowsheets  Taken 11/12/2022 0800 by Shalonda Lopez RN  Remains free of injury from restraints (restraint for interference with medical device): Every 2 hours: Monitor safety, psychosocial status, comfort, nutrition and hydration  Taken 11/12/2022 0600 by Elise Dillon Keli Olguin RN  Remains free of injury from restraints (restraint for interference with medical device): Every 2 hours: Monitor safety, psychosocial status, comfort, nutrition and hydration  Taken 11/12/2022 0400 by Greg Encinas RN  Remains free of injury from restraints (restraint for interference with medical device): Every 2 hours: Monitor safety, psychosocial status, comfort, nutrition and hydration  Taken 11/12/2022 0200 by Greg Encinas RN  Remains free of injury from restraints (restraint for interference with medical device): Every 2 hours: Monitor safety, psychosocial status, comfort, nutrition and hydration  Taken 11/12/2022 0000 by Greg Encinas RN  Remains free of injury from restraints (restraint for interference with medical device): Every 2 hours: Monitor safety, psychosocial status, comfort, nutrition and hydration

## 2022-11-12 NOTE — PLAN OF CARE
Problem: Safety - Medical Restraint  Goal: Remains free of injury from restraints (Restraint for Interference with Medical Device)  Description: INTERVENTIONS:  1. Determine that other, less restrictive measures have been tried or would not be effective before applying the restraint  2. Evaluate the patient's condition at the time of restraint application  3. Inform patient/family regarding the reason for restraint  4. Q2H: Monitor safety, psychosocial status, comfort, nutrition and hydration  11/12/2022 0838 by Elgin Ramos RN  Outcome: Not Progressing  Flowsheets  Taken 11/12/2022 0800 by Elgin Ramos RN  Remains free of injury from restraints (restraint for interference with medical device): Every 2 hours: Monitor safety, psychosocial status, comfort, nutrition and hydration  Taken 11/12/2022 0600 by Lisa Cade RN  Remains free of injury from restraints (restraint for interference with medical device): Every 2 hours: Monitor safety, psychosocial status, comfort, nutrition and hydration  Taken 11/12/2022 0400 by Lisa Cade RN  Remains free of injury from restraints (restraint for interference with medical device): Every 2 hours: Monitor safety, psychosocial status, comfort, nutrition and hydration  Taken 11/12/2022 0200 by Lisa Cade RN  Remains free of injury from restraints (restraint for interference with medical device): Every 2 hours: Monitor safety, psychosocial status, comfort, nutrition and hydration  Taken 11/12/2022 0000 by Lisa Cade RN  Remains free of injury from restraints (restraint for interference with medical device): Every 2 hours: Monitor safety, psychosocial status, comfort, nutrition and hydration     Problem: Safety - Medical Restraint  Goal: Remains free of injury from restraints (Restraint for Interference with Medical Device)  Description: INTERVENTIONS:  1.  Determine that other, less restrictive measures have been tried or would not be effective before applying the restraint  2. Evaluate the patient's condition at the time of restraint application  3. Inform patient/family regarding the reason for restraint  4.  Q2H: Monitor safety, psychosocial status, comfort, nutrition and hydration  11/12/2022 0838 by Nilam Sheets RN  Outcome: Not Progressing  Flowsheets  Taken 11/12/2022 0800 by Nilam Sheets RN  Remains free of injury from restraints (restraint for interference with medical device): Every 2 hours: Monitor safety, psychosocial status, comfort, nutrition and hydration  Taken 11/12/2022 0600 by Lee Sorenson RN  Remains free of injury from restraints (restraint for interference with medical device): Every 2 hours: Monitor safety, psychosocial status, comfort, nutrition and hydration  Taken 11/12/2022 0400 by Lee Sorenson RN  Remains free of injury from restraints (restraint for interference with medical device): Every 2 hours: Monitor safety, psychosocial status, comfort, nutrition and hydration  Taken 11/12/2022 0200 by Lee Sorenson RN  Remains free of injury from restraints (restraint for interference with medical device): Every 2 hours: Monitor safety, psychosocial status, comfort, nutrition and hydration  Taken 11/12/2022 0000 by Lee Sorenson RN  Remains free of injury from restraints (restraint for interference with medical device): Every 2 hours: Monitor safety, psychosocial status, comfort, nutrition and hydration  11/11/2022 2231 by Lee Sorenson RN  Outcome: Progressing  Flowsheets  Taken 11/11/2022 2200 by Lee Sorenson RN  Remains free of injury from restraints (restraint for interference with medical device): Every 2 hours: Monitor safety, psychosocial status, comfort, nutrition and hydration  Taken 11/11/2022 2000 by eLe Sorenson RN  Remains free of injury from restraints (restraint for interference with medical device):   Every 2 hours: Monitor safety, psychosocial status, comfort, nutrition and hydration   Determine that other, less restrictive measures have been tried or would not be effective before applying the restraint  Taken 11/11/2022 1800 by Paula Miller, RN  Remains free of injury from restraints (restraint for interference with medical device): Every 2 hours: Monitor safety, psychosocial status, comfort, nutrition and hydration  Taken 11/11/2022 1600 by Paula Miller, RN  Remains free of injury from restraints (restraint for interference with medical device): Every 2 hours: Monitor safety, psychosocial status, comfort, nutrition and hydration  Taken 11/11/2022 1400 by Paula Miller, RN  Remains free of injury from restraints (restraint for interference with medical device): Every 2 hours: Monitor safety, psychosocial status, comfort, nutrition and hydration  Taken 11/11/2022 1200 by Paula Miller, RN  Remains free of injury from restraints (restraint for interference with medical device): Every 2 hours: Monitor safety, psychosocial status, comfort, nutrition and hydration  Taken 11/11/2022 1000 by Paula Miller, RN  Remains free of injury from restraints (restraint for interference with medical device): Every 2 hours: Monitor safety, psychosocial status, comfort, nutrition and hydration

## 2022-11-12 NOTE — FLOWSHEET NOTE
Attempting to grasp lines and tubing. Kicking legs and attempting to get out of bed. Unable to verbally redirect. Soft restraints continued to right and left wrist and ankles.

## 2022-11-12 NOTE — FLOWSHEET NOTE
Patient remains confused, agitated/restless and difficult to reorient/redirect. Reaches for essential lines and medical equipment, as well as kicking legs out of bed and attempting to get out of bed without assistance. Patient is at risk of harming self, continued use of bilateral wrist and ankle restraints for patient's safety.

## 2022-11-12 NOTE — PROGRESS NOTES
SURGICAL INTENSIVE CARE  PROGRESS NOTE    Date of admission:  11/5/2022  Reason for ICU transfer:  worsening head CT    SUBJECTIVE:  Patient is resting, in 4 point restraints. Asks to be left alone. Opens eyes and follows commands. HOSPITAL COURSE:    11/5--presented after unwitnessed fall; found to have SDH/SAH/IPH--worse on repeat head CT  11/6--no issues overnight; admitted to SICU. 11/7--Pt confused this morning. 11/8--Pt with increasing confusion and agitation overnight. Pulled out two IV's, in 4-point restraints. Started on Norvasc, Depakote sprinkles and precedex. 11/9--Precedex switched to clonidine due to bradycardia. Remains A&Ox1 not following commands. Again pulling out IV's, in 4-point restraints. 11/10--Pt remains in 4-point restraints. A&Ox1. Follows commands intermittently today. Bradycardia resolved, continued hypertension with agitation. 11/11--Pt requiring Hydralazine PRN frequently overnight. Clonidine increased. Continued agitation. 11/12-No acute events. No new complaints. PHYSICAL EXAM:  BP (!) 162/82   Pulse 65   Temp 97.1 °F (36.2 °C) (Axillary)   Resp 13   Ht 5' 10\" (1.778 m)   Wt 165 lb 4.8 oz (75 kg)   SpO2 96%   BMI 23.72 kg/m²     GENERAL:  NAD. Follows some commands  HEAD:  Normocephalic, atraumatic. EYES:   No scleral icterus. PERRLA. LUNGS:  No increased work of breathing. CTAB. On 2 L NC.  CARDIOVASCULAR: Warm throughout. RRR. ABDOMEN:  Soft, non distended, non tender. No guarding, rigidity, rebound. EXTREMITIES:   MAEx4. Atraumatic. No LE edema. SKIN:  Warm and dry  NEUROLOGIC:  GCS 14    ASSESSMENT / PLAN:  Neuro:  shows bifrontal contusions greater than right and bitemporal contusions with tentorial subdural hematoma and occipital fx stable on scan #3. GCS 14. NSG following. Keppra. ETOH on PNB. Suboxone. Depakote sprinkles and Clonidine for agitation. CV: Hypertensive. Maintain BP <140. Scheduled Norvasc and Clonidine. Start metoprolol. Hydralazine and labetalol PRNs. Monitor hemodynamics. Pulm: COVID-19 infection. Supportive care. Wean oxygen as tolerated. Encourage IS/SMI. GI:  Bowel regimen. Zofran PRN. HO HCV  Renal: No acute issues. Monitor UOP & Electrolytes. Endocrine: No acute issues. Monitor glucose. MSK: No acute issues. PT/OT. Heme: Platelets improved. Hemoglobin stable. continue to monitor. Hemoglobin stable  ID: COVID-19. Supportive care. Hep C    Pain/Analgesia: Tylenol, Suboxone,   Total fluid rate: None  Bowel regimen: Dulcolax, glycolax  DVT proph:  SQ heparin  Glucose protocol: Not indicated  Mouth/eye care: As needed per patient  Urine:   Condom catheter. Keep in place for fluid monitoring. CVC sites:  None  Ancillary consults: UZIEL  Family Update: As available    Disposition: SICU    Electronically signed by Jeovanny Nova MD on 11/12/2022 at 1:01 PM       The patient is at significant risk for life-threatening neurological deterioration and death and requires ongoing critical care management.     Critical care time exclusive of teaching and procedures = 35 minutes

## 2022-11-12 NOTE — PLAN OF CARE
Problem: Discharge Planning  Goal: Discharge to home or other facility with appropriate resources  Outcome: Progressing     Problem: Skin/Tissue Integrity  Goal: Absence of new skin breakdown  Description: 1. Monitor for areas of redness and/or skin breakdown  2. Assess vascular access sites hourly  3. Every 4-6 hours minimum:  Change oxygen saturation probe site  4. Every 4-6 hours:  If on nasal continuous positive airway pressure, respiratory therapy assess nares and determine need for appliance change or resting period.   Outcome: Progressing     Problem: Safety - Adult  Goal: Free from fall injury  Outcome: Progressing     Problem: ABCDS Injury Assessment  Goal: Absence of physical injury  Outcome: Progressing     Problem: Neurosensory - Adult  Goal: Achieves stable or improved neurological status  Outcome: Progressing  Flowsheets (Taken 11/11/2022 2000)  Achieves stable or improved neurological status:   Assess for and report changes in neurological status   Maintain blood pressure and fluid volume within ordered parameters to optimize cerebral perfusion and minimize risk of hemorrhage     Problem: Neurosensory - Adult  Goal: Absence of seizures  Outcome: Progressing     Problem: Neurosensory - Adult  Goal: Remains free of injury related to seizures activity  Outcome: Progressing     Problem: Neurosensory - Adult  Goal: Achieves maximal functionality and self care  Outcome: Progressing     Problem: Respiratory - Adult  Goal: Achieves optimal ventilation and oxygenation  Outcome: Progressing     Problem: Cardiovascular - Adult  Goal: Maintains optimal cardiac output and hemodynamic stability  Outcome: Progressing     Problem: Cardiovascular - Adult  Goal: Absence of cardiac dysrhythmias or at baseline  Outcome: Progressing     Problem: Skin/Tissue Integrity - Adult  Goal: Skin integrity remains intact  Outcome: Progressing     Problem: Skin/Tissue Integrity - Adult  Goal: Incisions, wounds, or drain sites healing without S/S of infection  Outcome: Progressing     Problem: Skin/Tissue Integrity - Adult  Goal: Oral mucous membranes remain intact  Outcome: Progressing     Problem: Musculoskeletal - Adult  Goal: Return mobility to safest level of function  Outcome: Progressing  Flowsheets (Taken 11/11/2022 2000)  Return Mobility to Safest Level of Function:   Assess patient stability and activity tolerance for standing, transferring and ambulating with or without assistive devices   Obtain physical therapy/occupational therapy consults as needed   Apply continuous passive motion per provider or physical therapy orders to increase flexion toward goal     Problem: Musculoskeletal - Adult  Goal: Maintain proper alignment of affected body part  Outcome: Progressing     Problem: Musculoskeletal - Adult  Goal: Return ADL status to a safe level of function  Outcome: Progressing     Problem: Safety - Medical Restraint  Goal: Remains free of injury from restraints (Restraint for Interference with Medical Device)  Description: INTERVENTIONS:  1. Determine that other, less restrictive measures have been tried or would not be effective before applying the restraint  2. Evaluate the patient's condition at the time of restraint application  3. Inform patient/family regarding the reason for restraint  4.  Q2H: Monitor safety, psychosocial status, comfort, nutrition and hydration  Outcome: Progressing  Flowsheets  Taken 11/11/2022 2000 by Ketty Oliveira RN  Remains free of injury from restraints (restraint for interference with medical device):   Every 2 hours: Monitor safety, psychosocial status, comfort, nutrition and hydration   Determine that other, less restrictive measures have been tried or would not be effective before applying the restraint  Taken 11/11/2022 1800 by Darrion Luo RN  Remains free of injury from restraints (restraint for interference with medical device): Every 2 hours: Monitor safety, psychosocial status, comfort, nutrition and hydration  Taken 11/11/2022 1600 by Jayson Sanchez RN  Remains free of injury from restraints (restraint for interference with medical device): Every 2 hours: Monitor safety, psychosocial status, comfort, nutrition and hydration  Taken 11/11/2022 1400 by Jayson Sanchez RN  Remains free of injury from restraints (restraint for interference with medical device): Every 2 hours: Monitor safety, psychosocial status, comfort, nutrition and hydration  Taken 11/11/2022 1200 by Jayson Sanchez RN  Remains free of injury from restraints (restraint for interference with medical device): Every 2 hours: Monitor safety, psychosocial status, comfort, nutrition and hydration  Taken 11/11/2022 1000 by Jayson Sanchez RN  Remains free of injury from restraints (restraint for interference with medical device): Every 2 hours: Monitor safety, psychosocial status, comfort, nutrition and hydration     Problem: Anxiety  Goal: Will report anxiety at manageable levels  Description: INTERVENTIONS:  1. Administer medication as ordered  2. Teach and rehearse alternative coping skills  3. Provide emotional support with 1:1 interaction with staff  Outcome: Progressing     Problem: Behavior  Goal: Pt/Family maintain appropriate behavior and adhere to behavioral management agreement, if implemented  Description: INTERVENTIONS:  1. Assess patient/family's coping skills and  non-compliant behavior (including use of illegal substances)  2. Notify security of behavior or suspected illegal substances which indicate the need for search of the family and/or belongings  3. Encourage verbalization of thoughts and concerns in a socially appropriate manner  4. Utilize positive, consistent limit setting strategies supporting safety of patient, staff and others  5. Encourage participation in the decision making process about the behavioral management agreement  6.  If a visitor's behavior poses a threat to safety call refer to organization policy.   7. Initiate consult with , Psychosocial CNS, Spiritual Care as appropriate  Outcome: Progressing     Problem: Chronic Conditions and Co-morbidities  Goal: Patient's chronic conditions and co-morbidity symptoms are monitored and maintained or improved  Outcome: Progressing     Problem: Gastrointestinal - Adult  Goal: Minimal or absence of nausea and vomiting  Outcome: Progressing     Problem: Gastrointestinal - Adult  Goal: Maintains or returns to baseline bowel function  Outcome: Progressing     Problem: Gastrointestinal - Adult  Goal: Maintains adequate nutritional intake  Outcome: Progressing     Problem: Genitourinary - Adult  Goal: Absence of urinary retention  Outcome: Progressing     Problem: Infection - Adult  Goal: Absence of infection at discharge  Outcome: Progressing     Problem: Infection - Adult  Goal: Absence of infection during hospitalization  Outcome: Progressing     Problem: Infection - Adult  Goal: Absence of fever/infection during anticipated neutropenic period  Outcome: Progressing     Problem: Metabolic/Fluid and Electrolytes - Adult  Goal: Electrolytes maintained within normal limits  Outcome: Progressing  Flowsheets (Taken 11/11/2022 2000)  Electrolytes maintained within normal limits:   Monitor labs and assess patient for signs and symptoms of electrolyte imbalances   Administer electrolyte replacement as ordered   Monitor response to electrolyte replacements, including repeat lab results as appropriate     Problem: Metabolic/Fluid and Electrolytes - Adult  Goal: Hemodynamic stability and optimal renal function maintained  Outcome: Progressing  Flowsheets (Taken 11/11/2022 2000)  Hemodynamic stability and optimal renal function maintained:   Monitor labs and assess for signs and symptoms of volume excess or deficit   Monitor intake, output and patient weight     Problem: Metabolic/Fluid and Electrolytes - Adult  Goal: Glucose maintained within prescribed range  Outcome: Progressing     Problem: Hematologic - Adult  Goal: Maintains hematologic stability  Outcome: Progressing     Problem: Pain  Goal: Verbalizes/displays adequate comfort level or baseline comfort level  Outcome: Progressing     Problem: Nutrition Deficit:  Goal: Optimize nutritional status  Outcome: Progressing

## 2022-11-12 NOTE — PLAN OF CARE
Celestia Westley Celestia Westley Celestia Westley ..................................................................................................................................................................................................................................................................................................................................................................................... Problem: Safety - Medical Restraint  Goal: Remains free of injury from restraints (Restraint for Interference with Medical Device)  Description: INTERVENTIONS:  1. Determine that other, less restrictive measures have been tried or would not be effective before applying the restraint  2. Evaluate the patient's condition at the time of restraint application  3. Inform patient/family regarding the reason for restraint  4.  Q2H: Monitor safety, psychosocial status, comfort, nutrition and hydration  11/12/2022 0838 by Kenya Florentino RN  Outcome: Not Progressing  Flowsheets  Taken 11/12/2022 0800 by Kenya Florentino RN  Remains free of injury from restraints (restraint for interference with medical device): Every 2 hours: Monitor safety, psychosocial status, comfort, nutrition and hydration  Taken 11/12/2022 0600 by Keshav Diego RN  Remains free of injury from restraints (restraint for interference with medical device): Every 2 hours: Monitor safety, psychosocial status, comfort, nutrition and hydration  Taken 11/12/2022 0400 by Keshav Diego RN  Remains free of injury from restraints (restraint for interference with medical device): Every 2 hours: Monitor safety, psychosocial status, comfort, nutrition and hydration  Taken 11/12/2022 0200 by Keshav Diego RN  Remains free of injury from restraints (restraint for interference with medical device): Every 2 hours: Monitor safety, psychosocial status, comfort, nutrition and hydration  Taken 11/12/2022 0000 by Keshav Diego RN  Remains free of injury from restraints (restraint for interference with medical device): Every 2 hours: Monitor safety, psychosocial status, comfort, nutrition and hydration     Problem: Safety - Medical Restraint  Goal: Remains free of injury from restraints (Restraint for Interference with Medical Device)  Description: INTERVENTIONS:  1. Determine that other, less restrictive measures have been tried or would not be effective before applying the restraint  2. Evaluate the patient's condition at the time of restraint application  3. Inform patient/family regarding the reason for restraint  4.  Q2H: Monitor safety, psychosocial status, comfort, nutrition and hydration  Recent Flowsheet Documentation  Taken 11/12/2022 1000 by Jammie Gannon RN  Remains free of injury from restraints (restraint for interference with medical device): Every 2 hours: Monitor safety, psychosocial status, comfort, nutrition and hydration  11/12/2022 0838 by Jammie Gannon RN  Outcome: Not Progressing  Flowsheets  Taken 11/12/2022 0800 by Jammie Gannon RN  Remains free of injury from restraints (restraint for interference with medical device): Every 2 hours: Monitor safety, psychosocial status, comfort, nutrition and hydration  Taken 11/12/2022 0600 by Marcela Blankenship RN  Remains free of injury from restraints (restraint for interference with medical device): Every 2 hours: Monitor safety, psychosocial status, comfort, nutrition and hydration  Taken 11/12/2022 0400 by Marcela Blankenship RN  Remains free of injury from restraints (restraint for interference with medical device): Every 2 hours: Monitor safety, psychosocial status, comfort, nutrition and hydration  Taken 11/12/2022 0200 by Marcela Blankenship RN  Remains free of injury from restraints (restraint for interference with medical device): Every 2 hours: Monitor safety, psychosocial status, comfort, nutrition and hydration  Taken 11/12/2022 0000 by Marcela Blankenship RN  Remains free of injury from restraints (restraint for interference with medical device): Every 2 hours: Monitor safety, psychosocial status, comfort, nutrition and hydration

## 2022-11-12 NOTE — FLOWSHEET NOTE
Attempts to grasp lines and tubing needed for care. Kicking legs and attempting to roll over side of bed. Unable to verbally redirect. Soft restraints continued to right and left wrists and ankles.

## 2022-11-12 NOTE — FLOWSHEET NOTE
Patient remains confused, intermittently agitated and restless in the bed. Will continue to pull at medical lines and equipment and attempt to kick legs out of bed, despite frequent verbal redirection. Continued use of bilateral wrist and ankle restraints for patient's safety.

## 2022-11-13 ENCOUNTER — APPOINTMENT (OUTPATIENT)
Dept: CT IMAGING | Age: 62
DRG: 082 | End: 2022-11-13
Payer: COMMERCIAL

## 2022-11-13 LAB
ANION GAP SERPL CALCULATED.3IONS-SCNC: 13 MMOL/L (ref 7–16)
B.E.: 0.7 MMOL/L (ref -3–3)
BUN BLDV-MCNC: 17 MG/DL (ref 6–23)
CALCIUM IONIZED: 1.2 MMOL/L (ref 1.15–1.33)
CALCIUM SERPL-MCNC: 9.1 MG/DL (ref 8.6–10.2)
CHLORIDE BLD-SCNC: 101 MMOL/L (ref 98–107)
CO2: 23 MMOL/L (ref 22–29)
COHB: 1.3 % (ref 0–1.5)
CREAT SERPL-MCNC: 0.5 MG/DL (ref 0.7–1.2)
CRITICAL: ABNORMAL
DATE ANALYZED: ABNORMAL
DATE OF COLLECTION: ABNORMAL
GFR SERPL CREATININE-BSD FRML MDRD: >60 ML/MIN/1.73
GLUCOSE BLD-MCNC: 83 MG/DL (ref 74–99)
HCO3: 21.4 MMOL/L (ref 22–26)
HCT VFR BLD CALC: 42.8 % (ref 37–54)
HEMOGLOBIN: 14.5 G/DL (ref 12.5–16.5)
HHB: 3.5 % (ref 0–5)
LAB: ABNORMAL
Lab: ABNORMAL
MAGNESIUM: 1.9 MG/DL (ref 1.6–2.6)
MCH RBC QN AUTO: 33.5 PG (ref 26–35)
MCHC RBC AUTO-ENTMCNC: 33.9 % (ref 32–34.5)
MCV RBC AUTO: 98.8 FL (ref 80–99.9)
METHB: 0.4 % (ref 0–1.5)
MODE: ABNORMAL
O2 SATURATION: 96.4 % (ref 92–98.5)
O2HB: 94.8 % (ref 94–97)
OPERATOR ID: 1632
PATIENT TEMP: 37 C
PCO2: 25.6 MMHG (ref 35–45)
PDW BLD-RTO: 12 FL (ref 11.5–15)
PH BLOOD GAS: 7.54 (ref 7.35–7.45)
PLATELET # BLD: 206 E9/L (ref 130–450)
PMV BLD AUTO: 10.8 FL (ref 7–12)
PO2: 75.8 MMHG (ref 75–100)
POTASSIUM REFLEX MAGNESIUM: 4 MMOL/L (ref 3.5–5)
RBC # BLD: 4.33 E12/L (ref 3.8–5.8)
SODIUM BLD-SCNC: 137 MMOL/L (ref 132–146)
SOURCE, BLOOD GAS: ABNORMAL
THB: 15.9 G/DL (ref 11.5–16.5)
TIME ANALYZED: 2105
WBC # BLD: 6.6 E9/L (ref 4.5–11.5)

## 2022-11-13 PROCEDURE — 99291 CRITICAL CARE FIRST HOUR: CPT | Performed by: SURGERY

## 2022-11-13 PROCEDURE — 2500000003 HC RX 250 WO HCPCS

## 2022-11-13 PROCEDURE — 85027 COMPLETE CBC AUTOMATED: CPT

## 2022-11-13 PROCEDURE — 6360000002 HC RX W HCPCS: Performed by: STUDENT IN AN ORGANIZED HEALTH CARE EDUCATION/TRAINING PROGRAM

## 2022-11-13 PROCEDURE — 6370000000 HC RX 637 (ALT 250 FOR IP)

## 2022-11-13 PROCEDURE — 6370000000 HC RX 637 (ALT 250 FOR IP): Performed by: SURGERY

## 2022-11-13 PROCEDURE — 82805 BLOOD GASES W/O2 SATURATION: CPT

## 2022-11-13 PROCEDURE — 6370000000 HC RX 637 (ALT 250 FOR IP): Performed by: STUDENT IN AN ORGANIZED HEALTH CARE EDUCATION/TRAINING PROGRAM

## 2022-11-13 PROCEDURE — 2000000000 HC ICU R&B

## 2022-11-13 PROCEDURE — 6360000002 HC RX W HCPCS

## 2022-11-13 PROCEDURE — 83735 ASSAY OF MAGNESIUM: CPT

## 2022-11-13 PROCEDURE — 36415 COLL VENOUS BLD VENIPUNCTURE: CPT

## 2022-11-13 PROCEDURE — 80048 BASIC METABOLIC PNL TOTAL CA: CPT

## 2022-11-13 PROCEDURE — 92610 EVALUATE SWALLOWING FUNCTION: CPT

## 2022-11-13 PROCEDURE — 70450 CT HEAD/BRAIN W/O DYE: CPT

## 2022-11-13 PROCEDURE — 82330 ASSAY OF CALCIUM: CPT

## 2022-11-13 PROCEDURE — 2500000003 HC RX 250 WO HCPCS: Performed by: STUDENT IN AN ORGANIZED HEALTH CARE EDUCATION/TRAINING PROGRAM

## 2022-11-13 PROCEDURE — 92526 ORAL FUNCTION THERAPY: CPT

## 2022-11-13 RX ADMIN — DIVALPROEX SODIUM 250 MG: 125 CAPSULE, COATED PELLETS ORAL at 14:30

## 2022-11-13 RX ADMIN — Medication 100 MG: at 08:36

## 2022-11-13 RX ADMIN — METOPROLOL TARTRATE 25 MG: 25 TABLET, FILM COATED ORAL at 20:27

## 2022-11-13 RX ADMIN — BISACODYL 5 MG: 5 TABLET, COATED ORAL at 08:36

## 2022-11-13 RX ADMIN — HEPARIN SODIUM 5000 UNITS: 10000 INJECTION INTRAVENOUS; SUBCUTANEOUS at 20:28

## 2022-11-13 RX ADMIN — DIVALPROEX SODIUM 250 MG: 125 CAPSULE, COATED PELLETS ORAL at 20:28

## 2022-11-13 RX ADMIN — DIVALPROEX SODIUM 250 MG: 125 CAPSULE, COATED PELLETS ORAL at 04:59

## 2022-11-13 RX ADMIN — CLONIDINE HYDROCHLORIDE 0.2 MG: 0.2 TABLET ORAL at 08:35

## 2022-11-13 RX ADMIN — HEPARIN SODIUM 5000 UNITS: 10000 INJECTION INTRAVENOUS; SUBCUTANEOUS at 04:59

## 2022-11-13 RX ADMIN — CLONIDINE HYDROCHLORIDE 0.2 MG: 0.2 TABLET ORAL at 14:37

## 2022-11-13 RX ADMIN — PHENOBARBITAL 97.2 MG: 97.2 TABLET ORAL at 08:35

## 2022-11-13 RX ADMIN — PHENOBARBITAL 97.2 MG: 97.2 TABLET ORAL at 20:27

## 2022-11-13 RX ADMIN — Medication 2 PACKET: at 20:27

## 2022-11-13 RX ADMIN — METOPROLOL TARTRATE 25 MG: 25 TABLET, FILM COATED ORAL at 08:36

## 2022-11-13 RX ADMIN — LABETALOL HYDROCHLORIDE 10 MG: 5 INJECTION INTRAVENOUS at 15:24

## 2022-11-13 RX ADMIN — Medication 500 MG: at 12:37

## 2022-11-13 RX ADMIN — POLYETHYLENE GLYCOL 3350 17 G: 17 POWDER, FOR SOLUTION ORAL at 08:35

## 2022-11-13 RX ADMIN — HEPARIN SODIUM 5000 UNITS: 10000 INJECTION INTRAVENOUS; SUBCUTANEOUS at 11:30

## 2022-11-13 RX ADMIN — LABETALOL HYDROCHLORIDE 10 MG: 5 INJECTION INTRAVENOUS at 16:24

## 2022-11-13 RX ADMIN — AMLODIPINE BESYLATE 10 MG: 10 TABLET ORAL at 08:36

## 2022-11-13 RX ADMIN — Medication 500 MG: at 08:35

## 2022-11-13 RX ADMIN — LABETALOL HYDROCHLORIDE 10 MG: 5 INJECTION INTRAVENOUS at 18:33

## 2022-11-13 RX ADMIN — BUPRENORPHINE AND NALOXONE 1 TABLET: 8; 2 TABLET SUBLINGUAL at 08:36

## 2022-11-13 RX ADMIN — HYDRALAZINE HYDROCHLORIDE 10 MG: 20 INJECTION INTRAMUSCULAR; INTRAVENOUS at 14:33

## 2022-11-13 RX ADMIN — Medication 500 MG: at 16:25

## 2022-11-13 RX ADMIN — CLONIDINE HYDROCHLORIDE 0.2 MG: 0.2 TABLET ORAL at 20:27

## 2022-11-13 RX ADMIN — FOLIC ACID 1 MG: 5 INJECTION, SOLUTION INTRAMUSCULAR; INTRAVENOUS; SUBCUTANEOUS at 08:35

## 2022-11-13 RX ADMIN — LABETALOL HYDROCHLORIDE 10 MG: 5 INJECTION INTRAVENOUS at 20:24

## 2022-11-13 RX ADMIN — BUPRENORPHINE AND NALOXONE 1 TABLET: 8; 2 TABLET SUBLINGUAL at 20:27

## 2022-11-13 NOTE — PLAN OF CARE
Problem: Safety - Medical Restraint  Goal: Remains free of injury from restraints (Restraint for Interference with Medical Device)  Description: INTERVENTIONS:  1. Determine that other, less restrictive measures have been tried or would not be effective before applying the restraint  2. Evaluate the patient's condition at the time of restraint application  3. Inform patient/family regarding the reason for restraint  4. Q2H: Monitor safety, psychosocial status, comfort, nutrition and hydration  11/13/2022 0826 by Elgin Ramos RN  Outcome: Not Progressing  Flowsheets (Taken 11/13/2022 0800)  Remains free of injury from restraints (restraint for interference with medical device): Every 2 hours: Monitor safety, psychosocial status, comfort, nutrition and hydration     Problem: Safety - Medical Restraint  Goal: Remains free of injury from restraints (Restraint for Interference with Medical Device)  Description: INTERVENTIONS:  1. Determine that other, less restrictive measures have been tried or would not be effective before applying the restraint  2. Evaluate the patient's condition at the time of restraint application  3. Inform patient/family regarding the reason for restraint  4.  Q2H: Monitor safety, psychosocial status, comfort, nutrition and hydration  11/13/2022 0826 by Elgin Ramos RN  Outcome: Not Progressing  Flowsheets (Taken 11/13/2022 0800)  Remains free of injury from restraints (restraint for interference with medical device): Every 2 hours: Monitor safety, psychosocial status, comfort, nutrition and hydration  11/13/2022 0045 by Rhonda Bonner RN  Outcome: Progressing  Flowsheets  Taken 11/12/2022 1800 by Elgin Ramos RN  Remains free of injury from restraints (restraint for interference with medical device): Every 2 hours: Monitor safety, psychosocial status, comfort, nutrition and hydration  Taken 11/12/2022 1600 by Elgin Ramos RN  Remains free of injury from restraints (restraint for interference with medical device): Every 2 hours: Monitor safety, psychosocial status, comfort, nutrition and hydration     Problem: Gastrointestinal - Adult  Goal: Maintains or returns to baseline bowel function  11/13/2022 0045 by Marielle Coe RN  Outcome: Not Progressing  Goal: Maintains adequate nutritional intake  11/13/2022 0045 by Marielle Coe, RN  Outcome: Not Progressing

## 2022-11-13 NOTE — PLAN OF CARE
Problem: Safety - Medical Restraint  Goal: Remains free of injury from restraints (Restraint for Interference with Medical Device)  Description: INTERVENTIONS:  1. Determine that other, less restrictive measures have been tried or would not be effective before applying the restraint  2. Evaluate the patient's condition at the time of restraint application  3. Inform patient/family regarding the reason for restraint  4. Q2H: Monitor safety, psychosocial status, comfort, nutrition and hydration  11/13/2022 1618 by Elli Gao RN  Outcome: Not Progressing  Flowsheets  Taken 11/13/2022 1600  Remains free of injury from restraints (restraint for interference with medical device): Every 2 hours: Monitor safety, psychosocial status, comfort, nutrition and hydration  Taken 11/13/2022 1051  Remains free of injury from restraints (restraint for interference with medical device): Every 2 hours: Monitor safety, psychosocial status, comfort, nutrition and hydration  Taken 11/13/2022 0903  Remains free of injury from restraints (restraint for interference with medical device): Every 2 hours: Monitor safety, psychosocial status, comfort, nutrition and hydration     Problem: Safety - Medical Restraint  Goal: Remains free of injury from restraints (Restraint for Interference with Medical Device)  Description: INTERVENTIONS:  1. Determine that other, less restrictive measures have been tried or would not be effective before applying the restraint  2. Evaluate the patient's condition at the time of restraint application  3. Inform patient/family regarding the reason for restraint  4.  Q2H: Monitor safety, psychosocial status, comfort, nutrition and hydration  11/13/2022 1618 by Elli Gao RN  Outcome: Not Progressing  Flowsheets  Taken 11/13/2022 1600  Remains free of injury from restraints (restraint for interference with medical device): Every 2 hours: Monitor safety, psychosocial status, comfort, nutrition and hydration  Taken 11/13/2022 1051  Remains free of injury from restraints (restraint for interference with medical device): Every 2 hours: Monitor safety, psychosocial status, comfort, nutrition and hydration  Taken 11/13/2022 0903  Remains free of injury from restraints (restraint for interference with medical device): Every 2 hours: Monitor safety, psychosocial status, comfort, nutrition and hydration  11/13/2022 0826 by Inez Newsome RN  Outcome: Not Progressing  Flowsheets (Taken 11/13/2022 0800)  Remains free of injury from restraints (restraint for interference with medical device): Every 2 hours: Monitor safety, psychosocial status, comfort, nutrition and hydration

## 2022-11-13 NOTE — FLOWSHEET NOTE
Attempts to grasp lines and tubings needed for care. Kicking legs and attempting to leave bed. Unable to verbally redirect. Soft restraints continued to right and left wrists and ankles.

## 2022-11-13 NOTE — FLOWSHEET NOTE
Attempts to grasp lines and tube needed for care. Kicking legs and placing over side of bed. Unable to verbally redirect. Soft restraints continued to right and left wrists and ankles.

## 2022-11-13 NOTE — PROGRESS NOTES
SPEECH/LANGUAGE PATHOLOGY  CLINICAL ASSESSMENT OF SWALLOWING FUNCTION   and PLAN OF CARE    PATIENT NAME:  Shamir Castellanos  (male)     MRN:  [id number]    :  1960  (57 y.o.)  STATUS:  Inpatient: Room 3801/3801-A    TODAY'S DATE:  2022    SLP swallowing-dysphagia evaluation and treatment  Start:  22,   End:  22,   ONE TIME,   Standing Count:  1 Occurrences,   R       Comments:  Continues to remove corpak, no... Rowan Mcdowell DO   REASON FOR REFERRAL: to further assess oropharyngeal function   EVALUATING THERAPIST: MYLENE Diaz                 RESULTS:    DYSPHAGIA DIAGNOSIS:   Clinical indicators of moderate-severe oropharyngeal phase dysphagia       DIET RECOMMENDATIONS:  NPO with ongoing PO analysis by SLP only to determine if PO diet can be initiated         FEEDING RECOMMENDATIONS:     Assistance level:  Not applicable      Compensatory strategies recommended: Not applicable      Discussed recommendations with nursing and/or faxed report to referring provider: Yes    SPEECH THERAPY  PLAN OF CARE   The dysphagia POC is established based on physician order, dysphagia diagnosis and results of clinical assessment     Skilled SLP intervention for dysphagia management up to 5x per week until goals met, pt plateaus in function and/or discharged from hospital    Conditions Requiring Skilled Therapeutic Intervention for dysphagia:    Patient is performing below functional baseline d/t  current acute condition, Multiple diagnoses, multiple medications, and increased dependency upon caregivers.   Throat clearing during PO intake   Coughing during PO intake    Higher risk of aspiration in individuals unable to to follow 1- step commands JENI Bull., BARBARA Vital., & PERICO Martel 2009.)    Specific dysphagia interventions to include:     ongoing skilled PO analysis to determine if PO diet can be initiated     Specific instructions for next treatment:  ongoing skilled PO analysis to determine if PO diet can be initiated   Patient Treatment Goals:    Short Term Goals:  Pt will participate in ongoing evaluation of swallow function to determine when PO diet can be safely initiated    Long Term Goals:   Pt will maintain adequate nutrition/hydration via PO intake of the least restrictive oral diet with implementation of safe swallow/ compensatory strategies and decrease signs/symptoms of aspiration to less than 1 x/day. Pt will improve oropharyngeal swallow function to ensure airway protection during PO intake to maintain adequate nutrition/hydration and decrease signs/symptoms of aspiration to less than 1 x/day. Patient/family Goal:    Did not state. Will further assess during treatment. Plan of care discussed with Patient   The Patient did not demonstrate complete understanding of the diagnosis, prognosis and plan of care     Rehabilitation Potential/Prognosis: fair                    ADMITTING DIAGNOSIS: Subdural hematoma [S06. 5XAA]  SAH (subarachnoid hemorrhage) (HCC) [I60.9]  Subarachnoid bleed (HCC) [I60.9]  JOSIE (acute kidney injury) ([facility] [G72.8]  Acute alcoholic intoxication with complication ([facility] [A60.198]  Closed fracture of occipital bone, unspecified laterality, unspecified occipital fracture type, initial encounter ([facility] [U51.350F]  COVID-19 [U07.1]    VISIT DIAGNOSIS:   Visit Diagnoses         Codes    Subdural hematoma    -  Primary S06. 5XAA    Subarachnoid bleed (HCC)     I60.9    Closed fracture of occipital bone, unspecified laterality, unspecified occipital fracture type, initial encounter ([facility]     S02.119A    COVID-19     U94.1    Acute alcoholic intoxication with complication (HCC)     A23.746             PATIENT REPORT/COMPLAINT: patient not able to accurately report  RN cleared patient for participation in assessment     yes     PRIOR LEVEL OF SWALLOW FUNCTION:    PAST HISTORY OF DYSPHAGIA?: none reported    Home diet: Regular consistency solids (IDDSI level 7) with  thin liquids (IDDSI level 0)  Current Diet Order:  Diet NPO  ADULT TUBE FEEDING; Nasogastric; Standard with Fiber; Continuous; 10; Yes; 10; Q 4 hours; 55; 30; Q 4 hours; Protein; 1 Protein Modular Daily via feeding tube    PROCEDURE:  Consistencies Administered During the Evaluation   Liquids: thin liquid   Solids:  pureed foods      Method of Intake:   cup, spoon  Fed by clinician      Position:   Seated, upright    CLINICAL ASSESSMENT:  Oral Stage:       Delayed A-P transit due to: cognitive function       Pharyngeal Stage:    Throat clearing present after presentation of all consistencies administered  Immediate wet cough was noted after presentation of all consistencies administered    Cognition:   Confusion noted, Attention impaired, , and Needs frequent verbal cues to maintain adequate alertness    Oral Peripheral Examination   Generalized oral weakness    Current Respiratory Status    room air     Parameters of Speech Production  Respiration:  Adequate for speech production  Quality:   Could not test  Intensity: Could not test    Volitional Swallow: not able to elicit     Volitional Cough:   not able to elicit     Pain: No pain reported. EDUCATION:   The Speech Language Pathologist (SLP) completed education regarding results of evaluation and that intervention is warranted at this time. Learner: Patient  Education: Reviewed results and recommendations of this evaluation  Evaluation of Education:  Needs further instruction    This plan may be re-evaluated and revised as warranted. Evaluation Time includes thorough review of current medical information, gathering information on past medical history/social history and prior level of function, completion of standardized testing/informal observation of tasks, assessment of data and education on plan of care and goals.     [x]The admitting diagnosis and active problem list, have been reviewed prior to initiation of this evaluation. ACTIVE PROBLEM LIST:   Patient Active Problem List   Diagnosis    Inguinal hernia, right    Cervical radiculopathy    Chronic hepatitis C without hepatic coma (HCC)    Right shoulder pain    Right subclavian artery occlusion    Essential hypertension    Sinus tachycardia    Benign paroxysmal positional vertigo due to bilateral vestibular disorder    Hypotension    Pneumonia due to organism    Bacterial pneumonia    Acidosis    JOSIE (acute kidney injury) ([facility]    Bacteremia    Alcohol withdrawal syndrome without complication (HCC)    Dysphagia    Alcohol abuse    SAH (subarachnoid hemorrhage) ([facility]    Subdural hematoma    Acute alcoholic intoxication with complication ([facility]    Closed fracture of occipital bone ([facility]    COVID-19         CPT code:  58290  bedside swallow anam Villasenor M.S., CCC-SLP  Speech-Language Pathologist  SP. [id number]    [id number]  dysphagia tx: Patient seen for f/u for dysphagia mgmt. Patient received trials of thin liquid via tsp w/ immediate coughing/throat clearing noted. Patient educated on recommendation of NPO w/ ongoing PO trials w/ SLP ONLY to determine if/when PO diet can be initiated. Patient will require further instruction regarding SLP POC d/t variable alertness and cognitive status.

## 2022-11-13 NOTE — PROGRESS NOTES
SURGICAL INTENSIVE CARE  PROGRESS NOTE    Date of admission:  11/5/2022  Reason for ICU transfer:  worsening head CT    SUBJECTIVE:  No acute events. No new complaints. HOSPITAL COURSE:    11/5--presented after unwitnessed fall; found to have SDH/SAH/IPH--worse on repeat head CT  11/6--no issues overnight; admitted to SICU. 11/7--Pt confused this morning. 11/8--Pt with increasing confusion and agitation overnight. Pulled out two IV's, in 4-point restraints. Started on Norvasc, Depakote sprinkles and precedex. 11/9--Precedex switched to clonidine due to bradycardia. Remains A&Ox1 not following commands. Again pulling out IV's, in 4-point restraints. 11/10--Pt remains in 4-point restraints. A&Ox1. Follows commands intermittently today. Bradycardia resolved, continued hypertension with agitation. 11/11--Pt requiring Hydralazine PRN frequently overnight. Clonidine increased. Continued agitation. 11/12-No acute events. No new complaints. 11/13--remains agitated and restless; pulled out corpak again. Taking some meds with pudding per nursing    PHYSICAL EXAM:  BP (!) 143/92   Pulse 86   Temp 98.1 °F (36.7 °C) (Axillary)   Resp 13   Ht 5' 10\" (1.778 m)   Wt 165 lb 4.8 oz (75 kg)   SpO2 95%   BMI 23.72 kg/m²     GENERAL:  NAD. Follows some commands  HEAD:  Normocephalic, atraumatic. EYES:   No scleral icterus. PERRLA. LUNGS:  No increased work of breathing. Room air  CARDIOVASCULAR: Warm throughout. RRR. ABDOMEN:  Soft, non distended, non tender. No guarding, rigidity, rebound. EXTREMITIES:   MAEx4. Atraumatic. No LE edema. SKIN:  Warm and dry  NEUROLOGIC:  GCS 14    ASSESSMENT / PLAN:  Neuro:  shows bifrontal contusions greater than right and bitemporal contusions with tentorial subdural hematoma and occipital fx stable on scan #3. GCS 14. NSG following. Keppra. ETOH on PNB. Suboxone. Depakote sprinkles and Clonidine for agitation. CV: Hypertensive. Maintain BP <140.   Scheduled Norvasc, metoprolol, and Clonidine. Hydralazine and labetalol PRNs. Monitor hemodynamics. Pulm: COVID-19 infection. Supportive care. Encourage IS/SMI. GI:  Bowel regimen. Zofran PRN. HO HCV. SLP eval when able to participate. May need NGT reinserted for meds/nutrition  Renal: No acute issues. Monitor UOP & Electrolytes. Endocrine: No acute issues. Monitor glucose. MSK: No acute issues. PT/OT. Heme: monitor CBC  ID: COVID-19. Supportive care. Hep C    Pain/Analgesia: Tylenol, Suboxone,   Total fluid rate: None  Bowel regimen: Dulcolax, glycolax  DVT proph:  SQ heparin  Glucose protocol: Not indicated  Mouth/eye care: As needed per patient  Urine:   Condom catheter. Keep in place for fluid monitoring. CVC sites:  None  Ancillary consults: Irina Sorto  Family Update: As available    Disposition: SICU    Electronically signed by Mitch Aguilar MD on 11/13/2022 at 8:54 AM       The patient is at significant risk for life-threatening neurological deterioration and death and requires ongoing critical care management.     Critical care time exclusive of teaching and procedures = 35 minutes

## 2022-11-14 ENCOUNTER — APPOINTMENT (OUTPATIENT)
Dept: GENERAL RADIOLOGY | Age: 62
DRG: 082 | End: 2022-11-14
Payer: COMMERCIAL

## 2022-11-14 ENCOUNTER — APPOINTMENT (OUTPATIENT)
Dept: CT IMAGING | Age: 62
DRG: 082 | End: 2022-11-14
Payer: COMMERCIAL

## 2022-11-14 PROBLEM — K59.09 OTHER CONSTIPATION: Status: ACTIVE | Noted: 2022-11-14

## 2022-11-14 PROBLEM — R41.0 DELIRIUM: Status: ACTIVE | Noted: 2022-11-14

## 2022-11-14 PROBLEM — F11.20 OPIOID DEPENDENCE ON AGONIST THERAPY (HCC): Status: ACTIVE | Noted: 2022-11-14

## 2022-11-14 LAB
AMMONIA: 41 UMOL/L (ref 16–60)
ANION GAP SERPL CALCULATED.3IONS-SCNC: 12 MMOL/L (ref 7–16)
BUN BLDV-MCNC: 20 MG/DL (ref 6–23)
CALCIUM IONIZED: 1.24 MMOL/L (ref 1.15–1.33)
CALCIUM SERPL-MCNC: 9.1 MG/DL (ref 8.6–10.2)
CHLORIDE BLD-SCNC: 107 MMOL/L (ref 98–107)
CO2: 23 MMOL/L (ref 22–29)
CREAT SERPL-MCNC: 0.6 MG/DL (ref 0.7–1.2)
GFR SERPL CREATININE-BSD FRML MDRD: >60 ML/MIN/1.73
GLUCOSE BLD-MCNC: 118 MG/DL (ref 74–99)
HCT VFR BLD CALC: 44.3 % (ref 37–54)
HEMOGLOBIN: 14.9 G/DL (ref 12.5–16.5)
MAGNESIUM: 2 MG/DL (ref 1.6–2.6)
MCH RBC QN AUTO: 33.6 PG (ref 26–35)
MCHC RBC AUTO-ENTMCNC: 33.6 % (ref 32–34.5)
MCV RBC AUTO: 100 FL (ref 80–99.9)
PDW BLD-RTO: 12.3 FL (ref 11.5–15)
PHOSPHORUS: 3.2 MG/DL (ref 2.5–4.5)
PLATELET # BLD: 225 E9/L (ref 130–450)
PMV BLD AUTO: 10.8 FL (ref 7–12)
POTASSIUM REFLEX MAGNESIUM: 3.9 MMOL/L (ref 3.5–5)
RBC # BLD: 4.43 E12/L (ref 3.8–5.8)
SODIUM BLD-SCNC: 142 MMOL/L (ref 132–146)
TSH SERPL DL<=0.05 MIU/L-ACNC: 5.51 UIU/ML (ref 0.27–4.2)
WBC # BLD: 7.2 E9/L (ref 4.5–11.5)

## 2022-11-14 PROCEDURE — 70450 CT HEAD/BRAIN W/O DYE: CPT

## 2022-11-14 PROCEDURE — 6370000000 HC RX 637 (ALT 250 FOR IP)

## 2022-11-14 PROCEDURE — 99291 CRITICAL CARE FIRST HOUR: CPT | Performed by: SURGERY

## 2022-11-14 PROCEDURE — 2500000003 HC RX 250 WO HCPCS: Performed by: STUDENT IN AN ORGANIZED HEALTH CARE EDUCATION/TRAINING PROGRAM

## 2022-11-14 PROCEDURE — 84100 ASSAY OF PHOSPHORUS: CPT

## 2022-11-14 PROCEDURE — 84439 ASSAY OF FREE THYROXINE: CPT

## 2022-11-14 PROCEDURE — 83735 ASSAY OF MAGNESIUM: CPT

## 2022-11-14 PROCEDURE — 84443 ASSAY THYROID STIM HORMONE: CPT

## 2022-11-14 PROCEDURE — 85027 COMPLETE CBC AUTOMATED: CPT

## 2022-11-14 PROCEDURE — 82330 ASSAY OF CALCIUM: CPT

## 2022-11-14 PROCEDURE — 6360000002 HC RX W HCPCS

## 2022-11-14 PROCEDURE — 82140 ASSAY OF AMMONIA: CPT

## 2022-11-14 PROCEDURE — 2000000000 HC ICU R&B

## 2022-11-14 PROCEDURE — 6370000000 HC RX 637 (ALT 250 FOR IP): Performed by: SURGERY

## 2022-11-14 PROCEDURE — 2500000003 HC RX 250 WO HCPCS

## 2022-11-14 PROCEDURE — 36415 COLL VENOUS BLD VENIPUNCTURE: CPT

## 2022-11-14 PROCEDURE — 80048 BASIC METABOLIC PNL TOTAL CA: CPT

## 2022-11-14 PROCEDURE — 2709999900 HC NON-CHARGEABLE SUPPLY

## 2022-11-14 PROCEDURE — 6370000000 HC RX 637 (ALT 250 FOR IP): Performed by: STUDENT IN AN ORGANIZED HEALTH CARE EDUCATION/TRAINING PROGRAM

## 2022-11-14 PROCEDURE — 2580000003 HC RX 258: Performed by: STUDENT IN AN ORGANIZED HEALTH CARE EDUCATION/TRAINING PROGRAM

## 2022-11-14 PROCEDURE — 6360000002 HC RX W HCPCS: Performed by: STUDENT IN AN ORGANIZED HEALTH CARE EDUCATION/TRAINING PROGRAM

## 2022-11-14 PROCEDURE — 99253 IP/OBS CNSLTJ NEW/EST LOW 45: CPT | Performed by: PSYCHIATRY & NEUROLOGY

## 2022-11-14 PROCEDURE — 74018 RADEX ABDOMEN 1 VIEW: CPT

## 2022-11-14 PROCEDURE — 84481 FREE ASSAY (FT-3): CPT

## 2022-11-14 RX ORDER — ENALAPRILAT 2.5 MG/2ML
1.25 INJECTION INTRAVENOUS EVERY 6 HOURS SCHEDULED
Status: DISCONTINUED | OUTPATIENT
Start: 2022-11-14 | End: 2022-11-15

## 2022-11-14 RX ORDER — SODIUM CHLORIDE 9 MG/ML
INJECTION, SOLUTION INTRAVENOUS CONTINUOUS
Status: DISCONTINUED | OUTPATIENT
Start: 2022-11-14 | End: 2022-11-15

## 2022-11-14 RX ORDER — BISACODYL 10 MG
10 SUPPOSITORY, RECTAL RECTAL EVERY 6 HOURS
Status: COMPLETED | OUTPATIENT
Start: 2022-11-14 | End: 2022-11-14

## 2022-11-14 RX ORDER — SENNA AND DOCUSATE SODIUM 50; 8.6 MG/1; MG/1
2 TABLET, FILM COATED ORAL DAILY
Status: DISCONTINUED | OUTPATIENT
Start: 2022-11-14 | End: 2022-11-21

## 2022-11-14 RX ORDER — SENNA AND DOCUSATE SODIUM 50; 8.6 MG/1; MG/1
2 TABLET, FILM COATED ORAL DAILY PRN
Status: DISCONTINUED | OUTPATIENT
Start: 2022-11-14 | End: 2022-11-14

## 2022-11-14 RX ORDER — BISACODYL 10 MG
10 SUPPOSITORY, RECTAL RECTAL DAILY
Status: DISCONTINUED | OUTPATIENT
Start: 2022-11-14 | End: 2022-11-14

## 2022-11-14 RX ORDER — LACTULOSE 10 G/15ML
20 SOLUTION ORAL 3 TIMES DAILY
Status: DISCONTINUED | OUTPATIENT
Start: 2022-11-14 | End: 2022-11-19

## 2022-11-14 RX ORDER — METOPROLOL TARTRATE 5 MG/5ML
5 INJECTION INTRAVENOUS EVERY 6 HOURS
Status: DISCONTINUED | OUTPATIENT
Start: 2022-11-14 | End: 2022-11-15

## 2022-11-14 RX ORDER — BISACODYL 10 MG
10 SUPPOSITORY, RECTAL RECTAL DAILY PRN
Status: DISCONTINUED | OUTPATIENT
Start: 2022-11-14 | End: 2022-11-14

## 2022-11-14 RX ADMIN — LABETALOL HYDROCHLORIDE 10 MG: 5 INJECTION INTRAVENOUS at 03:36

## 2022-11-14 RX ADMIN — ENALAPRILAT 1.25 MG: 1.25 INJECTION INTRAVENOUS at 17:40

## 2022-11-14 RX ADMIN — SODIUM CHLORIDE: 9 INJECTION, SOLUTION INTRAVENOUS at 17:39

## 2022-11-14 RX ADMIN — Medication 100 MG: at 13:03

## 2022-11-14 RX ADMIN — BISACODYL 10 MG: 10 SUPPOSITORY RECTAL at 09:25

## 2022-11-14 RX ADMIN — BUPRENORPHINE AND NALOXONE 1 TABLET: 8; 2 TABLET SUBLINGUAL at 20:57

## 2022-11-14 RX ADMIN — METOPROLOL TARTRATE 5 MG: 5 INJECTION, SOLUTION INTRAVENOUS at 09:25

## 2022-11-14 RX ADMIN — HEPARIN SODIUM 5000 UNITS: 10000 INJECTION INTRAVENOUS; SUBCUTANEOUS at 20:57

## 2022-11-14 RX ADMIN — DIVALPROEX SODIUM 250 MG: 125 CAPSULE, COATED PELLETS ORAL at 20:57

## 2022-11-14 RX ADMIN — ENALAPRILAT 1.25 MG: 1.25 INJECTION INTRAVENOUS at 11:46

## 2022-11-14 RX ADMIN — DIVALPROEX SODIUM 250 MG: 125 CAPSULE, COATED PELLETS ORAL at 05:48

## 2022-11-14 RX ADMIN — LACTULOSE 20 G: 20 SOLUTION ORAL at 20:57

## 2022-11-14 RX ADMIN — BUPRENORPHINE AND NALOXONE 1 TABLET: 8; 2 TABLET SUBLINGUAL at 11:01

## 2022-11-14 RX ADMIN — FOLIC ACID 1 MG: 5 INJECTION, SOLUTION INTRAMUSCULAR; INTRAVENOUS; SUBCUTANEOUS at 09:31

## 2022-11-14 RX ADMIN — SODIUM CHLORIDE: 9 INJECTION, SOLUTION INTRAVENOUS at 09:33

## 2022-11-14 RX ADMIN — POLYETHYLENE GLYCOL 3350 17 G: 17 POWDER, FOR SOLUTION ORAL at 13:04

## 2022-11-14 RX ADMIN — LABETALOL HYDROCHLORIDE 10 MG: 5 INJECTION INTRAVENOUS at 02:15

## 2022-11-14 RX ADMIN — HYDRALAZINE HYDROCHLORIDE 10 MG: 20 INJECTION INTRAMUSCULAR; INTRAVENOUS at 19:04

## 2022-11-14 RX ADMIN — HEPARIN SODIUM 5000 UNITS: 10000 INJECTION INTRAVENOUS; SUBCUTANEOUS at 11:42

## 2022-11-14 RX ADMIN — CLONIDINE HYDROCHLORIDE 0.2 MG: 0.2 TABLET ORAL at 20:57

## 2022-11-14 RX ADMIN — DIVALPROEX SODIUM 250 MG: 125 CAPSULE, COATED PELLETS ORAL at 13:04

## 2022-11-14 RX ADMIN — METOPROLOL TARTRATE 5 MG: 5 INJECTION, SOLUTION INTRAVENOUS at 15:13

## 2022-11-14 RX ADMIN — METOPROLOL TARTRATE 5 MG: 5 INJECTION, SOLUTION INTRAVENOUS at 20:57

## 2022-11-14 RX ADMIN — HYDRALAZINE HYDROCHLORIDE 10 MG: 20 INJECTION INTRAMUSCULAR; INTRAVENOUS at 11:07

## 2022-11-14 RX ADMIN — HEPARIN SODIUM 5000 UNITS: 10000 INJECTION INTRAVENOUS; SUBCUTANEOUS at 05:48

## 2022-11-14 RX ADMIN — LACTULOSE 20 G: 20 SOLUTION ORAL at 15:13

## 2022-11-14 RX ADMIN — CLONIDINE HYDROCHLORIDE 0.2 MG: 0.2 TABLET ORAL at 13:03

## 2022-11-14 RX ADMIN — BISACODYL 10 MG: 10 SUPPOSITORY RECTAL at 14:47

## 2022-11-14 RX ADMIN — SODIUM CHLORIDE: 9 INJECTION, SOLUTION INTRAVENOUS at 08:47

## 2022-11-14 NOTE — PLAN OF CARE
Problem: Skin/Tissue Integrity  Goal: Absence of new skin breakdown  Description: 1. Monitor for areas of redness and/or skin breakdown  2. Assess vascular access sites hourly  3. Every 4-6 hours minimum:  Change oxygen saturation probe site  4. Every 4-6 hours:  If on nasal continuous positive airway pressure, respiratory therapy assess nares and determine need for appliance change or resting period. Outcome: Progressing     Problem: Safety - Adult  Goal: Free from fall injury  Outcome: Progressing     Problem: ABCDS Injury Assessment  Goal: Absence of physical injury  Outcome: Progressing     Problem: Respiratory - Adult  Goal: Achieves optimal ventilation and oxygenation  Outcome: Progressing     Problem: Skin/Tissue Integrity - Adult  Goal: Skin integrity remains intact  Outcome: Progressing     Problem: Safety - Medical Restraint  Goal: Remains free of injury from restraints (Restraint for Interference with Medical Device)  Description: INTERVENTIONS:  1. Determine that other, less restrictive measures have been tried or would not be effective before applying the restraint  2. Evaluate the patient's condition at the time of restraint application  3. Inform patient/family regarding the reason for restraint  4.  Q2H: Monitor safety, psychosocial status, comfort, nutrition and hydration  11/14/2022 0042 by Stacy Ceja RN  Outcome: Progressing  11/13/2022 1618 by Kenyatta Cho RN  Outcome: Not Progressing  Flowsheets  Taken 11/13/2022 1600  Remains free of injury from restraints (restraint for interference with medical device): Every 2 hours: Monitor safety, psychosocial status, comfort, nutrition and hydration  Taken 11/13/2022 1051  Remains free of injury from restraints (restraint for interference with medical device): Every 2 hours: Monitor safety, psychosocial status, comfort, nutrition and hydration  Taken 11/13/2022 0903  Remains free of injury from restraints (restraint for interference with medical device): Every 2 hours: Monitor safety, psychosocial status, comfort, nutrition and hydration     Problem: Genitourinary - Adult  Goal: Absence of urinary retention  Outcome: Progressing     Problem: Safety - Medical Restraint  Goal: Remains free of injury from restraints (Restraint for Interference with Medical Device)  Description: INTERVENTIONS:  1. Determine that other, less restrictive measures have been tried or would not be effective before applying the restraint  2. Evaluate the patient's condition at the time of restraint application  3. Inform patient/family regarding the reason for restraint  4.  Q2H: Monitor safety, psychosocial status, comfort, nutrition and hydration  11/14/2022 0042 by Ellie Skelton RN  Outcome: Progressing  11/13/2022 1618 by Sunni Nguyen RN  Outcome: Not Progressing  Flowsheets  Taken 11/13/2022 1600  Remains free of injury from restraints (restraint for interference with medical device): Every 2 hours: Monitor safety, psychosocial status, comfort, nutrition and hydration  Taken 11/13/2022 1051  Remains free of injury from restraints (restraint for interference with medical device): Every 2 hours: Monitor safety, psychosocial status, comfort, nutrition and hydration  Taken 11/13/2022 0903  Remains free of injury from restraints (restraint for interference with medical device): Every 2 hours: Monitor safety, psychosocial status, comfort, nutrition and hydration

## 2022-11-14 NOTE — FLOWSHEET NOTE
Attempts to grasp lines and tubings when released. Unable to verbally redirect. Soft restraints continued to right and left wrists. Restraints discontinued to right and left ankles.

## 2022-11-14 NOTE — PROGRESS NOTES
OCCUPATIONAL THERAPY    Date:2022  Patient Name: Marcio Sosa  MRN: [id number]  : 1960  Room: [address] [place] [address]-A              Chart reviewed. Pt not medically appropriate for therapy at this time. Will discontinue pt from OT caseload. Please re-order when indicated. Thank you.    Rebecca Purdy OTR/JARETH [phone]

## 2022-11-14 NOTE — PROGRESS NOTES
Physical Therapy  Physical Therapy Attempt    Name: Jacqueline Lombardi  : 1960  MRN: [id number]      Date of Service: 2022  Chart reviewed. Spoke with RN - pt just returned from STAT CT scan and not appropriate for skilled PT at this time. After multiple attempts, will discontinue skilled PT order. Please re-consult when pt follows commands and can participate in skilled interventions.     Trevor Davies, PT, DPT  [id number]

## 2022-11-14 NOTE — CARE COORDINATION
Pt remains in covid isolation. Less responsive this am . Rpt head ct done. Still requires 4 pt  restraints. Received call from domestic partner, Yamini. She is inquiring on what the next step will be for Heath Pierce. Explained that decisions for plan of care should be decided by next of kin, since Yamini is not hcpoa. Yamini states that he has a daughter Christi Barahona who lives in [address] [place] [address]. They have not spoken in many years, but Jenniffer's daughter has been in contact with her and letting her know her father's condition. Requested phone number for daughter. Yamini will call me with it. 10:15  Received call from Yamini with [address] phone number [phone]. Zulema Riedel. Although they have not spoken in many years, she would like to be involved in her father's care, but agrees that Yamini and [facility] JOSI daughter Kami Carmona can be given information about pt. Christi Barahona and Yamini will make decisions together.

## 2022-11-14 NOTE — PROGRESS NOTES
Speech Language Pathology      NAME:  Angeles Wang  :  1960  DATE: 2022  ROOM:  [facility]    Spoke with RN, Pt remains lethargic at this time. Subdural hematoma [S06. 5XAA]  SAH (subarachnoid hemorrhage) (HCC) [I60.9]  Subarachnoid bleed (HCC) [I60.9]  JOSIE (acute kidney injury) ([facility] [A82.3]  Acute alcoholic intoxication with complication ([facility] [T39.880]  Closed fracture of occipital bone, unspecified laterality, unspecified occipital fracture type, initial encounter ([facility] [G73.494P]  COVID-19 [U07.1]

## 2022-11-14 NOTE — FLOWSHEET NOTE
Attempts to grasp tubes and lines needed for care. Kicking legs and attempts to place legs over side of bed. Unable to verbally redirect. Soft restraints continued to right and left wrists and ankles.

## 2022-11-14 NOTE — PROGRESS NOTES
SURGICAL INTENSIVE CARE  PROGRESS NOTE    Date of admission:  11/5/2022  Reason for ICU transfer:  worsening head CT    SUBJECTIVE:  Increased somnolence and confusion overnight. Opening eyes spontaneously and purposeful movement but does not follow commands or answer questions. HOSPITAL COURSE:    11/5--presented after unwitnessed fall; found to have SDH/SAH/IPH--worse on repeat head CT  11/6--no issues overnight; admitted to SICU. 11/7--Pt confused this morning. 11/8--Pt with increasing confusion and agitation overnight. Pulled out two IV's, in 4-point restraints. Started on Norvasc, Depakote sprinkles and precedex. 11/9--Precedex switched to clonidine due to bradycardia. Remains A&Ox1 not following commands. Again pulling out IV's, in 4-point restraints. 11/10--Pt remains in 4-point restraints. A&Ox1. Follows commands intermittently today. Bradycardia resolved, continued hypertension with agitation. 11/11--Pt requiring Hydralazine PRN frequently overnight. Clonidine increased. Continued agitation. 11/12-No acute events. No new complaints. 11/13--remains agitated and restless; pulled out corpak again. Taking some meds with pudding per nursing  11/14--Increased somnolence and confusion overnight. STAT head CT showed slight increase in right parietal SAH. Stable on repeat head CT. Ammonia and TSH ordered. PHYSICAL EXAM:  BP (!) 153/97   Pulse (!) 110   Temp 99.6 °F (37.6 °C) (Axillary)   Resp 30   Ht 5' 10\" (1.778 m)   Wt 165 lb 4.8 oz (75 kg)   SpO2 98%   BMI 23.72 kg/m²     GENERAL:  NAD. Does not follow commands but opens eyes spontaneously with purposeful movements. HEAD:  Normocephalic, atraumatic. EYES:   No scleral icterus. PERRLA. LUNGS:  No increased work of breathing. Room air  CARDIOVASCULAR: Warm throughout. RRR. ABDOMEN:  Soft, non distended, non tender. No guarding, rigidity, rebound. EXTREMITIES:   MAEx4. Atraumatic. No LE edema.   SKIN:  Warm and dry  NEUROLOGIC:  GCS 12    ASSESSMENT / PLAN:  Neuro:  shows bifrontal contusions greater than right and bitemporal contusions with tentorial subdural hematoma and occipital fx stable on scan #3. Slight interval increase on repeat scan 11/14. GCS 12. NSG following. Keppra. ETOH on PNB (completed). Suboxone. Depakote sprinkles and Clonidine for agitation. CV: Hypertensive. Maintain BP <140. Scheduled Norvasc, metoprolol, and Clonidine. Hydralazine and labetalol PRNs. Monitor hemodynamics. Pulm: COVID-19 infection. Supportive care. Encourage IS/SMI. GI:  Obtain serum Ammonia for altered mental status. Bowel regimen. Zofran PRN. HO HCV. SLP to re-evaluate today. Corpack to be placed. Consider PEG tube in the near future. Renal: No acute issues. Monitor UOP & Electrolytes. Endocrine: Check TSH. No acute issues. Monitor glucose. MSK: No acute issues. PT/OT. Heme: monitor CBC  ID: COVID-19. Supportive care. Hep C. Pain/Analgesia: Tylenol, Suboxone,   Total fluid rate: NS at 125  Bowel regimen: Dulcolax, glycolax  DVT proph:  SQ heparin  Glucose protocol: Not indicated  Mouth/eye care: As needed per patient  Urine:   Condom catheter. Keep in place for fluid monitoring.   CVC sites:  None  Ancillary consults: Aman Pollard  Family Update: As available    Disposition: SICU    Electronically signed by Pastor Logan DO on 11/14/2022 at 7:06 AM

## 2022-11-14 NOTE — PROGRESS NOTES
12 fr. Small bowel feeding tube placed right nares using [employer] tracking device. Tube advanced 95 cm. 45 cm exposed. Bridle placed with ease. Abd. Xray ordered. Pt. Tolerated well.

## 2022-11-14 NOTE — PROGRESS NOTES
Was consulted 1 week a go while out of town. Problem list.   SAH (subarachnoid hemorrhage) (HCC)  Subdural hematoma  Acute alcoholic intoxication with complication (HCC)  Closed fracture of occipital bone ([address]  COVID-19      Non-Hospital  Cervical radiculopathy  Inguinal hernia, right  Chronic hepatitis C without hepatic coma (HCC)  Right shoulder pain  Right subclavian artery occlusion  Essential hypertension  Sinus tachycardia  Benign paroxysmal positional vertigo due to bilateral vestibular disorder  Hypotension  Pneumonia due to organism  Bacterial pneumonia  Acidosis  JOSIE (acute kidney injury) ([address]  Bacteremia  Alcohol withdrawal syndrome without complication ([address]  Dysphagia  Alcohol abuse    Pt remains in covid isolation. Less responsive this am . Rpt head ct done. Still requires 4 pt  restraints. Reviewed his PDMP reprt and he has been on chronic Suboxone 8m/2m BID for several years. 0.9 % sodium chloride infusion IntraVENous, CONTINUOUS @ 125 mL/hr    metoprolol (LOPRESSOR) injection 5 mg 5 mg, IntraVENous, EVERY 6 HOURS, Hold for SBP<100 or hr<75    sennosides-docusate sodium (SENOKOT-S) 8.6-50 MG tablet 2 tablet 2 tablets, Oral, DAILY    bisacodyl (DULCOLAX) suppository 10 mg 10 mg, Rectal, EVERY 6 HOURS    enalaprilat (VASOTEC) injection 1.25 mg 1.25 mg, IntraVENous, EVERY 6 HOURS SCHEDULED, Hold for SBP < 100.     cloNIDine (CATAPRES) tablet 0.2 mg 0.2 mg, Oral, 3 TIMES DAILY    divalproex (DEPAKOTE SPRINKLE) capsule 250 mg 250 mg, Oral, EVERY 8 HOURS SCHEDULED    heparin (porcine) injection 5,000 Units 5,000 Units, SubCUTAneous, EVERY 8 HOURS    hydrALAZINE (APRESOLINE) injection 10 mg 10 mg, IntraVENous, EVERY 10 MIN PRN    albuterol (PROVENTIL) nebulizer solution 2.5 mg 2.5 mg, Nebulization, EVERY 4 HOURS PRN    labetalol (NORMODYNE;TRANDATE) injection 10 mg 10 mg, IntraVENous, EVERY 10 MIN PRN, Give for systolic blood pressure greater than 140 HR >75    buprenorphine-naloxone (SUBOXONE) 8-2 MG SL tablet 1 tablet 1 tablet, SubLINGual, 2 TIMES DAILY    thiamine tablet 100 mg 100 mg, Oral, DAILY    sodium chloride flush 0.9 % injection 10 mL 10 mLs, IntraVENous, EVERY 12 HOURS SCHEDULED    sodium chloride flush 0.9 % injection 10 mL 10 mLs, IntraVENous, PRN    0.9 % sodium chloride infusion IntraVENous, PRN @ 5-250 mL/hr, For piggyback infusion, administer at same rate as piggyback for a total of 25 mL. Enter 25 mL into dose field and piggyback rate into rate field of order. If piggyback is infusing at a rate less than 100 mL/hr, enter 25 mL into dose field and 100 mL/hr into rate field of order. For KVO fluids, enter rate of 20 mL/hr or less into rate field of order. L1 ondansetron (ZOFRAN-ODT) disintegrating tablet 4 mg 4 mg, Oral, EVERY 8 HOURS PRN   L1 ondansetron (ZOFRAN) injection 4 mg 4 mg, IntraVENous, EVERY 6 HOURS PRN, Administer if oral route cannot be used. polyethylene glycol (GLYCOLAX) packet 17 g 17 g, Oral, DAILY, Stir and dissolve one packet of powder (17 g) in any 4 to 8 ounces of beverage (cold, hot or room temperature) then drink    acetaminophen (TYLENOL) tablet 650 mg 650 mg, Oral, EVERY 4 HOURS PRN, Give in addition to any other pain medication ordered at same time for any pain indication. folic acid injection 1 mg 1 mg, IntraVENous, DAILY     SPEECH/LANGUAGE PATHOLOGY  CLINICAL ASSESSMENT OF SWALLOWING FUNCTION   and PLAN OF CARE     PATIENT NAME:  Jung Ojeda  (male)                            MRN:  [id number]                           :  1960  (57 y.o.)  STATUS:  Inpatient: Room 3801/3801-A                TODAY'S DATE:  2022 07    SLP swallowing-dysphagia evaluation and treatment  Start:  22,   End:  22 07,   ONE TIME,   Standing Count:  1 Occurrences,   R       Comments:  Continues to remove corpak, no...     Rowan Mcdowell DO   REASON FOR REFERRAL: to further assess oropharyngeal function EVALUATING THERAPIST: Kandy Krabbe, SLP                                                                                                                                                                             RESULTS:     DYSPHAGIA DIAGNOSIS:   Clinical indicators of moderate-severe oropharyngeal phase dysphagia                           DIET RECOMMENDATIONS:  NPO with ongoing PO analysis by SLP only to determine if PO diet can be initiated                    FEEDING RECOMMENDATIONS:                         Assistance level:  Not applicable                           Compensatory strategies recommended: Not applicable                               Discussed recommendations with nursing and/or faxed report to referring provider: Yes     SPEECH THERAPY  PLAN OF CARE   The dysphagia POC is established based on physician order, dysphagia diagnosis and results of clinical assessment      Skilled SLP intervention for dysphagia management up to 5x per week until goals met, pt plateaus in function and/or discharged from hospital     Conditions Requiring Skilled Therapeutic Intervention for dysphagia:     Patient is performing below functional baseline d/t  current acute condition, Multiple diagnoses, multiple medications, and increased dependency upon caregivers.   Throat clearing during PO intake   Coughing during PO intake    Higher risk of aspiration in individuals unable to to follow 1- step commands JENI Lopez., SAVANA Lindquist, & JEAN-PAUL Parmar. 2009.)     Specific dysphagia interventions to include:      ongoing skilled PO analysis to determine if PO diet can be initiated      Specific instructions for next treatment:  ongoing skilled PO analysis to determine if PO diet can be initiated   Patient Treatment Goals:     Short Term Goals:  Pt will participate in ongoing evaluation of swallow function to determine when PO diet can be safely initiated     Long Term Goals:               Pt will maintain adequate nutrition/hydration via PO intake of the least restrictive oral diet with implementation of safe swallow/ compensatory strategies and decrease signs/symptoms of aspiration to less than 1 x/day. Pt will improve oropharyngeal swallow function to ensure airway protection during PO intake to maintain adequate nutrition/hydration and decrease signs/symptoms of aspiration to less than 1 x/day. Patient/family Goal:    Did not state. Will further assess during treatment. Plan of care discussed with Patient   The Patient did not demonstrate complete understanding of the diagnosis, prognosis and plan of care      Rehabilitation Potential/Prognosis: fair                                                                                                                                                                      ADMITTING DIAGNOSIS: Subdural hematoma [S06. 5XAA]  SAH (subarachnoid hemorrhage) (HCC) [I60.9]  Subarachnoid bleed (HCC) [I60.9]  JOSIE (acute kidney injury) ([facility] [P08.7]  Acute alcoholic intoxication with complication ([facility] [Q90.158]  Closed fracture of occipital bone, unspecified laterality, unspecified occipital fracture type, initial encounter ([facility] [O57.917D]  COVID-19 [U07.1]     VISIT DIAGNOSIS:   Visit Diagnoses           Codes     Subdural hematoma    -  Primary S06. 5XAA     Subarachnoid bleed (HCC)     I60.9     Closed fracture of occipital bone, unspecified laterality, unspecified occipital fracture type, initial encounter ([facility]     S02.119A     COVID-19     U07.1     Acute alcoholic intoxication with complication (HCC)     F06.842                            PATIENT REPORT/COMPLAINT: patient not able to accurately report  RN cleared patient for participation in assessment     yes      PRIOR LEVEL OF SWALLOW FUNCTION:     PAST HISTORY OF DYSPHAGIA?: none reported     Home diet: Regular consistency solids (IDDSI level 7) with  thin liquids (IDDSI level 0)  Current Diet Order:  Diet NPO  ADULT TUBE FEEDING; Nasogastric; Standard with Fiber; Continuous; 10; Yes; 10; Q 4 hours; 55; 30; Q 4 hours; Protein; 1 Protein Modular Daily via feeding tube     PROCEDURE:  Consistencies Administered During the Evaluation   Liquids: thin liquid             Solids:   pureed foods           Method of Intake:   cup, spoon  Fed by clinician       Position:   Seated, upright     CLINICAL ASSESSMENT:  Oral Stage:                       Delayed A-P transit due to: cognitive function           Pharyngeal Stage:          Throat clearing present after presentation of all consistencies administered  Immediate wet cough was noted after presentation of all consistencies administered     Cognition:   Confusion noted, Attention impaired, , and Needs frequent verbal cues to maintain adequate alertness     Oral Peripheral Examination   Generalized oral weakness     Current Respiratory Status                            room air                 Parameters of Speech Production  Respiration:       Adequate for speech production  Quality:              Could not test  Intensity:            Could not test     Volitional Swallow: not able to elicit      Volitional Cough:   not able to elicit      Pain: No pain reported. EDUCATION:               The Speech Language Pathologist (SLP) completed education regarding results of evaluation and that intervention is warranted at this time. Learner: Patient  Education: Reviewed results and recommendations of this evaluation  Evaluation of Education:  Needs further instruction     This plan may be re-evaluated and revised as warranted. Evaluation Time includes thorough review of current medical information, gathering information on past medical history/social history and prior level of function, completion of standardized testing/informal observation of tasks, assessment of data and education on plan of care and goals.      [x]The admitting diagnosis and active problem list, have been reviewed prior to initiation of this evaluation. ACTIVE PROBLEM LIST:       Patient Active Problem List   Diagnosis    Inguinal hernia, right    Cervical radiculopathy    Chronic hepatitis C without hepatic coma (HCC)    Right shoulder pain    Right subclavian artery occlusion    Essential hypertension    Sinus tachycardia    Benign paroxysmal positional vertigo due to bilateral vestibular disorder    Hypotension    Pneumonia due to organism    Bacterial pneumonia    Acidosis    JOSIE (acute kidney injury) ([facility]    Bacteremia    Alcohol withdrawal syndrome without complication (HCC)    Dysphagia    Alcohol abuse    SAH (subarachnoid hemorrhage) ([facility]    Subdural hematoma    Acute alcoholic intoxication with complication ([facility]    Closed fracture of occipital bone ([facility]    COVID-19                     CPT code:  30648  bedside swallow anam Sky M.S., CCC-SLP  Speech-Language Pathologist  SP. [id number]     84600  dysphagia tx: Patient seen for f/u for dysphagia mgmt. Patient received trials of thin liquid via tsp w/ immediate coughing/throat clearing noted. Patient educated on recommendation of NPO w/ ongoing PO trials w/ SLP ONLY to determine if/when PO diet can be initiated. Patient will require further instruction regarding SLP POC d/t variable alertness and cognitive status. Neurosurgery  Chart reviewed, CT head reviewed. No new Neurosurgical interventions/recommendations at this time. Continue previous plan. -Serial Neuro Checks  -Blood pressure control; keep SBP <140  -No AP/AC or NSAIDS  -Please call with any questions or concerns      11/14/22 1300 -- 23 73 157/97 -- -- -- -- 97   SUBJECTIVE:  Increased somnolence and confusion overnight. Opening eyes spontaneously and purposeful movement but does not follow commands or answer questions.       HOSPITAL COURSE:     11/5--presented after unwitnessed fall; found to have SDH/SAH/IPH--worse on repeat head CT  11/6--no issues overnight; admitted to SICU. 11/7--Pt confused this morning. 11/8--Pt with increasing confusion and agitation overnight. Pulled out two IV's, in 4-point restraints. Started on Norvasc, Depakote sprinkles and precedex. 11/9--Precedex switched to clonidine due to bradycardia. Remains A&Ox1 not following commands. Again pulling out IV's, in 4-point restraints. 11/10--Pt remains in 4-point restraints. A&Ox1. Follows commands intermittently today. Bradycardia resolved, continued hypertension with agitation. 11/11--Pt requiring Hydralazine PRN frequently overnight. Clonidine increased. Continued agitation. 11/12-No acute events. No new complaints. 11/13--remains agitated and restless; pulled out corpak again. Taking some meds with pudding per nursing  11/14--Increased somnolence and confusion overnight. STAT head CT showed slight increase in right parietal SAH. Stable on repeat head CT. Ammonia and TSH ordered. PHYSICAL EXAM:  BP (!) 153/97   Pulse (!) 110   Temp 99.6 °F (37.6 °C) (Axillary)   Resp 30   Ht 5' 10\" (1.778 m)   Wt 165 lb 4.8 oz (75 kg)   SpO2 98%   BMI 23.72 kg/m²      GENERAL:  NAD. Does not follow commands but opens eyes spontaneously with purposeful movements. HEAD:  Normocephalic, atraumatic. EYES:   No scleral icterus. PERRLA. LUNGS:  No increased work of breathing. Room air  CARDIOVASCULAR: Warm throughout. RRR. ABDOMEN:  Soft, non distended, non tender. No guarding, rigidity, rebound. EXTREMITIES:   MAEx4. Atraumatic. No LE edema. SKIN:  Warm and dry  NEUROLOGIC:  GCS 12          Ass/plan 58 y/o male on chronic Suboxone film   H/O Alcohol dep, on COVID isolation   SAH (subarachnoid hemorrhage) (HCC)  Subdural hematoma  Closed fracture of occipital bone (HCC)        Given current neurologic issues including dysphagia I would recommend pharmacy try to get film as tablets placed sublingually have a greater chance at being aspirated.      I will speak with pharmacy and see if this is possible. Film placed even buccally will be absorbed rabidly and is again much less risk for aspiration. Time spent reviewing HX 30 min    Dez Coles     Addendum    I spoke with his girlfriend about bringing in his Suboxone Film from home but Sudarshan Turcios hides it and his girlfriend does not know where he kept it. We also discussed his alcohol use which she says averages 1-2 tall boy beers a day. She noticed change in mental status 36 hours before he fell.  I will speak with pharmacy

## 2022-11-14 NOTE — PLAN OF CARE
Problem: Safety - Medical Restraint  Goal: Remains free of injury from restraints (Restraint for Interference with Medical Device)  Description: INTERVENTIONS:  1. Determine that other, less restrictive measures have been tried or would not be effective before applying the restraint  2. Evaluate the patient's condition at the time of restraint application  3. Inform patient/family regarding the reason for restraint  4. Q2H: Monitor safety, psychosocial status, comfort, nutrition and hydration  11/14/2022 0844 by Kristian Stein RN  Outcome: Not Progressing  Flowsheets (Taken 11/14/2022 0800)  Remains free of injury from restraints (restraint for interference with medical device): Every 2 hours: Monitor safety, psychosocial status, comfort, nutrition and hydration     Problem: Safety - Medical Restraint  Goal: Remains free of injury from restraints (Restraint for Interference with Medical Device)  Description: INTERVENTIONS:  1. Determine that other, less restrictive measures have been tried or would not be effective before applying the restraint  2. Evaluate the patient's condition at the time of restraint application  3. Inform patient/family regarding the reason for restraint  4.  Q2H: Monitor safety, psychosocial status, comfort, nutrition and hydration  11/14/2022 0844 by Kristian Stein RN  Outcome: Not Progressing  Flowsheets (Taken 11/14/2022 0800)  Remains free of injury from restraints (restraint for interference with medical device): Every 2 hours: Monitor safety, psychosocial status, comfort, nutrition and hydration  11/14/2022 0042 by Ashish Jimenez RN  Outcome: Progressing

## 2022-11-14 NOTE — PLAN OF CARE
Problem: Safety - Medical Restraint  Goal: Remains free of injury from restraints (Restraint for Interference with Medical Device)  Description: INTERVENTIONS:  1. Determine that other, less restrictive measures have been tried or would not be effective before applying the restraint  2. Evaluate the patient's condition at the time of restraint application  3. Inform patient/family regarding the reason for restraint  4. Q2H: Monitor safety, psychosocial status, comfort, nutrition and hydration  11/14/2022 1603 by Sylwia Carbajal RN  Outcome: Progressing  Flowsheets  Taken 11/14/2022 1600  Remains free of injury from restraints (restraint for interference with medical device): Every 2 hours: Monitor safety, psychosocial status, comfort, nutrition and hydration  Taken 11/14/2022 1145  Remains free of injury from restraints (restraint for interference with medical device): Every 2 hours: Monitor safety, psychosocial status, comfort, nutrition and hydration     Problem: Safety - Medical Restraint  Goal: Remains free of injury from restraints (Restraint for Interference with Medical Device)  Description: INTERVENTIONS:  1. Determine that other, less restrictive measures have been tried or would not be effective before applying the restraint  2. Evaluate the patient's condition at the time of restraint application  3. Inform patient/family regarding the reason for restraint  4.  Q2H: Monitor safety, psychosocial status, comfort, nutrition and hydration  11/14/2022 1603 by Sylwia Carbajal RN  Outcome: Progressing  Flowsheets  Taken 11/14/2022 1600  Remains free of injury from restraints (restraint for interference with medical device): Every 2 hours: Monitor safety, psychosocial status, comfort, nutrition and hydration  Taken 11/14/2022 1145  Remains free of injury from restraints (restraint for interference with medical device): Every 2 hours: Monitor safety, psychosocial status, comfort, nutrition and hydration  11/14/2022 0844 by Christ Fragoso, RN  Outcome: Not Progressing  Flowsheets (Taken 11/14/2022 0800)  Remains free of injury from restraints (restraint for interference with medical device): Every 2 hours: Monitor safety, psychosocial status, comfort, nutrition and hydration

## 2022-11-14 NOTE — PROGRESS NOTES
Neurosurgery Note:    Chart reviewed, CT head reviewed. No new Neurosurgical interventions/recommendations at this time. Continue previous plan.      -Serial Neuro Checks  -Blood pressure control; keep SBP <140  -No AP/AC or NSAIDS  -Please call with any questions or concerns

## 2022-11-14 NOTE — PROGRESS NOTES
[id number]  PROGRESS NOTE  ATTENDING NOTE    TRAUMA/CRITICAL CARE    MECHANISM OF INJURY:  unwitnessed fall    Chief Complaint   Patient presents with    Fall     +LOC fell backwards, +head, hematoma posterior head, unwitnessed, now dizzy, confused, A&Ox2 wrong month,president, doesn't remember falling R shoulder pain. Takes BP meds and suboxone, C-collar. HPI  Trauma consult. Injury occurred just prior to arrival to OSH. Unknown cause of fall. Per report, patient was unwitnessed fall at work. Positive loss of consciousness with hematoma to posterior head. Elevated EtOH. Patient Active Problem List   Diagnosis    Inguinal hernia, right    Cervical radiculopathy    Chronic hepatitis C without hepatic coma (HCC)    Right shoulder pain    Right subclavian artery occlusion    Essential hypertension    Sinus tachycardia    Benign paroxysmal positional vertigo due to bilateral vestibular disorder    Hypotension    Pneumonia due to organism    Bacterial pneumonia    Acidosis    JOSIE (acute kidney injury) ([address]    Bacteremia    Alcohol withdrawal syndrome without complication (HCC)    Dysphagia    Alcohol abuse    SAH (subarachnoid hemorrhage) ([address]    Subdural hematoma    Acute alcoholic intoxication with complication (HCC)    Closed fracture of occipital bone ([address]    COVID-19       OVERNIGHT EVENTS:  Agitated overnight, somnolent overnight, repeat CT head with slight increase [facility]    HOSPITAL COURSE:  11/5--presented after unwitnessed fall; found to have SDH/SAH/IPH--worse on repeat head CT  11/6--no issues overnight; admitted to SICU. 11/7--Pt confused this morning. 11/8--Pt with increasing confusion and agitation overnight. Pulled out two IV's, in 4-point restraints. Started on Norvasc, Depakote sprinkles and precedex. 11/9--Precedex switched to clonidine due to bradycardia. Remains A&Ox1 not following commands. Again pulling out IV's, in 4-point restraints.    11/10--Pt remains in 4-point restraints. A&Ox1. Follows commands intermittently today. Bradycardia resolved, continued hypertension with agitation. 11/11--Pt requiring Hydralazine PRN frequently overnight. Clonidine increased. Continued agitation. 11/12-No acute events. No new complaints. 11/13--remains agitated and restless; pulled out corpak again. Taking some meds with pudding per nursing  11/14--repeat CT head with slight increase in subarachnoid hemorrhage, phenobarb stopped, IV team for smallbore feeding tube placement, ammonia TSH checked, fluid started as patient not been on IV fluids all weekend    BP (!) 163/96   Pulse 75   Temp 99.1 °F (37.3 °C) (Axillary)   Resp 23   Ht 5' 10\" (1.778 m)   Wt 165 lb 4.8 oz (75 kg)   SpO2 97%   BMI 23.72 kg/m²   Physical Exam  Constitutional:       Appearance: He is ill-appearing. HENT:      Head: Normocephalic and atraumatic. Nose: Nose normal.      Mouth/Throat:      Mouth: Mucous membranes are moist.      Pharynx: Oropharynx is clear. Eyes:      Extraocular Movements: Extraocular movements intact. Pupils: Pupils are equal, round, and reactive to light. Cardiovascular:      Rate and Rhythm: Normal rate and regular rhythm. Pulses: Normal pulses. Heart sounds: Normal heart sounds. Pulmonary:      Effort: Pulmonary effort is normal.      Breath sounds: Normal breath sounds. Abdominal:      General: There is distension. Palpations: Abdomen is soft. Tenderness: There is no abdominal tenderness. Musculoskeletal:         General: No tenderness or signs of injury. Cervical back: Normal range of motion and neck supple. Skin:     General: Skin is warm and dry. Neurological:      Mental Status: He is alert. 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       Sarah Horner MD, MSc, FACS  11/14/2022  10:49 AM

## 2022-11-15 ENCOUNTER — ANESTHESIA EVENT (OUTPATIENT)
Dept: ENDOSCOPY | Age: 62
DRG: 082 | End: 2022-11-15
Payer: COMMERCIAL

## 2022-11-15 LAB
ANION GAP SERPL CALCULATED.3IONS-SCNC: 11 MMOL/L (ref 7–16)
BUN BLDV-MCNC: 15 MG/DL (ref 6–23)
CALCIUM IONIZED: 1.23 MMOL/L (ref 1.15–1.33)
CALCIUM SERPL-MCNC: 8.9 MG/DL (ref 8.6–10.2)
CHLORIDE BLD-SCNC: 111 MMOL/L (ref 98–107)
CO2: 23 MMOL/L (ref 22–29)
CREAT SERPL-MCNC: 0.4 MG/DL (ref 0.7–1.2)
GFR SERPL CREATININE-BSD FRML MDRD: >60 ML/MIN/1.73
GLUCOSE BLD-MCNC: 154 MG/DL (ref 74–99)
HCT VFR BLD CALC: 43.3 % (ref 37–54)
HEMOGLOBIN: 14.6 G/DL (ref 12.5–16.5)
MAGNESIUM: 2.1 MG/DL (ref 1.6–2.6)
MCH RBC QN AUTO: 34.2 PG (ref 26–35)
MCHC RBC AUTO-ENTMCNC: 33.7 % (ref 32–34.5)
MCV RBC AUTO: 101.4 FL (ref 80–99.9)
PDW BLD-RTO: 12.4 FL (ref 11.5–15)
PHOSPHORUS: 1.8 MG/DL (ref 2.5–4.5)
PLATELET # BLD: 210 E9/L (ref 130–450)
PMV BLD AUTO: 10.5 FL (ref 7–12)
POTASSIUM REFLEX MAGNESIUM: 3.8 MMOL/L (ref 3.5–5)
RBC # BLD: 4.27 E12/L (ref 3.8–5.8)
SODIUM BLD-SCNC: 145 MMOL/L (ref 132–146)
T3 FREE: 3 PG/ML (ref 2–4.4)
T3 TOTAL: 134.2 NG/DL (ref 80–200)
T4 FREE: 1.41 NG/DL (ref 0.93–1.7)
T4 TOTAL: 10.5 MCG/DL (ref 4.5–11.7)
VALPROIC ACID LEVEL: 27 MCG/ML (ref 50–100)
WBC # BLD: 8.9 E9/L (ref 4.5–11.5)

## 2022-11-15 PROCEDURE — 6360000002 HC RX W HCPCS

## 2022-11-15 PROCEDURE — 85027 COMPLETE CBC AUTOMATED: CPT

## 2022-11-15 PROCEDURE — 6370000000 HC RX 637 (ALT 250 FOR IP): Performed by: SURGERY

## 2022-11-15 PROCEDURE — 80048 BASIC METABOLIC PNL TOTAL CA: CPT

## 2022-11-15 PROCEDURE — 99291 CRITICAL CARE FIRST HOUR: CPT | Performed by: SURGERY

## 2022-11-15 PROCEDURE — 80164 ASSAY DIPROPYLACETIC ACD TOT: CPT

## 2022-11-15 PROCEDURE — 2000000000 HC ICU R&B

## 2022-11-15 PROCEDURE — 2500000003 HC RX 250 WO HCPCS: Performed by: STUDENT IN AN ORGANIZED HEALTH CARE EDUCATION/TRAINING PROGRAM

## 2022-11-15 PROCEDURE — 84436 ASSAY OF TOTAL THYROXINE: CPT

## 2022-11-15 PROCEDURE — 84100 ASSAY OF PHOSPHORUS: CPT

## 2022-11-15 PROCEDURE — 6370000000 HC RX 637 (ALT 250 FOR IP): Performed by: STUDENT IN AN ORGANIZED HEALTH CARE EDUCATION/TRAINING PROGRAM

## 2022-11-15 PROCEDURE — 2580000003 HC RX 258: Performed by: STUDENT IN AN ORGANIZED HEALTH CARE EDUCATION/TRAINING PROGRAM

## 2022-11-15 PROCEDURE — 82330 ASSAY OF CALCIUM: CPT

## 2022-11-15 PROCEDURE — 6360000002 HC RX W HCPCS: Performed by: STUDENT IN AN ORGANIZED HEALTH CARE EDUCATION/TRAINING PROGRAM

## 2022-11-15 PROCEDURE — 83735 ASSAY OF MAGNESIUM: CPT

## 2022-11-15 PROCEDURE — 6370000000 HC RX 637 (ALT 250 FOR IP)

## 2022-11-15 PROCEDURE — 84480 ASSAY TRIIODOTHYRONINE (T3): CPT

## 2022-11-15 PROCEDURE — 2500000003 HC RX 250 WO HCPCS

## 2022-11-15 RX ORDER — FOLIC ACID 1 MG/1
1 TABLET ORAL DAILY
Status: DISCONTINUED | OUTPATIENT
Start: 2022-11-15 | End: 2022-11-21

## 2022-11-15 RX ORDER — AMLODIPINE BESYLATE 10 MG/1
10 TABLET ORAL DAILY
Status: DISCONTINUED | OUTPATIENT
Start: 2022-11-15 | End: 2022-11-30 | Stop reason: HOSPADM

## 2022-11-15 RX ADMIN — SODIUM CHLORIDE: 9 INJECTION, SOLUTION INTRAVENOUS at 03:29

## 2022-11-15 RX ADMIN — LABETALOL HYDROCHLORIDE 10 MG: 5 INJECTION INTRAVENOUS at 02:10

## 2022-11-15 RX ADMIN — Medication 10 ML: at 20:52

## 2022-11-15 RX ADMIN — CLONIDINE HYDROCHLORIDE 0.2 MG: 0.2 TABLET ORAL at 08:17

## 2022-11-15 RX ADMIN — LABETALOL HYDROCHLORIDE 10 MG: 5 INJECTION INTRAVENOUS at 14:22

## 2022-11-15 RX ADMIN — BUPRENORPHINE AND NALOXONE 1 TABLET: 8; 2 TABLET SUBLINGUAL at 21:41

## 2022-11-15 RX ADMIN — HYDRALAZINE HYDROCHLORIDE 10 MG: 20 INJECTION INTRAMUSCULAR; INTRAVENOUS at 18:33

## 2022-11-15 RX ADMIN — LABETALOL HYDROCHLORIDE 10 MG: 5 INJECTION INTRAVENOUS at 20:47

## 2022-11-15 RX ADMIN — BUPRENORPHINE AND NALOXONE 1 TABLET: 8; 2 TABLET SUBLINGUAL at 08:17

## 2022-11-15 RX ADMIN — LABETALOL HYDROCHLORIDE 10 MG: 5 INJECTION INTRAVENOUS at 06:52

## 2022-11-15 RX ADMIN — LACTULOSE 20 G: 20 SOLUTION ORAL at 08:17

## 2022-11-15 RX ADMIN — Medication 250 MG: at 16:57

## 2022-11-15 RX ADMIN — Medication 100 MG: at 08:19

## 2022-11-15 RX ADMIN — HYDRALAZINE HYDROCHLORIDE 10 MG: 20 INJECTION INTRAMUSCULAR; INTRAVENOUS at 16:08

## 2022-11-15 RX ADMIN — ENALAPRILAT 1.25 MG: 1.25 INJECTION INTRAVENOUS at 05:54

## 2022-11-15 RX ADMIN — CLONIDINE HYDROCHLORIDE 0.2 MG: 0.2 TABLET ORAL at 13:41

## 2022-11-15 RX ADMIN — Medication 1 PACKET: at 21:41

## 2022-11-15 RX ADMIN — HEPARIN SODIUM 5000 UNITS: 10000 INJECTION INTRAVENOUS; SUBCUTANEOUS at 10:13

## 2022-11-15 RX ADMIN — METOPROLOL TARTRATE 25 MG: 25 TABLET, FILM COATED ORAL at 11:14

## 2022-11-15 RX ADMIN — LACTULOSE 20 G: 20 SOLUTION ORAL at 13:41

## 2022-11-15 RX ADMIN — ENALAPRILAT 1.25 MG: 1.25 INJECTION INTRAVENOUS at 00:25

## 2022-11-15 RX ADMIN — LABETALOL HYDROCHLORIDE 10 MG: 5 INJECTION INTRAVENOUS at 01:23

## 2022-11-15 RX ADMIN — SENNOSIDES AND DOCUSATE SODIUM 2 TABLET: 50; 8.6 TABLET ORAL at 08:17

## 2022-11-15 RX ADMIN — LACTULOSE 20 G: 20 SOLUTION ORAL at 21:41

## 2022-11-15 RX ADMIN — DIVALPROEX SODIUM 250 MG: 125 CAPSULE, COATED PELLETS ORAL at 21:41

## 2022-11-15 RX ADMIN — HEPARIN SODIUM 5000 UNITS: 10000 INJECTION INTRAVENOUS; SUBCUTANEOUS at 03:32

## 2022-11-15 RX ADMIN — HYDRALAZINE HYDROCHLORIDE 10 MG: 20 INJECTION INTRAMUSCULAR; INTRAVENOUS at 12:07

## 2022-11-15 RX ADMIN — LABETALOL HYDROCHLORIDE 10 MG: 5 INJECTION INTRAVENOUS at 16:49

## 2022-11-15 RX ADMIN — DIVALPROEX SODIUM 250 MG: 125 CAPSULE, COATED PELLETS ORAL at 05:53

## 2022-11-15 RX ADMIN — Medication 10 ML: at 08:18

## 2022-11-15 RX ADMIN — HYDRALAZINE HYDROCHLORIDE 10 MG: 20 INJECTION INTRAMUSCULAR; INTRAVENOUS at 03:34

## 2022-11-15 RX ADMIN — DIVALPROEX SODIUM 250 MG: 125 CAPSULE, COATED PELLETS ORAL at 13:41

## 2022-11-15 RX ADMIN — POLYETHYLENE GLYCOL 3350 17 G: 17 POWDER, FOR SOLUTION ORAL at 08:17

## 2022-11-15 RX ADMIN — AMLODIPINE BESYLATE 10 MG: 10 TABLET ORAL at 10:13

## 2022-11-15 RX ADMIN — METOPROLOL TARTRATE 25 MG: 25 TABLET, FILM COATED ORAL at 21:41

## 2022-11-15 RX ADMIN — FOLIC ACID 1 MG: 5 INJECTION, SOLUTION INTRAMUSCULAR; INTRAVENOUS; SUBCUTANEOUS at 08:17

## 2022-11-15 RX ADMIN — METOPROLOL TARTRATE 5 MG: 5 INJECTION, SOLUTION INTRAVENOUS at 08:17

## 2022-11-15 RX ADMIN — HEPARIN SODIUM 5000 UNITS: 10000 INJECTION INTRAVENOUS; SUBCUTANEOUS at 20:47

## 2022-11-15 RX ADMIN — LABETALOL HYDROCHLORIDE 10 MG: 5 INJECTION INTRAVENOUS at 21:54

## 2022-11-15 RX ADMIN — HYDRALAZINE HYDROCHLORIDE 10 MG: 20 INJECTION INTRAMUSCULAR; INTRAVENOUS at 13:39

## 2022-11-15 RX ADMIN — METOPROLOL TARTRATE 5 MG: 5 INJECTION, SOLUTION INTRAVENOUS at 03:28

## 2022-11-15 RX ADMIN — Medication 250 MG: at 11:15

## 2022-11-15 RX ADMIN — CLONIDINE HYDROCHLORIDE 0.2 MG: 0.2 TABLET ORAL at 21:41

## 2022-11-15 ASSESSMENT — LIFESTYLE VARIABLES: SMOKING_STATUS: 1

## 2022-11-15 NOTE — PROGRESS NOTES
[facility]  PROGRESS NOTE  ATTENDING NOTE    TRAUMA/CRITICAL CARE    MECHANISM OF INJURY:  unwitnessed fall    Chief Complaint   Patient presents with    Fall     +LOC fell backwards, +head, hematoma posterior head, unwitnessed, now dizzy, confused, A&Ox2 wrong month,president, doesn't remember falling R shoulder pain. Takes BP meds and suboxone, C-collar. HPI  Trauma consult. Injury occurred just prior to arrival to OSH. Unknown cause of fall. Per report, patient was unwitnessed fall at work. Positive loss of consciousness with hematoma to posterior head. Elevated EtOH. Patient Active Problem List   Diagnosis    Inguinal hernia, right    Cervical radiculopathy    Chronic hepatitis C without hepatic coma (HCC)    Right shoulder pain    Right subclavian artery occlusion    Essential hypertension    Sinus tachycardia    Benign paroxysmal positional vertigo due to bilateral vestibular disorder    Hypotension    Pneumonia due to organism    Bacterial pneumonia    Acidosis    JOSIE (acute kidney injury) ([facility] [address]    Bacteremia    Alcohol withdrawal syndrome without complication (HCC)    Dysphagia    Alcohol abuse    Subarachnoid bleed (HCC)    Subdural hematoma    Acute alcoholic intoxication with complication (HCC)    Closed fracture of occipital bone (HCC)    COVID-19    Opioid dependence on agonist therapy ([facility] [address]    Delirium    Other constipation       OVERNIGHT EVENTS:  + BM    HOSPITAL COURSE:  11/5--presented after unwitnessed fall; found to have SDH/SAH/IPH--worse on repeat head CT  11/6--no issues overnight; admitted to SICU. 11/7--Pt confused this morning. 11/8--Pt with increasing confusion and agitation overnight. Pulled out two IV's, in 4-point restraints. Started on Norvasc, Depakote sprinkles and precedex. 11/9--Precedex switched to clonidine due to bradycardia. Remains A&Ox1 not following commands. Again pulling out IV's, in 4-point restraints.    11/10--Pt remains in 4-point restraints. A&Ox1. Follows commands intermittently today. Bradycardia resolved, continued hypertension with agitation. 11/11--Pt requiring Hydralazine PRN frequently overnight. Clonidine increased. Continued agitation. 11/12-No acute events. No new complaints. 11/13--remains agitated and restless; pulled out corpak again. Taking some meds with pudding per nursing  11/14--repeat CT head with slight increase in subarachnoid hemorrhage, phenobarb stopped, IV team for smallbore feeding tube placement, ammonia TSH checked, fluid started as patient not been on IV fluids all weekend  11/15--placed back on oral anti-HTN, d/c IVF    BP (!) 165/90   Pulse 66   Temp 99.4 °F (37.4 °C) (Axillary)   Resp 24   Ht 5' 10\" (1.778 m)   Wt 165 lb 4.8 oz (75 kg)   SpO2 93%   BMI 23.72 kg/m²   Physical Exam  Constitutional:       Appearance: He is ill-appearing. HENT:      Head: Normocephalic and atraumatic. Nose: Nose normal.      Mouth/Throat:      Mouth: Mucous membranes are moist.      Pharynx: Oropharynx is clear. Eyes:      Extraocular Movements: Extraocular movements intact. Pupils: Pupils are equal, round, and reactive to light. Cardiovascular:      Rate and Rhythm: Normal rate and regular rhythm. Pulses: Normal pulses. Heart sounds: Normal heart sounds. Pulmonary:      Effort: Pulmonary effort is normal.      Breath sounds: Normal breath sounds. Abdominal:      General: There is distension. Palpations: Abdomen is soft. Tenderness: There is no abdominal tenderness. Musculoskeletal:         General: No tenderness or signs of injury. Cervical back: Normal range of motion and neck supple. Skin:     General: Skin is warm and dry. Neurological:      Mental Status: He is alert. Comments: Will not speak to me  Says yes inappropriately at times  Purposeful  Does not follow commands       Lines:     Montoya:  no external  Central line:  no  PICC:  yes - right basilic PICC    I have reviewed the laboratory studies    CXR:  none    ASSESSMENT/PLAN:   Neuro: TBI--completed course of Keppra, neurosurgery following  Occipital skull fracture--analgesia, neurosurgery following  History of drug abuse--continue Suboxone, addiction medicine following  Delirium/agitation--continue clonidine, Depakote, check Depakote level--normal, check ammonia--normal, check TSH--elevated but FT4 and FT3 ok, stop phenobarb  Alcohol abuse--thiamine, folate, 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       Haile Espitia MD, MSc, FACS  11/15/2022  11:07 AM

## 2022-11-15 NOTE — PROGRESS NOTES
SURGICAL INTENSIVE CARE  PROGRESS NOTE    Date of admission:  11/5/2022  Reason for ICU transfer:  worsening head CT    SUBJECTIVE:  No acute overnight events. Small bore NG tube remains in place. Patient opens eyes to voice and intermittently follows commands. Moaning but does not answer questions. HOSPITAL COURSE:    11/5--presented after unwitnessed fall; found to have SDH/SAH/IPH--worse on repeat head CT  11/6--no issues overnight; admitted to SICU. 11/7--Pt confused this morning. 11/8--Pt with increasing confusion and agitation overnight. Pulled out two IV's, in 4-point restraints. Started on Norvasc, Depakote sprinkles and precedex. 11/9--Precedex switched to clonidine due to bradycardia. Remains A&Ox1 not following commands. Again pulling out IV's, in 4-point restraints. 11/10--Pt remains in 4-point restraints. A&Ox1. Follows commands intermittently today. Bradycardia resolved, continued hypertension with agitation. 11/11--Pt requiring Hydralazine PRN frequently overnight. Clonidine increased. Continued agitation. 11/12-No acute events. No new complaints. 11/13--remains agitated and restless; pulled out corpak again. Taking some meds with pudding per nursing  11/14--Increased somnolence and confusion overnight. STAT head CT showed slight increase in right parietal SAH. Stable on repeat head CT. Ammonia and TSH ordered. 11/15--GCS 12. Ammonia increased from 24 to 41. Started on Lactulose. TSH 5.51 with normal T3, T4. Had 1 BM. PHYSICAL EXAM:  BP (!) 143/95   Pulse 74   Temp 99.3 °F (37.4 °C) (Axillary)   Resp 22   Ht 5' 10\" (1.778 m)   Wt 165 lb 4.8 oz (75 kg)   SpO2 96%   BMI 23.72 kg/m²     GENERAL:  NAD. Does not follow commands but opens eyes spontaneously with purposeful movements. HEAD:  Normocephalic, atraumatic. Corpack in place. EYES:   No scleral icterus. PERRLA. LUNGS:  No increased work of breathing. Room air. CARDIOVASCULAR: Warm throughout. RRR.   ABDOMEN:  Soft, non distended, non tender. No guarding, rigidity, rebound. EXTREMITIES:   MAEx4. Atraumatic. No LE edema. SKIN:  Warm and dry  NEUROLOGIC:  GCS 12    ASSESSMENT / PLAN:  Neuro:  shows bifrontal contusions greater than right and bitemporal contusions with tentorial subdural hematoma and occipital fx stable on scan #3. Slight interval increase on repeat scan 11/14. GCS 12. NSG following. Keppra completed. ETOH on PNB (completed). Suboxone film per Addiction medicine. Depakote sprinkles and Clonidine for agitation. Obtain Depakote level today. CV: Hypertensive. Maintain BP <140. Scheduled Norvasc, metoprolol, and Clonidine. Hydralazine and labetalol PRNs. Monitor hemodynamics. Pulm: COVID-19 infection. Supportive care. Encourage IS/SMI. GI:  Obtain serum Ammonia for altered mental status. Bowel regimen. Zofran PRN. HO HCV. SLP to re-evaluate when mentation improved. Corpack. Plan for PEG Thursday if no improvement. Renal: No acute issues. Monitor UOP & Electrolytes. Endocrine: TSH elevated with normal T3/T4. No acute issues. Monitor glucose. MSK: No acute issues. PT/OT when able. Heme: monitor CBC. ID: [id number]. Supportive care. Hep C. Pain/Analgesia: Tylenol, Suboxone,   Total fluid rate: Stopped  Bowel regimen: Dulcolax, glycolax  DVT proph:  SQ heparin  Glucose protocol: Not indicated  Mouth/eye care: As needed per patient  Urine:   Condom catheter. Keep in place for fluid monitoring.   CVC sites:  None  Ancillary consults: Eve Nielsen  Family Update: As available    Disposition: SICU    Electronically signed by Maria De Jesus Herrera DO on 11/15/2022 at 5:24 AM

## 2022-11-15 NOTE — CARE COORDINATION
Pt remains disoriented. Now only in wrist restraints. Still not following commands. Per conversation with daughter Lowry Olszewski yesterday, Salma Maldonado will decide on snf's since she is familiar with facilities in the area . Spoke with Salma Maldonado this am to discuss snf's. 1st choice is [facility]. They are accepting covid positive pts, but pt will likely be out of covid isolation when ready for snf. Referral made to [facility]. Acepptance pending. Will need new orders for PT/OT once pt is able to participate in therapy in order to obtain precert.

## 2022-11-15 NOTE — PROGRESS NOTES
Speech Language Pathology      NAME:  Machelle Morales  :  1960  DATE: 11/15/2022  ROOM:  3801/3801-A    Attempted ongoing Speech-Language Pathology intervention for dysphagia management . Pt unavailable at this time due to:  [x] HOLD per RN- reports Pt is not following directions at this time  [] Off unit for testing/ procedure    [] With medical staff   [] Declined intervention  [] Sleeping/ Lethargic   [] Other:     SLP to continue previously established POC and re-attempt as able. Subdural hematoma [S06. 5XAA]  SAH (subarachnoid hemorrhage) (HCC) [I60.9]  Subarachnoid bleed (HCC) [I60.9]  JOSIE (acute kidney injury) ([facility] [address] [D77.5]  Acute alcoholic intoxication with complication ([facility] [address] [Y14.590]  Closed fracture of occipital bone, unspecified laterality, unspecified occipital fracture type, initial encounter ([facility] [address] [K27.084I]  COVID-19 [U07.1]

## 2022-11-15 NOTE — PLAN OF CARE
Problem: Neurosensory - Adult  Goal: Achieves stable or improved neurological status  Outcome: Not Progressing  Goal: Achieves maximal functionality and self care  Outcome: Not Progressing     Problem: ABCDS Injury Assessment  Goal: Absence of physical injury  Outcome: Progressing     Problem: Neurosensory - Adult  Goal: Absence of seizures  Outcome: Progressing  Goal: Remains free of injury related to seizures activity  Outcome: Progressing     Problem: Respiratory - Adult  Goal: Achieves optimal ventilation and oxygenation  Outcome: Progressing     Problem: Safety - Medical Restraint  Goal: Remains free of injury from restraints (Restraint for Interference with Medical Device)  Description: INTERVENTIONS:  1. Determine that other, less restrictive measures have been tried or would not be effective before applying the restraint  2. Evaluate the patient's condition at the time of restraint application  3. Inform patient/family regarding the reason for restraint  4.  Q2H: Monitor safety, psychosocial status, comfort, nutrition and hydration  Outcome: Progressing     Problem: Neurosensory - Adult  Goal: Achieves stable or improved neurological status  Outcome: Not Progressing  Goal: Achieves maximal functionality and self care  Outcome: Not Progressing

## 2022-11-15 NOTE — PROGRESS NOTES
Comprehensive Nutrition Assessment    Type and Reason for Visit:  Reassess    Nutrition Recommendations/Plan:     Continue NPO as pt not appropriate for repeat swallow at this time d/t mentation. Continue Current Tube Feeding; Regimen meets 100% est calorie & protein needs       Malnutrition Assessment:  Malnutrition Status: At risk for malnutrition (11/15/22 1013)    Context:  Acute Illness     Findings of the 6 clinical characteristics of malnutrition:  Energy Intake:  Mild decrease in energy intake (improving w/ TF at goal)  Weight Loss:  Unable to assess (wt fluctuations/ discrepancies)     Body Fat Loss:  Unable to assess (d/t covid iso)     Muscle Mass Loss:  Unable to assess (d/t covid iso)    Fluid Accumulation:  No significant fluid accumulation     Strength:  Not Performed    Nutrition Assessment:    Pt remains at risk d/t ongoing need for ICU care & EN support (failed swallow eval 11/13). Admit s/p fall +SDH / [address] / IPH also found to have covid/ rhinovirus PNA. Noted ETOH abuse. TF running at goal rate, possible PEG this week if pt cannot participate in repeat swallow. Will monitor & follow.     Nutrition Related Findings:    Pt remains disoriented, MAP WNL,  +I/O's 5L, +1 nonpitting edema, active BS, dysphagia, Corpak w/ TF Wound Type: None       Current Nutrition Intake & Therapies:    Average Meal Intake: NPO     Current Tube Feeding (TF) Orders:  Feeding Route: Nasoenteric  Formula: Standard with Fiber  Schedule: Continuous  Feeding Regimen: 55 ml/hr, running at goal per doc flow  Additives/Modulars: Protein (daily= 26 gm)  Water Flushes: 30 ml q 4 hr= 180 ml water  Current TF & Flush Orders Provides: 1320 ml tv, 1980 kcals, 84 gm pro (2080 kcals & 110 gm pro w/ mod), 1003 ml free water    Anthropometric Measures:  Height: 5' 10\" (177.8 cm)  Ideal Body Weight (IBW): 166 lbs (75 kg)    Admission Body Weight: 195 lb (88.5 kg) (11/6 first measured)  Current Body Weight: 165 lb (74.8 kg) (11/12 actual, noted wt fluctuations vs scale discrepanices), 117.5 % IBW. Current BMI (kg/m2): 23.7  Usual Body Weight: 189 lb 6 oz (85.9 kg) (EMR measured wt 6/29/22)  % Weight Change (Calculated): 3, unable to assess true wt changes                    BMI Categories: Normal Weight (BMI 18.5-24. 9)    Estimated Daily Nutrient Needs:  Energy Requirements Based On: Formula  Weight Used for Energy Requirements: Current  Energy (kcal/day): MSJ 1547 x 1.2 SF= 3484-7725  Weight Used for Protein Requirements: Current  Protein (g/day): 1.3-1.5 g/kg CBW;   Fluid (ml/day): per neuro/ critical care    Nutrition Diagnosis:   Inadequate oral intake related to cognitive or neurological impairment as evidenced by NPO or clear liquid status due to medical condition, nutrition support - enteral nutrition    Nutrition Interventions:   Nutrition Education/Counseling: Education not appropriate  Coordination of Nutrition Care: Continue to monitor while inpatient      Goals:  Previous Goal Met: Progressing toward Goal(s)  Goals: Tolerate nutrition support at goal rate      Nutrition Monitoring and Evaluation:     Food/Nutrient Intake Outcomes: Enteral Nutrition Intake/Tolerance  Physical Signs/Symptoms Outcomes: Biochemical Data, Nutrition Focused Physical Findings, Skin, Weight, Chewing or Swallowing, GI Status, Fluid Status or Edema, Hemodynamic Status    Discharge Planning:     Too soon to determine     Edgar Callahan RD, LD  Contact: Ext [phone]

## 2022-11-15 NOTE — ANESTHESIA PRE PROCEDURE
Department of Anesthesiology  Preprocedure Note       Name:  Xin Farris   Age:  57 y.o.  :  1960                                          MRN:  [id number]         Date:  11/15/2022      Surgeon: Latisha Rosas):  Jaclyn Lange MD    Procedure: Procedure(s):  EGD PEG TUBE PLACEMENT    Medications prior to admission:   Prior to Admission medications    Medication Sig Start Date End Date Taking? Authorizing Provider   albuterol sulfate  (90 Base) MCG/ACT inhaler inhale 2 puffs by mouth four times a day if needed for wheezing  Patient not taking: No sig reported 21   Angle Spencer MD   Diclofenac Sodium (VOLTAREN PO) Take by mouth  Patient not taking: No sig reported    Historical Provider, MD   gabapentin (NEURONTIN) 300 MG capsule Take 1 capsule by mouth 2 times daily for 180 days. Intended supply: 90 days  Patient not taking: Reported on 2022 10/25/21 6/15/22  Angle Spencer MD   fluticasone [facility]) 50 MCG/ACT nasal spray 1 spray by Each Nostril route daily  Patient not taking: No sig reported    Historical Provider, MD   Multiple Vitamins-Minerals (THERAPEUTIC MULTIVITAMIN-MINERALS) tablet Take 1 tablet by mouth daily    Historical Provider, MD   dextromethorphan-guaiFENesin (MUCINEX DM)  MG per extended release tablet Take 0.5 tablets by mouth every 12 hours as needed  Patient not taking: No sig reported    Historical Provider, MD   diphenhydrAMINE (BENADRYL) 25 MG tablet Take 50 mg by mouth nightly as needed for Sleep    Historical Provider, MD   buprenorphine-naloxone (SUBOXONE) 8-2 MG FILM SL film Place 1 Film under the tongue 2 times daily.   20   Historical Provider, MD       Current medications:    Current Facility-Administered Medications   Medication Dose Route Frequency Provider Last Rate Last Admin    folic acid (FOLVITE) tablet 1 mg  1 mg Oral Daily Jaclyn Lange MD        potassium & sodium phosphates (PHOS-NAK) 280-160-250 MG packet 250 mg  1 packet Oral 4x Daily Claire Fleischer, MD   250 mg at 11/15/22 1657    metoprolol tartrate (LOPRESSOR) tablet 25 mg  25 mg Oral BID Kevin Johnson MD   25 mg at 11/15/22 1114    amLODIPine (NORVASC) tablet 10 mg  10 mg Oral Daily Kevin Johnson MD   10 mg at 11/15/22 1013    sennosides-docusate sodium (SENOKOT-S) 8.6-50 MG tablet 2 tablet  2 tablet Oral Daily Claire Fleischer, MD   2 tablet at 11/15/22 0817    lactulose (CHRONULAC) 10 GM/15ML solution 20 g  20 g Oral TID Madhav Guerra DO   20 g at 11/15/22 1341    cloNIDine (CATAPRES) tablet 0.2 mg  0.2 mg Oral TID Claire Fleischer, MD   0.2 mg at 11/15/22 1341    divalproex (DEPAKOTE SPRINKLE) capsule 250 mg  250 mg Oral 3 times per day Liliana Workman MD   250 mg at 11/15/22 1341    heparin (porcine) injection 5,000 Units  5,000 Units SubCUTAneous Q8H Madhav Guerra, DO   5,000 Units at 11/15/22 1013    hydrALAZINE (APRESOLINE) injection 10 mg  10 mg IntraVENous Q10 Min PRN Junior La DO   10 mg at 11/15/22 1608    albuterol (PROVENTIL) nebulizer solution 2.5 mg  2.5 mg Nebulization Q4H PRN Madhav Guerra,         labetalol (NORMODYNE;TRANDATE) injection 10 mg  10 mg IntraVENous Q10 Min PRN Enedina Talamantes MD   10 mg at 11/15/22 1649    buprenorphine-naloxone (SUBOXONE) 8-2 MG SL tablet 1 tablet  1 tablet SubLINGual BID Enedina Talamantes MD   1 tablet at 11/15/22 0817    thiamine tablet 100 mg  100 mg Oral Daily Erum Tello MD   100 mg at 11/15/22 0819    sodium chloride flush 0.9 % injection 10 mL  10 mL IntraVENous 2 times per day [address] B Capal, DO   10 mL at 11/15/22 0818    sodium chloride flush 0.9 % injection 10 mL  10 mL IntraVENous PRN Elias SHAH Capal, DO        0.9 % sodium chloride infusion   IntraVENous PRN Renetta Dan DO   Stopped at 11/14/22 0813    ondansetron (ZOFRAN-ODT) disintegrating tablet 4 mg  4 mg Oral Q8H PRN Elias Queen DO        Or    ondansetron (ZOFRAN) injection 4 mg  4 mg IntraVENous Q6H PRN [employer], DO   4 mg at 11/05/22 2122    polyethylene glycol (GLYCOLAX) packet 17 g  17 g Oral Daily Elias B Capal, DO   17 g at 11/15/22 0817    acetaminophen (TYLENOL) tablet 650 mg  650 mg Oral Q4H PRN Elias B Capal, DO   650 mg at 11/11/22 0231       Allergies: Allergies   Allergen Reactions    Blue Dyes (Parenteral)        Problem List:    Patient Active Problem List   Diagnosis Code    Inguinal hernia, right K40.90    Cervical radiculopathy M54.12    Chronic hepatitis C without hepatic coma (HCC) B18.2    Right shoulder pain M25.511    Right subclavian artery occlusion I70.8    Essential hypertension I10    Sinus tachycardia R00.0    Benign paroxysmal positional vertigo due to bilateral vestibular disorder H81.13    Hypotension I95.9    Pneumonia due to organism J18.9    Bacterial pneumonia J15.9    Acidosis E87.20    JOSIE (acute kidney injury) (HCC) N17.9    Bacteremia R78.81    Alcohol withdrawal syndrome without complication (HCC) C78.855    Dysphagia R13.10    Alcohol abuse F10.10    Subarachnoid bleed (HCC) I60.9    Subdural hematoma S06. 5XAA    Acute alcoholic intoxication with complication ([facility] [address] S33.681    Closed fracture of occipital bone (HCC) S02.119A    COVID-19 U07.1    Opioid dependence on agonist therapy (HCC) F11.20    Delirium R41.0    Other constipation K59.09       Past Medical History:        Diagnosis Date    Hepatitis C     liver bx wnl. genotype is the best possible    Herniated disc, cervical        Past Surgical History:        Procedure Laterality Date    ANTERIOR CRUCIATE LIGAMENT REPAIR  1990s    left, [facility] ANTERIOR CRUCIATE LIGAMENT REPAIR  2009    right ACL repair, Dr. Ebenezer Jama COLONOSCOPY  2009    normal, Dr Salvatore Pena [facility]  5/20/2014    right indirect inguinal herniorrhaphy with mesh, Dr. Livia Dunbar Right 5/20/14    with mesh   [facility] neck fusion C6 and C7, [place]    PICC LINE INSERTION NURSE  3/28/2021         SHOULDER SURGERY  2007    left, removal spur, Dr. Carlos Kim  2007    melenoma, left clavical area, [place]    UPPER GASTROINTESTINAL ENDOSCOPY N/A 3/31/2021    EGD BIOPSY and AGUERO DILATATION performed by Francisco Javier Leonard DO at [facility] ENDOSCOPY       Social History:    Social History     Tobacco Use    Smoking status: Every Day     Packs/day: 0.50     Years: 50.00     Pack years: 25.00     Types: Cigarettes     Start date: 1970    Smokeless tobacco: Never   Substance Use Topics    Alcohol use: Yes     Alcohol/week: 38.0 standard drinks     Types: 18 Cans of beer, 20 Shots of liquor per week     Comment: daily                                Ready to quit: Not Answered  Counseling given: Not Answered      Vital Signs (Current):   Vitals:    11/15/22 1400 11/15/22 1421 11/15/22 1500 11/15/22 1608   BP: (!) 153/92 (!) 168/96 (!) 122/90 (!) 165/94   Pulse: 69 92 65    Resp: 21 14 20    Temp: 36.7 °C (98.1 °F)      TempSrc: Axillary      SpO2: 95% 98% 96%    Weight:       Height:                                                  BP Readings from Last 3 Encounters:   11/15/22 (!) 165/94   08/08/22 111/76   06/29/22 120/70       NPO Status:                                                                                 BMI:   Wt Readings from Last 3 Encounters:   11/12/22 165 lb 4.8 oz (75 kg)   08/08/22 189 lb 6.4 oz (85.9 kg)   06/29/22 186 lb (84.4 kg)     Body mass index is 23.72 kg/m².     CBC:   Lab Results   Component Value Date/Time    WBC 8.9 11/15/2022 05:45 AM    RBC 4.27 11/15/2022 05:45 AM    HGB 14.6 11/15/2022 05:45 AM    HCT 43.3 11/15/2022 05:45 AM    .4 11/15/2022 05:45 AM    RDW 12.4 11/15/2022 05:45 AM     11/15/2022 05:45 AM       CMP:   Lab Results   Component Value Date/Time     11/15/2022 05:45 AM    K 3.8 11/15/2022 05:45 AM     11/15/2022 05:45 AM    CO2 23 11/15/2022 05:45 AM    BUN 15 11/15/2022 05:45 AM    CREATININE 0.4 11/15/2022 05:45 AM    GFRAA >60 11/07/2021 05:45 PM    LABGLOM >60 11/15/2022 05:45 AM    GLUCOSE 154 11/15/2022 05:45 AM    PROT 8.6 11/05/2022 04:10 PM    CALCIUM 8.9 11/15/2022 05:45 AM    BILITOT 0.4 11/05/2022 04:10 PM    ALKPHOS 130 11/05/2022 04:10 PM     11/05/2022 04:10 PM    ALT 72 11/05/2022 04:10 PM       POC Tests: No results for input(s): POCGLU, POCNA, POCK, POCCL, POCBUN, POCHEMO, POCHCT in the last 72 hours.     Coags:   Lab Results   Component Value Date/Time    PROTIME 11.8 11/06/2022 02:01 AM    INR 1.1 11/06/2022 02:01 AM    APTT 33.3 06/12/2020 06:02 PM       HCG (If Applicable): No results found for: PREGTESTUR, PREGSERUM, HCG, HCGQUANT     ABGs: No results found for: PHART, PO2ART, GYK3OIU, DGX1VTY, BEART, Z8CAYSXB     Type & Screen (If Applicable):  No results found for: LABABO, LABRH    Drug/Infectious Status (If Applicable):  No results found for: HIV, HEPCAB    COVID-19 Screening (If Applicable):   Lab Results   Component Value Date/Time    COVID19 DETECTED 11/05/2022 07:16 PM    COVID19 Not Detected 11/23/2021 02:35 PM    COVID19 Not Detected 03/24/2021 04:02 PM       CT Head wo contrast 11/14/22     FINDINGS:   BRAIN/VENTRICLES:       Parenchymal hematomas in the inferior frontal lobes bilaterally with   extensive overlying vasogenic edema is again visualized.  Parenchymal   hematomas in the right parietal lobe.  Parenchymal hematoma in the temporal   lobes bilaterally more prominent on the left are visualized demonstrate no   significant increase in comparison to the prior study.       Left parieto-occipital subdural hematoma is visualized demonstrating no   significant increase in comparison to the prior study.       Right frontoparietal prominence of the extra-axial CSF spaces visualized   demonstrating no significant increase in comparison to the prior study,   subdural hygroma.       Subarachnoid hemorrhage in the right posterior parietal lobe demonstrates no   significant change in comparison to the prior study.       Blood in the interhemispheric fissures and along the tentorium is visualized   demonstrates no significant change in comparison to the prior study.       Left-to-right subfalcine midline shift measuring 6.5 mm demonstrates no   significant change in comparison to the prior study.       The ventricles demonstrate no significant change in size in comparison to the   prior study, no evidence of clot within the 3rd ventricle or within the   aqueduct of Sylvius is visualized.  No evidence of blood within the 4th   ventricle.       Layering blood within the posterior horn of the right lateral ventricle is   visualized demonstrates no change in comparison to the prior study.       No evidence of intracranial mass.  No evidence of sellar or parasellar mass   is visualized. US Left Upper Extremity 11/9/22  Impression   No evidence of DVT.       Suspect superficial thrombus in the cephalic vein. Arterial evaluation of Upper Extremities 11/13/15  Narrative   Limited study. The baseline arterial Doppler study was not done,   patient will be brought back for the same.       Patient has good flow to both hands based upon the pulse volume   recording of the  index finger bilaterally.  The pulse volume   recording become flat arm on hyperabduction of the right arm but   remained normal on hyperabduction of the left arm, consistent with   right subclavian artery axillary artery junction occlusion on   hyperabduction of the right arm         Anesthesia Evaluation  Patient summary reviewed and Nursing notes reviewed no history of anesthetic complications:   Airway: Mallampati: Unable to assess / NA          Dental:          Pulmonary:   (+) pneumonia (COVID-19): unresolved,  COPD:  current smoker                           Cardiovascular:    (+) hypertension: severe,          Beta Blocker:  Dose within 24 Hrs      ROS comment: EKG 11/5/22  Normal sinus rhythm  Incomplete right bundle branch block  Borderline ECG    Cardiac ECHO 7/2/20  Summary  Normal left ventricular systolic function. Ejection fraction is visually estimated at 60%. Normal right ventricular size and function (TAPSE 2.1 cm). There is doppler evidence of stage I diastolic dysfunction. Mild tricuspid regurgitation. PASP is estimated at 22 mmHg      Neuro/Psych:   (+) psychiatric history (ETOH with withdrawl, opioid dependence):             ROS comment: Acute SDH, SAH  Cervical radiculopathy  Benign paroxysmal positional vertigo 2 to vestibular disorder  H/o delerium GI/Hepatic/Renal:   (+) hepatitis: C, liver disease:, renal disease: ARF,          ROS comment: H/o Right inguinal hernia  dysphagia. Endo/Other:                      ROS comment: Closed fracture of occipital bone  H/o shoulder surgery  H/o skin cancer excision  H/o anterior cruciate ligament repair  H/o neck surgery Abdominal:             Vascular:   + PVD, aortic or cerebral, . ROS comment: Right SCV artery occlusion  Subdural hematoma  Subarachnoid bleed. Other Findings:           Anesthesia Plan      MAC     ASA 4     (PICC line)  Induction: intravenous. Anesthetic plan and risks discussed with patient and child/children. Plan discussed with CRNA and attending. Note completed via chart review. Anesthesia provider to complete in person assessment. Pt is restrained on the floor. He is covid +. Per charting he is alert to self only and does not follow commands.        Margarita Kim RN   11/15/2022

## 2022-11-16 ENCOUNTER — APPOINTMENT (OUTPATIENT)
Dept: GENERAL RADIOLOGY | Age: 62
DRG: 082 | End: 2022-11-16
Payer: COMMERCIAL

## 2022-11-16 ENCOUNTER — ANESTHESIA (OUTPATIENT)
Dept: ENDOSCOPY | Age: 62
DRG: 082 | End: 2022-11-16
Payer: COMMERCIAL

## 2022-11-16 LAB
ANION GAP SERPL CALCULATED.3IONS-SCNC: 11 MMOL/L (ref 7–16)
BUN BLDV-MCNC: 14 MG/DL (ref 6–23)
CALCIUM IONIZED: 1.24 MMOL/L (ref 1.15–1.33)
CALCIUM SERPL-MCNC: 9.1 MG/DL (ref 8.6–10.2)
CHLORIDE BLD-SCNC: 107 MMOL/L (ref 98–107)
CO2: 24 MMOL/L (ref 22–29)
CREAT SERPL-MCNC: 0.4 MG/DL (ref 0.7–1.2)
GFR SERPL CREATININE-BSD FRML MDRD: >60 ML/MIN/1.73
GLUCOSE BLD-MCNC: 119 MG/DL (ref 74–99)
HCT VFR BLD CALC: 41.7 % (ref 37–54)
HEMOGLOBIN: 14.3 G/DL (ref 12.5–16.5)
MAGNESIUM: 2 MG/DL (ref 1.6–2.6)
MCH RBC QN AUTO: 34.1 PG (ref 26–35)
MCHC RBC AUTO-ENTMCNC: 34.3 % (ref 32–34.5)
MCV RBC AUTO: 99.5 FL (ref 80–99.9)
PDW BLD-RTO: 12.1 FL (ref 11.5–15)
PHOSPHORUS: 2.8 MG/DL (ref 2.5–4.5)
PLATELET # BLD: 220 E9/L (ref 130–450)
PMV BLD AUTO: 11.2 FL (ref 7–12)
POTASSIUM REFLEX MAGNESIUM: 3.8 MMOL/L (ref 3.5–5)
RBC # BLD: 4.19 E12/L (ref 3.8–5.8)
SODIUM BLD-SCNC: 142 MMOL/L (ref 132–146)
WBC # BLD: 8.4 E9/L (ref 4.5–11.5)

## 2022-11-16 PROCEDURE — 2580000003 HC RX 258: Performed by: NURSE ANESTHETIST, CERTIFIED REGISTERED

## 2022-11-16 PROCEDURE — 71045 X-RAY EXAM CHEST 1 VIEW: CPT

## 2022-11-16 PROCEDURE — 6370000000 HC RX 637 (ALT 250 FOR IP): Performed by: SURGERY

## 2022-11-16 PROCEDURE — 0DH63UZ INSERTION OF FEEDING DEVICE INTO STOMACH, PERCUTANEOUS APPROACH: ICD-10-PCS | Performed by: SURGERY

## 2022-11-16 PROCEDURE — 3700000001 HC ADD 15 MINUTES (ANESTHESIA): Performed by: SURGERY

## 2022-11-16 PROCEDURE — 3609013300 HC EGD TUBE PLACEMENT: Performed by: SURGERY

## 2022-11-16 PROCEDURE — 85027 COMPLETE CBC AUTOMATED: CPT

## 2022-11-16 PROCEDURE — 6360000002 HC RX W HCPCS: Performed by: STUDENT IN AN ORGANIZED HEALTH CARE EDUCATION/TRAINING PROGRAM

## 2022-11-16 PROCEDURE — 6360000002 HC RX W HCPCS: Performed by: SURGERY

## 2022-11-16 PROCEDURE — 6360000002 HC RX W HCPCS

## 2022-11-16 PROCEDURE — 80048 BASIC METABOLIC PNL TOTAL CA: CPT

## 2022-11-16 PROCEDURE — 6370000000 HC RX 637 (ALT 250 FOR IP): Performed by: STUDENT IN AN ORGANIZED HEALTH CARE EDUCATION/TRAINING PROGRAM

## 2022-11-16 PROCEDURE — 99291 CRITICAL CARE FIRST HOUR: CPT | Performed by: SURGERY

## 2022-11-16 PROCEDURE — 2500000003 HC RX 250 WO HCPCS: Performed by: STUDENT IN AN ORGANIZED HEALTH CARE EDUCATION/TRAINING PROGRAM

## 2022-11-16 PROCEDURE — 83735 ASSAY OF MAGNESIUM: CPT

## 2022-11-16 PROCEDURE — 2580000003 HC RX 258: Performed by: STUDENT IN AN ORGANIZED HEALTH CARE EDUCATION/TRAINING PROGRAM

## 2022-11-16 PROCEDURE — 2580000003 HC RX 258: Performed by: SURGERY

## 2022-11-16 PROCEDURE — 36415 COLL VENOUS BLD VENIPUNCTURE: CPT

## 2022-11-16 PROCEDURE — 43246 EGD PLACE GASTROSTOMY TUBE: CPT | Performed by: SURGERY

## 2022-11-16 PROCEDURE — 3700000000 HC ANESTHESIA ATTENDED CARE: Performed by: SURGERY

## 2022-11-16 PROCEDURE — 2700000000 HC OXYGEN THERAPY PER DAY

## 2022-11-16 PROCEDURE — 82330 ASSAY OF CALCIUM: CPT

## 2022-11-16 PROCEDURE — 6370000000 HC RX 637 (ALT 250 FOR IP)

## 2022-11-16 PROCEDURE — 6360000002 HC RX W HCPCS: Performed by: NURSE ANESTHETIST, CERTIFIED REGISTERED

## 2022-11-16 PROCEDURE — 2000000000 HC ICU R&B

## 2022-11-16 PROCEDURE — 2709999900 HC NON-CHARGEABLE SUPPLY: Performed by: SURGERY

## 2022-11-16 PROCEDURE — 2500000003 HC RX 250 WO HCPCS: Performed by: SURGERY

## 2022-11-16 PROCEDURE — 2500000003 HC RX 250 WO HCPCS

## 2022-11-16 PROCEDURE — 84100 ASSAY OF PHOSPHORUS: CPT

## 2022-11-16 RX ORDER — PROPOFOL 10 MG/ML
INJECTION, EMULSION INTRAVENOUS PRN
Status: DISCONTINUED | OUTPATIENT
Start: 2022-11-16 | End: 2022-11-16 | Stop reason: SDUPTHER

## 2022-11-16 RX ORDER — SUCRALFATE 1 G/1
1 TABLET ORAL EVERY 6 HOURS SCHEDULED
Status: DISCONTINUED | OUTPATIENT
Start: 2022-11-16 | End: 2022-11-30 | Stop reason: HOSPADM

## 2022-11-16 RX ORDER — METOPROLOL TARTRATE 50 MG/1
50 TABLET, FILM COATED ORAL 2 TIMES DAILY
Status: DISCONTINUED | OUTPATIENT
Start: 2022-11-16 | End: 2022-11-16

## 2022-11-16 RX ORDER — CARVEDILOL 6.25 MG/1
12.5 TABLET ORAL 2 TIMES DAILY WITH MEALS
Status: DISCONTINUED | OUTPATIENT
Start: 2022-11-16 | End: 2022-11-18

## 2022-11-16 RX ORDER — LANSOPRAZOLE
30 KIT
Status: DISCONTINUED | OUTPATIENT
Start: 2022-11-16 | End: 2022-11-30 | Stop reason: HOSPADM

## 2022-11-16 RX ORDER — SODIUM CHLORIDE 9 MG/ML
INJECTION, SOLUTION INTRAVENOUS CONTINUOUS PRN
Status: DISCONTINUED | OUTPATIENT
Start: 2022-11-16 | End: 2022-11-16 | Stop reason: SDUPTHER

## 2022-11-16 RX ORDER — LIDOCAINE HYDROCHLORIDE 10 MG/ML
INJECTION, SOLUTION INFILTRATION; PERINEURAL PRN
Status: DISCONTINUED | OUTPATIENT
Start: 2022-11-16 | End: 2022-11-16 | Stop reason: ALTCHOICE

## 2022-11-16 RX ADMIN — POTASSIUM PHOSPHATE, MONOBASIC AND POTASSIUM PHOSPHATE, DIBASIC 20 MMOL: 224; 236 INJECTION, SOLUTION, CONCENTRATE INTRAVENOUS at 08:09

## 2022-11-16 RX ADMIN — LABETALOL HYDROCHLORIDE 10 MG: 5 INJECTION INTRAVENOUS at 03:52

## 2022-11-16 RX ADMIN — CLONIDINE HYDROCHLORIDE 0.2 MG: 0.2 TABLET ORAL at 15:30

## 2022-11-16 RX ADMIN — LABETALOL HYDROCHLORIDE 10 MG: 5 INJECTION INTRAVENOUS at 14:42

## 2022-11-16 RX ADMIN — Medication 250 MG: at 12:24

## 2022-11-16 RX ADMIN — Medication 250 MG: at 08:31

## 2022-11-16 RX ADMIN — Medication 250 MG: at 16:35

## 2022-11-16 RX ADMIN — CLONIDINE HYDROCHLORIDE 0.2 MG: 0.2 TABLET ORAL at 08:30

## 2022-11-16 RX ADMIN — LANSOPRAZOLE 30 MG: KIT at 18:53

## 2022-11-16 RX ADMIN — CLONIDINE HYDROCHLORIDE 0.2 MG: 0.2 TABLET ORAL at 20:19

## 2022-11-16 RX ADMIN — CARVEDILOL 12.5 MG: 6.25 TABLET, FILM COATED ORAL at 16:34

## 2022-11-16 RX ADMIN — DIVALPROEX SODIUM 250 MG: 125 CAPSULE, COATED PELLETS ORAL at 05:49

## 2022-11-16 RX ADMIN — SUCRALFATE 1 G: 1 TABLET ORAL at 18:53

## 2022-11-16 RX ADMIN — FOLIC ACID 1 MG: 1 TABLET ORAL at 08:31

## 2022-11-16 RX ADMIN — HYDRALAZINE HYDROCHLORIDE 10 MG: 20 INJECTION INTRAMUSCULAR; INTRAVENOUS at 06:55

## 2022-11-16 RX ADMIN — AMLODIPINE BESYLATE 10 MG: 10 TABLET ORAL at 08:32

## 2022-11-16 RX ADMIN — PROPOFOL 200 MG: 10 INJECTION, EMULSION INTRAVENOUS at 15:00

## 2022-11-16 RX ADMIN — LACTULOSE 20 G: 20 SOLUTION ORAL at 08:30

## 2022-11-16 RX ADMIN — SENNOSIDES AND DOCUSATE SODIUM 2 TABLET: 50; 8.6 TABLET ORAL at 08:30

## 2022-11-16 RX ADMIN — SUCRALFATE 1 G: 1 TABLET ORAL at 22:24

## 2022-11-16 RX ADMIN — LACTULOSE 20 G: 20 SOLUTION ORAL at 20:19

## 2022-11-16 RX ADMIN — POLYETHYLENE GLYCOL 3350 17 G: 17 POWDER, FOR SOLUTION ORAL at 08:30

## 2022-11-16 RX ADMIN — METOPROLOL TARTRATE 50 MG: 50 TABLET, FILM COATED ORAL at 08:42

## 2022-11-16 RX ADMIN — LABETALOL HYDROCHLORIDE 10 MG: 5 INJECTION INTRAVENOUS at 05:58

## 2022-11-16 RX ADMIN — LABETALOL HYDROCHLORIDE 10 MG: 5 INJECTION INTRAVENOUS at 08:49

## 2022-11-16 RX ADMIN — HEPARIN SODIUM 5000 UNITS: 10000 INJECTION INTRAVENOUS; SUBCUTANEOUS at 20:18

## 2022-11-16 RX ADMIN — BUPRENORPHINE AND NALOXONE 1 TABLET: 8; 2 TABLET SUBLINGUAL at 08:31

## 2022-11-16 RX ADMIN — Medication 250 MG: at 20:19

## 2022-11-16 RX ADMIN — HEPARIN SODIUM 5000 UNITS: 10000 INJECTION INTRAVENOUS; SUBCUTANEOUS at 05:47

## 2022-11-16 RX ADMIN — Medication 10 ML: at 20:19

## 2022-11-16 RX ADMIN — SODIUM CHLORIDE: 9 INJECTION, SOLUTION INTRAVENOUS at 14:55

## 2022-11-16 RX ADMIN — DIVALPROEX SODIUM 250 MG: 125 CAPSULE, COATED PELLETS ORAL at 15:30

## 2022-11-16 RX ADMIN — LACTULOSE 20 G: 20 SOLUTION ORAL at 15:32

## 2022-11-16 RX ADMIN — DIVALPROEX SODIUM 250 MG: 125 CAPSULE, COATED PELLETS ORAL at 20:19

## 2022-11-16 RX ADMIN — HEPARIN SODIUM 5000 UNITS: 10000 INJECTION INTRAVENOUS; SUBCUTANEOUS at 12:25

## 2022-11-16 RX ADMIN — CEFAZOLIN 2000 MG: 2 INJECTION, POWDER, FOR SOLUTION INTRAMUSCULAR; INTRAVENOUS at 15:48

## 2022-11-16 RX ADMIN — Medication 100 MG: at 08:31

## 2022-11-16 RX ADMIN — BUPRENORPHINE AND NALOXONE 1 TABLET: 8; 2 TABLET SUBLINGUAL at 20:19

## 2022-11-16 ASSESSMENT — LIFESTYLE VARIABLES: SMOKING_STATUS: 1

## 2022-11-16 ASSESSMENT — PAIN SCALES - GENERAL: PAINLEVEL_OUTOF10: 0

## 2022-11-16 NOTE — PROGRESS NOTES
Physical Therapy    PT consult to evaluate/treat received and appreciated. Discussed with RN. Pt for PEG this AM, will attempt back later as able. Thank you.         Damion Sosa, PT, DPT   [id number]

## 2022-11-16 NOTE — CARE COORDINATION
Plan for peg insertion today. Still requiring wrist restraints. Covid precautions cont. Informed by [facility] that they cannot accept pt since he is on suboxone. Checking with other facilities that accept covid positive pts to see if they can take on suboxone. 2 pm  [facility] is accepting covid pts and can accept pts on suboxone. Referral made to [facility]. She will follow pt.

## 2022-11-16 NOTE — PROGRESS NOTES
[facility]  PROGRESS NOTE  ATTENDING NOTE    TRAUMA/CRITICAL CARE    MECHANISM OF INJURY:  unwitnessed fall    Chief Complaint   Patient presents with    Fall     +LOC fell backwards, +head, hematoma posterior head, unwitnessed, now dizzy, confused, A&Ox2 wrong month,president, doesn't remember falling R shoulder pain. Takes BP meds and suboxone, C-collar. HPI  Trauma consult. Injury occurred just prior to arrival to OSH. Unknown cause of fall. Per report, patient was unwitnessed fall at work. Positive loss of consciousness with hematoma to posterior head. Elevated EtOH. Patient Active Problem List   Diagnosis    Inguinal hernia, right    Cervical radiculopathy    Chronic hepatitis C without hepatic coma (HCC)    Right shoulder pain    Right subclavian artery occlusion    Essential hypertension    Sinus tachycardia    Benign paroxysmal positional vertigo due to bilateral vestibular disorder    Hypotension    Pneumonia due to organism    Bacterial pneumonia    Acidosis    JOSIE (acute kidney injury) ([facility] [address]    Bacteremia    Alcohol withdrawal syndrome without complication (HCC)    Dysphagia    Alcohol abuse    Subarachnoid bleed (HCC)    Subdural hematoma    Acute alcoholic intoxication with complication (HCC)    Closed fracture of occipital bone (HCC)    COVID-19    Opioid dependence on agonist therapy ([facility] [address]    Delirium    Other constipation       OVERNIGHT EVENTS:  Multiple doses of PRN BP meds overnight    HOSPITAL COURSE:  11/5--presented after unwitnessed fall; found to have SDH/SAH/IPH--worse on repeat head CT  11/6--no issues overnight; admitted to SICU. 11/7--Pt confused this morning. 11/8--Pt with increasing confusion and agitation overnight. Pulled out two IV's, in 4-point restraints. Started on Norvasc, Depakote sprinkles and precedex. 11/9--Precedex switched to clonidine due to bradycardia. Remains A&Ox1 not following commands.  Again pulling out IV's, in 4-point restraints. 11/10--Pt remains in 4-point restraints. A&Ox1. Follows commands intermittently today. Bradycardia resolved, continued hypertension with agitation. 11/11--Pt requiring Hydralazine PRN frequently overnight. Clonidine increased. Continued agitation. 11/12-No acute events. No new complaints. 11/13--remains agitated and restless; pulled out corpak again. Taking some meds with pudding per nursing  11/14--repeat CT head with slight increase in subarachnoid hemorrhage, phenobarb stopped, IV team for smallbore feeding tube placement, ammonia TSH checked, fluid started as patient not been on IV fluids all weekend  11/15--placed back on oral anti-HTN, d/c IVF  11/16--change metoprolol to coreg    BP (!) 158/104   Pulse 61   Temp 97.9 °F (36.6 °C) (Axillary)   Resp 16   Ht 5' 10\" (1.778 m)   Wt 165 lb 4.8 oz (75 kg)   SpO2 100%   BMI 23.72 kg/m²   Physical Exam  Constitutional:       Appearance: He is ill-appearing. HENT:      Head: Normocephalic and atraumatic. Nose: Nose normal.      Mouth/Throat:      Mouth: Mucous membranes are moist.      Pharynx: Oropharynx is clear. Eyes:      Extraocular Movements: Extraocular movements intact. Pupils: Pupils are equal, round, and reactive to light. Cardiovascular:      Rate and Rhythm: Normal rate and regular rhythm. Pulses: Normal pulses. Heart sounds: Normal heart sounds. Pulmonary:      Effort: Pulmonary effort is normal.      Breath sounds: Normal breath sounds. Abdominal:      General: There is distension. Palpations: Abdomen is soft. Tenderness: There is no abdominal tenderness. Musculoskeletal:         General: No tenderness or signs of injury. Cervical back: Normal range of motion and neck supple. Skin:     General: Skin is warm and dry. Neurological:      Mental Status: He is alert. Comments:  Will not speak to me  Says yes inappropriately at times  Purposeful  Does not follow commands Lines:    Montoya:  no external  Central line:  no  PICC:  yes - right basilic PICC    I have reviewed the laboratory studies    CXR:  none    ASSESSMENT/PLAN:   Neuro: TBI--completed course of Keppra, neurosurgery following  Occipital skull fracture--analgesia, neurosurgery following  History of drug abuse--continue Suboxone, addiction medicine following  Delirium/agitation--continue clonidine, Depakote, check Depakote level--normal, check ammonia--normal, check TSH--elevated but FT4 and FT3 ok,   Alcohol abuse--thiamine, folate, social work for SBI, completed course of phenobarbital  Cardio:  HTN--change metoprolol to coreg, c/w clonidine, c/w norvasc  Respiratory: COVID +--droplet precautions, no tx needed  GI:  dysphagia--IV team for small bore feeding tube; PEG today  FEN: No active issues   Renal:  No active issues   Heme:  No active issues   Endocrine:  No active issues, keep glucose <180   ID: No active issues       DVT/GI ppx:  heparin sub-q/Tube feeds    CC TIME:  I spent 36 min managing this patients critical issues which are a constant threat to life excluding time teaching and performing procedures.       Sandra Solo MD, MSc, FACS  11/16/2022  1:53 PM

## 2022-11-16 NOTE — FLOWSHEET NOTE
Attempts to grasp and pull lines and tubings when released. Unable to verbally redirect. Soft restraints continued to right and left wrists.

## 2022-11-16 NOTE — OP NOTE
[address] [place] [address]  UPPER ENDOSCOPY REPORT      DATE OF PROCEDURE: 11/16/2022     PREOPERATIVE DIAGNOSIS: dysphagia    POSTOPERATIVE DIAGNOSIS/FINDINGS: same and gastric and duodenal ulcers without evidence of bleeding    SURGEON: BELL Benjamin: None    OPERATION: 1. Esophagogastroduodenoscopy 2. Percutaneous Endoscopic Gastrostomy (PEG)  placement    ANESTHESIA: Local monitored anesthesia. COMPLICATIONS: None. ESTIMATED BLOOD LOSS: 5 ml     SPECIMENS: none    PRIOR TO EXAM: Gen: comfortable, no distress, awake and alert; Lungs: Clear;  Heart:regular rate and rhythm, normal S1S2     BRIEF HISTORY:  This is a 57 y.o. male who presents with the complaint of dysphagia, TBI. My recommendation is to proceed with esophagogastroduodenoscopy/ PEG tube placement. The patient was advised of the risks, benefits, complications and options including the risk of bleeding and perforation. The patient understood and agreed to proceed. Under [facility] anesthesia, the patient was positioned in the left side down decubitus position. A bite block was inserted. Under direct visualization the scope was passed through the oral cavity, into the esophagus and then into the stomach. The scope was then passed through a normal appearing pylorus into the duodenum. The proximal duodenum and bulb were unremarkable. The scope was pulled back into the stomach and retroflexed. Visualization on the gastroesophageal junction and fundus was unremarkable. The scope was then straightened and the distal stomach was inspected. This showed no evidence of gastritis or ulcer. No biopsies were performed. A good one to one was obtained with the scope in the distal stomach    The area of the abdomen was prepped with iodine. 10 cc1% lidocaine was used for local anesthetic for the skin and subcutaneous tissue. The 11 blade was used to make a skin incision.  The 18 gauge angio cath was used to stick the skin into the stomach. This was performed under direct visualization. Next the wire was placed through the needle using the seldinger technique. The snare was used to grasp the wire. The  EGDscope with wire was pulled out through the patient's mouth. The peg tube was then attached to the wire and pulled back into the stomach under direct visualization. The endocscope confirmed good placement of the peg tube. The peg tube was placed at 2cm cm at the skin and sutured in place with 2-0 prolene. The air was sucked out of the stomach. An abdominal binder was ordered and the peg tube was placed to gravity drainage. THE PATIENT TOLERATED THE PROCEDURE.     Findings:  Gastric and duodenal ulcers  PEG tube at 2cm at the skin  PPI/Carafate  Stool for H pylori    Plan:  Ok to use PEG tube for meds  Start Tube feeds today, 11/16/2022      Damaris Mohamud MD, MSc, FACS  11/16/2022  3:19 PM

## 2022-11-16 NOTE — PROGRESS NOTES
SURGICAL INTENSIVE CARE  PROGRESS NOTE    Date of admission:  11/5/2022  Reason for ICU transfer:  worsening head CT    SUBJECTIVE:  No acute overnight events. Patient is more alert today. Says his full name and follows some commands. Plan for PEG today. HOSPITAL COURSE:    11/5--presented after unwitnessed fall; found to have SDH/SAH/IPH--worse on repeat head CT  11/6--no issues overnight; admitted to SICU. 11/7--Pt confused this morning. 11/8--Pt with increasing confusion and agitation overnight. Pulled out two IV's, in 4-point restraints. Started on Norvasc, Depakote sprinkles and precedex. 11/9--Precedex switched to clonidine due to bradycardia. Remains A&Ox1 not following commands. Again pulling out IV's, in 4-point restraints. 11/10--Pt remains in 4-point restraints. A&Ox1. Follows commands intermittently today. Bradycardia resolved, continued hypertension with agitation. 11/11--Pt requiring Hydralazine PRN frequently overnight. Clonidine increased. Continued agitation. 11/12-No acute events. No new complaints. 11/13--remains agitated and restless; pulled out corpak again. Taking some meds with pudding per nursing  11/14--Increased somnolence and confusion overnight. STAT head CT showed slight increase in right parietal SAH. Stable on repeat head CT. Ammonia and TSH ordered. 11/15--GCS 12. Ammonia increased from 24 to 41. Started on Lactulose. TSH 5.51 with normal T3, T4. Had 1 BM. PHYSICAL EXAM:  BP (!) 154/102   Pulse 92   Temp 99 °F (37.2 °C) (Bladder)   Resp 20   Ht 5' 10\" (1.778 m)   Wt 165 lb 4.8 oz (75 kg)   SpO2 95%   BMI 23.72 kg/m²     GENERAL:  NAD. A&O x 1  HEAD:  Normocephalic, atraumatic. Corpack in place. EYES:   No scleral icterus. PERRLA. LUNGS:  No increased work of breathing. Room air. CARDIOVASCULAR: Warm throughout. RRR. ABDOMEN:  Soft, non distended, non tender. No guarding, rigidity, rebound. EXTREMITIES:   MAEx4. Atraumatic. No LE edema.   SKIN:  Warm and dry  NEUROLOGIC:  GCS 14    ASSESSMENT / PLAN:  Neuro:  shows bifrontal contusions greater than right and bitemporal contusions with tentorial subdural hematoma and occipital fx stable on scan #3. Slight interval increase on repeat scan 11/14. GCS 12. NSG following. Keppra completed. ETOH on PNB (completed). Suboxone film per Addiction medicine. Depakote sprinkles and Clonidine for agitation. Depakote level normal  CV: Hypertensive. Maintain BP <140. Scheduled Norvasc, metoprolol, and Clonidine. Hydralazine and labetalol PRNs. Monitor hemodynamics. Pulm: COVID-19 infection. Supportive care. Encourage IS/SMI. GI:  Ammonia elevated from prior. Started on Lactulose. Bowel regimen. Zofran PRN. HO HCV. SLP to re-evaluate when mentation improved. Corpack. PEG today. Renal: No acute issues. Monitor UOP & Electrolytes. Endocrine: TSH elevated with normal T3/T4. No acute issues. Monitor glucose. MSK: No acute issues. PT/OT when able. Heme: monitor CBC. ID: [id number]. Supportive care. Hep C. Pain/Analgesia: Tylenol, Suboxone,   Total fluid rate: Stopped  Bowel regimen: Dulcolax, glycolax  DVT proph:  SQ heparin  Glucose protocol: Not indicated  Mouth/eye care: As needed per patient  Urine:   Condom catheter. Keep in place for fluid monitoring.   CVC sites:  None  Ancillary consults: Torrie Gregory, addiction medicine  Family Update: As available    Disposition: [facility]    Electronically signed by Adrian Murdock DO on 11/16/2022 at 5:41 AM

## 2022-11-16 NOTE — PROGRESS NOTES
OCCUPATIONAL THERAPY    Date:2022  Patient Name: Mare Bernstein  MRN: [id number]  : 1960  Room: [facility]              Chart reviewed. Pt scheduled for PEG this am.  Will re-attempt at later time. Thank you for consult.     Jimmy Coronado, OTR/L [id number]

## 2022-11-16 NOTE — FLOWSHEET NOTE
Pt reaching for tubes and lines and has pulled out multiple NG. Many attempts have been made to educate pt but have been unsuccessful. Bilateral soft wrist restraints placed. will continue to assess

## 2022-11-16 NOTE — PLAN OF CARE
Problem: Skin/Tissue Integrity  Goal: Absence of new skin breakdown  Description: 1. Monitor for areas of redness and/or skin breakdown  2. Assess vascular access sites hourly  3. Every 4-6 hours minimum:  Change oxygen saturation probe site  4. Every 4-6 hours:  If on nasal continuous positive airway pressure, respiratory therapy assess nares and determine need for appliance change or resting period. Outcome: Progressing     Problem: Safety - Adult  Goal: Free from fall injury  Outcome: Progressing     Problem: ABCDS Injury Assessment  Goal: Absence of physical injury  Outcome: Progressing     Problem: Skin/Tissue Integrity - Adult  Goal: Skin integrity remains intact  Outcome: Progressing     Problem: Safety - Medical Restraint  Goal: Remains free of injury from restraints (Restraint for Interference with Medical Device)  Description: INTERVENTIONS:  1. Determine that other, less restrictive measures have been tried or would not be effective before applying the restraint  2. Evaluate the patient's condition at the time of restraint application  3. Inform patient/family regarding the reason for restraint  4.  Q2H: Monitor safety, psychosocial status, comfort, nutrition and hydration  Outcome: Progressing     Problem: Gastrointestinal - Adult  Goal: Minimal or absence of nausea and vomiting  Outcome: Progressing     Problem: Genitourinary - Adult  Goal: Absence of urinary retention  Outcome: Progressing

## 2022-11-16 NOTE — ANESTHESIA PRE PROCEDURE
Department of Anesthesiology  Preprocedure Note       Name:  Evan Retana   Age:  57 y.o.  :  1960                                          MRN:  [id number]         Date:  2022      Surgeon: Jena Rodriguez):  Wilmer Quinonez MD    Procedure: Procedure(s):  EGD PEG TUBE PLACEMENT    Medications prior to admission:   Prior to Admission medications    Medication Sig Start Date End Date Taking? Authorizing Provider   albuterol sulfate  (90 Base) MCG/ACT inhaler inhale 2 puffs by mouth four times a day if needed for wheezing  Patient not taking: No sig reported 21   Brian Travis MD   Diclofenac Sodium (VOLTAREN PO) Take by mouth  Patient not taking: No sig reported    Historical Provider, MD   gabapentin (NEURONTIN) 300 MG capsule Take 1 capsule by mouth 2 times daily for 180 days. Intended supply: 90 days  Patient not taking: Reported on 2022 10/25/21 6/15/22  Brian Travis MD   fluticasone [facility]) 50 MCG/ACT nasal spray 1 spray by Each Nostril route daily  Patient not taking: No sig reported    Historical Provider, MD   Multiple Vitamins-Minerals (THERAPEUTIC MULTIVITAMIN-MINERALS) tablet Take 1 tablet by mouth daily    Historical Provider, MD   dextromethorphan-guaiFENesin (MUCINEX DM)  MG per extended release tablet Take 0.5 tablets by mouth every 12 hours as needed  Patient not taking: No sig reported    Historical Provider, MD   diphenhydrAMINE (BENADRYL) 25 MG tablet Take 50 mg by mouth nightly as needed for Sleep    Historical Provider, MD   buprenorphine-naloxone (SUBOXONE) 8-2 MG FILM SL film Place 1 Film under the tongue 2 times daily. 20   Historical Provider, MD       Current medications:    No current facility-administered medications for this visit. No current outpatient medications on file.      Facility-Administered Medications Ordered in Other Visits   Medication Dose Route Frequency Provider Last Rate Last Admin    folic acid (Karlie Fidel) tablet 1 mg  1 mg Oral Daily Chava Tobias MD        potassium & sodium phosphates (PHOS-NAK) 280-160-250 MG packet 250 mg  1 packet Oral 4x Daily Krystle Dexter MD   1 packet at 11/15/22 2141    metoprolol tartrate (LOPRESSOR) tablet 25 mg  25 mg Oral BID Chava Tobias MD   25 mg at 11/15/22 2141    amLODIPine (NORVASC) tablet 10 mg  10 mg Oral Daily Chava Tobias MD   10 mg at 11/15/22 1013    sennosides-docusate sodium (SENOKOT-S) 8.6-50 MG tablet 2 tablet  2 tablet Oral Daily Krystle Dexter MD   2 tablet at 11/15/22 0817    lactulose (CHRONULAC) 10 GM/15ML solution 20 g  20 g Oral TID Wing Lucks, DO   20 g at 11/15/22 2141    cloNIDine (CATAPRES) tablet 0.2 mg  0.2 mg Oral TID Krystle Dexter MD   0.2 mg at 11/15/22 2141    divalproex (DEPAKOTE SPRINKLE) capsule 250 mg  250 mg Oral 3 times per day Popeye Resnediz MD   250 mg at 11/16/22 0549    heparin (porcine) injection 5,000 Units  5,000 Units SubCUTAneous Q8H Wing Lucks, DO   5,000 Units at 11/16/22 0547    hydrALAZINE (APRESOLINE) injection 10 mg  10 mg IntraVENous Q10 Min PRN Stana Pry, DO   10 mg at 11/16/22 0655    albuterol (PROVENTIL) nebulizer solution 2.5 mg  2.5 mg Nebulization Q4H PRN Wing Lucks, DO        labetalol (NORMODYNE;TRANDATE) injection 10 mg  10 mg IntraVENous Q10 Min PRN Denita Gray MD   10 mg at 11/16/22 0558    buprenorphine-naloxone (SUBOXONE) 8-2 MG SL tablet 1 tablet  1 tablet SubLINGual BID Denita Gray MD   1 tablet at 11/15/22 2141    thiamine tablet 100 mg  100 mg Oral Daily Lakshmi Castillo MD   100 mg at 11/15/22 0819    sodium chloride flush 0.9 % injection 10 mL  10 mL IntraVENous 2 times per day Charlotte Craft DO   10 mL at 11/15/22 2052    sodium chloride flush 0.9 % injection 10 mL  10 mL IntraVENous PRN Elias Queen DO        0.9 % sodium chloride infusion   IntraVENous PRN Charlotte Craft DO   Stopped at 11/14/22 0813    ondansetron (ZOFRAN-ODT) disintegrating tablet 4 mg  4 mg Oral Q8H PRN Iman Loveless, DO        Or    ondansetron (ZOFRAN) injection 4 mg  4 mg IntraVENous Q6H PRN Iman Loveless, DO   4 mg at 11/05/22 2122    polyethylene glycol (GLYCOLAX) packet 17 g  17 g Oral Daily Elias B Capal, DO   17 g at 11/15/22 0817    acetaminophen (TYLENOL) tablet 650 mg  650 mg Oral Q4H PRN Elias B Capal, DO   650 mg at 11/11/22 0231       Allergies: Allergies   Allergen Reactions    Blue Dyes (Parenteral)        Problem List:    Patient Active Problem List   Diagnosis Code    Inguinal hernia, right K40.90    Cervical radiculopathy M54.12    Chronic hepatitis C without hepatic coma (HCC) B18.2    Right shoulder pain M25.511    Right subclavian artery occlusion I70.8    Essential hypertension I10    Sinus tachycardia R00.0    Benign paroxysmal positional vertigo due to bilateral vestibular disorder H81.13    Hypotension I95.9    Pneumonia due to organism J18.9    Bacterial pneumonia J15.9    Acidosis E87.20    JOSIE (acute kidney injury) (HCC) N17.9    Bacteremia R78.81    Alcohol withdrawal syndrome without complication (HCC) K68.906    Dysphagia R13.10    Alcohol abuse F10.10    Subarachnoid bleed (HCC) I60.9    Subdural hematoma S06. 5XAA    Acute alcoholic intoxication with complication ([facility] J15.764    Closed fracture of occipital bone (HCC) S02.119A    COVID-19 U07.1    Opioid dependence on agonist therapy ([facility] F11.20    Delirium R41.0    Other constipation K59.09       Past Medical History:        Diagnosis Date    Hepatitis C     liver bx wnl. genotype is the best possible    Herniated disc, cervical        Past Surgical History:        Procedure Laterality Date    ANTERIOR CRUCIATE LIGAMENT REPAIR  1990s    left, [facility] ANTERIOR CRUCIATE LIGAMENT REPAIR  2009    right ACL repair, Dr. Meggan Kidd COLONOSCOPY  2009    [facility]  [facility]    right indirect inguinal herniorrhaphy with mesh, Radha Leung [address] Right 5/20/14    with mesh    NECK SURGERY  1992    neck fusion C6 and C7, [facility] [address] NURSE  3/28/2021         SHOULDER SURGERY  2007    left, removal spur, Dr. Vinicio Iniguez  2007    melenoma, left clavical area, [facility]    UPPER GASTROINTESTINAL ENDOSCOPY N/A 3/31/2021    EGD BIOPSY and AGUERO DILATATION performed by Savana Boudreaux DO at [facility] ENDOSCOPY       Social History:    Social History     Tobacco Use    Smoking status: Every Day     Packs/day: 0.50     Years: 50.00     Pack years: 25.00     Types: Cigarettes     Start date: 1970    Smokeless tobacco: Never   Substance Use Topics    Alcohol use: Yes     Alcohol/week: 38.0 standard drinks     Types: 18 Cans of beer, 20 Shots of liquor per week     Comment: daily                                Ready to quit: Not Answered  Counseling given: Not Answered      Vital Signs (Current): There were no vitals filed for this visit.                                            BP Readings from Last 3 Encounters:   11/16/22 (!) 144/100   08/08/22 111/76   06/29/22 120/70       NPO Status:                                                                                 BMI:   Wt Readings from Last 3 Encounters:   11/12/22 165 lb 4.8 oz (75 kg)   08/08/22 189 lb 6.4 oz (85.9 kg)   06/29/22 186 lb (84.4 kg)     There is no height or weight on file to calculate BMI.    CBC:   Lab Results   Component Value Date/Time    WBC 8.4 11/16/2022 05:40 AM    RBC 4.19 11/16/2022 05:40 AM    HGB 14.3 11/16/2022 05:40 AM    HCT 41.7 11/16/2022 05:40 AM    MCV 99.5 11/16/2022 05:40 AM    RDW 12.1 11/16/2022 05:40 AM     11/16/2022 05:40 AM       CMP:   Lab Results   Component Value Date/Time     11/15/2022 05:45 AM    K 3.8 11/15/2022 05:45 AM     11/15/2022 05:45 AM    CO2 23 11/15/2022 05:45 AM    BUN 15 11/15/2022 05:45 AM CREATININE 0.4 11/15/2022 05:45 AM    GFRAA >60 11/07/2021 05:45 PM    LABGLOM >60 11/15/2022 05:45 AM    GLUCOSE 154 11/15/2022 05:45 AM    PROT 8.6 11/05/2022 04:10 PM    CALCIUM 8.9 11/15/2022 05:45 AM    BILITOT 0.4 11/05/2022 04:10 PM    ALKPHOS 130 11/05/2022 04:10 PM     11/05/2022 04:10 PM    ALT 72 11/05/2022 04:10 PM       POC Tests: No results for input(s): POCGLU, POCNA, POCK, POCCL, POCBUN, POCHEMO, POCHCT in the last 72 hours.     Coags:   Lab Results   Component Value Date/Time    PROTIME 11.8 11/06/2022 02:01 AM    INR 1.1 11/06/2022 02:01 AM    APTT 33.3 06/12/2020 06:02 PM       HCG (If Applicable): No results found for: PREGTESTUR, PREGSERUM, HCG, HCGQUANT     ABGs: No results found for: PHART, PO2ART, ZUY0UAL, LAS0HYI, BEART, B4UGTRRE     Type & Screen (If Applicable):  No results found for: LABABO, LABRH    Drug/Infectious Status (If Applicable):  No results found for: HIV, HEPCAB    COVID-19 Screening (If Applicable):   Lab Results   Component Value Date/Time    COVID19 DETECTED 11/05/2022 07:16 PM    COVID19 Not Detected 11/23/2021 02:35 PM    COVID19 Not Detected 03/24/2021 04:02 PM       CT Head wo contrast 11/14/22     FINDINGS:   BRAIN/VENTRICLES:       Parenchymal hematomas in the inferior frontal lobes bilaterally with   extensive overlying vasogenic edema is again visualized.  Parenchymal   hematomas in the right parietal lobe.  Parenchymal hematoma in the temporal   lobes bilaterally more prominent on the left are visualized demonstrate no   significant increase in comparison to the prior study.       Left parieto-occipital subdural hematoma is visualized demonstrating no   significant increase in comparison to the prior study.       Right frontoparietal prominence of the extra-axial CSF spaces visualized   demonstrating no significant increase in comparison to the prior study,   subdural hygroma.       Subarachnoid hemorrhage in the right posterior parietal lobe demonstrates no significant change in comparison to the prior study.       Blood in the interhemispheric fissures and along the tentorium is visualized   demonstrates no significant change in comparison to the prior study.       Left-to-right subfalcine midline shift measuring 6.5 mm demonstrates no   significant change in comparison to the prior study.       The ventricles demonstrate no significant change in size in comparison to the   prior study, no evidence of clot within the 3rd ventricle or within the   aqueduct of Sylvius is visualized.  No evidence of blood within the 4th   ventricle.       Layering blood within the posterior horn of the right lateral ventricle is   visualized demonstrates no change in comparison to the prior study.       No evidence of intracranial mass.  No evidence of sellar or parasellar mass   is visualized. US Left Upper Extremity 11/9/22  Impression   No evidence of DVT.       Suspect superficial thrombus in the cephalic vein. Arterial evaluation of Upper Extremities 11/13/15  Narrative   Limited study. The baseline arterial Doppler study was not done,   patient will be brought back for the same.       Patient has good flow to both hands based upon the pulse volume   recording of the  index finger bilaterally.  The pulse volume   recording become flat arm on hyperabduction of the right arm but   remained normal on hyperabduction of the left arm, consistent with   right subclavian artery axillary artery junction occlusion on   hyperabduction of the right arm         Anesthesia Evaluation  Patient summary reviewed and Nursing notes reviewed no history of anesthetic complications:   Airway: Mallampati: Unable to assess / NA          Dental:          Pulmonary: breath sounds clear to auscultation  (+) pneumonia (COVID-19): unresolved,  COPD:  current smoker                           Cardiovascular:    (+) hypertension: severe,       ECG reviewed  Rhythm: regular  Rate: normal  Echocardiogram reviewed         Beta Blocker:  Dose within 24 Hrs      ROS comment: EKG:  NSR 82, Incomplete Rt BBB. ECHO:   Normal left ventricular systolic function. Ejection fraction is visually estimated at 60%. Normal right ventricular size and function (TAPSE 2.1 cm). There is doppler evidence of stage I diastolic dysfunction. Mild tricuspid regurgitation. PASP is estimated at 22 mmHg (normal estimated PASP). Neuro/Psych:   (+) neuromuscular disease:, psychiatric history (ETOH with withdrawl, opioid dependence):             ROS comment: Acute SDH, SAH  Cervical radiculopathy  Benign paroxysmal positional vertigo 2 to vestibular disorder  H/o delerium GI/Hepatic/Renal:   (+) hepatitis: C, liver disease:, renal disease: ARF,          ROS comment: H/o Right inguinal hernia  dysphagia. Endo/Other:                      ROS comment: Closed fracture of occipital bone  H/o shoulder surgery  H/o skin cancer excision  H/o anterior cruciate ligament repair  H/o neck surgery Abdominal:             Vascular:   + PVD, aortic or cerebral, . ROS comment: Right SCV artery occlusion  Subdural hematoma  Subarachnoid bleed. Other Findings:             Anesthesia Plan      MAC     ASA 4     (PICC line)  Induction: intravenous. Anesthetic plan and risks discussed with patient and child/children. Plan discussed with CRNA. Attending anesthesiologist reviewed and agrees with Preprocedure content            Note completed via chart review. Anesthesia provider to complete in person assessment. Pt is restrained on the floor. He is covid +. Per charting he is alert to self only and does not follow commands.        Garett Marques MD   11/16/2022

## 2022-11-16 NOTE — PLAN OF CARE
Problem: Safety - Medical Restraint  Goal: Remains free of injury from restraints (Restraint for Interference with Medical Device)  Description: INTERVENTIONS:  1. Determine that other, less restrictive measures have been tried or would not be effective before applying the restraint  2. Evaluate the patient's condition at the time of restraint application  3. Inform patient/family regarding the reason for restraint  4. Q2H: Monitor safety, psychosocial status, comfort, nutrition and hydration  11/16/2022 0822 by Mabel Sainz RN  Outcome: Not Progressing  Flowsheets (Taken 11/16/2022 0800)  Remains free of injury from restraints (restraint for interference with medical device): Every 2 hours: Monitor safety, psychosocial status, comfort, nutrition and hydration     Problem: Safety - Medical Restraint  Goal: Remains free of injury from restraints (Restraint for Interference with Medical Device)  Description: INTERVENTIONS:  1. Determine that other, less restrictive measures have been tried or would not be effective before applying the restraint  2. Evaluate the patient's condition at the time of restraint application  3. Inform patient/family regarding the reason for restraint  4.  Q2H: Monitor safety, psychosocial status, comfort, nutrition and hydration  11/16/2022 0822 by Mabel Sainz RN  Outcome: Not Progressing  Flowsheets (Taken 11/16/2022 0800)  Remains free of injury from restraints (restraint for interference with medical device): Every 2 hours: Monitor safety, psychosocial status, comfort, nutrition and hydration  11/15/2022 2359 by Cheryl Ledezma RN  Outcome: Progressing

## 2022-11-16 NOTE — ANESTHESIA POSTPROCEDURE EVALUATION
Department of Anesthesiology  Postprocedure Note    Patient: Benny Shultz  MRN: [id number]  YOB: 1960  Date of evaluation: 11/16/2022      Procedure Summary     Date: 11/16/22 Room / Location: [address] / [facility]    Anesthesia Start: 6305 Anesthesia Stop: 1496    Procedure: EGD PEG TUBE PLACEMENT Diagnosis:       Dysphagia, unspecified type      (/)    Surgeons: Kevin Johnson MD Responsible Provider: Onelia Saxena MD    Anesthesia Type: MAC ASA Status: 4          Anesthesia Type: No value filed.     [place] Phase I:      [place] Phase II:        Anesthesia Post Evaluation    Patient location during evaluation: PACU  Patient participation: complete - patient participated  Level of consciousness: awake and alert  Airway patency: patent  Nausea & Vomiting: no nausea and no vomiting  Complications: no  Cardiovascular status: hemodynamically stable  Respiratory status: acceptable  Hydration status: euvolemic

## 2022-11-17 ENCOUNTER — APPOINTMENT (OUTPATIENT)
Dept: GENERAL RADIOLOGY | Age: 62
DRG: 082 | End: 2022-11-17
Payer: COMMERCIAL

## 2022-11-17 ENCOUNTER — APPOINTMENT (OUTPATIENT)
Dept: CT IMAGING | Age: 62
DRG: 082 | End: 2022-11-17
Payer: COMMERCIAL

## 2022-11-17 LAB
AADO2: 290.7 MMHG
AADO2: 525.9 MMHG
ADENOVIRUS BY PCR: NOT DETECTED
ANION GAP SERPL CALCULATED.3IONS-SCNC: 15 MMOL/L (ref 7–16)
B.E.: -0.4 MMOL/L (ref -3–3)
B.E.: -1.7 MMOL/L (ref -3–3)
B.E.: 2.4 MMOL/L (ref -3–3)
B.E.: 3 MMOL/L (ref -3–3)
B.E.: 3 MMOL/L (ref -3–3)
BACTERIA: ABNORMAL /HPF
BILIRUBIN URINE: ABNORMAL
BLOOD, URINE: NEGATIVE
BORDETELLA PARAPERTUSSIS BY PCR: NOT DETECTED
BORDETELLA PERTUSSIS BY PCR: NOT DETECTED
BUN BLDV-MCNC: 25 MG/DL (ref 6–23)
CALCIUM IONIZED: 1.26 MMOL/L (ref 1.15–1.33)
CALCIUM SERPL-MCNC: 9.3 MG/DL (ref 8.6–10.2)
CHLAMYDOPHILIA PNEUMONIAE BY PCR: NOT DETECTED
CHLORIDE BLD-SCNC: 105 MMOL/L (ref 98–107)
CLARITY: CLEAR
CO2: 22 MMOL/L (ref 22–29)
COHB: 0.4 % (ref 0–1.5)
COHB: 0.6 % (ref 0–1.5)
COHB: 0.9 % (ref 0–1.5)
COHB: 1 % (ref 0–1.5)
COHB: 1 % (ref 0–1.5)
COLOR: YELLOW
CORONAVIRUS 229E BY PCR: NOT DETECTED
CORONAVIRUS HKU1 BY PCR: NOT DETECTED
CORONAVIRUS NL63 BY PCR: NOT DETECTED
CORONAVIRUS OC43 BY PCR: NOT DETECTED
CORTISOL TOTAL: 41.18 MCG/DL (ref 2.68–18.4)
CREAT SERPL-MCNC: 0.6 MG/DL (ref 0.7–1.2)
CRITICAL: ABNORMAL
CRITICAL: NORMAL
DATE ANALYZED: ABNORMAL
DATE ANALYZED: NORMAL
DATE OF COLLECTION: ABNORMAL
DATE OF COLLECTION: NORMAL
EKG ATRIAL RATE: 123 BPM
EKG P AXIS: 34 DEGREES
EKG P-R INTERVAL: 124 MS
EKG Q-T INTERVAL: 304 MS
EKG QRS DURATION: 76 MS
EKG QTC CALCULATION (BAZETT): 435 MS
EKG R AXIS: 13 DEGREES
EKG T AXIS: 63 DEGREES
EKG VENTRICULAR RATE: 123 BPM
EPITHELIAL CELLS, UA: ABNORMAL /HPF
FIO2: 100 %
FIO2: 60 %
GFR SERPL CREATININE-BSD FRML MDRD: >60 ML/MIN/1.73
GLUCOSE BLD-MCNC: 190 MG/DL (ref 74–99)
GLUCOSE URINE: 100 MG/DL
HCO3: 21.5 MMOL/L (ref 22–26)
HCO3: 23 MMOL/L (ref 22–26)
HCO3: 23.4 MMOL/L (ref 22–26)
HCO3: 23.6 MMOL/L (ref 22–26)
HCO3: 24.9 MMOL/L (ref 22–26)
HCT VFR BLD CALC: 46 % (ref 37–54)
HEMOGLOBIN: 15.3 G/DL (ref 12.5–16.5)
HHB: 1.7 % (ref 0–5)
HHB: 12.2 % (ref 0–5)
HHB: 3.8 % (ref 0–5)
HHB: 4.3 % (ref 0–5)
HHB: 7.9 % (ref 0–5)
HUMAN METAPNEUMOVIRUS BY PCR: NOT DETECTED
HUMAN RHINOVIRUS/ENTEROVIRUS BY PCR: NOT DETECTED
HYALINE CASTS: ABNORMAL /LPF (ref 0–2)
INFLUENZA A BY PCR: NOT DETECTED
INFLUENZA B BY PCR: NOT DETECTED
KETONES, URINE: ABNORMAL MG/DL
LAB: ABNORMAL
LAB: NORMAL
LEUKOCYTE ESTERASE, URINE: NEGATIVE
Lab: ABNORMAL
Lab: NORMAL
MAGNESIUM: 2 MG/DL (ref 1.6–2.6)
MCH RBC QN AUTO: 33.7 PG (ref 26–35)
MCHC RBC AUTO-ENTMCNC: 33.3 % (ref 32–34.5)
MCV RBC AUTO: 101.3 FL (ref 80–99.9)
METHB: 0.2 % (ref 0–1.5)
METHB: 0.3 % (ref 0–1.5)
METHB: 0.3 % (ref 0–1.5)
METHB: 0.4 % (ref 0–1.5)
METHB: 0.4 % (ref 0–1.5)
MODE: ABNORMAL
MODE: AC
MODE: AC
MYCOPLASMA PNEUMONIAE BY PCR: NOT DETECTED
NITRITE, URINE: NEGATIVE
O2 SATURATION: 87.6 % (ref 92–98.5)
O2 SATURATION: 92 % (ref 92–98.5)
O2 SATURATION: 95.7 % (ref 92–98.5)
O2 SATURATION: 96.2 % (ref 92–98.5)
O2 SATURATION: 98.3 % (ref 92–98.5)
O2HB: 86.4 % (ref 94–97)
O2HB: 90.7 % (ref 94–97)
O2HB: 95 % (ref 94–97)
O2HB: 95.1 % (ref 94–97)
O2HB: 97.4 % (ref 94–97)
OPERATOR ID: 2593
OPERATOR ID: 3342
OPERATOR ID: NORMAL
PARAINFLUENZA VIRUS 1 BY PCR: NOT DETECTED
PARAINFLUENZA VIRUS 2 BY PCR: NOT DETECTED
PARAINFLUENZA VIRUS 3 BY PCR: NOT DETECTED
PARAINFLUENZA VIRUS 4 BY PCR: NOT DETECTED
PATIENT TEMP: 37 C
PCO2: 26.4 MMHG (ref 35–45)
PCO2: 26.4 MMHG (ref 35–45)
PCO2: 30.9 MMHG (ref 35–45)
PCO2: 32.1 MMHG (ref 35–45)
PCO2: 35.9 MMHG (ref 35–45)
PDW BLD-RTO: 12 FL (ref 11.5–15)
PEEP/CPAP: 8 CMH2O
PEEP/CPAP: 8 CMH2O
PFO2: 1.3 MMHG/%
PFO2: 1.38 MMHG/%
PH BLOOD GAS: 7.43 (ref 7.35–7.45)
PH BLOOD GAS: 7.44 (ref 7.35–7.45)
PH BLOOD GAS: 7.52 (ref 7.35–7.45)
PH BLOOD GAS: 7.56 (ref 7.35–7.45)
PH BLOOD GAS: 7.57 (ref 7.35–7.45)
PH UA: 6 (ref 5–9)
PHOSPHORUS: 3.4 MG/DL (ref 2.5–4.5)
PLATELET # BLD: 284 E9/L (ref 130–450)
PMV BLD AUTO: 11.4 FL (ref 7–12)
PO2: 130 MMHG (ref 75–100)
PO2: 50.3 MMHG (ref 75–100)
PO2: 57.6 MMHG (ref 75–100)
PO2: 76 MMHG (ref 75–100)
PO2: 82.6 MMHG (ref 75–100)
POTASSIUM REFLEX MAGNESIUM: 4.1 MMOL/L (ref 3.5–5)
PROCALCITONIN: 0.75 NG/ML (ref 0–0.08)
PROTEIN UA: 30 MG/DL
RBC # BLD: 4.54 E12/L (ref 3.8–5.8)
RBC UA: ABNORMAL /HPF (ref 0–2)
RESPIRATORY SYNCYTIAL VIRUS BY PCR: NOT DETECTED
RI(T): 3.52
RI(T): 4.05
RR MECHANICAL: 14 B/MIN
RR MECHANICAL: 18 B/MIN
SARS-COV-2, PCR: DETECTED
SODIUM BLD-SCNC: 142 MMOL/L (ref 132–146)
SOURCE, BLOOD GAS: ABNORMAL
SOURCE, BLOOD GAS: NORMAL
SPECIFIC GRAVITY UA: 1.01 (ref 1–1.03)
THB: 13.7 G/DL (ref 11.5–16.5)
THB: 14.1 G/DL (ref 11.5–16.5)
THB: 15.7 G/DL (ref 11.5–16.5)
THB: 16 G/DL (ref 11.5–16.5)
THB: 16.3 G/DL (ref 11.5–16.5)
TIME ANALYZED: 1301
TIME ANALYZED: 2203
TIME ANALYZED: 354
TIME ANALYZED: 543
TIME ANALYZED: 654
TROPONIN, HIGH SENSITIVITY: 41 NG/L (ref 0–11)
UROBILINOGEN, URINE: >=8 E.U./DL
VT MECHANICAL: 500 ML
VT MECHANICAL: 500 ML
WBC # BLD: 12.1 E9/L (ref 4.5–11.5)
WBC UA: ABNORMAL /HPF (ref 0–5)

## 2022-11-17 PROCEDURE — 71275 CT ANGIOGRAPHY CHEST: CPT

## 2022-11-17 PROCEDURE — 6370000000 HC RX 637 (ALT 250 FOR IP): Performed by: SURGERY

## 2022-11-17 PROCEDURE — 2500000003 HC RX 250 WO HCPCS: Performed by: SURGERY

## 2022-11-17 PROCEDURE — 36620 INSERTION CATHETER ARTERY: CPT | Performed by: SURGERY

## 2022-11-17 PROCEDURE — 71045 X-RAY EXAM CHEST 1 VIEW: CPT

## 2022-11-17 PROCEDURE — 6360000002 HC RX W HCPCS

## 2022-11-17 PROCEDURE — 31500 INSERT EMERGENCY AIRWAY: CPT | Performed by: SURGERY

## 2022-11-17 PROCEDURE — 31500 INSERT EMERGENCY AIRWAY: CPT

## 2022-11-17 PROCEDURE — 94664 DEMO&/EVAL PT USE INHALER: CPT

## 2022-11-17 PROCEDURE — 84145 PROCALCITONIN (PCT): CPT

## 2022-11-17 PROCEDURE — 6360000002 HC RX W HCPCS: Performed by: SURGERY

## 2022-11-17 PROCEDURE — 87206 SMEAR FLUORESCENT/ACID STAI: CPT

## 2022-11-17 PROCEDURE — 84484 ASSAY OF TROPONIN QUANT: CPT

## 2022-11-17 PROCEDURE — 82533 TOTAL CORTISOL: CPT

## 2022-11-17 PROCEDURE — 0202U NFCT DS 22 TRGT SARS-COV-2: CPT

## 2022-11-17 PROCEDURE — 83735 ASSAY OF MAGNESIUM: CPT

## 2022-11-17 PROCEDURE — 6370000000 HC RX 637 (ALT 250 FOR IP)

## 2022-11-17 PROCEDURE — 94640 AIRWAY INHALATION TREATMENT: CPT

## 2022-11-17 PROCEDURE — 87338 HPYLORI STOOL AG IA: CPT

## 2022-11-17 PROCEDURE — 87077 CULTURE AEROBIC IDENTIFY: CPT

## 2022-11-17 PROCEDURE — 03HY32Z INSERTION OF MONITORING DEVICE INTO UPPER ARTERY, PERCUTANEOUS APPROACH: ICD-10-PCS | Performed by: SURGERY

## 2022-11-17 PROCEDURE — 2500000003 HC RX 250 WO HCPCS

## 2022-11-17 PROCEDURE — 93005 ELECTROCARDIOGRAM TRACING: CPT | Performed by: SURGERY

## 2022-11-17 PROCEDURE — 2580000003 HC RX 258: Performed by: STUDENT IN AN ORGANIZED HEALTH CARE EDUCATION/TRAINING PROGRAM

## 2022-11-17 PROCEDURE — 80048 BASIC METABOLIC PNL TOTAL CA: CPT

## 2022-11-17 PROCEDURE — 36556 INSERT NON-TUNNEL CV CATH: CPT | Performed by: SURGERY

## 2022-11-17 PROCEDURE — 36415 COLL VENOUS BLD VENIPUNCTURE: CPT

## 2022-11-17 PROCEDURE — 02HV33Z INSERTION OF INFUSION DEVICE INTO SUPERIOR VENA CAVA, PERCUTANEOUS APPROACH: ICD-10-PCS | Performed by: SURGERY

## 2022-11-17 PROCEDURE — 36620 INSERTION CATHETER ARTERY: CPT

## 2022-11-17 PROCEDURE — 6370000000 HC RX 637 (ALT 250 FOR IP): Performed by: STUDENT IN AN ORGANIZED HEALTH CARE EDUCATION/TRAINING PROGRAM

## 2022-11-17 PROCEDURE — 87070 CULTURE OTHR SPECIMN AEROBIC: CPT

## 2022-11-17 PROCEDURE — 82330 ASSAY OF CALCIUM: CPT

## 2022-11-17 PROCEDURE — 2580000003 HC RX 258: Performed by: SURGERY

## 2022-11-17 PROCEDURE — 84100 ASSAY OF PHOSPHORUS: CPT

## 2022-11-17 PROCEDURE — 36556 INSERT NON-TUNNEL CV CATH: CPT

## 2022-11-17 PROCEDURE — 37799 UNLISTED PX VASCULAR SURGERY: CPT

## 2022-11-17 PROCEDURE — 04HY32Z INSERTION OF MONITORING DEVICE INTO LOWER ARTERY, PERCUTANEOUS APPROACH: ICD-10-PCS | Performed by: SURGERY

## 2022-11-17 PROCEDURE — 70450 CT HEAD/BRAIN W/O DYE: CPT

## 2022-11-17 PROCEDURE — 0BH17EZ INSERTION OF ENDOTRACHEAL AIRWAY INTO TRACHEA, VIA NATURAL OR ARTIFICIAL OPENING: ICD-10-PCS | Performed by: SURGERY

## 2022-11-17 PROCEDURE — 36600 WITHDRAWAL OF ARTERIAL BLOOD: CPT

## 2022-11-17 PROCEDURE — 6360000004 HC RX CONTRAST MEDICATION: Performed by: RADIOLOGY

## 2022-11-17 PROCEDURE — 85027 COMPLETE CBC AUTOMATED: CPT

## 2022-11-17 PROCEDURE — 81001 URINALYSIS AUTO W/SCOPE: CPT

## 2022-11-17 PROCEDURE — 82805 BLOOD GASES W/O2 SATURATION: CPT

## 2022-11-17 PROCEDURE — 2700000000 HC OXYGEN THERAPY PER DAY

## 2022-11-17 PROCEDURE — 99291 CRITICAL CARE FIRST HOUR: CPT | Performed by: SURGERY

## 2022-11-17 PROCEDURE — 2000000000 HC ICU R&B

## 2022-11-17 PROCEDURE — 5A1945Z RESPIRATORY VENTILATION, 24-96 CONSECUTIVE HOURS: ICD-10-PCS | Performed by: SURGERY

## 2022-11-17 PROCEDURE — 51702 INSERT TEMP BLADDER CATH: CPT

## 2022-11-17 PROCEDURE — 94002 VENT MGMT INPAT INIT DAY: CPT

## 2022-11-17 RX ORDER — MINERAL OIL AND WHITE PETROLATUM 150; 830 MG/G; MG/G
OINTMENT OPHTHALMIC EVERY 4 HOURS
Status: DISCONTINUED | OUTPATIENT
Start: 2022-11-17 | End: 2022-11-20

## 2022-11-17 RX ORDER — SUCCINYLCHOLINE CHLORIDE 20 MG/ML
INJECTION INTRAMUSCULAR; INTRAVENOUS
Status: DISCONTINUED
Start: 2022-11-17 | End: 2022-11-17

## 2022-11-17 RX ORDER — ETOMIDATE 2 MG/ML
INJECTION INTRAVENOUS
Status: COMPLETED
Start: 2022-11-17 | End: 2022-11-17

## 2022-11-17 RX ORDER — PROPOFOL 10 MG/ML
INJECTION, EMULSION INTRAVENOUS
Status: COMPLETED
Start: 2022-11-17 | End: 2022-11-17

## 2022-11-17 RX ORDER — 0.9 % SODIUM CHLORIDE 0.9 %
1000 INTRAVENOUS SOLUTION INTRAVENOUS ONCE
Status: COMPLETED | OUTPATIENT
Start: 2022-11-17 | End: 2022-11-17

## 2022-11-17 RX ORDER — SODIUM CHLORIDE 9 MG/ML
INJECTION, SOLUTION INTRAVENOUS CONTINUOUS
Status: DISCONTINUED | OUTPATIENT
Start: 2022-11-17 | End: 2022-11-19

## 2022-11-17 RX ORDER — IPRATROPIUM BROMIDE AND ALBUTEROL SULFATE 2.5; .5 MG/3ML; MG/3ML
1 SOLUTION RESPIRATORY (INHALATION)
Status: DISCONTINUED | OUTPATIENT
Start: 2022-11-17 | End: 2022-11-30 | Stop reason: HOSPADM

## 2022-11-17 RX ORDER — 0.9 % SODIUM CHLORIDE 0.9 %
500 INTRAVENOUS SOLUTION INTRAVENOUS ONCE
Status: COMPLETED | OUTPATIENT
Start: 2022-11-17 | End: 2022-11-17

## 2022-11-17 RX ORDER — VECURONIUM BROMIDE 1 MG/ML
10 INJECTION, POWDER, LYOPHILIZED, FOR SOLUTION INTRAVENOUS ONCE
Status: COMPLETED | OUTPATIENT
Start: 2022-11-17 | End: 2022-11-17

## 2022-11-17 RX ORDER — FENTANYL CITRATE 50 UG/ML
100 INJECTION, SOLUTION INTRAMUSCULAR; INTRAVENOUS
Status: DISCONTINUED | OUTPATIENT
Start: 2022-11-17 | End: 2022-11-18

## 2022-11-17 RX ORDER — IPRATROPIUM BROMIDE AND ALBUTEROL SULFATE 2.5; .5 MG/3ML; MG/3ML
1 SOLUTION RESPIRATORY (INHALATION) EVERY 4 HOURS PRN
Status: DISCONTINUED | OUTPATIENT
Start: 2022-11-17 | End: 2022-11-30 | Stop reason: HOSPADM

## 2022-11-17 RX ORDER — PROPOFOL 10 MG/ML
5-80 INJECTION, EMULSION INTRAVENOUS CONTINUOUS
Status: DISCONTINUED | OUTPATIENT
Start: 2022-11-17 | End: 2022-11-17

## 2022-11-17 RX ORDER — CHLORHEXIDINE GLUCONATE 0.12 MG/ML
15 RINSE ORAL 2 TIMES DAILY
Status: DISCONTINUED | OUTPATIENT
Start: 2022-11-17 | End: 2022-11-21

## 2022-11-17 RX ORDER — MIDAZOLAM HYDROCHLORIDE 2 MG/2ML
5 INJECTION, SOLUTION INTRAMUSCULAR; INTRAVENOUS ONCE
Status: COMPLETED | OUTPATIENT
Start: 2022-11-17 | End: 2022-11-17

## 2022-11-17 RX ORDER — POLYVINYL ALCOHOL 14 MG/ML
1 SOLUTION/ DROPS OPHTHALMIC EVERY 4 HOURS
Status: DISCONTINUED | OUTPATIENT
Start: 2022-11-17 | End: 2022-11-20

## 2022-11-17 RX ORDER — FENTANYL CITRATE 50 UG/ML
INJECTION, SOLUTION INTRAMUSCULAR; INTRAVENOUS
Status: COMPLETED
Start: 2022-11-17 | End: 2022-11-17

## 2022-11-17 RX ADMIN — LACTULOSE 20 G: 20 SOLUTION ORAL at 20:22

## 2022-11-17 RX ADMIN — POLYVINYL ALCOHOL 1 DROP: 14 SOLUTION/ DROPS OPHTHALMIC at 20:22

## 2022-11-17 RX ADMIN — HEPARIN SODIUM 5000 UNITS: 10000 INJECTION INTRAVENOUS; SUBCUTANEOUS at 20:13

## 2022-11-17 RX ADMIN — SODIUM CHLORIDE 500 ML: 9 INJECTION, SOLUTION INTRAVENOUS at 07:33

## 2022-11-17 RX ADMIN — DIVALPROEX SODIUM 250 MG: 125 CAPSULE, COATED PELLETS ORAL at 20:22

## 2022-11-17 RX ADMIN — SODIUM CHLORIDE: 9 INJECTION, SOLUTION INTRAVENOUS at 07:19

## 2022-11-17 RX ADMIN — GUAIFENESIN 200 MG: 200 SOLUTION ORAL at 11:41

## 2022-11-17 RX ADMIN — POLYVINYL ALCOHOL 1 DROP: 14 SOLUTION/ DROPS OPHTHALMIC at 09:16

## 2022-11-17 RX ADMIN — LANSOPRAZOLE 30 MG: KIT at 15:58

## 2022-11-17 RX ADMIN — Medication 10 ML: at 20:22

## 2022-11-17 RX ADMIN — Medication 250 MG: at 11:41

## 2022-11-17 RX ADMIN — CEFEPIME 2000 MG: 2 INJECTION, POWDER, FOR SOLUTION INTRAVENOUS at 10:11

## 2022-11-17 RX ADMIN — HEPARIN SODIUM 5000 UNITS: 10000 INJECTION INTRAVENOUS; SUBCUTANEOUS at 11:44

## 2022-11-17 RX ADMIN — GUAIFENESIN 200 MG: 200 SOLUTION ORAL at 15:57

## 2022-11-17 RX ADMIN — Medication 50 MCG/HR: at 09:54

## 2022-11-17 RX ADMIN — MINERAL OIL, WHITE PETROLATUM: .03; .94 OINTMENT OPHTHALMIC at 13:30

## 2022-11-17 RX ADMIN — POLYVINYL ALCOHOL 1 DROP: 14 SOLUTION/ DROPS OPHTHALMIC at 11:40

## 2022-11-17 RX ADMIN — PROPOFOL 10 MCG/KG/MIN: 10 INJECTION, EMULSION INTRAVENOUS at 07:10

## 2022-11-17 RX ADMIN — BUPRENORPHINE AND NALOXONE 1 TABLET: 8; 2 TABLET SUBLINGUAL at 09:16

## 2022-11-17 RX ADMIN — VANCOMYCIN HYDROCHLORIDE 1250 MG: 10 INJECTION, POWDER, LYOPHILIZED, FOR SOLUTION INTRAVENOUS at 20:19

## 2022-11-17 RX ADMIN — VANCOMYCIN HYDROCHLORIDE 2000 MG: 10 INJECTION, POWDER, LYOPHILIZED, FOR SOLUTION INTRAVENOUS at 10:02

## 2022-11-17 RX ADMIN — SODIUM CHLORIDE: 9 INJECTION, SOLUTION INTRAVENOUS at 16:01

## 2022-11-17 RX ADMIN — Medication 10 ML: at 09:18

## 2022-11-17 RX ADMIN — IPRATROPIUM BROMIDE AND ALBUTEROL SULFATE 1 AMPULE: .5; 2.5 SOLUTION RESPIRATORY (INHALATION) at 20:36

## 2022-11-17 RX ADMIN — CEFEPIME 2000 MG: 2 INJECTION, POWDER, FOR SOLUTION INTRAVENOUS at 18:08

## 2022-11-17 RX ADMIN — DIVALPROEX SODIUM 250 MG: 125 CAPSULE, COATED PELLETS ORAL at 05:50

## 2022-11-17 RX ADMIN — SUCRALFATE 1 G: 1 TABLET ORAL at 05:50

## 2022-11-17 RX ADMIN — Medication 100 MG: at 09:17

## 2022-11-17 RX ADMIN — GUAIFENESIN 200 MG: 200 SOLUTION ORAL at 03:47

## 2022-11-17 RX ADMIN — Medication 2 MG/HR: at 10:03

## 2022-11-17 RX ADMIN — MINERAL OIL, WHITE PETROLATUM: .03; .94 OINTMENT OPHTHALMIC at 21:16

## 2022-11-17 RX ADMIN — IPRATROPIUM BROMIDE AND ALBUTEROL SULFATE 1 AMPULE: .5; 2.5 SOLUTION RESPIRATORY (INHALATION) at 03:41

## 2022-11-17 RX ADMIN — ETOMIDATE INJECTION 20 MG: 2 SOLUTION INTRAVENOUS at 07:04

## 2022-11-17 RX ADMIN — Medication 100 MCG/HR: at 21:15

## 2022-11-17 RX ADMIN — MINERAL OIL, WHITE PETROLATUM: .03; .94 OINTMENT OPHTHALMIC at 18:02

## 2022-11-17 RX ADMIN — POLYVINYL ALCOHOL 1 DROP: 14 SOLUTION/ DROPS OPHTHALMIC at 15:57

## 2022-11-17 RX ADMIN — MIDAZOLAM 5 MG: 1 INJECTION INTRAMUSCULAR; INTRAVENOUS at 09:52

## 2022-11-17 RX ADMIN — FENTANYL CITRATE 100 MCG: 50 INJECTION INTRAMUSCULAR; INTRAVENOUS at 20:56

## 2022-11-17 RX ADMIN — CLONIDINE HYDROCHLORIDE 0.2 MG: 0.2 TABLET ORAL at 09:18

## 2022-11-17 RX ADMIN — CHLORHEXIDINE GLUCONATE 15 ML: 1.2 RINSE BUCCAL at 20:22

## 2022-11-17 RX ADMIN — Medication 5 MCG/MIN: at 07:51

## 2022-11-17 RX ADMIN — Medication 250 MG: at 09:16

## 2022-11-17 RX ADMIN — SODIUM CHLORIDE 1000 ML: 9 INJECTION, SOLUTION INTRAVENOUS at 09:34

## 2022-11-17 RX ADMIN — LACTULOSE 20 G: 20 SOLUTION ORAL at 09:16

## 2022-11-17 RX ADMIN — Medication 250 MG: at 20:22

## 2022-11-17 RX ADMIN — MINERAL OIL, WHITE PETROLATUM: .03; .94 OINTMENT OPHTHALMIC at 10:35

## 2022-11-17 RX ADMIN — GUAIFENESIN 200 MG: 200 SOLUTION ORAL at 20:22

## 2022-11-17 RX ADMIN — Medication 250 MG: at 15:58

## 2022-11-17 RX ADMIN — DIVALPROEX SODIUM 250 MG: 125 CAPSULE, COATED PELLETS ORAL at 13:30

## 2022-11-17 RX ADMIN — HEPARIN SODIUM 5000 UNITS: 10000 INJECTION INTRAVENOUS; SUBCUTANEOUS at 03:48

## 2022-11-17 RX ADMIN — POLYETHYLENE GLYCOL 3350 17 G: 17 POWDER, FOR SOLUTION ORAL at 09:57

## 2022-11-17 RX ADMIN — IOPAMIDOL 75 ML: 755 INJECTION, SOLUTION INTRAVENOUS at 08:44

## 2022-11-17 RX ADMIN — GUAIFENESIN 200 MG: 200 SOLUTION ORAL at 09:17

## 2022-11-17 RX ADMIN — FOLIC ACID 1 MG: 1 TABLET ORAL at 09:17

## 2022-11-17 RX ADMIN — SUCRALFATE 1 G: 1 TABLET ORAL at 18:02

## 2022-11-17 RX ADMIN — SENNOSIDES AND DOCUSATE SODIUM 2 TABLET: 50; 8.6 TABLET ORAL at 09:17

## 2022-11-17 RX ADMIN — VECURONIUM BROMIDE 10 MG: 1 INJECTION, POWDER, LYOPHILIZED, FOR SOLUTION INTRAVENOUS at 07:55

## 2022-11-17 RX ADMIN — IPRATROPIUM BROMIDE AND ALBUTEROL SULFATE 1 AMPULE: .5; 2.5 SOLUTION RESPIRATORY (INHALATION) at 09:10

## 2022-11-17 RX ADMIN — SUCRALFATE 1 G: 1 TABLET ORAL at 11:40

## 2022-11-17 RX ADMIN — ACETAMINOPHEN 650 MG: 325 TABLET, FILM COATED ORAL at 09:17

## 2022-11-17 RX ADMIN — FENTANYL CITRATE 100 MCG: 50 INJECTION INTRAMUSCULAR; INTRAVENOUS at 09:18

## 2022-11-17 RX ADMIN — PROPOFOL 10 MG: 10 INJECTION, EMULSION INTRAVENOUS at 07:16

## 2022-11-17 RX ADMIN — FENTANYL CITRATE 100 MCG: 50 INJECTION INTRAMUSCULAR; INTRAVENOUS at 07:35

## 2022-11-17 RX ADMIN — IPRATROPIUM BROMIDE AND ALBUTEROL SULFATE 1 AMPULE: .5; 2.5 SOLUTION RESPIRATORY (INHALATION) at 13:32

## 2022-11-17 RX ADMIN — IPRATROPIUM BROMIDE AND ALBUTEROL SULFATE 1 AMPULE: .5; 2.5 SOLUTION RESPIRATORY (INHALATION) at 17:25

## 2022-11-17 RX ADMIN — SODIUM CHLORIDE: 9 INJECTION, SOLUTION INTRAVENOUS at 21:15

## 2022-11-17 RX ADMIN — CHLORHEXIDINE GLUCONATE 15 ML: 1.2 RINSE BUCCAL at 09:16

## 2022-11-17 ASSESSMENT — PAIN SCALES - GENERAL
PAINLEVEL_OUTOF10: 0
PAINLEVEL_OUTOF10: 0
PAINLEVEL_OUTOF10: 2
PAINLEVEL_OUTOF10: 3
PAINLEVEL_OUTOF10: 2
PAINLEVEL_OUTOF10: 0
PAINLEVEL_OUTOF10: 8
PAINLEVEL_OUTOF10: 0
PAINLEVEL_OUTOF10: 7
PAINLEVEL_OUTOF10: 5

## 2022-11-17 ASSESSMENT — PULMONARY FUNCTION TESTS
PIF_VALUE: 15
PIF_VALUE: 36
PIF_VALUE: 22
PIF_VALUE: 25
PIF_VALUE: 21
PIF_VALUE: 22
PIF_VALUE: 22
PIF_VALUE: 23
PIF_VALUE: 30
PIF_VALUE: 22
PIF_VALUE: 23
PIF_VALUE: 26
PIF_VALUE: 21
PIF_VALUE: 29
PIF_VALUE: 23
PIF_VALUE: 22
PIF_VALUE: 22
PIF_VALUE: 21
PIF_VALUE: 22
PIF_VALUE: 21
PIF_VALUE: 22

## 2022-11-17 NOTE — CARE COORDINATION
Pt required intubation this am and levophed drip. Met with daughter Jesse Kiser who is here from [place]. Informed her of snf choices that [facility] had made and she agreed. Also discussed LTACH as pt's medical needs are higher than snf level of care at this time. She would be agreeable to [facility] if needed. Referral made to [facility] to follow.

## 2022-11-17 NOTE — FLOWSHEET NOTE
[facility] [id number] called for third time regarding blood cultures. Told patient is on list and they will page phlebotomist on floor to come ASAP.

## 2022-11-17 NOTE — FLOWSHEET NOTE
[facility] called for fourth time regarding pending STAT blood cultures ordered 0909 this morning. Was told by representative patient was not on list for phlebotomist to draw.  Representative changed patient status and moved to top of list.

## 2022-11-17 NOTE — FLOWSHEET NOTE
Bilateral soft wrist restraints. Continued. Patient will attempt to pull at lines, tubes, and equipment.

## 2022-11-17 NOTE — PROCEDURES
Intubation Procedure Note    Indication: Respiratory failure and hypoxia    Consent: Unable to be obtained due to the emergent nature of this procedure. Called daughter, but no answer    Medications Used: etomidate intravenously and propofol intravenously (propofol given once intubated    Procedure: The patient was placed in the appropriate position. Cricoid pressure was not required. Intubation was performed by direct laryngoscopy using a laryngoscope and an 8.0 cuffed endotracheal tube. The cuff was then inflated and the tube was secured appropriately at a distance of 25 cm to the dental ridge. Initial confirmation of placement included bilateral breath sounds, an end tidal CO2 detector, absence of sounds over the stomach, tube fogging, adequate chest rise, and adequate pulse oximetry reading. A chest x-ray to verify correct placement of the tube has been ordered but is still pending. The patient tolerated the procedure well.      Complications: None    Angela Baca MD, MSc, FACS  11/17/2022  7:11 AM

## 2022-11-17 NOTE — FLOWSHEET NOTE
Patient continues to reach for ETT, central lines, barboza, and monitoring devices when unrestrained. Attempts to verbally reorient/redirect or educate patient on importance towards continued care goals are unsuccessful at this time. 2pt bilateral soft wrist restraints maintained for patient safety. Daughter [place] at bedside and aware of continuation and indication. Will continue to attempt to reorient/redirect and assess for earliest possible safe removal from restraints to meet care goals.

## 2022-11-17 NOTE — PROCEDURES
Felicia Marie is a 57 y.o. male patient. 1. Subdural hematoma    2. Subarachnoid bleed (HCC)    3. Closed fracture of occipital bone, unspecified laterality, unspecified occipital fracture type, [facility]    4. JOSIE (acute kidney injury) ([facility]    5. COVID-19    6. Acute alcoholic intoxication with complication ([facility]    7. Acute respiratory failure with hypoxia (HCC)      Past Medical History:   Diagnosis Date    Hepatitis C     liver bx wnl. genotype is the best possible    Herniated disc, cervical      Blood pressure 105/77, pulse (!) 132, temperature 98.7 °F (37.1 °C), temperature source Axillary, resp. rate 28, height 5' 10\" (1.778 m), weight 165 lb 4.8 oz (75 kg), SpO2 95 %. Insert Arterial Line    Date/Time: 11/17/2022 8:23 AM  Performed by: Bradly Damian MD  Authorized by: Bradly Damian MD   Consent: The procedure was performed in an emergent situation. Patient identity confirmed: arm band  Preparation: Patient was prepped and draped in the usual sterile fashion.   Indications: multiple ABGs, respiratory failure and hemodynamic monitoring  Location: left radial    Sedation:  Patient sedated: yes  Sedatives: propofol  Analgesia: fentanyl    Favian's test normal: yes  Needle gauge: 20  Number of attempts: 2  Comments: Ultrasound used, unable to thread wire    Porfirio Lieberman MD, MSc, FACS  11/17/2022  8:23 AM      Porfirio Lieberman MD  11/17/2022

## 2022-11-17 NOTE — PROCEDURES
Angeles Wang is a 57 y.o. male patient. 1. Subdural hematoma    2. Subarachnoid bleed (HCC)    3. Closed fracture of occipital bone, unspecified laterality, unspecified occipital fracture type, [facility]    4. JOSIE (acute kidney injury) [facility]    5. COVID-19    6. Acute alcoholic intoxication with complication ([facility]    7. Acute respiratory failure with hypoxia (HCC)    8. Shock ([facility]      Past Medical History:   Diagnosis Date    Hepatitis C     liver bx wnl. genotype is the best possible    Herniated disc, cervical      Blood pressure (!) 65/50, pulse (!) 134, temperature 98.7 °F (37.1 °C), temperature source Axillary, resp. rate 24, height 5' 10\" (1.778 m), weight 165 lb 4.8 oz (75 kg), SpO2 97 %. Insert Arterial Line    Date/Time: 11/17/2022 9:53 AM  Performed by: Jose Gardner MD  Authorized by: Jose Gardner MD   Consent: The procedure was performed in an emergent situation. Test results: test results available and properly labeled  Site marked: the operative site was marked  Imaging studies: imaging studies available  Patient identity confirmed: arm band  Preparation: Patient was prepped and draped in the usual sterile fashion.   Indications: multiple ABGs, respiratory failure and hemodynamic monitoring  Location: right femoral    Sedation:  Patient sedated: yes  Sedatives: midazolam  Analgesia: fentanyl    Needle gauge: 20  Seldinger technique: Seldinger technique used  Number of attempts: 1  Post-procedure: line sutured and dressing applied  Patient tolerance: patient tolerated the procedure well with no immediate complications        Star Beck MD  11/17/2022

## 2022-11-17 NOTE — PROGRESS NOTES
Speech Language Pathology      NAME:  Samson Findrashi  :  1960  DATE: 2022  [facility]    Chart reviewed, Pt requiring increased oxygenation over night. Will DC from SLP caseload at this time. Subdural hematoma [S06. 5XAA]  SAH (subarachnoid hemorrhage) ([facility]) [I60.9]  Subarachnoid bleed ([facility] [I60.9]  JOSIE (acute kidney injury) ([facility] [Q75.4]  Acute alcoholic intoxication with complication ([facility] [J77.945]  Closed fracture of occipital bone, unspecified laterality, unspecified occipital fracture type, initial encounter ([facility] [T35.502C]  COVID-19 [U07.1]    Yancy Pruitt., [address] Pathologist  [id number]  2022

## 2022-11-17 NOTE — PROCEDURES
Jacqueline Lombardi is a 57 y.o. male patient. 1. Subdural hematoma    2. Subarachnoid bleed (HCC)    3. Closed fracture of occipital bone, unspecified laterality, unspecified occipital fracture type, initial encounter ([facility]    4. [facility]) [facility]    5. COVID-19    6. Acute alcoholic intoxication with complication [facility])      Past Medical History:   Diagnosis Date    Hepatitis C     liver bx wnl. genotype is the best possible    Herniated disc, cervical      Blood pressure 105/77, pulse (!) 132, temperature 98.7 °F (37.1 °C), temperature source Axillary, resp. rate 28, height 5' 10\" (1.778 m), weight 165 lb 4.8 oz (75 kg), SpO2 95 %. Central Line    Date/Time: 11/17/2022 8:21 AM  Performed by: Omar Conway MD  Authorized by: Omar Conway MD   Consent: The procedure was performed in an emergent situation. Test results: test results available and properly labeled  Site marked: the operative site was marked  Imaging studies: imaging studies available  Patient identity confirmed: arm band  Indications: vascular access and central pressure monitoring    Sedation:  Patient sedated: yes  Sedatives: propofol  Analgesia: fentanyl  Vitals: Vital signs were monitored during sedation.     Preparation: skin prepped with ChloraPrep  Sterile barriers: all five maximum sterile barriers used - cap, mask, sterile gown, sterile gloves, and large sterile sheet  Hand hygiene: hand hygiene performed prior to central venous catheter insertion  Location details: left subclavian  Patient position: Trendelenburg  Catheter type: triple lumen  Catheter size: 8 Fr  Pre-procedure: landmarks identified  Ultrasound guidance: no  Number of attempts: 1  Successful placement: yes  Post-procedure: line sutured and dressing applied  Assessment: blood return through all ports, free fluid flow, no pneumothorax on x-ray and placement verified by x-ray  Patient tolerance: patient tolerated the procedure well with no immediate complications    Ailyn Monge MD, MSc, FACS  11/17/2022  8:22 AM      Ailyn Monge MD  11/17/2022

## 2022-11-17 NOTE — PROGRESS NOTES
11/17/22 0616   Oxygen Therapy/Pulse Ox   O2 Therapy Oxygen humidified   O2 Device Heated high flow cannula   O2 Flow Rate (L/min) 60 L/min   FiO2  100 %   Humidification Source Heated wire   Placed on airvo per order. Spo2 95%.

## 2022-11-17 NOTE — PLAN OF CARE
Message sent to Dr. Yuli Solo regarding concurrent Subxone administration and new continuous infusion of fentanyl. Per Dr. Yuli Solo, hold Suboxone while on fentanyl infusion.  MD also stated if fentanyl infusion is short term (10-14 days), Suboxone in chronic use patients can be restarted without concern for withdrawal.

## 2022-11-17 NOTE — PROGRESS NOTES
Please note: This patient is on medication that requires renal, weight, and/or indication dose adjustment. Date Body Weight IBW  Adjusted BW SCr  CrCl Dialysis status   11/17/2022 165 lb 4.8 oz (75 kg) Ideal body weight: 73 kg (160 lb 15 oz)  Adjusted ideal body weight: 73.8 kg (162 lb 10.9 oz) Serum creatinine: 0.6 mg/dL (L) 11/17/22 0449  Estimated creatinine clearance: 128 mL/min (A) N/a       Pharmacy has dose-adjusted the following medication(s):    Date Previous Order Adjusted Order   11/17/2022 Cefpime 2 g IV every 12 hrs  Cefepime 2 g IV every 8 hrs        These changes were made per protocol according to the [address] for Pharmacists. *Please note this dose may need readjusted if patient's condition changes. Please contact pharmacy with any questions regarding these changes.     Jefe Muñoz, [facility]  11/17/2022  9:16 AM

## 2022-11-17 NOTE — FLOWSHEET NOTE
Stat lab 8006 called for second time to draw STAT blood cultures x 2 ordered at 0909 this morning. Transferred to lab, notified that there is only one phlebotomist until 1400 and cultures will be drawn when more staff arrives.

## 2022-11-17 NOTE — PROGRESS NOTES
OCCUPATIONAL THERAPY    Date:2022  Patient Name: Marcio Sosa  MRN: [id number]  : 1960  Room: [address] [place] [address]-A              Chart reviewed. Pt not medically appropriate for therapy at this time (s/p intubation)  Will re-attempt at later time. Thank you for consult.     Rebecca Purdy OTR/JARETH [id number]

## 2022-11-17 NOTE — PROGRESS NOTES
Pharmacy Consultation Note  (Antibiotic Dosing and Monitoring)    Initial consult date: 11-  Consulting physician/provider: Dr. Radha Mcfarlane  Drug: Vancomycin  Indication: sepsis    Age/  Gender Height Weight IBW  Allergy Information   62 y.o./male 5' 10\" (177.8 cm) 195 lb (88.5 kg)     Ideal body weight: 73 kg (160 lb 15 oz)  Adjusted ideal body weight: 73.8 kg (162 lb 10.9 oz)   Blue dyes (parenteral)      Renal Function:  Recent Labs     11/15/22  0545 11/16/22  0540 11/17/22  0449   BUN 15 14 25*   CREATININE 0.4* 0.4* 0.6*       Intake/Output Summary (Last 24 hours) at 11/17/2022 1129  Last data filed at 11/17/2022 1127  Gross per 24 hour   Intake 4065.07 ml   Output 1760 ml   Net 2305.07 ml       Vancomycin Monitoring:  Trough:  No results for input(s): VANCOTROUGH in the last 72 hours. Random:  No results for input(s): VANCORANDOM in the last 72 hours. No results for input(s): Charpam Dias in the last 72 hours. Historical Cultures:  Organism   Date Value Ref Range Status   06/15/2022 Staphylococcus aureus (A)  Final     No results for input(s): BC in the last 72 hours. Vancomycin Administration Times:  Recent vancomycin administrations                     vancomycin (VANCOCIN) 2,000 mg in dextrose 5 % 500 mL IVPB (mg) 2,000 mg New Bag 11/17/22 1002                    Assessment:  63 yo/M, starting vancomycin empirically for sepsis. Admitted 11-6-2022 after unwitnessed fall at work.  +EtOH on admission; found to have SDH/SAH/IPH. Estimated Creatinine Clearance: 128 mL/min (A) (based on SCr of 0.6 mg/dL (L)). To dose vancomycin, pharmacy will be utilizing Mobile[employer] calculation software for goal AUC/ANNE 400-600 mg/L-hr. Plan:  Give vancomycin 2000 mg x1; then start 1250 mg q12h tonight. Will check vancomycin level on 11/18 AM.  Will continue to monitor renal function. Pharmacy to follow.     Vinny Enrique, [facility] 11/17/2022 11:29 AM

## 2022-11-17 NOTE — PROGRESS NOTES
Physical Therapy  Physical Therapy Attempt    Name: Governnishant Fabian  : 1960  MRN: [id number]      Date of Service: 2022  Chart reviewed. Pt was just intubated this morning and not appropriate for skilled PT at this time. Will re-attempt as able.     Judi Tellez, PT, DPT  [id number]

## 2022-11-17 NOTE — PROGRESS NOTES
Patient oxygen saturation dropping since beginning of shift, increased oxygen to 8 L. This RN attempted to Nasotracheal suction the patient to clear him of mucous in the throat. Upon progression patient had an episode of vomiting. Oral suction used, increase high flow nasal canula oxygen to 12 L. Chest x-ray ordered.

## 2022-11-17 NOTE — PROGRESS NOTES
[facility]  PROGRESS NOTE  ATTENDING NOTE    TRAUMA/CRITICAL CARE    MECHANISM OF INJURY:  unwitnessed fall    Chief Complaint   Patient presents with    Fall     +LOC fell backwards, +head, hematoma posterior head, unwitnessed, now dizzy, confused, A&Ox2 wrong month,president, doesn't remember falling R shoulder pain. Takes BP meds and suboxone, C-collar. HPI  Trauma consult. Injury occurred just prior to arrival to OSH. Unknown cause of fall. Per report, patient was unwitnessed fall at work. Positive loss of consciousness with hematoma to posterior head. Elevated EtOH. Patient Active Problem List   Diagnosis    Inguinal hernia, right    Cervical radiculopathy    Chronic hepatitis C without hepatic coma (HCC)    Right shoulder pain    Right subclavian artery occlusion    Essential hypertension    Sinus tachycardia    Benign paroxysmal positional vertigo due to bilateral vestibular disorder    Hypotension    Pneumonia due to organism    Bacterial pneumonia    Acidosis    JOSIE (acute kidney injury) ([facility]    Bacteremia    Alcohol withdrawal syndrome without complication (HCC)    Dysphagia    Alcohol abuse    Subarachnoid bleed (HCC)    Subdural hematoma    Acute alcoholic intoxication with complication (HCC)    Closed fracture of occipital bone (HCC)    COVID-19    Opioid dependence on agonist therapy ([facility]    Delirium    Other constipation       OVERNIGHT EVENTS:  Tachycardic, tachypnea; placed on [address]:  11/5--presented after unwitnessed fall; found to have SDH/SAH/IPH--worse on repeat head CT  11/6--no issues overnight; admitted to SICU. 11/7--Pt confused this morning. 11/8--Pt with increasing confusion and agitation overnight. Pulled out two IV's, in 4-point restraints. Started on Norvasc, Depakote sprinkles and precedex. 11/9--Precedex switched to clonidine due to bradycardia. Remains A&Ox1 not following commands.  Again pulling out IV's, in 4-point restraints. 11/10--Pt remains in 4-point restraints. A&Ox1. Follows commands intermittently today. Bradycardia resolved, continued hypertension with agitation. 11/11--Pt requiring Hydralazine PRN frequently overnight. Clonidine increased. Continued agitation. 11/12-No acute events. No new complaints. 11/13--remains agitated and restless; pulled out corpak again. Taking some meds with pudding per nursing  11/14--repeat CT head with slight increase in subarachnoid hemorrhage, phenobarb stopped, IV team for smallbore feeding tube placement, ammonia TSH checked, fluid started as patient not been on IV fluids all weekend  11/15--placed back on oral anti-HTN, d/c IVF  11/16--change metoprolol to coreg  11/17--intubated, CVC, arnaldo, CT head, CTA chest, abx, 1.5L fluid bolus, pressors, procal    /77   Pulse (!) 132   Temp 98.7 °F (37.1 °C) (Axillary)   Resp 28   Ht 5' 10\" (1.778 m)   Wt 165 lb 4.8 oz (75 kg)   SpO2 95%   BMI 23.72 kg/m²   Physical Exam  Constitutional:       Appearance: He is ill-appearing. HENT:      Head: Normocephalic and atraumatic. Nose: Nose normal.      Mouth/Throat:      Mouth: Mucous membranes are moist.      Pharynx: Oropharynx is clear. Eyes:      Extraocular Movements: Extraocular movements intact. Pupils: Pupils are equal, round, and reactive to light. Cardiovascular:      Rate and Rhythm: Normal rate and regular rhythm. Pulses: Normal pulses. Heart sounds: Normal heart sounds. Pulmonary:      Effort: Pulmonary effort is normal.      Breath sounds: Normal breath sounds. Abdominal:      General: There is distension. Palpations: Abdomen is soft. Tenderness: There is no abdominal tenderness. Musculoskeletal:         General: No tenderness or signs of injury. Cervical back: Normal range of motion and neck supple. Skin:     General: Skin is warm and dry. Neurological:      Mental Status: He is alert. Comments:  Will not speak to me  Says yes inappropriately at times  Purposeful  Does not follow commands       Lines: Barboza:  yes--Continue barboza catheter for managing strict I and Os in this critically ill patient. Central line:  yes--left SCV CVC 11/17  PICC:  yes - right basilic PICC    I have reviewed the laboratory studies    CXR:  clear, tubes and lines in good position    ASSESSMENT/PLAN:   Neuro: TBI--completed course of Keppra, neurosurgery following  Occipital skull fracture--analgesia, neurosurgery following  History of drug abuse--continue Suboxone, addiction medicine following  Delirium/agitation--continue clonidine, Depakote, check Depakote level--normal, check ammonia--normal, check TSH--elevated but FT4 and FT3 ok,   Alcohol abuse--thiamine, folate, social work for SBI, completed course of phenobarbital  Cardio: septic shock--levo, vaso, check cortisol; bolus  Respiratory: COVID +--droplet precautions, no tx needed  Acute respiratory failure--intubated, duonebs, pulm toilet, vent management, CTA chest  GI:  dysphagia--s/p PEG, TFs on hold d/t pressors  FEN: No active issues   Renal:  No active issues   Heme:  No active issues   Endocrine:  No active issues, keep glucose <180   ID: sepsis--panculture, start antibiotics, check procal      DVT/GI ppx:  heparin sub-q/PPI, carafate    CC TIME:  I spent 55 min managing this patients critical issues which are a constant threat to life excluding time teaching and performing procedures.       Misha Ernst MD, MSc, FACS  11/17/2022  8:25 AM

## 2022-11-18 ENCOUNTER — APPOINTMENT (OUTPATIENT)
Dept: GENERAL RADIOLOGY | Age: 62
DRG: 082 | End: 2022-11-18
Payer: COMMERCIAL

## 2022-11-18 LAB
AADO2: 292.5 MMHG
ANION GAP SERPL CALCULATED.3IONS-SCNC: 11 MMOL/L (ref 7–16)
B.E.: 1.1 MMOL/L (ref -3–3)
BUN BLDV-MCNC: 22 MG/DL (ref 6–23)
CALCIUM IONIZED: 1.21 MMOL/L (ref 1.15–1.33)
CALCIUM SERPL-MCNC: 8.3 MG/DL (ref 8.6–10.2)
CHLORIDE BLD-SCNC: 110 MMOL/L (ref 98–107)
CO2: 22 MMOL/L (ref 22–29)
COHB: 0.4 % (ref 0–1.5)
CREAT SERPL-MCNC: 0.5 MG/DL (ref 0.7–1.2)
CRITICAL: NORMAL
DATE ANALYZED: NORMAL
DATE OF COLLECTION: NORMAL
FIO2: 60 %
GFR SERPL CREATININE-BSD FRML MDRD: >60 ML/MIN/1.73
GLUCOSE BLD-MCNC: 88 MG/DL (ref 74–99)
HCO3: 24.9 MMOL/L (ref 22–26)
HCT VFR BLD CALC: 37 % (ref 37–54)
HEMOGLOBIN: 12.3 G/DL (ref 12.5–16.5)
HHB: 4.5 % (ref 0–5)
LAB: NORMAL
Lab: NORMAL
MAGNESIUM: 1.9 MG/DL (ref 1.6–2.6)
MCH RBC QN AUTO: 34.6 PG (ref 26–35)
MCHC RBC AUTO-ENTMCNC: 33.2 % (ref 32–34.5)
MCV RBC AUTO: 103.9 FL (ref 80–99.9)
METHB: 0.2 % (ref 0–1.5)
MODE: AC
O2 SATURATION: 95.5 % (ref 92–98.5)
O2HB: 94.9 % (ref 94–97)
OPERATOR ID: 2577
PATIENT TEMP: 37 C
PCO2: 36.8 MMHG (ref 35–45)
PDW BLD-RTO: 12.2 FL (ref 11.5–15)
PEEP/CPAP: 8 CMH2O
PFO2: 1.33 MMHG/%
PH BLOOD GAS: 7.45 (ref 7.35–7.45)
PHOSPHORUS: 2.2 MG/DL (ref 2.5–4.5)
PLATELET # BLD: 220 E9/L (ref 130–450)
PMV BLD AUTO: 11.3 FL (ref 7–12)
PO2: 79.8 MMHG (ref 75–100)
POTASSIUM REFLEX MAGNESIUM: 3.4 MMOL/L (ref 3.5–5)
RBC # BLD: 3.56 E12/L (ref 3.8–5.8)
RI(T): 3.67
RR MECHANICAL: 14 B/MIN
SODIUM BLD-SCNC: 143 MMOL/L (ref 132–146)
SOURCE, BLOOD GAS: NORMAL
THB: 13.2 G/DL (ref 11.5–16.5)
TIME ANALYZED: 426
VANCOMYCIN RANDOM: 9.5 MCG/ML (ref 5–40)
VT MECHANICAL: 500 ML
WBC # BLD: 16.7 E9/L (ref 4.5–11.5)

## 2022-11-18 PROCEDURE — 87040 BLOOD CULTURE FOR BACTERIA: CPT

## 2022-11-18 PROCEDURE — 6370000000 HC RX 637 (ALT 250 FOR IP): Performed by: STUDENT IN AN ORGANIZED HEALTH CARE EDUCATION/TRAINING PROGRAM

## 2022-11-18 PROCEDURE — 6370000000 HC RX 637 (ALT 250 FOR IP): Performed by: SURGERY

## 2022-11-18 PROCEDURE — 6360000002 HC RX W HCPCS

## 2022-11-18 PROCEDURE — 94003 VENT MGMT INPAT SUBQ DAY: CPT

## 2022-11-18 PROCEDURE — 2580000003 HC RX 258: Performed by: SURGERY

## 2022-11-18 PROCEDURE — 80048 BASIC METABOLIC PNL TOTAL CA: CPT

## 2022-11-18 PROCEDURE — 99291 CRITICAL CARE FIRST HOUR: CPT | Performed by: SURGERY

## 2022-11-18 PROCEDURE — 2000000000 HC ICU R&B

## 2022-11-18 PROCEDURE — 6360000002 HC RX W HCPCS: Performed by: SURGERY

## 2022-11-18 PROCEDURE — 84100 ASSAY OF PHOSPHORUS: CPT

## 2022-11-18 PROCEDURE — 80202 ASSAY OF VANCOMYCIN: CPT

## 2022-11-18 PROCEDURE — 83735 ASSAY OF MAGNESIUM: CPT

## 2022-11-18 PROCEDURE — 82805 BLOOD GASES W/O2 SATURATION: CPT

## 2022-11-18 PROCEDURE — 71045 X-RAY EXAM CHEST 1 VIEW: CPT

## 2022-11-18 PROCEDURE — 2580000003 HC RX 258: Performed by: STUDENT IN AN ORGANIZED HEALTH CARE EDUCATION/TRAINING PROGRAM

## 2022-11-18 PROCEDURE — 36415 COLL VENOUS BLD VENIPUNCTURE: CPT

## 2022-11-18 PROCEDURE — 85027 COMPLETE CBC AUTOMATED: CPT

## 2022-11-18 PROCEDURE — 37799 UNLISTED PX VASCULAR SURGERY: CPT

## 2022-11-18 PROCEDURE — 82330 ASSAY OF CALCIUM: CPT

## 2022-11-18 PROCEDURE — 2500000003 HC RX 250 WO HCPCS: Performed by: SURGERY

## 2022-11-18 PROCEDURE — 6370000000 HC RX 637 (ALT 250 FOR IP)

## 2022-11-18 PROCEDURE — 94640 AIRWAY INHALATION TREATMENT: CPT

## 2022-11-18 RX ORDER — OXYCODONE HYDROCHLORIDE 5 MG/1
5 TABLET ORAL EVERY 4 HOURS
Status: DISCONTINUED | OUTPATIENT
Start: 2022-11-18 | End: 2022-11-19

## 2022-11-18 RX ORDER — CARVEDILOL 6.25 MG/1
6.25 TABLET ORAL 2 TIMES DAILY WITH MEALS
Status: DISCONTINUED | OUTPATIENT
Start: 2022-11-18 | End: 2022-11-30 | Stop reason: HOSPADM

## 2022-11-18 RX ORDER — FENTANYL CITRATE 50 UG/ML
50 INJECTION, SOLUTION INTRAMUSCULAR; INTRAVENOUS
Status: DISCONTINUED | OUTPATIENT
Start: 2022-11-18 | End: 2022-11-20

## 2022-11-18 RX ADMIN — SUCRALFATE 1 G: 1 TABLET ORAL at 17:45

## 2022-11-18 RX ADMIN — MINERAL OIL, WHITE PETROLATUM: .03; .94 OINTMENT OPHTHALMIC at 17:45

## 2022-11-18 RX ADMIN — CARVEDILOL 6.25 MG: 6.25 TABLET, FILM COATED ORAL at 09:22

## 2022-11-18 RX ADMIN — VANCOMYCIN HYDROCHLORIDE 1250 MG: 10 INJECTION, POWDER, LYOPHILIZED, FOR SOLUTION INTRAVENOUS at 07:36

## 2022-11-18 RX ADMIN — Medication 250 MG: at 11:57

## 2022-11-18 RX ADMIN — DIVALPROEX SODIUM 250 MG: 125 CAPSULE, COATED PELLETS ORAL at 13:45

## 2022-11-18 RX ADMIN — IPRATROPIUM BROMIDE AND ALBUTEROL SULFATE 1 AMPULE: .5; 2.5 SOLUTION RESPIRATORY (INHALATION) at 21:32

## 2022-11-18 RX ADMIN — Medication 100 MG: at 09:21

## 2022-11-18 RX ADMIN — OXYCODONE 5 MG: 5 TABLET ORAL at 23:59

## 2022-11-18 RX ADMIN — GUAIFENESIN 200 MG: 200 SOLUTION ORAL at 19:46

## 2022-11-18 RX ADMIN — LACTULOSE 20 G: 20 SOLUTION ORAL at 19:46

## 2022-11-18 RX ADMIN — HEPARIN SODIUM 5000 UNITS: 10000 INJECTION INTRAVENOUS; SUBCUTANEOUS at 03:57

## 2022-11-18 RX ADMIN — GUAIFENESIN 200 MG: 200 SOLUTION ORAL at 11:57

## 2022-11-18 RX ADMIN — FENTANYL CITRATE 50 MCG: 0.05 INJECTION, SOLUTION INTRAMUSCULAR; INTRAVENOUS at 12:11

## 2022-11-18 RX ADMIN — POLYVINYL ALCOHOL 1 DROP: 14 SOLUTION/ DROPS OPHTHALMIC at 16:15

## 2022-11-18 RX ADMIN — OXYCODONE 5 MG: 5 TABLET ORAL at 09:20

## 2022-11-18 RX ADMIN — CHLORHEXIDINE GLUCONATE 15 ML: 1.2 RINSE BUCCAL at 19:47

## 2022-11-18 RX ADMIN — DIVALPROEX SODIUM 250 MG: 125 CAPSULE, COATED PELLETS ORAL at 20:02

## 2022-11-18 RX ADMIN — Medication 250 MG: at 09:21

## 2022-11-18 RX ADMIN — POLYVINYL ALCOHOL 1 DROP: 14 SOLUTION/ DROPS OPHTHALMIC at 11:58

## 2022-11-18 RX ADMIN — FENTANYL CITRATE 50 MCG: 0.05 INJECTION, SOLUTION INTRAMUSCULAR; INTRAVENOUS at 20:04

## 2022-11-18 RX ADMIN — IPRATROPIUM BROMIDE AND ALBUTEROL SULFATE 1 AMPULE: .5; 2.5 SOLUTION RESPIRATORY (INHALATION) at 13:55

## 2022-11-18 RX ADMIN — VANCOMYCIN HYDROCHLORIDE 1250 MG: 10 INJECTION, POWDER, LYOPHILIZED, FOR SOLUTION INTRAVENOUS at 19:52

## 2022-11-18 RX ADMIN — Medication 250 MG: at 19:46

## 2022-11-18 RX ADMIN — SUCRALFATE 1 G: 1 TABLET ORAL at 06:04

## 2022-11-18 RX ADMIN — HEPARIN SODIUM 5000 UNITS: 10000 INJECTION INTRAVENOUS; SUBCUTANEOUS at 12:06

## 2022-11-18 RX ADMIN — LANSOPRAZOLE 30 MG: KIT at 16:15

## 2022-11-18 RX ADMIN — ACETAMINOPHEN 650 MG: 325 TABLET, FILM COATED ORAL at 16:14

## 2022-11-18 RX ADMIN — POTASSIUM PHOSPHATE, MONOBASIC AND POTASSIUM PHOSPHATE, DIBASIC 30 MMOL: 224; 236 INJECTION, SOLUTION, CONCENTRATE INTRAVENOUS at 09:19

## 2022-11-18 RX ADMIN — POLYVINYL ALCOHOL 1 DROP: 14 SOLUTION/ DROPS OPHTHALMIC at 19:51

## 2022-11-18 RX ADMIN — DIVALPROEX SODIUM 250 MG: 125 CAPSULE, COATED PELLETS ORAL at 06:04

## 2022-11-18 RX ADMIN — MINERAL OIL, WHITE PETROLATUM: .03; .94 OINTMENT OPHTHALMIC at 13:45

## 2022-11-18 RX ADMIN — Medication 4 MG/HR: at 07:34

## 2022-11-18 RX ADMIN — MINERAL OIL, WHITE PETROLATUM: .03; .94 OINTMENT OPHTHALMIC at 02:08

## 2022-11-18 RX ADMIN — OXYCODONE 5 MG: 5 TABLET ORAL at 19:46

## 2022-11-18 RX ADMIN — LANSOPRAZOLE 30 MG: KIT at 06:03

## 2022-11-18 RX ADMIN — Medication 10 ML: at 09:24

## 2022-11-18 RX ADMIN — GUAIFENESIN 200 MG: 200 SOLUTION ORAL at 23:59

## 2022-11-18 RX ADMIN — OXYCODONE 5 MG: 5 TABLET ORAL at 16:14

## 2022-11-18 RX ADMIN — SUCRALFATE 1 G: 1 TABLET ORAL at 00:10

## 2022-11-18 RX ADMIN — POLYETHYLENE GLYCOL 3350 17 G: 17 POWDER, FOR SOLUTION ORAL at 09:23

## 2022-11-18 RX ADMIN — CEFEPIME 2000 MG: 2 INJECTION, POWDER, FOR SOLUTION INTRAVENOUS at 17:48

## 2022-11-18 RX ADMIN — SENNOSIDES AND DOCUSATE SODIUM 2 TABLET: 50; 8.6 TABLET ORAL at 09:23

## 2022-11-18 RX ADMIN — SODIUM CHLORIDE 0.2 MCG/KG/HR: 9 INJECTION, SOLUTION INTRAVENOUS at 09:17

## 2022-11-18 RX ADMIN — CEFEPIME 2000 MG: 2 INJECTION, POWDER, FOR SOLUTION INTRAVENOUS at 02:08

## 2022-11-18 RX ADMIN — MINERAL OIL, WHITE PETROLATUM: .03; .94 OINTMENT OPHTHALMIC at 09:21

## 2022-11-18 RX ADMIN — SODIUM CHLORIDE: 9 INJECTION, SOLUTION INTRAVENOUS at 21:49

## 2022-11-18 RX ADMIN — GUAIFENESIN 200 MG: 200 SOLUTION ORAL at 03:57

## 2022-11-18 RX ADMIN — POLYVINYL ALCOHOL 1 DROP: 14 SOLUTION/ DROPS OPHTHALMIC at 00:11

## 2022-11-18 RX ADMIN — MINERAL OIL, WHITE PETROLATUM: .03; .94 OINTMENT OPHTHALMIC at 21:53

## 2022-11-18 RX ADMIN — POTASSIUM BICARBONATE 20 MEQ: 782 TABLET, EFFERVESCENT ORAL at 09:21

## 2022-11-18 RX ADMIN — GUAIFENESIN 200 MG: 200 SOLUTION ORAL at 00:10

## 2022-11-18 RX ADMIN — MINERAL OIL, WHITE PETROLATUM: .03; .94 OINTMENT OPHTHALMIC at 06:04

## 2022-11-18 RX ADMIN — IPRATROPIUM BROMIDE AND ALBUTEROL SULFATE 1 AMPULE: .5; 2.5 SOLUTION RESPIRATORY (INHALATION) at 09:48

## 2022-11-18 RX ADMIN — POLYVINYL ALCOHOL 1 DROP: 14 SOLUTION/ DROPS OPHTHALMIC at 23:59

## 2022-11-18 RX ADMIN — FOLIC ACID 1 MG: 1 TABLET ORAL at 09:20

## 2022-11-18 RX ADMIN — POLYVINYL ALCOHOL 1 DROP: 14 SOLUTION/ DROPS OPHTHALMIC at 03:57

## 2022-11-18 RX ADMIN — SUCRALFATE 1 G: 1 TABLET ORAL at 11:58

## 2022-11-18 RX ADMIN — POTASSIUM BICARBONATE 20 MEQ: 782 TABLET, EFFERVESCENT ORAL at 19:46

## 2022-11-18 RX ADMIN — SODIUM CHLORIDE: 9 INJECTION, SOLUTION INTRAVENOUS at 12:20

## 2022-11-18 RX ADMIN — OXYCODONE 5 MG: 5 TABLET ORAL at 11:58

## 2022-11-18 RX ADMIN — LACTULOSE 20 G: 20 SOLUTION ORAL at 13:45

## 2022-11-18 RX ADMIN — LACTULOSE 20 G: 20 SOLUTION ORAL at 09:20

## 2022-11-18 RX ADMIN — Medication 10 ML: at 19:47

## 2022-11-18 RX ADMIN — GUAIFENESIN 200 MG: 200 SOLUTION ORAL at 09:20

## 2022-11-18 RX ADMIN — IPRATROPIUM BROMIDE AND ALBUTEROL SULFATE 1 AMPULE: .5; 2.5 SOLUTION RESPIRATORY (INHALATION) at 17:58

## 2022-11-18 RX ADMIN — POLYVINYL ALCOHOL 1 DROP: 14 SOLUTION/ DROPS OPHTHALMIC at 07:35

## 2022-11-18 RX ADMIN — ACETAMINOPHEN 650 MG: 325 TABLET, FILM COATED ORAL at 11:57

## 2022-11-18 RX ADMIN — CARVEDILOL 6.25 MG: 6.25 TABLET, FILM COATED ORAL at 17:45

## 2022-11-18 RX ADMIN — Medication 250 MG: at 17:45

## 2022-11-18 RX ADMIN — GUAIFENESIN 200 MG: 200 SOLUTION ORAL at 16:14

## 2022-11-18 RX ADMIN — CEFEPIME 2000 MG: 2 INJECTION, POWDER, FOR SOLUTION INTRAVENOUS at 09:45

## 2022-11-18 RX ADMIN — FENTANYL CITRATE 50 MCG: 0.05 INJECTION, SOLUTION INTRAMUSCULAR; INTRAVENOUS at 14:44

## 2022-11-18 RX ADMIN — FENTANYL CITRATE 50 MCG: 0.05 INJECTION, SOLUTION INTRAMUSCULAR; INTRAVENOUS at 21:51

## 2022-11-18 RX ADMIN — HEPARIN SODIUM 5000 UNITS: 10000 INJECTION INTRAVENOUS; SUBCUTANEOUS at 19:45

## 2022-11-18 RX ADMIN — CHLORHEXIDINE GLUCONATE 15 ML: 1.2 RINSE BUCCAL at 09:20

## 2022-11-18 ASSESSMENT — PULMONARY FUNCTION TESTS
PIF_VALUE: 14
PIF_VALUE: 22
PIF_VALUE: 13
PIF_VALUE: 14
PIF_VALUE: 21
PIF_VALUE: 19
PIF_VALUE: 22
PIF_VALUE: 13
PIF_VALUE: 14
PIF_VALUE: 13
PIF_VALUE: 13
PIF_VALUE: 19
PIF_VALUE: 21
PIF_VALUE: 13
PIF_VALUE: 24
PIF_VALUE: 14
PIF_VALUE: 25
PIF_VALUE: 13
PIF_VALUE: 24
PIF_VALUE: 20
PIF_VALUE: 13
PIF_VALUE: 13
PIF_VALUE: 23
PIF_VALUE: 14
PIF_VALUE: 21
PIF_VALUE: 14
PIF_VALUE: 21

## 2022-11-18 ASSESSMENT — PAIN SCALES - GENERAL
PAINLEVEL_OUTOF10: 0
PAINLEVEL_OUTOF10: 0
PAINLEVEL_OUTOF10: 6
PAINLEVEL_OUTOF10: 8
PAINLEVEL_OUTOF10: 7
PAINLEVEL_OUTOF10: 4
PAINLEVEL_OUTOF10: 0
PAINLEVEL_OUTOF10: 7
PAINLEVEL_OUTOF10: 0
PAINLEVEL_OUTOF10: 8
PAINLEVEL_OUTOF10: 0
PAINLEVEL_OUTOF10: 3
PAINLEVEL_OUTOF10: 2

## 2022-11-18 NOTE — PROGRESS NOTES
SURGICAL INTENSIVE CARE  PROGRESS NOTE    Date of admission:  11/5/2022  Reason for ICU transfer:  worsening head CT    SUBJECTIVE:  Patient remains intubated. Opens eyes to voice. Does not follow commands. HOSPITAL COURSE:    11/5--presented after unwitnessed fall; found to have SDH/SAH/IPH--worse on repeat head CT  11/6--no issues overnight; admitted to SICU. 11/7--Pt confused this morning. 11/8--Pt with increasing confusion and agitation overnight. Pulled out two IV's, in 4-point restraints. Started on Norvasc, Depakote sprinkles and precedex. 11/9--Precedex switched to clonidine due to bradycardia. Remains A&Ox1 not following commands. Again pulling out IV's, in 4-point restraints. 11/10--Pt remains in 4-point restraints. A&Ox1. Follows commands intermittently today. Bradycardia resolved, continued hypertension with agitation. 11/11--Pt requiring Hydralazine PRN frequently overnight. Clonidine increased. Continued agitation. 11/12-No acute events. No new complaints. 11/13--remains agitated and restless; pulled out corpak again. Taking some meds with pudding per nursing  11/14--Increased somnolence and confusion overnight. STAT head CT showed slight increase in right parietal SAH. Stable on repeat head CT. Ammonia and TSH ordered. 11/15--GCS 12. Ammonia increased from 24 to 41. Started on Lactulose. TSH 5.51 with normal T3, T4. Had 1 BM.   11/16--No acute overnight events. Patient is more alert today. Says his full name and follows some commands. PEG placed today. 11/17--Patient developed tachypnea, tachycardia and hypoxia overnight. Emergent intubation this AM. Patient was GCS 10 prior to intubation. Opening eyes to voice and mumbling but not following commands. CTA chest negative for PE. Started on antibiotics.        PHYSICAL EXAM:  /88   Pulse 92   Temp 97.9 °F (36.6 °C) (Bladder)   Resp 15   Ht 5' 10\" (1.778 m)   Wt 165 lb 4.8 oz (75 kg)   SpO2 99%   BMI 23.72 kg/m²     GENERAL: NAD. Intubated  HEAD:  Normocephalic, atraumatic. EYES:   No scleral icterus. PERRLA. LUNGS:  CTAB. Mechanical ventilation  CARDIOVASCULAR: Warm throughout. RRR. ABDOMEN:  PEG tube in place, 2 cm at the skin. No erythema or drainage. Soft, non distended, non tender. No guarding, rigidity, rebound. EXTREMITIES:   MAEx4. Atraumatic. No LE edema. SKIN:  Warm and dry  NEUROLOGIC:  Intubated, sedated. Opens eyes to voice. Localizes to pain. ASSESSMENT / PLAN:  Neuro:  shows bifrontal contusions greater than right and bitemporal contusions with tentorial subdural hematoma and occipital fx stable on scan #3. Slight interval increase on repeat scan 11/14. GCS 12. NSG following. Keppra completed. ETOH on PNB (completed). Suboxone film per Addiction medicine. Continue Depakote. Depakote level normal. Stop fentanyl and Versed drips. Start Precedex and scheduled oxy. CV: Septic shock, no longer on pressors. HTN, switched to Coreg. Cortisol elevated. Fluid support. Pulm: Acute hypoxic respiratory failure. Intubated. CTA chest negative for PE with evidence of atelectasis/infiltrates. Intubated and sedated. Duonebs. Wean per protocol. COVID-19 infection. Supportive care. Encourage IS/SMI. GI:  Ammonia elevated from prior. Started on Lactulose. Bowel regimen. Zofran PRN. HO HCV. SLP to re-evaluate when mentation improved. Corpack. PEG tube in place. Hold tube feeds due to pressors. Renal: No acute issues. Monitor UOP & Electrolytes. Endocrine: TSH elevated with normal T3/T4. No acute issues. Monitor glucose. MSK: No acute issues. PT/OT when able. Heme: monitor CBC. ID: Started on Cefepime and Vanc for sepsis. Pancultures pending. Procal increased. COVID-19. Supportive care. Hep C. Pain/Analgesia: Tylenol, Suboxone, PRN Fentanyl  Total fluid rate: Stopped  Bowel regimen: Dulcolax, glycolax  DVT proph:  SQ heparin  Glucose protocol: Not indicated  Mouth/eye care: As needed per patient  Urine: [place]. 11/17. Keep in place for fluid monitoring.   CVC sites:  Triple lumen left subclavian 11/17  Ancillary consults: Lisbet Mosher, addiction medicine  Family Update: As available    Disposition: [facility]    Electronically signed by Carola Tobin DO on 11/18/2022 at 8:36 AM

## 2022-11-18 NOTE — PROGRESS NOTES
[facility]  PROGRESS NOTE  ATTENDING NOTE    TRAUMA/CRITICAL CARE    MECHANISM OF INJURY:  unwitnessed fall    Chief Complaint   Patient presents with    Fall     +LOC fell backwards, +head, hematoma posterior head, unwitnessed, now dizzy, confused, A&Ox2 wrong month,president, doesn't remember falling R shoulder pain. Takes BP meds and suboxone, C-collar. HPI  Trauma consult. Injury occurred just prior to arrival to OSH. Unknown cause of fall. Per report, patient was unwitnessed fall at work. Positive loss of consciousness with hematoma to posterior head. Elevated EtOH. Patient Active Problem List   Diagnosis    Inguinal hernia, right    Cervical radiculopathy    Chronic hepatitis C without hepatic coma (HCC)    Right shoulder pain    Right subclavian artery occlusion    Essential hypertension    Sinus tachycardia    Benign paroxysmal positional vertigo due to bilateral vestibular disorder    Hypotension    Pneumonia due to organism    Bacterial pneumonia    Acidosis    JOSIE (acute kidney injury) ([facility] [address]    Bacteremia    Alcohol withdrawal syndrome without complication (HCC)    Dysphagia    Alcohol abuse    Subarachnoid bleed (HCC)    Subdural hematoma    Acute alcoholic intoxication with complication (HCC)    Closed fracture of occipital bone (HCC)    COVID-19    Opioid dependence on agonist therapy ([facility] [address]    Delirium    Other constipation       OVERNIGHT EVENTS:  No issues overnight off pressors, bowel movement x1    HOSPITAL COURSE:  11/5--presented after unwitnessed fall; found to have SDH/SAH/IPH--worse on repeat head CT  11/6--no issues overnight; admitted to SICU. 11/7--Pt confused this morning. 11/8--Pt with increasing confusion and agitation overnight. Pulled out two IV's, in 4-point restraints. Started on Norvasc, Depakote sprinkles and precedex. 11/9--Precedex switched to clonidine due to bradycardia. Remains A&Ox1 not following commands.  Again pulling out IV's, in 4-point restraints. 11/10--Pt remains in 4-point restraints. A&Ox1. Follows commands intermittently today. Bradycardia resolved, continued hypertension with agitation. 11/11--Pt requiring Hydralazine PRN frequently overnight. Clonidine increased. Continued agitation. 11/12-No acute events. No new complaints. 11/13--remains agitated and restless; pulled out corpak again. Taking some meds with pudding per nursing  11/14--repeat CT head with slight increase in subarachnoid hemorrhage, phenobarb stopped, IV team for smallbore feeding tube placement, ammonia TSH checked, fluid started as patient not been on IV fluids all weekend  11/15--placed back on oral anti-HTN, d/c IVF  11/16--change metoprolol to coreg  11/17--intubated, CVC, arnaldo, CT head, CTA chest, abx, 1.5L fluid bolus, pressors, procal  11/18: Stop fentanyl and Versed drips and switch to Precedex and scheduled Doxy, Coreg restarted at lower dose, tube feeds restarted    /88   Pulse 92   Temp 97.9 °F (36.6 °C) (Bladder)   Resp 15   Ht 5' 10\" (1.778 m)   Wt 165 lb 4.8 oz (75 kg)   SpO2 99%   BMI 23.72 kg/m²   Physical Exam  Constitutional:       Appearance: He is ill-appearing. HENT:      Head: Normocephalic and atraumatic. Nose: Nose normal.      Mouth/Throat:      Mouth: Mucous membranes are moist.      Pharynx: Oropharynx is clear. Eyes:      Extraocular Movements: Extraocular movements intact. Pupils: Pupils are equal, round, and reactive to light. Cardiovascular:      Rate and Rhythm: Normal rate and regular rhythm. Pulses: Normal pulses. Heart sounds: Normal heart sounds. Pulmonary:      Effort: Pulmonary effort is normal.      Breath sounds: Normal breath sounds. Abdominal:      General: There is distension. Palpations: Abdomen is soft. Tenderness: There is no abdominal tenderness. Musculoskeletal:         General: No tenderness or signs of injury.       Cervical back: Normal range of motion and neck supple. Skin:     General: Skin is warm and dry. Neurological:      Mental Status: He is alert. Comments: Intubated, sedated       Lines: Barboza:  yes--Continue barboza catheter for managing strict I and Os in this critically ill patient. Central line:  yes--left SCV CVC 11/17  PICC:  yes - right basilic PICC, single-lumen    I have reviewed the laboratory studies    CXR: Small right lower lobe infiltrate otherwise tubes and lines in good position    ASSESSMENT/PLAN:   Neuro: TBI--completed course of Keppra, neurosurgery following  Occipital skull fracture--analgesia, neurosurgery following  History of drug abuse--Suboxone held due to continuous fentanyl drip, addiction medicine following  Delirium/agitation--continue Depakote, stop fentanyl and Versed drips and switch to Precedex and scheduled Doxy  Alcohol abuse--thiamine, folate, social work for SBI, completed course of phenobarbital  Cardio: septic shock--improved, cortisol normal continue antibiotics  Hypertension discharge will resume Coreg at low-dose  Respiratory: COVID +--droplet precautions, no tx needed  Acute respiratory failure--intubated, duonebs, pulm toilet, vent management, CTA chest--negative for PE only showed infiltrates  GI:  dysphagia--s/p PEG, TFs resumed today  FEN: Hypokalemia--replace  Hypophosphatemia--replace  Renal:  No active issues   Heme:  No active issues   Endocrine:  No active issues, keep glucose <180   ID: sepsis likely tracheitis versus pneumonia--panculture, continue antibiotics, procal elevated at 3 we will recheck tomorrow      DVT/GI ppx:  heparin sub-q/PPI, carafate    CC TIME:  I spent 38 min managing this patients critical issues which are a constant threat to life excluding time teaching and performing procedures.       Oumou Wisdom MD, MSc, FACS  11/18/2022  8:03 AM

## 2022-11-18 NOTE — FLOWSHEET NOTE
Patient continues to reach for ETT, central lines, barboza, and PEG when unrestrained. Attempts to verbally reorient/redirect or educate patient on importance towards are unsuccessful at this time. 2pt bilateral soft wrist restraints maintained for patient safety. Daughter Mara Connors aware of indication and continuation via phone update this morning. Will continue to attempt to reorient/redirect and assess for earliest possible safe removal from restraints to promote continued care goals.

## 2022-11-18 NOTE — PLAN OF CARE
[id number] called for second time this morning after 4 attempts yesterday and 2 overnight to get phlebotomist to bedside to draw STAT blood cultures x 2 ordered yesterday 11/17 at 0909. Representative stated she was unsure why they still have not been assigned but \"there is a phlebotomist in the lab right now that is available, she is stocking her cart and I will send her right up to draw\".

## 2022-11-18 NOTE — PROGRESS NOTES
Pharmacy Consultation Note  (Antibiotic Dosing and Monitoring)    Initial consult date: 11-  Consulting physician/provider: Dr. Bridger Ngo  Drug: Vancomycin  Indication: sepsis    Age/  Gender Height Weight IBW  Allergy Information   62 y.o./male 5' 10\" (177.8 cm) 195 lb (88.5 kg)     Ideal body weight: 73 kg (160 lb 15 oz)  Adjusted ideal body weight: 73.8 kg (162 lb 10.9 oz)   Blue dyes (parenteral)      Renal Function:  Recent Labs     11/16/22  0540 11/17/22  0449 11/18/22  0400   BUN 14 25* 22   CREATININE 0.4* 0.6* 0.5*         Intake/Output Summary (Last 24 hours) at 11/18/2022 1141  Last data filed at 11/18/2022 1115  Gross per 24 hour   Intake 4202.3 ml   Output 2372 ml   Net 1830.3 ml         Vancomycin Monitoring:  Trough:  No results for input(s): VANCOTROUGH in the last 72 hours. Random:    Recent Labs     11/18/22  0400   VANCORANDOM 9.5       No results for input(s): Meghana Case in the last 72 hours. Historical Cultures:  Organism   Date Value Ref Range Status   11/17/2022 Gram negative aby (A)  Preliminary     No results for input(s): BC in the last 72 hours. Vancomycin Administration Times:  Recent vancomycin administrations                     vancomycin (VANCOCIN) 1,250 mg in dextrose 5 % 250 mL IVPB (mg) 1,250 mg New Bag 11/18/22 0736     1,250 mg New Bag 11/17/22 2019    vancomycin (VANCOCIN) 2,000 mg in dextrose 5 % 500 mL IVPB (mg) 2,000 mg New Bag 11/17/22 1002                  Assessment:  61 yo/M, starting vancomycin empirically for sepsis. Admitted 11-6-2022 after unwitnessed fall at work.  +EtOH on admission; found to have SDH/SAH/IPH. Estimated Creatinine Clearance: 154 mL/min (A) (based on SCr of 0.5 mg/dL (L)). To dose vancomycin, pharmacy will be utilizing [employer] calculation software for goal AUC/ANNE 400-600 mg/L-hr.  11/18: day #2 vanco and cefepime. Vanc level = 9.5 2 0400 today. GNR in SPUSU Cx from 11/17. PCT = 0.75 on 11/17.   Increase in WBC today.    Plan:  Continue vancomycin 1250 mg q12h: AUC/ANNE = 552, Tr = 19.4 mCg/mL. Will re-check vancomycin level when appropriate. Will continue to monitor renal function. Pharmacy to follow.     Jacek Bullard, [address] [place] [address] 11/18/2022 11:41 AM

## 2022-11-18 NOTE — CARE COORDINATION
Remains intubated on vent. Fentanyl and versed drips stopped. On precedex drip. Wbc 16.7 Cont vanc and cefepime pending results of cultures. Dc plan is either [facility] vs [facility].

## 2022-11-19 ENCOUNTER — APPOINTMENT (OUTPATIENT)
Dept: ULTRASOUND IMAGING | Age: 62
DRG: 082 | End: 2022-11-19
Payer: COMMERCIAL

## 2022-11-19 ENCOUNTER — APPOINTMENT (OUTPATIENT)
Dept: GENERAL RADIOLOGY | Age: 62
DRG: 082 | End: 2022-11-19
Payer: COMMERCIAL

## 2022-11-19 LAB
AADO2: 270.5 MMHG
ANION GAP SERPL CALCULATED.3IONS-SCNC: 10 MMOL/L (ref 7–16)
B.E.: 1.4 MMOL/L (ref -3–3)
BUN BLDV-MCNC: 12 MG/DL (ref 6–23)
CALCIUM IONIZED: 1.21 MMOL/L (ref 1.15–1.33)
CALCIUM SERPL-MCNC: 8.3 MG/DL (ref 8.6–10.2)
CHLORIDE BLD-SCNC: 106 MMOL/L (ref 98–107)
CO2: 22 MMOL/L (ref 22–29)
COHB: 0.3 % (ref 0–1.5)
CREAT SERPL-MCNC: 0.4 MG/DL (ref 0.7–1.2)
CRITICAL: ABNORMAL
CULTURE, RESPIRATORY: ABNORMAL
CULTURE, RESPIRATORY: ABNORMAL
DATE ANALYZED: ABNORMAL
DATE OF COLLECTION: ABNORMAL
FIO2: 60 %
GFR SERPL CREATININE-BSD FRML MDRD: >60 ML/MIN/1.73
GLUCOSE BLD-MCNC: 121 MG/DL (ref 74–99)
HCO3: 24.5 MMOL/L (ref 22–26)
HCT VFR BLD CALC: 32.6 % (ref 37–54)
HEMOGLOBIN: 10.9 G/DL (ref 12.5–16.5)
HHB: 2.4 % (ref 0–5)
LAB: ABNORMAL
Lab: ABNORMAL
MAGNESIUM: 1.8 MG/DL (ref 1.6–2.6)
MCH RBC QN AUTO: 33.5 PG (ref 26–35)
MCHC RBC AUTO-ENTMCNC: 33.4 % (ref 32–34.5)
MCV RBC AUTO: 100.3 FL (ref 80–99.9)
METHB: 0.2 % (ref 0–1.5)
MODE: ABNORMAL
O2 SATURATION: 97.6 % (ref 92–98.5)
O2HB: 97.1 % (ref 94–97)
OPERATOR ID: 2577
ORGANISM: ABNORMAL
PATIENT TEMP: 37 C
PCO2: 34 MMHG (ref 35–45)
PDW BLD-RTO: 12 FL (ref 11.5–15)
PEEP/CPAP: 5 CMH2O
PFO2: 1.75 MMHG/%
PH BLOOD GAS: 7.48 (ref 7.35–7.45)
PHOSPHORUS: 2.5 MG/DL (ref 2.5–4.5)
PLATELET # BLD: 217 E9/L (ref 130–450)
PMV BLD AUTO: 11 FL (ref 7–12)
PO2: 104.9 MMHG (ref 75–100)
POTASSIUM REFLEX MAGNESIUM: 3.9 MMOL/L (ref 3.5–5)
PROCALCITONIN: 0.97 NG/ML (ref 0–0.08)
PS: 8 CMH20
RBC # BLD: 3.25 E12/L (ref 3.8–5.8)
RI(T): 2.58
SMEAR, RESPIRATORY: ABNORMAL
SODIUM BLD-SCNC: 138 MMOL/L (ref 132–146)
SOURCE, BLOOD GAS: ABNORMAL
THB: 12.2 G/DL (ref 11.5–16.5)
TIME ANALYZED: 453
WBC # BLD: 8.6 E9/L (ref 4.5–11.5)

## 2022-11-19 PROCEDURE — 94640 AIRWAY INHALATION TREATMENT: CPT

## 2022-11-19 PROCEDURE — 6370000000 HC RX 637 (ALT 250 FOR IP): Performed by: STUDENT IN AN ORGANIZED HEALTH CARE EDUCATION/TRAINING PROGRAM

## 2022-11-19 PROCEDURE — 2580000003 HC RX 258: Performed by: SURGERY

## 2022-11-19 PROCEDURE — 6360000002 HC RX W HCPCS: Performed by: SURGERY

## 2022-11-19 PROCEDURE — 37799 UNLISTED PX VASCULAR SURGERY: CPT

## 2022-11-19 PROCEDURE — 2580000003 HC RX 258: Performed by: STUDENT IN AN ORGANIZED HEALTH CARE EDUCATION/TRAINING PROGRAM

## 2022-11-19 PROCEDURE — 2700000000 HC OXYGEN THERAPY PER DAY

## 2022-11-19 PROCEDURE — 6370000000 HC RX 637 (ALT 250 FOR IP): Performed by: SURGERY

## 2022-11-19 PROCEDURE — 2500000003 HC RX 250 WO HCPCS: Performed by: SURGERY

## 2022-11-19 PROCEDURE — 85027 COMPLETE CBC AUTOMATED: CPT

## 2022-11-19 PROCEDURE — 93970 EXTREMITY STUDY: CPT

## 2022-11-19 PROCEDURE — 83735 ASSAY OF MAGNESIUM: CPT

## 2022-11-19 PROCEDURE — 6370000000 HC RX 637 (ALT 250 FOR IP)

## 2022-11-19 PROCEDURE — 84100 ASSAY OF PHOSPHORUS: CPT

## 2022-11-19 PROCEDURE — 82805 BLOOD GASES W/O2 SATURATION: CPT

## 2022-11-19 PROCEDURE — 6360000002 HC RX W HCPCS: Performed by: STUDENT IN AN ORGANIZED HEALTH CARE EDUCATION/TRAINING PROGRAM

## 2022-11-19 PROCEDURE — 80048 BASIC METABOLIC PNL TOTAL CA: CPT

## 2022-11-19 PROCEDURE — 99291 CRITICAL CARE FIRST HOUR: CPT | Performed by: SURGERY

## 2022-11-19 PROCEDURE — 94003 VENT MGMT INPAT SUBQ DAY: CPT

## 2022-11-19 PROCEDURE — 84145 PROCALCITONIN (PCT): CPT

## 2022-11-19 PROCEDURE — 36415 COLL VENOUS BLD VENIPUNCTURE: CPT

## 2022-11-19 PROCEDURE — 93971 EXTREMITY STUDY: CPT | Performed by: RADIOLOGY

## 2022-11-19 PROCEDURE — 82330 ASSAY OF CALCIUM: CPT

## 2022-11-19 PROCEDURE — 2000000000 HC ICU R&B

## 2022-11-19 PROCEDURE — 71045 X-RAY EXAM CHEST 1 VIEW: CPT

## 2022-11-19 PROCEDURE — 6360000002 HC RX W HCPCS

## 2022-11-19 RX ORDER — HYDRALAZINE HYDROCHLORIDE 20 MG/ML
10 INJECTION INTRAMUSCULAR; INTRAVENOUS EVERY 10 MIN PRN
Status: DISCONTINUED | OUTPATIENT
Start: 2022-11-19 | End: 2022-11-21

## 2022-11-19 RX ORDER — OXYCODONE HYDROCHLORIDE 5 MG/1
5 TABLET ORAL EVERY 8 HOURS
Status: DISCONTINUED | OUTPATIENT
Start: 2022-11-19 | End: 2022-11-20

## 2022-11-19 RX ADMIN — Medication 250 MG: at 12:17

## 2022-11-19 RX ADMIN — CHLORHEXIDINE GLUCONATE 15 ML: 1.2 RINSE BUCCAL at 20:43

## 2022-11-19 RX ADMIN — SUCRALFATE 1 G: 1 TABLET ORAL at 04:39

## 2022-11-19 RX ADMIN — HYDRALAZINE HYDROCHLORIDE 10 MG: 20 INJECTION INTRAMUSCULAR; INTRAVENOUS at 06:41

## 2022-11-19 RX ADMIN — POLYVINYL ALCOHOL 1 DROP: 14 SOLUTION/ DROPS OPHTHALMIC at 09:48

## 2022-11-19 RX ADMIN — HEPARIN SODIUM 5000 UNITS: 10000 INJECTION INTRAVENOUS; SUBCUTANEOUS at 12:18

## 2022-11-19 RX ADMIN — MINERAL OIL, WHITE PETROLATUM: .03; .94 OINTMENT OPHTHALMIC at 17:19

## 2022-11-19 RX ADMIN — GUAIFENESIN 200 MG: 200 SOLUTION ORAL at 04:34

## 2022-11-19 RX ADMIN — MINERAL OIL, WHITE PETROLATUM: .03; .94 OINTMENT OPHTHALMIC at 09:29

## 2022-11-19 RX ADMIN — FENTANYL CITRATE 50 MCG: 0.05 INJECTION, SOLUTION INTRAMUSCULAR; INTRAVENOUS at 00:46

## 2022-11-19 RX ADMIN — CARVEDILOL 6.25 MG: 6.25 TABLET, FILM COATED ORAL at 09:30

## 2022-11-19 RX ADMIN — LANSOPRAZOLE 30 MG: KIT at 06:40

## 2022-11-19 RX ADMIN — Medication 10 ML: at 09:31

## 2022-11-19 RX ADMIN — SODIUM CHLORIDE 1.1 MCG/KG/HR: 9 INJECTION, SOLUTION INTRAVENOUS at 14:32

## 2022-11-19 RX ADMIN — VANCOMYCIN HYDROCHLORIDE 1250 MG: 10 INJECTION, POWDER, LYOPHILIZED, FOR SOLUTION INTRAVENOUS at 09:45

## 2022-11-19 RX ADMIN — POLYVINYL ALCOHOL 1 DROP: 14 SOLUTION/ DROPS OPHTHALMIC at 03:30

## 2022-11-19 RX ADMIN — FENTANYL CITRATE 50 MCG: 0.05 INJECTION, SOLUTION INTRAMUSCULAR; INTRAVENOUS at 03:39

## 2022-11-19 RX ADMIN — DIVALPROEX SODIUM 250 MG: 125 CAPSULE, COATED PELLETS ORAL at 22:05

## 2022-11-19 RX ADMIN — Medication 250 MG: at 20:44

## 2022-11-19 RX ADMIN — MINERAL OIL, WHITE PETROLATUM: .03; .94 OINTMENT OPHTHALMIC at 22:04

## 2022-11-19 RX ADMIN — SODIUM CHLORIDE 1.1 MCG/KG/HR: 9 INJECTION, SOLUTION INTRAVENOUS at 02:09

## 2022-11-19 RX ADMIN — POLYVINYL ALCOHOL 1 DROP: 14 SOLUTION/ DROPS OPHTHALMIC at 20:54

## 2022-11-19 RX ADMIN — OXYCODONE 5 MG: 5 TABLET ORAL at 04:34

## 2022-11-19 RX ADMIN — GUAIFENESIN 200 MG: 200 SOLUTION ORAL at 12:17

## 2022-11-19 RX ADMIN — CEFEPIME 2000 MG: 2 INJECTION, POWDER, FOR SOLUTION INTRAVENOUS at 09:48

## 2022-11-19 RX ADMIN — POLYVINYL ALCOHOL 1 DROP: 14 SOLUTION/ DROPS OPHTHALMIC at 12:17

## 2022-11-19 RX ADMIN — IPRATROPIUM BROMIDE AND ALBUTEROL SULFATE 1 AMPULE: .5; 2.5 SOLUTION RESPIRATORY (INHALATION) at 22:27

## 2022-11-19 RX ADMIN — Medication 250 MG: at 17:02

## 2022-11-19 RX ADMIN — MINERAL OIL, WHITE PETROLATUM: .03; .94 OINTMENT OPHTHALMIC at 00:46

## 2022-11-19 RX ADMIN — OXYCODONE 5 MG: 5 TABLET ORAL at 17:02

## 2022-11-19 RX ADMIN — SUCRALFATE 1 G: 1 TABLET ORAL at 12:17

## 2022-11-19 RX ADMIN — SUCRALFATE 1 G: 1 TABLET ORAL at 00:00

## 2022-11-19 RX ADMIN — IPRATROPIUM BROMIDE AND ALBUTEROL SULFATE 1 AMPULE: .5; 2.5 SOLUTION RESPIRATORY (INHALATION) at 09:08

## 2022-11-19 RX ADMIN — CEFEPIME 2000 MG: 2 INJECTION, POWDER, FOR SOLUTION INTRAVENOUS at 00:49

## 2022-11-19 RX ADMIN — Medication 100 MG: at 09:32

## 2022-11-19 RX ADMIN — GUAIFENESIN 200 MG: 200 SOLUTION ORAL at 09:30

## 2022-11-19 RX ADMIN — HEPARIN SODIUM 5000 UNITS: 10000 INJECTION INTRAVENOUS; SUBCUTANEOUS at 04:34

## 2022-11-19 RX ADMIN — HEPARIN SODIUM 5000 UNITS: 10000 INJECTION INTRAVENOUS; SUBCUTANEOUS at 20:46

## 2022-11-19 RX ADMIN — VANCOMYCIN HYDROCHLORIDE 1250 MG: 10 INJECTION, POWDER, LYOPHILIZED, FOR SOLUTION INTRAVENOUS at 20:57

## 2022-11-19 RX ADMIN — POLYVINYL ALCOHOL 1 DROP: 14 SOLUTION/ DROPS OPHTHALMIC at 23:51

## 2022-11-19 RX ADMIN — DIVALPROEX SODIUM 250 MG: 125 CAPSULE, COATED PELLETS ORAL at 14:32

## 2022-11-19 RX ADMIN — IPRATROPIUM BROMIDE AND ALBUTEROL SULFATE 1 AMPULE: .5; 2.5 SOLUTION RESPIRATORY (INHALATION) at 17:16

## 2022-11-19 RX ADMIN — SUCRALFATE 1 G: 1 TABLET ORAL at 23:52

## 2022-11-19 RX ADMIN — Medication 250 MG: at 09:30

## 2022-11-19 RX ADMIN — POLYVINYL ALCOHOL 1 DROP: 14 SOLUTION/ DROPS OPHTHALMIC at 17:18

## 2022-11-19 RX ADMIN — MINERAL OIL, WHITE PETROLATUM: .03; .94 OINTMENT OPHTHALMIC at 05:17

## 2022-11-19 RX ADMIN — MINERAL OIL, WHITE PETROLATUM: .03; .94 OINTMENT OPHTHALMIC at 12:17

## 2022-11-19 RX ADMIN — GUAIFENESIN 200 MG: 200 SOLUTION ORAL at 23:58

## 2022-11-19 RX ADMIN — ACETAMINOPHEN 650 MG: 325 TABLET, FILM COATED ORAL at 20:43

## 2022-11-19 RX ADMIN — GUAIFENESIN 200 MG: 200 SOLUTION ORAL at 17:02

## 2022-11-19 RX ADMIN — CEFEPIME 2000 MG: 2 INJECTION, POWDER, FOR SOLUTION INTRAVENOUS at 17:20

## 2022-11-19 RX ADMIN — LANSOPRAZOLE 30 MG: KIT at 17:03

## 2022-11-19 RX ADMIN — Medication 10 ML: at 20:44

## 2022-11-19 RX ADMIN — POLYETHYLENE GLYCOL 3350 17 G: 17 POWDER, FOR SOLUTION ORAL at 09:31

## 2022-11-19 RX ADMIN — FOLIC ACID 1 MG: 1 TABLET ORAL at 09:31

## 2022-11-19 RX ADMIN — SUCRALFATE 1 G: 1 TABLET ORAL at 17:04

## 2022-11-19 RX ADMIN — CARVEDILOL 6.25 MG: 6.25 TABLET, FILM COATED ORAL at 17:03

## 2022-11-19 RX ADMIN — FENTANYL CITRATE 50 MCG: 0.05 INJECTION, SOLUTION INTRAMUSCULAR; INTRAVENOUS at 12:23

## 2022-11-19 RX ADMIN — LACTULOSE 20 G: 20 SOLUTION ORAL at 09:30

## 2022-11-19 RX ADMIN — LACTULOSE 20 G: 20 SOLUTION ORAL at 14:32

## 2022-11-19 RX ADMIN — CHLORHEXIDINE GLUCONATE 15 ML: 1.2 RINSE BUCCAL at 09:30

## 2022-11-19 RX ADMIN — DIVALPROEX SODIUM 250 MG: 125 CAPSULE, COATED PELLETS ORAL at 04:39

## 2022-11-19 RX ADMIN — OXYCODONE 5 MG: 5 TABLET ORAL at 09:29

## 2022-11-19 RX ADMIN — GUAIFENESIN 200 MG: 200 SOLUTION ORAL at 20:43

## 2022-11-19 RX ADMIN — OXYCODONE 5 MG: 5 TABLET ORAL at 23:52

## 2022-11-19 RX ADMIN — SENNOSIDES AND DOCUSATE SODIUM 2 TABLET: 50; 8.6 TABLET ORAL at 09:29

## 2022-11-19 RX ADMIN — IPRATROPIUM BROMIDE AND ALBUTEROL SULFATE 1 AMPULE: .5; 2.5 SOLUTION RESPIRATORY (INHALATION) at 12:38

## 2022-11-19 RX ADMIN — FENTANYL CITRATE 50 MCG: 0.05 INJECTION, SOLUTION INTRAMUSCULAR; INTRAVENOUS at 05:17

## 2022-11-19 ASSESSMENT — PULMONARY FUNCTION TESTS
PIF_VALUE: 14
PIF_VALUE: 14
PIF_VALUE: 13
PIF_VALUE: 14
PIF_VALUE: 14
PIF_VALUE: 13
PIF_VALUE: 14
PIF_VALUE: 14
PIF_VALUE: 13
PIF_VALUE: 15
PIF_VALUE: 14
PIF_VALUE: 16
PIF_VALUE: 14
PIF_VALUE: 13
PIF_VALUE: 14
PIF_VALUE: 13
PIF_VALUE: 3

## 2022-11-19 ASSESSMENT — PAIN SCALES - GENERAL
PAINLEVEL_OUTOF10: 7

## 2022-11-19 NOTE — PLAN OF CARE
Problem: Respiratory - Adult  Goal: Achieves optimal ventilation and oxygenation  11/19/2022 1241 by Evelin Venegas RCP  Outcome: Progressing

## 2022-11-19 NOTE — PROGRESS NOTES
Pharmacy Consultation Note  (Antibiotic Dosing and Monitoring)    Initial consult date: 11-  Consulting physician/provider: Dr. Alba Driver  Drug: Vancomycin  Indication: sepsis    Age/  Gender Height Weight IBW  Allergy Information   62 y.o./male 5' 10\" (177.8 cm) 195 lb (88.5 kg)     Ideal body weight: 73 kg (160 lb 15 oz)  Adjusted ideal body weight: 73.8 kg (162 lb 10.9 oz)   Blue dyes (parenteral)      Renal Function:  Recent Labs     11/17/22  0449 11/18/22  0400 11/19/22  0450   BUN 25* 22 12   CREATININE 0.6* 0.5* 0.4*         Intake/Output Summary (Last 24 hours) at 11/19/2022 0725  Last data filed at 11/19/2022 0700  Gross per 24 hour   Intake 5193.59 ml   Output 2125 ml   Net 3068. 59 ml         Vancomycin Monitoring:  Trough:  No results for input(s): VANCOTROUGH in the last 72 hours. Random:    Recent Labs     11/18/22  0400   VANCORANDOM 9.5         No results for input(s): Angle Santos in the last 72 hours. Historical Cultures:  Organism   Date Value Ref Range Status   11/17/2022 Gram negative aby (A)  Preliminary     No results for input(s): BC in the last 72 hours. Vancomycin Administration Times:    Recent vancomycin administrations                     vancomycin (VANCOCIN) 1,250 mg in dextrose 5 % 250 mL IVPB (mg) 1,250 mg New Bag 11/18/22 1952     1,250 mg New Bag  0736     1,250 mg New Bag 11/17/22 2019    vancomycin (VANCOCIN) 2,000 mg in dextrose 5 % 500 mL IVPB (mg) 2,000 mg New Bag 11/17/22 1002                    Assessment:  61 yo/M, starting vancomycin empirically for sepsis. Admitted 11-6-2022 after unwitnessed fall at work.  +EtOH on admission; found to have SDH/SAH/IPH. Estimated Creatinine Clearance: 193 mL/min (A) (based on SCr of 0.4 mg/dL (L)). To dose vancomycin, pharmacy will be utilizing [employer] calculation software for goal AUC/ANNE 400-600 mg/L-hr.  11/18: day #2 vanco and cefepime. Vanc level = 9.5 2 0400 today. GNR in SP[facility] Cx from 11/17.   PCT = 0.75 on 11/17. Increase in WBC today. 11/19: Scr 0.4, UOP 1.2 mL/kg/hr    Plan:  Continue vancomycin 1250 mg q12h: AUC/ANNE = 552, Tr = 19.4 mCg/mL. Will re-check vancomycin level when appropriate. Will continue to monitor renal function. Pharmacy to follow.     Jarrett PimentelD, BCPS 11/19/2022 7:26 AM

## 2022-11-19 NOTE — PLAN OF CARE
Problem: Respiratory - Adult  Goal: Achieves optimal ventilation and oxygenation  Flowsheets  Taken 11/18/2022 2254 by Joycelyn Carrasco RCP  Achieves optimal ventilation and oxygenation:   Assess for changes in respiratory status   Position to facilitate oxygenation and minimize respiratory effort   Assess the need for suctioning and aspirate as needed   Respiratory therapy support as indicated   Oxygen supplementation based on oxygen saturation or arterial blood gases  Taken 11/18/2022 2000 by Cas Babb  Achieves optimal ventilation and oxygenation:   Assess for changes in respiratory status   Assess for changes in mentation and behavior   Position to facilitate oxygenation and minimize respiratory effort   Oxygen supplementation based on oxygen saturation or arterial blood gases

## 2022-11-19 NOTE — PROGRESS NOTES
[facility]  PROGRESS NOTE  ATTENDING NOTE    TRAUMA/CRITICAL CARE    MECHANISM OF INJURY:  unwitnessed fall    Chief Complaint   Patient presents with    Fall     +LOC fell backwards, +head, hematoma posterior head, unwitnessed, now dizzy, confused, A&Ox2 wrong month,president, doesn't remember falling R shoulder pain. Takes BP meds and suboxone, C-collar. HPI  Trauma consult. Injury occurred just prior to arrival to OSH. Unknown cause of fall. Per report, patient was unwitnessed fall at work. Positive loss of consciousness with hematoma to posterior head. Elevated EtOH. Patient Active Problem List   Diagnosis    Inguinal hernia, right    Cervical radiculopathy    Chronic hepatitis C without hepatic coma (HCC)    Right shoulder pain    Right subclavian artery occlusion    Essential hypertension    Sinus tachycardia    Benign paroxysmal positional vertigo due to bilateral vestibular disorder    Hypotension    Pneumonia due to organism    Bacterial pneumonia    Acidosis    JOSIE (acute kidney injury) ([facility] [address]    Bacteremia    Alcohol withdrawal syndrome without complication (HCC)    Dysphagia    Alcohol abuse    Subarachnoid bleed (HCC)    Subdural hematoma    Acute alcoholic intoxication with complication (HCC)    Closed fracture of occipital bone (HCC)    COVID-19    Opioid dependence on agonist therapy ([facility] [address]    Delirium    Other constipation       OVERNIGHT EVENTS:  BM x 1; now following commands    HOSPITAL COURSE:  11/5--presented after unwitnessed fall; found to have SDH/SAH/IPH--worse on repeat head CT  11/6--no issues overnight; admitted to SICU. 11/7--Pt confused this morning. 11/8--Pt with increasing confusion and agitation overnight. Pulled out two IV's, in 4-point restraints. Started on Norvasc, Depakote sprinkles and precedex. 11/9--Precedex switched to clonidine due to bradycardia. Remains A&Ox1 not following commands. Again pulling out IV's, in 4-point restraints. 11/10--Pt remains in 4-point restraints. A&Ox1. Follows commands intermittently today. Bradycardia resolved, continued hypertension with agitation. 11/11--Pt requiring Hydralazine PRN frequently overnight. Clonidine increased. Continued agitation. 11/12-No acute events. No new complaints. 11/13--remains agitated and restless; pulled out corpak again. Taking some meds with pudding per nursing  11/14--repeat CT head with slight increase in subarachnoid hemorrhage, phenobarb stopped, IV team for smallbore feeding tube placement, ammonia TSH checked, fluid started as patient not been on IV fluids all weekend  11/15--placed back on oral anti-HTN, d/c IVF  11/16--change metoprolol to coreg  11/17--intubated, CVC, arnaldo, CT head, CTA chest, abx, 1.5L fluid bolus, pressors, procal  11/18: Stop fentanyl and Versed drips and switch to Precedex and scheduled Doxy, Coreg restarted at lower dose, tube feeds restarted  11/19:  extubate, dec oxy    /67   Pulse 90   Temp 98.6 °F (37 °C) (Bladder)   Resp 29   Ht 5' 10\" (1.778 m)   Wt 165 lb 4.8 oz (75 kg)   SpO2 100%   BMI 23.72 kg/m²   Physical Exam  Constitutional:       Appearance: He is ill-appearing. HENT:      Head: Normocephalic and atraumatic. Nose: Nose normal.      Mouth/Throat:      Mouth: Mucous membranes are moist.      Pharynx: Oropharynx is clear. Eyes:      Extraocular Movements: Extraocular movements intact. Pupils: Pupils are equal, round, and reactive to light. Cardiovascular:      Rate and Rhythm: Normal rate and regular rhythm. Pulses: Normal pulses. Heart sounds: Normal heart sounds. Pulmonary:      Effort: Pulmonary effort is normal.      Breath sounds: Normal breath sounds. Abdominal:      General: There is distension. Palpations: Abdomen is soft. Tenderness: There is no abdominal tenderness. Musculoskeletal:         General: No tenderness or signs of injury.       Cervical back: Normal range of motion and neck supple. Skin:     General: Skin is warm and dry. Neurological:      Mental Status: He is alert. Comments: Intubated, sedated       Lines: Barboza:  yes--Continue barboza catheter for managing strict I and Os in this critically ill patient. Central line:  yes--left SCV CVC 11/17  PICC:  yes - right basilic PICC, single-lumen    I have reviewed the laboratory studies    CXR: clearing of RLL infiltrate; tubes and lines in good position    ASSESSMENT/PLAN:   Neuro: TBI--completed course of Keppra, neurosurgery following  Occipital skull fracture--analgesia, neurosurgery following  History of drug abuse--Suboxone on hold, addiction medicine following; dec oxy  Delirium/agitation--continue Depakote, dec oxy  Alcohol abuse--thiamine, folate, social work for SBI, completed course of phenobarbital  Cardio: septic shock--improved, cortisol normal continue antibiotics  Hypertension--will resume Coreg at low-dose  Respiratory: COVID +--droplet precautions, no tx needed  Acute respiratory failure--intubated, duonebs, pulm toilet, vent management, CTA chest--negative for PE only showed infiltrates; SBT--extubate  GI:  dysphagia--s/p PEG, resume TFs after extubation  FEN: No active issues   Renal:  No active issues   Heme:  acute blood loss anemia vs. Dilutional--monitor  Endocrine:  No active issues, keep glucose <180   ID: sepsis likely tracheitis versus pneumonia--panculture, continue antibiotics      DVT/GI ppx:  heparin sub-q/PPI, carafate    CC TIME:  I spent 36 min managing this patients critical issues which are a constant threat to life excluding time teaching and performing procedures.       Sarah Horner MD, MSc, FACS  11/19/2022  12:43 PM

## 2022-11-19 NOTE — PLAN OF CARE
Problem: Respiratory - Adult  Goal: Achieves optimal ventilation and oxygenation  11/19/2022 0445 by Serjio Norton RCP  Outcome: Progressing

## 2022-11-19 NOTE — PROGRESS NOTES
Attempted ultrasound around 5:30pm. Patient being extubated. Will try to go back up later tonight, or may wait till tomorrow per RN.

## 2022-11-19 NOTE — PROGRESS NOTES
Spontaneous Parameters performed on PS of 5. ETT leak test performed audible airflow heard when cuff deflated.  Cuff re-inflated after leak test.    VT = 504 ml  f = 20  B/M  Ve = 10.1 L/M  NIF = -34  cmH2O  VC = 5.74 L  RSBI = 39      Performed by Jose Elias Ricketts RCP

## 2022-11-20 ENCOUNTER — APPOINTMENT (OUTPATIENT)
Dept: GENERAL RADIOLOGY | Age: 62
DRG: 082 | End: 2022-11-20
Payer: COMMERCIAL

## 2022-11-20 LAB
ANION GAP SERPL CALCULATED.3IONS-SCNC: 10 MMOL/L (ref 7–16)
B.E.: 2 MMOL/L (ref -3–3)
BUN BLDV-MCNC: 9 MG/DL (ref 6–23)
CALCIUM IONIZED: 1.25 MMOL/L (ref 1.15–1.33)
CALCIUM SERPL-MCNC: 8.5 MG/DL (ref 8.6–10.2)
CHLORIDE BLD-SCNC: 101 MMOL/L (ref 98–107)
CO2: 24 MMOL/L (ref 22–29)
COHB: 0.3 % (ref 0–1.5)
CREAT SERPL-MCNC: 0.4 MG/DL (ref 0.7–1.2)
CRITICAL: ABNORMAL
DATE ANALYZED: ABNORMAL
DATE OF COLLECTION: ABNORMAL
GFR SERPL CREATININE-BSD FRML MDRD: >60 ML/MIN/1.73
GLUCOSE BLD-MCNC: 119 MG/DL (ref 74–99)
HCO3: 24.7 MMOL/L (ref 22–26)
HCT VFR BLD CALC: 29.6 % (ref 37–54)
HEMOGLOBIN: 10.5 G/DL (ref 12.5–16.5)
HHB: 5.2 % (ref 0–5)
LAB: ABNORMAL
Lab: ABNORMAL
MAGNESIUM: 1.7 MG/DL (ref 1.6–2.6)
MCH RBC QN AUTO: 33.7 PG (ref 26–35)
MCHC RBC AUTO-ENTMCNC: 35.5 % (ref 32–34.5)
MCV RBC AUTO: 94.9 FL (ref 80–99.9)
METHB: 0.2 % (ref 0–1.5)
MODE: ABNORMAL
O2 SATURATION: 94.8 % (ref 92–98.5)
O2HB: 94.3 % (ref 94–97)
OPERATOR ID: 2067
PATIENT TEMP: 37 C
PCO2: 32.5 MMHG (ref 35–45)
PDW BLD-RTO: 11.5 FL (ref 11.5–15)
PH BLOOD GAS: 7.5 (ref 7.35–7.45)
PHOSPHORUS: 2 MG/DL (ref 2.5–4.5)
PLATELET # BLD: 251 E9/L (ref 130–450)
PMV BLD AUTO: 10.7 FL (ref 7–12)
PO2: 73.6 MMHG (ref 75–100)
POTASSIUM REFLEX MAGNESIUM: 3.5 MMOL/L (ref 3.5–5)
RBC # BLD: 3.12 E12/L (ref 3.8–5.8)
SODIUM BLD-SCNC: 135 MMOL/L (ref 132–146)
SOURCE, BLOOD GAS: ABNORMAL
THB: 12.5 G/DL (ref 11.5–16.5)
TIME ANALYZED: 436
WBC # BLD: 7.6 E9/L (ref 4.5–11.5)

## 2022-11-20 PROCEDURE — 6370000000 HC RX 637 (ALT 250 FOR IP): Performed by: STUDENT IN AN ORGANIZED HEALTH CARE EDUCATION/TRAINING PROGRAM

## 2022-11-20 PROCEDURE — 85027 COMPLETE CBC AUTOMATED: CPT

## 2022-11-20 PROCEDURE — 6370000000 HC RX 637 (ALT 250 FOR IP): Performed by: SURGERY

## 2022-11-20 PROCEDURE — 94640 AIRWAY INHALATION TREATMENT: CPT

## 2022-11-20 PROCEDURE — 2500000003 HC RX 250 WO HCPCS: Performed by: STUDENT IN AN ORGANIZED HEALTH CARE EDUCATION/TRAINING PROGRAM

## 2022-11-20 PROCEDURE — 6360000002 HC RX W HCPCS: Performed by: SURGERY

## 2022-11-20 PROCEDURE — 2580000003 HC RX 258: Performed by: STUDENT IN AN ORGANIZED HEALTH CARE EDUCATION/TRAINING PROGRAM

## 2022-11-20 PROCEDURE — 99291 CRITICAL CARE FIRST HOUR: CPT | Performed by: SURGERY

## 2022-11-20 PROCEDURE — 6360000002 HC RX W HCPCS

## 2022-11-20 PROCEDURE — 84100 ASSAY OF PHOSPHORUS: CPT

## 2022-11-20 PROCEDURE — 2000000000 HC ICU R&B

## 2022-11-20 PROCEDURE — 82330 ASSAY OF CALCIUM: CPT

## 2022-11-20 PROCEDURE — 82805 BLOOD GASES W/O2 SATURATION: CPT

## 2022-11-20 PROCEDURE — 2580000003 HC RX 258: Performed by: SURGERY

## 2022-11-20 PROCEDURE — 83735 ASSAY OF MAGNESIUM: CPT

## 2022-11-20 PROCEDURE — 36415 COLL VENOUS BLD VENIPUNCTURE: CPT

## 2022-11-20 PROCEDURE — 2700000000 HC OXYGEN THERAPY PER DAY

## 2022-11-20 PROCEDURE — 37799 UNLISTED PX VASCULAR SURGERY: CPT

## 2022-11-20 PROCEDURE — 80048 BASIC METABOLIC PNL TOTAL CA: CPT

## 2022-11-20 PROCEDURE — 71045 X-RAY EXAM CHEST 1 VIEW: CPT

## 2022-11-20 RX ORDER — OXYCODONE HYDROCHLORIDE 5 MG/1
5 TABLET ORAL EVERY 6 HOURS PRN
Status: DISCONTINUED | OUTPATIENT
Start: 2022-11-20 | End: 2022-11-21

## 2022-11-20 RX ADMIN — BUPRENORPHINE AND NALOXONE 1 TABLET: 8; 2 TABLET SUBLINGUAL at 20:34

## 2022-11-20 RX ADMIN — SODIUM PHOSPHATE, MONOBASIC, MONOHYDRATE AND SODIUM PHOSPHATE, DIBASIC, ANHYDROUS 15 MMOL: 276; 142 INJECTION, SOLUTION INTRAVENOUS at 08:38

## 2022-11-20 RX ADMIN — HEPARIN SODIUM 5000 UNITS: 10000 INJECTION INTRAVENOUS; SUBCUTANEOUS at 20:35

## 2022-11-20 RX ADMIN — CEFEPIME 2000 MG: 2 INJECTION, POWDER, FOR SOLUTION INTRAVENOUS at 02:34

## 2022-11-20 RX ADMIN — FOLIC ACID 1 MG: 1 TABLET ORAL at 08:21

## 2022-11-20 RX ADMIN — Medication 10 ML: at 20:34

## 2022-11-20 RX ADMIN — CLONIDINE HYDROCHLORIDE 0.2 MG: 0.2 TABLET ORAL at 08:14

## 2022-11-20 RX ADMIN — GUAIFENESIN 200 MG: 200 SOLUTION ORAL at 12:16

## 2022-11-20 RX ADMIN — GUAIFENESIN 200 MG: 200 SOLUTION ORAL at 16:04

## 2022-11-20 RX ADMIN — Medication 500 MG: at 08:14

## 2022-11-20 RX ADMIN — SENNOSIDES AND DOCUSATE SODIUM 2 TABLET: 50; 8.6 TABLET ORAL at 08:14

## 2022-11-20 RX ADMIN — IPRATROPIUM BROMIDE AND ALBUTEROL SULFATE 1 AMPULE: .5; 2.5 SOLUTION RESPIRATORY (INHALATION) at 12:19

## 2022-11-20 RX ADMIN — Medication 100 MG: at 08:15

## 2022-11-20 RX ADMIN — HEPARIN SODIUM 5000 UNITS: 10000 INJECTION INTRAVENOUS; SUBCUTANEOUS at 04:22

## 2022-11-20 RX ADMIN — GUAIFENESIN 200 MG: 200 SOLUTION ORAL at 04:25

## 2022-11-20 RX ADMIN — SUCRALFATE 1 G: 1 TABLET ORAL at 12:17

## 2022-11-20 RX ADMIN — CARVEDILOL 6.25 MG: 6.25 TABLET, FILM COATED ORAL at 16:11

## 2022-11-20 RX ADMIN — DIVALPROEX SODIUM 250 MG: 125 CAPSULE, COATED PELLETS ORAL at 05:58

## 2022-11-20 RX ADMIN — IPRATROPIUM BROMIDE AND ALBUTEROL SULFATE 1 AMPULE: .5; 2.5 SOLUTION RESPIRATORY (INHALATION) at 09:37

## 2022-11-20 RX ADMIN — CLONIDINE HYDROCHLORIDE 0.2 MG: 0.2 TABLET ORAL at 20:33

## 2022-11-20 RX ADMIN — IPRATROPIUM BROMIDE AND ALBUTEROL SULFATE 1 AMPULE: .5; 2.5 SOLUTION RESPIRATORY (INHALATION) at 18:20

## 2022-11-20 RX ADMIN — CHLORHEXIDINE GLUCONATE 15 ML: 1.2 RINSE BUCCAL at 08:14

## 2022-11-20 RX ADMIN — DIVALPROEX SODIUM 250 MG: 125 CAPSULE, COATED PELLETS ORAL at 16:04

## 2022-11-20 RX ADMIN — MINERAL OIL, WHITE PETROLATUM: .03; .94 OINTMENT OPHTHALMIC at 02:24

## 2022-11-20 RX ADMIN — DIVALPROEX SODIUM 250 MG: 125 CAPSULE, COATED PELLETS ORAL at 22:13

## 2022-11-20 RX ADMIN — OXYCODONE 5 MG: 5 TABLET ORAL at 08:15

## 2022-11-20 RX ADMIN — Medication 10 ML: at 08:15

## 2022-11-20 RX ADMIN — Medication 500 MG: at 20:34

## 2022-11-20 RX ADMIN — Medication 500 MG: at 16:11

## 2022-11-20 RX ADMIN — LANSOPRAZOLE 30 MG: KIT at 16:04

## 2022-11-20 RX ADMIN — SUCRALFATE 1 G: 1 TABLET ORAL at 05:58

## 2022-11-20 RX ADMIN — SUCRALFATE 1 G: 1 TABLET ORAL at 16:44

## 2022-11-20 RX ADMIN — CLONIDINE HYDROCHLORIDE 0.2 MG: 0.2 TABLET ORAL at 16:04

## 2022-11-20 RX ADMIN — GUAIFENESIN 200 MG: 200 SOLUTION ORAL at 08:15

## 2022-11-20 RX ADMIN — Medication 500 MG: at 12:16

## 2022-11-20 RX ADMIN — CHLORHEXIDINE GLUCONATE 15 ML: 1.2 RINSE BUCCAL at 20:34

## 2022-11-20 RX ADMIN — MINERAL OIL, WHITE PETROLATUM: .03; .94 OINTMENT OPHTHALMIC at 05:58

## 2022-11-20 RX ADMIN — POLYETHYLENE GLYCOL 3350 17 G: 17 POWDER, FOR SOLUTION ORAL at 08:14

## 2022-11-20 RX ADMIN — CARVEDILOL 6.25 MG: 6.25 TABLET, FILM COATED ORAL at 08:14

## 2022-11-20 RX ADMIN — GUAIFENESIN 200 MG: 200 SOLUTION ORAL at 20:30

## 2022-11-20 RX ADMIN — HEPARIN SODIUM 5000 UNITS: 10000 INJECTION INTRAVENOUS; SUBCUTANEOUS at 12:20

## 2022-11-20 RX ADMIN — CEFEPIME 2000 MG: 2 INJECTION, POWDER, FOR SOLUTION INTRAVENOUS at 08:34

## 2022-11-20 RX ADMIN — POLYVINYL ALCOHOL 1 DROP: 14 SOLUTION/ DROPS OPHTHALMIC at 04:25

## 2022-11-20 RX ADMIN — CEFEPIME 2000 MG: 2 INJECTION, POWDER, FOR SOLUTION INTRAVENOUS at 16:20

## 2022-11-20 RX ADMIN — LANSOPRAZOLE 30 MG: KIT at 06:03

## 2022-11-20 RX ADMIN — IPRATROPIUM BROMIDE AND ALBUTEROL SULFATE 1 AMPULE: .5; 2.5 SOLUTION RESPIRATORY (INHALATION) at 21:47

## 2022-11-20 ASSESSMENT — PAIN SCALES - GENERAL: PAINLEVEL_OUTOF10: 3

## 2022-11-20 NOTE — PLAN OF CARE
Problem: Discharge Planning  Goal: Discharge to home or other facility with appropriate resources  Outcome: Progressing     Problem: Skin/Tissue Integrity  Goal: Absence of new skin breakdown  Description: 1. Monitor for areas of redness and/or skin breakdown  2. Assess vascular access sites hourly  3. Every 4-6 hours minimum:  Change oxygen saturation probe site  4. Every 4-6 hours:  If on nasal continuous positive airway pressure, respiratory therapy assess nares and determine need for appliance change or resting period.   Outcome: Progressing     Problem: Safety - Adult  Goal: Free from fall injury  Outcome: Progressing     Problem: ABCDS Injury Assessment  Goal: Absence of physical injury  Outcome: Progressing     Problem: Neurosensory - Adult  Goal: Achieves stable or improved neurological status  Outcome: Progressing  Goal: Absence of seizures  Outcome: Progressing  Goal: Remains free of injury related to seizures activity  Outcome: Progressing  Goal: Achieves maximal functionality and self care  Outcome: Progressing     Problem: Respiratory - Adult  Goal: Achieves optimal ventilation and oxygenation  Outcome: Progressing     Problem: Cardiovascular - Adult  Goal: Maintains optimal cardiac output and hemodynamic stability  Outcome: Progressing  Goal: Absence of cardiac dysrhythmias or at baseline  Outcome: Progressing     Problem: Skin/Tissue Integrity - Adult  Goal: Skin integrity remains intact  Outcome: Progressing  Goal: Incisions, wounds, or drain sites healing without S/S of infection  Outcome: Progressing  Goal: Oral mucous membranes remain intact  Outcome: Progressing     Problem: Musculoskeletal - Adult  Goal: Return mobility to safest level of function  Outcome: Progressing  Goal: Maintain proper alignment of affected body part  Outcome: Progressing  Goal: Return ADL status to a safe level of function  Outcome: Progressing     Problem: Safety - Medical Restraint  Goal: Remains free of injury from restraints (Restraint for Interference with Medical Device)  Description: INTERVENTIONS:  1. Determine that other, less restrictive measures have been tried or would not be effective before applying the restraint  2. Evaluate the patient's condition at the time of restraint application  3. Inform patient/family regarding the reason for restraint  4.  Q2H: Monitor safety, psychosocial status, comfort, nutrition and hydration  Outcome: Progressing  Flowsheets  Taken 11/20/2022 0600  Remains free of injury from restraints (restraint for interference with medical device): Every 2 hours: Monitor safety, psychosocial status, comfort, nutrition and hydration  Taken 11/20/2022 0400  Remains free of injury from restraints (restraint for interference with medical device): Every 2 hours: Monitor safety, psychosocial status, comfort, nutrition and hydration  Taken 11/20/2022 0200  Remains free of injury from restraints (restraint for interference with medical device): Every 2 hours: Monitor safety, psychosocial status, comfort, nutrition and hydration  Taken 11/20/2022 0000  Remains free of injury from restraints (restraint for interference with medical device): Every 2 hours: Monitor safety, psychosocial status, comfort, nutrition and hydration  Taken 11/19/2022 2200  Remains free of injury from restraints (restraint for interference with medical device): Every 2 hours: Monitor safety, psychosocial status, comfort, nutrition and hydration  Taken 11/19/2022 2000  Remains free of injury from restraints (restraint for interference with medical device): Every 2 hours: Monitor safety, psychosocial status, comfort, nutrition and hydration     Problem: Gastrointestinal - Adult  Goal: Minimal or absence of nausea and vomiting  Outcome: Progressing  Goal: Maintains or returns to baseline bowel function  Outcome: Progressing  Goal: Maintains adequate nutritional intake  Outcome: Progressing     Problem: Genitourinary - Adult  Goal: Absence of urinary retention  Outcome: Progressing     Problem: Infection - Adult  Goal: Absence of infection at discharge  Outcome: Progressing  Goal: Absence of infection during hospitalization  Outcome: Progressing  Goal: Absence of fever/infection during anticipated neutropenic period  Outcome: Progressing     Problem: Metabolic/Fluid and Electrolytes - Adult  Goal: Electrolytes maintained within normal limits  Outcome: Progressing  Goal: Hemodynamic stability and optimal renal function maintained  Outcome: Progressing  Goal: Glucose maintained within prescribed range  Outcome: Progressing     Problem: Hematologic - Adult  Goal: Maintains hematologic stability  Outcome: Progressing     Problem: Pain  Goal: Verbalizes/displays adequate comfort level or baseline comfort level  Outcome: Progressing

## 2022-11-20 NOTE — PLAN OF CARE
Problem: ABCDS Injury Assessment  Goal: Absence of physical injury  11/20/2022 1648 by Mary Crawford RN  Outcome: Progressing     Problem: Neurosensory - Adult  Goal: Achieves stable or improved neurological status  11/20/2022 1648 by Mary Crawford RN  Outcome: Progressing     Problem: Neurosensory - Adult  Goal: Absence of seizures  11/20/2022 1648 by Mary Crawford RN  Outcome: Progressing     Problem: Neurosensory - Adult  Goal: Remains free of injury related to seizures activity  11/20/2022 1648 by Mary Crawford RN  Outcome: Progressing     Problem: Neurosensory - Adult  Goal: Achieves maximal functionality and self care  11/20/2022 1648 by Mary Crawford RN  Outcome: Progressing     Problem: Safety - Medical Restraint  Goal: Remains free of injury from restraints (Restraint for Interference with Medical Device)  Description: INTERVENTIONS:  1. Determine that other, less restrictive measures have been tried or would not be effective before applying the restraint  2. Evaluate the patient's condition at the time of restraint application  3. Inform patient/family regarding the reason for restraint  4.  Q2H: Monitor safety, psychosocial status, comfort, nutrition and hydration  11/20/2022 1648 by Mary Crawford RN  Outcome: Progressing  Flowsheets  Taken 11/20/2022 1200  Remains free of injury from restraints (restraint for interference with medical device): Every 2 hours: Monitor safety, psychosocial status, comfort, nutrition and hydration  Taken 11/20/2022 1000  Remains free of injury from restraints (restraint for interference with medical device): Every 2 hours: Monitor safety, psychosocial status, comfort, nutrition and hydration  Taken 11/20/2022 0800  Remains free of injury from restraints (restraint for interference with medical device): Every 2 hours: Monitor safety, psychosocial status, comfort, nutrition and hydration

## 2022-11-20 NOTE — PROGRESS NOTES
Pharmacy Consultation Note  (Antibiotic Dosing and Monitoring)    Initial consult date: 11-  Consulting physician/provider: Dr. Segovia Solid  Drug: Vancomycin  Indication: sepsis    Vancomycin has been discontinued   Clinical Pharmacy to sign-off  Physician to re-consult pharmacy if future dosing is needed    Thank you for the consult,    Teddy Peck, PharmD, BCPS, [facility] 11/20/2022 9:05 AM

## 2022-11-20 NOTE — PROGRESS NOTES
Order to extubate pt from mechanical ventilation . Rn turned  off precedex and tube feed. Pt sxd via oet ,orally and evac.  balloon deflated , oet removed . Pt verbalized name clear loud voice. Oxygen via Nasal cannula at 6 lpm =spo2 95 via ox. Bilateral clear diminished breath sounds. Ersp rate 16-20 comfortable. Pt coughed strong productive.

## 2022-11-21 PROBLEM — E83.42 HYPOMAGNESEMIA: Status: ACTIVE | Noted: 2022-11-21

## 2022-11-21 PROBLEM — E87.6 HYPOKALEMIA: Status: ACTIVE | Noted: 2022-11-21

## 2022-11-21 LAB
ANION GAP SERPL CALCULATED.3IONS-SCNC: 8 MMOL/L (ref 7–16)
BUN BLDV-MCNC: 9 MG/DL (ref 6–23)
CALCIUM SERPL-MCNC: 8.3 MG/DL (ref 8.6–10.2)
CHLORIDE BLD-SCNC: 99 MMOL/L (ref 98–107)
CO2: 26 MMOL/L (ref 22–29)
CREAT SERPL-MCNC: 0.4 MG/DL (ref 0.7–1.2)
GFR SERPL CREATININE-BSD FRML MDRD: >60 ML/MIN/1.73
GLUCOSE BLD-MCNC: 122 MG/DL (ref 74–99)
H PYLORI ANTIGEN STOOL: NEGATIVE
HCT VFR BLD CALC: 34 % (ref 37–54)
HEMOGLOBIN: 12.3 G/DL (ref 12.5–16.5)
MAGNESIUM: 1.7 MG/DL (ref 1.6–2.6)
MCH RBC QN AUTO: 34.5 PG (ref 26–35)
MCHC RBC AUTO-ENTMCNC: 36.2 % (ref 32–34.5)
MCV RBC AUTO: 95.2 FL (ref 80–99.9)
PDW BLD-RTO: 11.7 FL (ref 11.5–15)
PHOSPHORUS: 3.3 MG/DL (ref 2.5–4.5)
PLATELET # BLD: 283 E9/L (ref 130–450)
PMV BLD AUTO: 10.7 FL (ref 7–12)
POTASSIUM REFLEX MAGNESIUM: 3.5 MMOL/L (ref 3.5–5)
RBC # BLD: 3.57 E12/L (ref 3.8–5.8)
SODIUM BLD-SCNC: 133 MMOL/L (ref 132–146)
WBC # BLD: 5.8 E9/L (ref 4.5–11.5)

## 2022-11-21 PROCEDURE — 94640 AIRWAY INHALATION TREATMENT: CPT

## 2022-11-21 PROCEDURE — 2580000003 HC RX 258: Performed by: STUDENT IN AN ORGANIZED HEALTH CARE EDUCATION/TRAINING PROGRAM

## 2022-11-21 PROCEDURE — 84100 ASSAY OF PHOSPHORUS: CPT

## 2022-11-21 PROCEDURE — 2580000003 HC RX 258: Performed by: SURGERY

## 2022-11-21 PROCEDURE — 6370000000 HC RX 637 (ALT 250 FOR IP): Performed by: SURGERY

## 2022-11-21 PROCEDURE — 2140000000 HC CCU INTERMEDIATE R&B

## 2022-11-21 PROCEDURE — 80048 BASIC METABOLIC PNL TOTAL CA: CPT

## 2022-11-21 PROCEDURE — 6370000000 HC RX 637 (ALT 250 FOR IP): Performed by: STUDENT IN AN ORGANIZED HEALTH CARE EDUCATION/TRAINING PROGRAM

## 2022-11-21 PROCEDURE — 99233 SBSQ HOSP IP/OBS HIGH 50: CPT | Performed by: SURGERY

## 2022-11-21 PROCEDURE — 6360000002 HC RX W HCPCS

## 2022-11-21 PROCEDURE — 6360000002 HC RX W HCPCS: Performed by: SURGERY

## 2022-11-21 PROCEDURE — 85027 COMPLETE CBC AUTOMATED: CPT

## 2022-11-21 PROCEDURE — 36415 COLL VENOUS BLD VENIPUNCTURE: CPT

## 2022-11-21 PROCEDURE — 83735 ASSAY OF MAGNESIUM: CPT

## 2022-11-21 PROCEDURE — 94003 VENT MGMT INPAT SUBQ DAY: CPT

## 2022-11-21 PROCEDURE — 2700000000 HC OXYGEN THERAPY PER DAY

## 2022-11-21 PROCEDURE — 37799 UNLISTED PX VASCULAR SURGERY: CPT

## 2022-11-21 RX ORDER — FUROSEMIDE 10 MG/ML
20 INJECTION INTRAMUSCULAR; INTRAVENOUS ONCE
Status: COMPLETED | OUTPATIENT
Start: 2022-11-21 | End: 2022-11-21

## 2022-11-21 RX ORDER — POTASSIUM CHLORIDE 7.45 MG/ML
10 INJECTION INTRAVENOUS
Status: COMPLETED | OUTPATIENT
Start: 2022-11-21 | End: 2022-11-21

## 2022-11-21 RX ORDER — MAGNESIUM SULFATE IN WATER 40 MG/ML
2000 INJECTION, SOLUTION INTRAVENOUS ONCE
Status: COMPLETED | OUTPATIENT
Start: 2022-11-21 | End: 2022-11-21

## 2022-11-21 RX ORDER — SENNA PLUS 8.6 MG/1
1 TABLET ORAL NIGHTLY PRN
Status: DISCONTINUED | OUTPATIENT
Start: 2022-11-21 | End: 2022-11-30 | Stop reason: HOSPADM

## 2022-11-21 RX ORDER — POTASSIUM CHLORIDE 7.45 MG/ML
10 INJECTION INTRAVENOUS
Status: DISPENSED | OUTPATIENT
Start: 2022-11-21 | End: 2022-11-21

## 2022-11-21 RX ADMIN — CARVEDILOL 6.25 MG: 6.25 TABLET, FILM COATED ORAL at 18:20

## 2022-11-21 RX ADMIN — GUAIFENESIN 200 MG: 200 SOLUTION ORAL at 08:13

## 2022-11-21 RX ADMIN — CLONIDINE HYDROCHLORIDE 0.2 MG: 0.2 TABLET ORAL at 08:13

## 2022-11-21 RX ADMIN — Medication 500 MG: at 08:12

## 2022-11-21 RX ADMIN — GUAIFENESIN 200 MG: 200 SOLUTION ORAL at 00:24

## 2022-11-21 RX ADMIN — CARVEDILOL 6.25 MG: 6.25 TABLET, FILM COATED ORAL at 08:12

## 2022-11-21 RX ADMIN — IPRATROPIUM BROMIDE AND ALBUTEROL SULFATE 1 AMPULE: .5; 2.5 SOLUTION RESPIRATORY (INHALATION) at 13:39

## 2022-11-21 RX ADMIN — HEPARIN SODIUM 5000 UNITS: 10000 INJECTION INTRAVENOUS; SUBCUTANEOUS at 04:30

## 2022-11-21 RX ADMIN — SUCRALFATE 1 G: 1 TABLET ORAL at 00:25

## 2022-11-21 RX ADMIN — CLONIDINE HYDROCHLORIDE 0.2 MG: 0.2 TABLET ORAL at 22:34

## 2022-11-21 RX ADMIN — CEFEPIME 2000 MG: 2 INJECTION, POWDER, FOR SOLUTION INTRAVENOUS at 12:59

## 2022-11-21 RX ADMIN — BUPRENORPHINE AND NALOXONE 1 TABLET: 8; 2 TABLET SUBLINGUAL at 22:35

## 2022-11-21 RX ADMIN — POTASSIUM CHLORIDE 10 MEQ: 7.46 INJECTION, SOLUTION INTRAVENOUS at 11:41

## 2022-11-21 RX ADMIN — CHLORHEXIDINE GLUCONATE 15 ML: 1.2 RINSE BUCCAL at 08:12

## 2022-11-21 RX ADMIN — SUCRALFATE 1 G: 1 TABLET ORAL at 05:43

## 2022-11-21 RX ADMIN — DIVALPROEX SODIUM 250 MG: 125 CAPSULE, COATED PELLETS ORAL at 14:02

## 2022-11-21 RX ADMIN — GUAIFENESIN 200 MG: 200 SOLUTION ORAL at 04:30

## 2022-11-21 RX ADMIN — FUROSEMIDE 20 MG: 10 INJECTION, SOLUTION INTRAMUSCULAR; INTRAVENOUS at 11:30

## 2022-11-21 RX ADMIN — Medication 500 MG: at 22:35

## 2022-11-21 RX ADMIN — SUCRALFATE 1 G: 1 TABLET ORAL at 18:20

## 2022-11-21 RX ADMIN — Medication 500 MG: at 18:20

## 2022-11-21 RX ADMIN — CLONIDINE HYDROCHLORIDE 0.2 MG: 0.2 TABLET ORAL at 14:03

## 2022-11-21 RX ADMIN — Medication 100 MG: at 08:13

## 2022-11-21 RX ADMIN — HEPARIN SODIUM 5000 UNITS: 10000 INJECTION INTRAVENOUS; SUBCUTANEOUS at 22:34

## 2022-11-21 RX ADMIN — POTASSIUM CHLORIDE 10 MEQ: 7.46 INJECTION, SOLUTION INTRAVENOUS at 12:54

## 2022-11-21 RX ADMIN — Medication 500 MG: at 12:49

## 2022-11-21 RX ADMIN — POTASSIUM CHLORIDE 10 MEQ: 7.46 INJECTION, SOLUTION INTRAVENOUS at 14:02

## 2022-11-21 RX ADMIN — AMLODIPINE BESYLATE 10 MG: 10 TABLET ORAL at 11:30

## 2022-11-21 RX ADMIN — MAGNESIUM SULFATE HEPTAHYDRATE 2000 MG: 40 INJECTION, SOLUTION INTRAVENOUS at 11:38

## 2022-11-21 RX ADMIN — POTASSIUM CHLORIDE 10 MEQ: 7.46 INJECTION, SOLUTION INTRAVENOUS at 14:58

## 2022-11-21 RX ADMIN — Medication 10 ML: at 21:00

## 2022-11-21 RX ADMIN — CEFEPIME 2000 MG: 2 INJECTION, POWDER, FOR SOLUTION INTRAVENOUS at 02:28

## 2022-11-21 RX ADMIN — HEPARIN SODIUM 5000 UNITS: 10000 INJECTION INTRAVENOUS; SUBCUTANEOUS at 12:49

## 2022-11-21 RX ADMIN — IPRATROPIUM BROMIDE AND ALBUTEROL SULFATE 1 AMPULE: .5; 2.5 SOLUTION RESPIRATORY (INHALATION) at 10:17

## 2022-11-21 RX ADMIN — BUPRENORPHINE AND NALOXONE 1 TABLET: 8; 2 TABLET SUBLINGUAL at 08:14

## 2022-11-21 RX ADMIN — Medication 10 MEQ: at 17:38

## 2022-11-21 RX ADMIN — FOLIC ACID 1 MG: 1 TABLET ORAL at 08:13

## 2022-11-21 RX ADMIN — DIVALPROEX SODIUM 250 MG: 125 CAPSULE, COATED PELLETS ORAL at 22:34

## 2022-11-21 RX ADMIN — DIVALPROEX SODIUM 250 MG: 125 CAPSULE, COATED PELLETS ORAL at 05:43

## 2022-11-21 RX ADMIN — Medication 10 ML: at 08:13

## 2022-11-21 RX ADMIN — SUCRALFATE 1 G: 1 TABLET ORAL at 12:49

## 2022-11-21 RX ADMIN — LANSOPRAZOLE 30 MG: KIT at 05:43

## 2022-11-21 RX ADMIN — CEFEPIME 2000 MG: 2 INJECTION, POWDER, FOR SOLUTION INTRAVENOUS at 18:31

## 2022-11-21 ASSESSMENT — PAIN SCALES - GENERAL
PAINLEVEL_OUTOF10: 0

## 2022-11-21 NOTE — PROGRESS NOTES
SURGICAL INTENSIVE CARE  PROGRESS NOTE    Date of admission:  11/5/2022  Reason for ICU transfer:  worsening head CT    SUBJECTIVE:  Knows his name and the year this morning. Following commands. No agitation overnight. Thinks he is at a hotel in [place]. HOSPITAL COURSE:    11/5--presented after unwitnessed fall; found to have SDH/SAH/IPH--worse on repeat head CT  11/6--no issues overnight; admitted to SICU. 11/7--Pt confused this morning. 11/8--Pt with increasing confusion and agitation overnight. Pulled out two IV's, in 4-point restraints. Started on Norvasc, Depakote sprinkles and precedex. 11/9--Precedex switched to clonidine due to bradycardia. Remains A&Ox1 not following commands. Again pulling out IV's, in 4-point restraints. 11/10--Pt remains in 4-point restraints. A&Ox1. Follows commands intermittently today. Bradycardia resolved, continued hypertension with agitation. 11/11--Pt requiring Hydralazine PRN frequently overnight. Clonidine increased. Continued agitation. 11/12-No acute events. No new complaints. 11/13--remains agitated and restless; pulled out corpak again. Taking some meds with pudding per nursing  11/14--Increased somnolence and confusion overnight. STAT head CT showed slight increase in right parietal SAH. Stable on repeat head CT. Ammonia and TSH ordered. 11/15--GCS 12. Ammonia increased from 24 to 41. Started on Lactulose. TSH 5.51 with normal T3, T4. Had 1 BM.   11/16--No acute overnight events. Patient is more alert today. Says his full name and follows some commands. PEG placed today. 11/17--Patient developed tachypnea, tachycardia and hypoxia overnight. Emergent intubation this AM. Patient was GCS 10 prior to intubation. Opening eyes to voice and mumbling but not following commands. CTA chest negative for PE. Started on antibiotics. 11/18: Off of pressors. 11/19: following commands this morning, will try to extubate again today  11/20: no acute issues. On NC 6/7 L. Extubated yesterday. E-lytes replaced       PHYSICAL EXAM:  /81   Pulse 65   Temp 98.5 °F (36.9 °C) (Bladder)   Resp 18   Ht 5' 10\" (1.778 m)   Wt 165 lb 4.8 oz (75 kg)   SpO2 97%   BMI 23.72 kg/m²     GENERAL:  NAD. A&O x 2  HEAD:  Normocephalic, atraumatic. EYES:   No scleral icterus. PERRLA. LUNGS:  CTAB. On 3L NC.   CARDIOVASCULAR: Warm throughout. RRR. ABDOMEN:  PEG tube in place, 2 cm at the skin. No erythema or drainage. Soft, non distended, non tender. No guarding, rigidity, rebound. EXTREMITIES:   MAEx4. Atraumatic. No LE edema. SKIN:  Warm and dry  NEUROLOGIC: GCS 14. Following commands. Moving all extremities. Slightly confused. ASSESSMENT / PLAN:  Neuro:  shows bifrontal contusions greater than right and bitemporal contusions with tentorial subdural hematoma and occipital fx stable on scan #3. Slight interval increase on repeat scan 11/14. GCS 12. NSG following. Keppra (completed). ETOH on PNB (completed). Suboxone film per Addiction medicine. Continue Depakote. Depakote level normal.  Clonidine for agitation and HTN. PRN Tylenol. CV: Septic shock, no longer on pressors. Goal BP <140. HTN, Coreg, Norvasc. Cortisol elevated. Fluid support. Pulm: Acute hypoxic respiratory failure (resolved) CTA chest negative for PE with evidence of atelectasis/infiltrates. Duonebs. On 3L NC. COVID-19 infection. Supportive care. Encourage IS/SMI. GI:  Bowel regimen held due to diarrhea. Zofran PRN. HO HCV. SLP to re-evaluate when mentation improved. PEG tube in place. Tube feeds at goal.  Renal: No acute issues. Currently 7 L positive. 40 mg of Lasix. Monitor UOP & Electrolytes. Endocrine: TSH elevated with normal T3/T4. No acute issues. Monitor glucose. MSK: No acute issues. PT/OT when able. Heme: monitor CBC. ID: Cefepime for H. Influenzae in sputum. COVID-19. Supportive care. H/o Hep C.     Pain/Analgesia: Tylenol, Suboxone, Total fluid rate: Stopped  Bowel regimen: PRN Senokot  DVT proph:   SQ heparin  Glucose protocol: Not indicated  Mouth/eye care: As needed per patient  Urine:   Remove today  CVC sites:  Remove today  Ancillary consults: Aman Pollard, addiction medicine  Family Update: As available    Disposition: Transfer to Telemetry    Electronically signed by Pastor Logan DO on 11/21/2022 at 5:46 AM

## 2022-11-21 NOTE — PLAN OF CARE
Patient reaches towards medical lines and peg tube when unrestrained. Unable to follow commands at this time. B/L soft wrist restraints to remain in place for patient safety. Problem: Skin/Tissue Integrity  Goal: Absence of new skin breakdown  Description: 1. Monitor for areas of redness and/or skin breakdown  2. Assess vascular access sites hourly  3. Every 4-6 hours minimum:  Change oxygen saturation probe site  4. Every 4-6 hours:  If on nasal continuous positive airway pressure, respiratory therapy assess nares and determine need for appliance change or resting period. Outcome: Progressing     Problem: Safety - Adult  Goal: Free from fall injury  Outcome: Progressing     Problem: ABCDS Injury Assessment  Goal: Absence of physical injury  11/21/2022 0410 by Twan Overton RN  Outcome: Progressing  11/20/2022 1648 by Henrey Blizzard, RN  Outcome: Progressing     Problem: Safety - Medical Restraint  Goal: Remains free of injury from restraints (Restraint for Interference with Medical Device)  Description: INTERVENTIONS:  1. Determine that other, less restrictive measures have been tried or would not be effective before applying the restraint  2. Evaluate the patient's condition at the time of restraint application  3. Inform patient/family regarding the reason for restraint  4.  Q2H: Monitor safety, psychosocial status, comfort, nutrition and hydration  11/21/2022 0410 by Twan Overton RN  Outcome: Progressing  Flowsheets  Taken 11/21/2022 0000  Remains free of injury from restraints (restraint for interference with medical device): Every 2 hours: Monitor safety, psychosocial status, comfort, nutrition and hydration  Taken 11/20/2022 2000  Remains free of injury from restraints (restraint for interference with medical device): Every 2 hours: Monitor safety, psychosocial status, comfort, nutrition and hydration  11/20/2022 1648 by Henrey Blizzard, RN  Outcome: Progressing  Flowsheets  Taken 11/20/2022 1400  Remains free of injury from restraints (restraint for interference with medical device): Every 2 hours: Monitor safety, psychosocial status, comfort, nutrition and hydration  Taken 11/20/2022 1200  Remains free of injury from restraints (restraint for interference with medical device): Every 2 hours: Monitor safety, psychosocial status, comfort, nutrition and hydration  Taken 11/20/2022 1000  Remains free of injury from restraints (restraint for interference with medical device): Every 2 hours: Monitor safety, psychosocial status, comfort, nutrition and hydration  Taken 11/20/2022 0800  Remains free of injury from restraints (restraint for interference with medical device): Every 2 hours: Monitor safety, psychosocial status, comfort, nutrition and hydration

## 2022-11-21 NOTE — CARE COORDINATION
Pt was extubated on 11/19. O2 3L Wrist restraints cont. Now following commands better. PT/OT reordered to evaluate pt. Pt no longer has criteria for LTACH. [facility] is following, but would need to be restraint free and will require ins auth.

## 2022-11-21 NOTE — ACP (ADVANCE CARE PLANNING)
Advance Care Planning   Healthcare Decision Maker:    Primary Decision Maker: Mell[facility] - Child - [phone]    Click here to complete Healthcare Decision Makers including selection of the Healthcare Decision Maker Relationship (ie \"Primary\").

## 2022-11-21 NOTE — PROGRESS NOTES
[facility]  SURGICAL INTENSIVE CARE UNIT (SICU)  ATTENDING PHYSICIAN CRITICAL CARE PROGRESS NOTE     I have examined the patient, reviewed the record,and discussed the case with the APN/  Resident. I have reviewed all relevant labs and imaging data. The following summarizes my clinical findings and independent assessment. Date of admission:  11/5/2022    CC: unwitnessed fall     HOSPITAL COURSE:   11/5--presented after unwitnessed fall; found to have SDH/SAH/IPH--worse on repeat head CT  11/6--no issues overnight; admitted to SICU. 11/7--Pt confused this morning. 11/8--Pt with increasing confusion and agitation overnight. Pulled out two IV's, in 4-point restraints. Started on Norvasc, Depakote sprinkles and precedex. 11/9--Precedex switched to clonidine due to bradycardia. Remains A&Ox1 not following commands. Again pulling out IV's, in 4-point restraints. 11/10--Pt remains in 4-point restraints. A&Ox1. Follows commands intermittently today. Bradycardia resolved, continued hypertension with agitation. 11/11--Pt requiring Hydralazine PRN frequently overnight. Clonidine increased. Continued agitation. 11/12-No acute events. No new complaints. 11/13--remains agitated and restless; pulled out corpak again. Taking some meds with pudding per nursing  11/14--Increased somnolence and confusion overnight. STAT head CT showed slight increase in right parietal SAH. Stable on repeat head CT. Ammonia and TSH ordered. 11/15--GCS 12. Ammonia increased from 24 to 41. Started on Lactulose. TSH 5.51 with normal T3, T4. Had 1 BM.   11/16--No acute overnight events. Patient is more alert today. Says his full name and follows some commands. PEG placed today. 11/17--Patient developed tachypnea, tachycardia and hypoxia overnight. Emergent intubation this AM. Patient was GCS 10 prior to intubation. Opening eyes to voice and mumbling but not following commands. CTA chest negative for PE.  Started on antibiotics. 11/18: Off of pressors. 11/19: following commands this morning, will try to extubate again today  11/20: no acute issues. On NC 6/7 L. Extubated yesterday. E-lytes replaced   11/21: no overnight issues, more oriented and following commands , 3L NC , SBP goal <160 , replace Mg and K         New Imaging Reviewed and personally interpreted:    No new imaging     New Labs reviewed potassium 3.5, creatinine 0.4, mag 1.7, glucose 122, white blood cell count 5.8, hemoglobin 12.3, platelet count 352    Physical Exam  HENT:      Head: Normocephalic. Mouth/Throat:      Mouth: Mucous membranes are moist.   Eyes:      Extraocular Movements: Extraocular movements intact. Pupils: Pupils are equal, round, and reactive to light. Cardiovascular:      Rate and Rhythm: Normal rate. Pulmonary:      Effort: Pulmonary effort is normal. No respiratory distress. Abdominal:      General: Abdomen is flat. There is no distension. Comments: PEG c/d/I    Musculoskeletal:         General: Normal range of motion. Cervical back: Neck supple. Comments: 5 out of 5 strength upper and lower extremities   Skin:     General: Skin is warm. Neurological:      General: No focal deficit present. Mental Status: He is alert. Comments: Disoriented to place       Assessment   Principal Problem:    Subarachnoid bleed ([address]  Active Problems:    Subdural hematoma    Acute alcoholic intoxication with complication (HCC)    Closed fracture of occipital bone (HCC)    COVID-19    Opioid dependence on agonist therapy (HCC)    Delirium    Other constipation    Hypokalemia    Hypomagnesemia  Resolved Problems:    * No resolved hospital problems.  *      Plan   GI: Tube Feeds at Goal  , considering he is more alert will have speech 3 cm,  bowel regimen has been held considering multiple bowel movements we will discontinue these add on a as needed Senokot , stop folic acid and thiamine he has been on this over 5 days, continue Phos-Nak packs   Neuro: prn Tylenol   prn Oxycodone  ( stop has not used this)  Repeat CT head stable, Neurosurgery following , and All IV infusions and IVPB should be in 0.9% NaCl if available , Goal Na >140 once able to eat well behaved on a fluid restriction diet, will mix medications for now in normal saline instead of free water sodium has down trended since 1118 from 143 down to 133  Suboxone pain management following, clonidine, Depakote   Finished phenobarbital for risk of alcohol withdraw  Hold home diclofenac  Renal: Hep lock IV, Monitor Urine Output, Daily CBC,BMP, Mg,Phos, ionized Ca, 7 L positive we will give 20 mg of Lasix   Musculoskeletal: WBAT all extremities , Spines Clear AM-PAC Score we will have him see him today as his agitation is much improved  Pulmonary: Aggressive pulmonary hygiene , SMI  Duonebs stop scheduled Robitussin secretions have decreased only on 3 L nasal cannula, Monitor RR and Maintain SpO2 > 92%  COVID-19 purely support did not go on antivirals as risk of thrombocytopenia and bleeding  ID:  cefepime  day 5 of 7  sensitivities still pending  Heme: No indication for Transfusion ,   Monitor Hb   Cardiac: Monitor Hemodynamics  goal systolic blood pressure under 140 currently on Coreg on clonidine for blood pressure/agitation we will un hold Norvasc  Endocrine: Maintain glucose <180     DVT Prophylaxis: PCDs, SQ Heparin   Ulcer Prophylaxis:  PPI and Carafate gastric and duodenal ulcers found on EGD when PEG was placed  Tubes and Lines:  Remove Montoya catheter, remove arterial line, place peripheral IVs and remove central line    Seizure proph:       Off of Keppra  Ancillary consults:   Neurosurgery, PT/OT , and Pain management   , SW SBI   Family Update:         As available   CODE Status:       Full Code    Dispo: Transfer to Akshat Angel MD

## 2022-11-21 NOTE — PROGRESS NOTES
Call out to pharmacy related to fifth bag of potassium currently showing discontinued medication with need of administration time change.

## 2022-11-22 LAB
ANION GAP SERPL CALCULATED.3IONS-SCNC: 11 MMOL/L (ref 7–16)
BUN BLDV-MCNC: 14 MG/DL (ref 6–23)
CALCIUM SERPL-MCNC: 9.1 MG/DL (ref 8.6–10.2)
CHLORIDE BLD-SCNC: 97 MMOL/L (ref 98–107)
CO2: 25 MMOL/L (ref 22–29)
CREAT SERPL-MCNC: 0.5 MG/DL (ref 0.7–1.2)
GFR SERPL CREATININE-BSD FRML MDRD: >60 ML/MIN/1.73
GLUCOSE BLD-MCNC: 110 MG/DL (ref 74–99)
HCT VFR BLD CALC: 35.5 % (ref 37–54)
HEMOGLOBIN: 12.5 G/DL (ref 12.5–16.5)
MAGNESIUM: 1.9 MG/DL (ref 1.6–2.6)
MCH RBC QN AUTO: 33.8 PG (ref 26–35)
MCHC RBC AUTO-ENTMCNC: 35.2 % (ref 32–34.5)
MCV RBC AUTO: 95.9 FL (ref 80–99.9)
PDW BLD-RTO: 11.6 FL (ref 11.5–15)
PHOSPHORUS: 3.4 MG/DL (ref 2.5–4.5)
PLATELET # BLD: 318 E9/L (ref 130–450)
PMV BLD AUTO: 10.5 FL (ref 7–12)
POTASSIUM REFLEX MAGNESIUM: 4.1 MMOL/L (ref 3.5–5)
RBC # BLD: 3.7 E12/L (ref 3.8–5.8)
SODIUM BLD-SCNC: 133 MMOL/L (ref 132–146)
WBC # BLD: 5.2 E9/L (ref 4.5–11.5)

## 2022-11-22 PROCEDURE — 6370000000 HC RX 637 (ALT 250 FOR IP): Performed by: SURGERY

## 2022-11-22 PROCEDURE — 84100 ASSAY OF PHOSPHORUS: CPT

## 2022-11-22 PROCEDURE — 99232 SBSQ HOSP IP/OBS MODERATE 35: CPT | Performed by: SURGERY

## 2022-11-22 PROCEDURE — 80048 BASIC METABOLIC PNL TOTAL CA: CPT

## 2022-11-22 PROCEDURE — 83735 ASSAY OF MAGNESIUM: CPT

## 2022-11-22 PROCEDURE — 6360000002 HC RX W HCPCS

## 2022-11-22 PROCEDURE — 6370000000 HC RX 637 (ALT 250 FOR IP): Performed by: STUDENT IN AN ORGANIZED HEALTH CARE EDUCATION/TRAINING PROGRAM

## 2022-11-22 PROCEDURE — 6360000002 HC RX W HCPCS: Performed by: SURGERY

## 2022-11-22 PROCEDURE — 2580000003 HC RX 258: Performed by: SURGERY

## 2022-11-22 PROCEDURE — 2700000000 HC OXYGEN THERAPY PER DAY

## 2022-11-22 PROCEDURE — 2580000003 HC RX 258: Performed by: STUDENT IN AN ORGANIZED HEALTH CARE EDUCATION/TRAINING PROGRAM

## 2022-11-22 PROCEDURE — 2140000000 HC CCU INTERMEDIATE R&B

## 2022-11-22 PROCEDURE — 36415 COLL VENOUS BLD VENIPUNCTURE: CPT

## 2022-11-22 PROCEDURE — 85027 COMPLETE CBC AUTOMATED: CPT

## 2022-11-22 PROCEDURE — 92610 EVALUATE SWALLOWING FUNCTION: CPT | Performed by: SPEECH-LANGUAGE PATHOLOGIST

## 2022-11-22 RX ORDER — VALPROIC ACID 250 MG/5ML
250 SOLUTION ORAL EVERY 8 HOURS SCHEDULED
Status: DISCONTINUED | OUTPATIENT
Start: 2022-11-22 | End: 2022-11-30 | Stop reason: HOSPADM

## 2022-11-22 RX ADMIN — SUCRALFATE 1 G: 1 TABLET ORAL at 11:56

## 2022-11-22 RX ADMIN — CARVEDILOL 6.25 MG: 6.25 TABLET, FILM COATED ORAL at 17:20

## 2022-11-22 RX ADMIN — VALPROIC ACID 250 MG: 250 SOLUTION ORAL at 22:09

## 2022-11-22 RX ADMIN — Medication 500 MG: at 15:27

## 2022-11-22 RX ADMIN — Medication 500 MG: at 11:55

## 2022-11-22 RX ADMIN — BUPRENORPHINE AND NALOXONE 1 TABLET: 8; 2 TABLET SUBLINGUAL at 22:09

## 2022-11-22 RX ADMIN — Medication 500 MG: at 22:08

## 2022-11-22 RX ADMIN — CLONIDINE HYDROCHLORIDE 0.2 MG: 0.2 TABLET ORAL at 22:08

## 2022-11-22 RX ADMIN — CEFEPIME 2000 MG: 2 INJECTION, POWDER, FOR SOLUTION INTRAVENOUS at 10:56

## 2022-11-22 RX ADMIN — AMLODIPINE BESYLATE 10 MG: 10 TABLET ORAL at 08:49

## 2022-11-22 RX ADMIN — LANSOPRAZOLE 30 MG: KIT at 06:06

## 2022-11-22 RX ADMIN — Medication 10 ML: at 09:32

## 2022-11-22 RX ADMIN — SUCRALFATE 1 G: 1 TABLET ORAL at 02:21

## 2022-11-22 RX ADMIN — VALPROIC ACID 250 MG: 250 SOLUTION ORAL at 14:09

## 2022-11-22 RX ADMIN — CEFEPIME 2000 MG: 2 INJECTION, POWDER, FOR SOLUTION INTRAVENOUS at 02:21

## 2022-11-22 RX ADMIN — HEPARIN SODIUM 5000 UNITS: 10000 INJECTION INTRAVENOUS; SUBCUTANEOUS at 12:01

## 2022-11-22 RX ADMIN — CLONIDINE HYDROCHLORIDE 0.2 MG: 0.2 TABLET ORAL at 08:49

## 2022-11-22 RX ADMIN — Medication 10 ML: at 22:11

## 2022-11-22 RX ADMIN — CEFEPIME 2000 MG: 2 INJECTION, POWDER, FOR SOLUTION INTRAVENOUS at 17:21

## 2022-11-22 RX ADMIN — CARVEDILOL 6.25 MG: 6.25 TABLET, FILM COATED ORAL at 08:49

## 2022-11-22 RX ADMIN — CLONIDINE HYDROCHLORIDE 0.2 MG: 0.2 TABLET ORAL at 15:26

## 2022-11-22 RX ADMIN — HEPARIN SODIUM 5000 UNITS: 10000 INJECTION INTRAVENOUS; SUBCUTANEOUS at 22:19

## 2022-11-22 RX ADMIN — Medication 500 MG: at 08:50

## 2022-11-22 RX ADMIN — VALPROIC ACID 250 MG: 250 SOLUTION ORAL at 06:05

## 2022-11-22 RX ADMIN — SUCRALFATE 1 G: 1 TABLET ORAL at 17:20

## 2022-11-22 RX ADMIN — LANSOPRAZOLE 30 MG: KIT at 15:32

## 2022-11-22 RX ADMIN — HEPARIN SODIUM 5000 UNITS: 10000 INJECTION INTRAVENOUS; SUBCUTANEOUS at 05:18

## 2022-11-22 RX ADMIN — BUPRENORPHINE AND NALOXONE 1 TABLET: 8; 2 TABLET SUBLINGUAL at 08:50

## 2022-11-22 RX ADMIN — SUCRALFATE 1 G: 1 TABLET ORAL at 06:06

## 2022-11-22 NOTE — PROGRESS NOTES
[facility]   ATTENDING PHYSICIAN PROGRESS NOTE     I have examined the patient, reviewed the record, and discussed the case with the APN/ Resident. I have reviewed all relevant labs and imaging data. Please refer to the APN/ resident's note. I agree with the assessment and plan with the following corrections/ additions. The following summarizes my clinical findings and independent assessment. CC: fall    Pt denies pain.       Awake and alert  Confused  Follows commands  Hrt:  regular rate/rhythm; no murmur  Lungs:  fairly clear bilaterally  Abd:  soft; BS active; NT/ND; PEG in place  Skin:  warm/dry  Ext:  moving all 4 ext    Labs personally reviewed    Patient Active Problem List    Diagnosis Date Noted    Hypokalemia 11/21/2022    Hypomagnesemia 11/21/2022    Opioid dependence on agonist therapy ([address] 11/14/2022    Delirium 11/14/2022    Other constipation 11/14/2022    Subdural hematoma 55/24/1390    Acute alcoholic intoxication with complication ([address] 91/19/6707    Closed fracture of occipital bone ([address] 11/07/2022    COVID-19 11/07/2022    Subarachnoid bleed ([address] 11/05/2022    Dysphagia     Alcohol abuse     Alcohol withdrawal syndrome without complication ([address]     Bacteremia     Pneumonia due to organism 03/24/2021    Bacterial pneumonia     Acidosis     JOSIE (acute kidney injury) ([address]     Hypotension 06/17/2020    Benign paroxysmal positional vertigo due to bilateral vestibular disorder 06/13/2020    Essential hypertension 06/12/2020    Sinus tachycardia 06/12/2020    Right subclavian artery occlusion 12/03/2015    Right shoulder pain 11/12/2015    Chronic hepatitis C without hepatic coma ([address] 10/16/2015    Inguinal hernia, right 05/08/2014    Cervical radiculopathy 05/08/2014       S/p fall  SDH/SAH/IPH--monitor neuro exam  HTN--cont norvasc, coreg, clonidine  On home suboxone  Cefepime empirically  PPI/carafate for gastric/duodenal ulcers seen when PEG placed  Dysphagia--cont TF; await swallow eval  PT/OT evals   Speech eval  DVT risk--PCDs/subQ heparin    Bridget Ferrara MD, FACS  11/22/2022  11:32 AM

## 2022-11-22 NOTE — PROGRESS NOTES
SPEECH/LANGUAGE PATHOLOGY  CLINICAL ASSESSMENT OF SWALLOWING FUNCTION   and PLAN OF CARE    PATIENT NAME:  Sandie Bullock  (male)     MRN:  [id number]    :  1960  (57 y.o.)  STATUS:  Inpatient: Room 4956/2430-X    TODAY'S DATE:  2022    SLP swallowing-dysphagia evaluation and treatment  Start:  22,   End:  22,   ONE TIME,   Standing Count:  1 Occurrences,   Padmaja Millan MD   REASON FOR REFERRAL: assess oropharyngeal swallow s/p extubation, SAH   EVALUATING THERAPIST: MYLENE Edwards                 RESULTS:    DYSPHAGIA DIAGNOSIS:   Clinical indicators of suspected pharyngeal phase dysphagia       DIET RECOMMENDATIONS:  NPO until MBSS can be completed       FEEDING RECOMMENDATIONS:     Assistance level:  Not applicable      Compensatory strategies recommended: Not applicable      Discussed recommendations with nursing and/or faxed report to referring provider: Yes    SPEECH THERAPY  PLAN OF CARE   The dysphagia POC is established based on physician order, dysphagia diagnosis and results of clinical assessment     Will establish POC once MBSS is completed. Conditions Requiring Skilled Therapeutic Intervention for dysphagia:    Patient is performing below functional baseline d/t  current acute condition, Multiple diagnoses, multiple medications, and increased dependency upon caregivers.     Specific dysphagia interventions to include:     Trials of upgraded diet/liquid     Specific instructions for next treatment:  MBSS to be completed  Patient Treatment Goals:    Short Term Goals:  Pt will participate in MBSS to fully assess oropharyngeal swallow function and to assist in determining the least restrictive PO diet to maintain adequate nutrition/hydration     Long Term Goals:   Pt will improve oropharyngeal swallow function to ensure airway protection during PO intake to maintain adequate nutrition/hydration and decrease signs/symptoms of aspiration to less than 1 x/day. Patient/family Goal:    Did not state. Will further assess during treatment. Plan of care discussed with Patient   The Patient did not demonstrate complete understanding of the diagnosis, prognosis and plan of care     Rehabilitation Potential/Prognosis: fair                    ADMITTING DIAGNOSIS: Subdural hematoma [S06. 5XAA]  SAH (subarachnoid hemorrhage) (HCC) [I60.9]  Subarachnoid bleed (HCC) [I60.9]  JOSIE (acute kidney injury) ([facility] [T93.0]  Acute alcoholic intoxication with complication ([facility] [W01.445]  Closed fracture of occipital bone, unspecified laterality, unspecified occipital fracture type, initial encounter ([facility] [H82.545U]  COVID-19 [U07.1]    VISIT DIAGNOSIS:   Visit Diagnoses         Codes    Subdural hematoma    -  Primary S06. 5XAA    Subarachnoid bleed (HCC)     I60.9    Closed fracture of occipital bone, unspecified laterality, unspecified occipital fracture type, initial encounter ([facility]     S02.119A    COVID-19     H53.5    Acute alcoholic intoxication with complication (HCC)     O32.108    Acute respiratory failure with hypoxia (HCC)     J96.01    Shock ([address]     R57.9    septic shock     R57.9             PATIENT REPORT/COMPLAINT: patient currently NPO pending results of this evaluation  RN cleared patient for participation in assessment     yes     PRIOR LEVEL OF SWALLOW FUNCTION:    PAST HISTORY OF DYSPHAGIA?: none reported    Home diet: Regular consistency solids (IDDSI level 7) with  thin liquids (IDDSI level 0)  Current Diet Order:  Diet NPO Exceptions are: Sips of Water with Meds  ADULT TUBE FEEDING; PEG; Standard with Fiber; Continuous; 20; Yes; 10; Q 4 hours; 55; 30; Q 4 hours; Protein; 1 protein modular daily    PROCEDURE:  Consistencies Administered During the Evaluation   Liquids: thin liquid   Solids:  pureed foods      Method of Intake:   spoon  Fed by clinician      Position:   Seated, upright    CLINICAL ASSESSMENT:  Oral Stage:        The oral stage of swallowing was within functional limits for consistencies administered      Pharyngeal Stage:    No signs of aspiration were noted during this evaluation however, silent aspiration cannot be ruled out at bedside. If silent aspiration is suspected, a Videofluoroscopic Study of Swallowing (MBS) is recommended and requires a physician order. HOWEVER Pt was unable to follow commands during assessment to allow for full assessment     Cognition:   Did not follow commands    Oral Peripheral Examination   Adequate lingual/labial strength     Current Respiratory Status    2 liters high flow nasal cannula     Parameters of Speech Production  nonverbal    Volitional Swallow: not able to elicit     Volitional Cough:   not able to elicit     Pain: No pain reported. EDUCATION:   The Speech Language Pathologist (SLP) completed education regarding results of evaluation and that intervention is warranted at this time. Learner: Patient  Education: Reviewed results and recommendations of this evaluation and Reviewed diet and strategies  Evaluation of Education:  No evidence of learning    This plan may be re-evaluated and revised as warranted. Evaluation Time includes thorough review of current medical information, gathering information on past medical history/social history and prior level of function, completion of standardized testing/informal observation of tasks, assessment of data and education on plan of care and goals. [x]The admitting diagnosis and active problem list, have been reviewed prior to initiation of this evaluation.         ACTIVE PROBLEM LIST:   Patient Active Problem List   Diagnosis    Inguinal hernia, right    Cervical radiculopathy    Chronic hepatitis C without hepatic coma (HCC)    Right shoulder pain    Right subclavian artery occlusion    Essential hypertension    Sinus tachycardia    Benign paroxysmal positional vertigo due to bilateral vestibular disorder    Hypotension Pneumonia due to organism    Bacterial pneumonia    Acidosis    JOSIE (acute kidney injury) ([address]    Bacteremia    Alcohol withdrawal syndrome without complication ([address]    Dysphagia    Alcohol abuse    Subarachnoid bleed (HCC)    Subdural hematoma    Acute alcoholic intoxication with complication ([address]    Closed fracture of occipital bone ([address]    COVID-19    Opioid dependence on agonist therapy ([address]    Delirium    Other constipation    Hypokalemia    Hypomagnesemia         CPT code:  32255  bedside swallow anam Rodriguez, CCC-SLP  Speech-Language Pathologist  [id number]  11/22/2022

## 2022-11-22 NOTE — PROGRESS NOTES
Comprehensive Nutrition Assessment    Type and Reason for Visit:  Reassess    Nutrition Recommendations/Plan:   Recommend to continue current tube feeding orders at this time (Jevity 1.5 @55/hr plus 1 protein modular). This formula and rate meets 100% of patients estimated calorie and protein needs. Malnutrition Assessment:  Malnutrition Status:  Insufficient data (11/22/22 1044)    Context:  Acute Illness     Findings of the 6 clinical characteristics of malnutrition:  Energy Intake:  No significant decrease in energy intake (TF meeting needs)  Weight Loss:  Unable to assess (d/t possible fluid shifts and weight discrepancies)     Body Fat Loss:  Unable to assess (d/t COVID isolation)     Muscle Mass Loss:  Unable to assess (d/t COVID isolation)    Fluid Accumulation:  No significant fluid accumulation     Strength:  Not Performed    Nutrition Assessment:    Patient remains NPO and receiving tube feedings ; s/p PEG placement on 11/16 ; noted clinical indicators of moderate-severe oropharyngeal phase dysphagia per speech ; s/p extubation on 11/19 ; adm s/p fall ; noted SDH/SAH/IPH/TBI ; adm w/ septic shock and occipital fx ; hx of ETOH abuse and hepatitis C ; pt also COVID-19 positive ; noted JOSIE ; will provide TF recommendations    Nutrition Related Findings:    +I&Os (+4.7 L), trace edema, A&O x 1, missing teeth, dysphagia, active BS, rounded abd, PEG w/ TF, MAP WNL ;  Wound Type: None       Current Nutrition Intake & Therapies:    Average Meal Intake: NPO     Current Tube Feeding (TF) Orders:  Feeding Route: PEG  Formula: Standard with Fiber  Schedule: Continuous (@55/hr)  Feeding Regimen: @55/hr  Additives/Modulars: Protein  Water Flushes: 30ml q 4 hours  Current TF & Flush Orders Provides: 1320ml, 1980 calories, 84g protein, 810ml water (2084 calories and 110g protein w/ protein modular)      Anthropometric Measures:  Height: 5' 10\" (177.8 cm)  Ideal Body Weight (IBW): 166 lbs (75 kg)    Admission Body Weight: 195 lb (88.5 kg) (11/6 first measured)  Current Body Weight: 165 lb (74.8 kg) (11/12, actual), 99.4 % IBW. Current BMI (kg/m2): 23.7  Usual Body Weight: 189 lb 6 oz (85.9 kg) (EMR measured wt 6/29/22)  % Weight Change (Calculated): 3                    BMI Categories: Normal Weight (BMI 18.5-24. 9)    Estimated Daily Nutrient Needs:  Energy Requirements Based On: Formula  Weight Used for Energy Requirements: Current  Energy (kcal/day): 3850-3118 (REE 1546 x 1.3 SF)  Weight Used for Protein Requirements: Current  Protein (g/day):  (1.3-1.5g/kg CBW)  Method Used for Fluid Requirements: 1 ml/kcal  Fluid (ml/day): 4497-4003    Nutrition Diagnosis:   Inadequate oral intake related to cognitive or neurological impairment (s/p fall w/ SDH) as evidenced by NPO or clear liquid status due to medical condition, nutrition support - enteral nutrition    Nutrition Interventions:   Food and/or Nutrient Delivery: Continue Current Diet, Continue Current Tube Feeding  Nutrition Education/Counseling: Education not indicated  Coordination of Nutrition Care: Continue to monitor while inpatient, Speech Therapy, Swallow Evaluation       Goals:  Previous Goal Met: Progressing toward Goal(s)  Goals: Tolerate nutrition support at goal rate, by next RD assessment       Nutrition Monitoring and Evaluation:   Behavioral-Environmental Outcomes: None Identified  Food/Nutrient Intake Outcomes: Enteral Nutrition Intake/Tolerance  Physical Signs/Symptoms Outcomes: Biochemical Data, GI Status, Fluid Status or Edema, Hemodynamic Status, Nutrition Focused Physical Findings, Skin, Weight    Discharge Planning:     Too soon to determine     Beckie Gillette RD, LD  Contact: [phone]

## 2022-11-22 NOTE — PROGRESS NOTES
[facility]. Daily Progress Note 11/22/2022    Date of admission:  11/5/2022    CC: unwitnessed fall; worsening head CT     Subjective:  GCS 14 this AM. Not oriented to place. Alert and talkative. Following commands. TF running at 30cc/hr. Objective:  /82   Pulse 90   Temp 98.1 °F (36.7 °C) (Oral)   Resp 22   Ht 5' 10\" (1.778 m)   Wt 165 lb 4.8 oz (75 kg)   SpO2 94%   BMI 23.72 kg/m²     GENERAL:  Laying in bed, awake, alert, cooperative, no apparent distress    NEUROLOGIC:  GCS 14     HEAD: Normocephalic, atraumatic  EYES: No sclera icterus, pupils equal  LUNGS:  No increased work of breathing. room air. CARDIOVASCULAR:  regular rate   ABDOMEN:  Soft, non-tender, non-distended  EXTREMITIES: No edema or swelling  SKIN: Warm and dry    Assessment/Plan:  57 y.o. male s/p unwitnessed fall     Principal Problem:    Subarachnoid bleed (HCC)  Active Problems:    Subdural hematoma    Acute alcoholic intoxication with complication (HCC)    Closed fracture of occipital bone (HCC)    COVID-19    Opioid dependence on agonist therapy (HCC)    Delirium    Other constipation    Hypokalemia    Hypomagnesemia  Resolved Problems:    * No resolved hospital problems. *    Neuro:  shows bifrontal contusions greater than right and bitemporal contusions with tentorial subdural hematoma and occipital fx stable on scan #3. Slight interval increase on repeat scan 11/14. GCS 12. NSG following. Keppra (completed). ETOH on PNB (completed). Suboxone film per Addiction medicine. Continue Depakote. Depakote level normal.  Clonidine for agitation and HTN. PRN Tylenol. CV: Septic shock, no longer on pressors. Goal BP <140. HTN, Coreg, Norvasc. Cortisol elevated. Fluid support. Pulm: Acute hypoxic respiratory failure (resolved) CTA chest negative for PE with evidence of atelectasis/infiltrates. Duonebs. On 3L NC. COVID-19 infection. Supportive care. Encourage IS/SMI.   GI:  Bowel regimen held due to diarrhea. Zofran PRN. HO HCV. SLP to re-evaluate  dysphagia for possible oral intake. PEG tube in place. Tube feeds at goal.  Renal: No acute issues. Monitor UOP & Electrolytes. Endocrine: TSH elevated with normal T3/T4. No acute issues. Monitor glucose. MSK: No acute issues. PT/OT when able. Heme: monitor CBC. ID: Cefepime day 6/7 for H. Influenzae in sputum. COVID-19. Supportive care. H/o Hep C.      Pain/Analgesia:         Tylenol, Suboxone  Bowel regimen:         PRN Senokot  DVT proph:                SQ heparin  Glucose protocol:     Not indicated  Mouth/eye care:        As needed per patient  Urine:                          n/a  CVC sites:                  n/a  Ancillary consults:    Paolo Saenz, addiction medicine  Family Update:          As available     Disposition:   Transfer to Telemetry    Jennifer Yoder MD

## 2022-11-23 ENCOUNTER — APPOINTMENT (OUTPATIENT)
Dept: GENERAL RADIOLOGY | Age: 62
DRG: 082 | End: 2022-11-23
Payer: COMMERCIAL

## 2022-11-23 LAB
ANION GAP SERPL CALCULATED.3IONS-SCNC: 10 MMOL/L (ref 7–16)
BLOOD CULTURE, ROUTINE: NORMAL
BUN BLDV-MCNC: 14 MG/DL (ref 6–23)
CALCIUM IONIZED: 1.24 MMOL/L (ref 1.15–1.33)
CALCIUM SERPL-MCNC: 9.2 MG/DL (ref 8.6–10.2)
CHLORIDE BLD-SCNC: 97 MMOL/L (ref 98–107)
CO2: 29 MMOL/L (ref 22–29)
CREAT SERPL-MCNC: 0.5 MG/DL (ref 0.7–1.2)
CULTURE, BLOOD 2: NORMAL
GFR SERPL CREATININE-BSD FRML MDRD: >60 ML/MIN/1.73
GLUCOSE BLD-MCNC: 130 MG/DL (ref 74–99)
HCT VFR BLD CALC: 36.1 % (ref 37–54)
HEMOGLOBIN: 12.7 G/DL (ref 12.5–16.5)
MAGNESIUM: 1.8 MG/DL (ref 1.6–2.6)
MCH RBC QN AUTO: 33.5 PG (ref 26–35)
MCHC RBC AUTO-ENTMCNC: 35.2 % (ref 32–34.5)
MCV RBC AUTO: 95.3 FL (ref 80–99.9)
PDW BLD-RTO: 11.8 FL (ref 11.5–15)
PHOSPHORUS: 3.2 MG/DL (ref 2.5–4.5)
PLATELET # BLD: 334 E9/L (ref 130–450)
PMV BLD AUTO: 10.1 FL (ref 7–12)
POTASSIUM REFLEX MAGNESIUM: 3.8 MMOL/L (ref 3.5–5)
RBC # BLD: 3.79 E12/L (ref 3.8–5.8)
SODIUM BLD-SCNC: 136 MMOL/L (ref 132–146)
WBC # BLD: 6.2 E9/L (ref 4.5–11.5)

## 2022-11-23 PROCEDURE — 94640 AIRWAY INHALATION TREATMENT: CPT

## 2022-11-23 PROCEDURE — 92611 MOTION FLUOROSCOPY/SWALLOW: CPT | Performed by: SPEECH-LANGUAGE PATHOLOGIST

## 2022-11-23 PROCEDURE — 97530 THERAPEUTIC ACTIVITIES: CPT

## 2022-11-23 PROCEDURE — 6370000000 HC RX 637 (ALT 250 FOR IP): Performed by: SURGERY

## 2022-11-23 PROCEDURE — 6360000002 HC RX W HCPCS

## 2022-11-23 PROCEDURE — 84100 ASSAY OF PHOSPHORUS: CPT

## 2022-11-23 PROCEDURE — 6370000000 HC RX 637 (ALT 250 FOR IP): Performed by: STUDENT IN AN ORGANIZED HEALTH CARE EDUCATION/TRAINING PROGRAM

## 2022-11-23 PROCEDURE — 85027 COMPLETE CBC AUTOMATED: CPT

## 2022-11-23 PROCEDURE — 6360000002 HC RX W HCPCS: Performed by: SURGERY

## 2022-11-23 PROCEDURE — 82330 ASSAY OF CALCIUM: CPT

## 2022-11-23 PROCEDURE — 2140000000 HC CCU INTERMEDIATE R&B

## 2022-11-23 PROCEDURE — 80048 BASIC METABOLIC PNL TOTAL CA: CPT

## 2022-11-23 PROCEDURE — 2700000000 HC OXYGEN THERAPY PER DAY

## 2022-11-23 PROCEDURE — 74230 X-RAY XM SWLNG FUNCJ C+: CPT

## 2022-11-23 PROCEDURE — 97162 PT EVAL MOD COMPLEX 30 MIN: CPT

## 2022-11-23 PROCEDURE — 2580000003 HC RX 258: Performed by: SURGERY

## 2022-11-23 PROCEDURE — 83735 ASSAY OF MAGNESIUM: CPT

## 2022-11-23 PROCEDURE — 99232 SBSQ HOSP IP/OBS MODERATE 35: CPT | Performed by: SURGERY

## 2022-11-23 PROCEDURE — 36415 COLL VENOUS BLD VENIPUNCTURE: CPT

## 2022-11-23 PROCEDURE — 2580000003 HC RX 258: Performed by: STUDENT IN AN ORGANIZED HEALTH CARE EDUCATION/TRAINING PROGRAM

## 2022-11-23 RX ADMIN — LANSOPRAZOLE 30 MG: KIT at 06:02

## 2022-11-23 RX ADMIN — IPRATROPIUM BROMIDE AND ALBUTEROL SULFATE 1 AMPULE: .5; 2.5 SOLUTION RESPIRATORY (INHALATION) at 19:41

## 2022-11-23 RX ADMIN — CARVEDILOL 6.25 MG: 6.25 TABLET, FILM COATED ORAL at 08:01

## 2022-11-23 RX ADMIN — IPRATROPIUM BROMIDE AND ALBUTEROL SULFATE 1 AMPULE: .5; 2.5 SOLUTION RESPIRATORY (INHALATION) at 16:31

## 2022-11-23 RX ADMIN — Medication 10 ML: at 21:59

## 2022-11-23 RX ADMIN — BUPRENORPHINE AND NALOXONE 1 TABLET: 8; 2 TABLET SUBLINGUAL at 08:01

## 2022-11-23 RX ADMIN — CEFEPIME 2000 MG: 2 INJECTION, POWDER, FOR SOLUTION INTRAVENOUS at 17:43

## 2022-11-23 RX ADMIN — Medication 500 MG: at 12:04

## 2022-11-23 RX ADMIN — LANSOPRAZOLE 30 MG: KIT at 16:38

## 2022-11-23 RX ADMIN — VALPROIC ACID 250 MG: 250 SOLUTION ORAL at 06:03

## 2022-11-23 RX ADMIN — CLONIDINE HYDROCHLORIDE 0.2 MG: 0.2 TABLET ORAL at 14:04

## 2022-11-23 RX ADMIN — CEFEPIME 2000 MG: 2 INJECTION, POWDER, FOR SOLUTION INTRAVENOUS at 02:41

## 2022-11-23 RX ADMIN — HEPARIN SODIUM 5000 UNITS: 10000 INJECTION INTRAVENOUS; SUBCUTANEOUS at 22:01

## 2022-11-23 RX ADMIN — CLONIDINE HYDROCHLORIDE 0.2 MG: 0.2 TABLET ORAL at 08:01

## 2022-11-23 RX ADMIN — Medication 10 ML: at 08:02

## 2022-11-23 RX ADMIN — VALPROIC ACID 250 MG: 250 SOLUTION ORAL at 14:04

## 2022-11-23 RX ADMIN — VALPROIC ACID 250 MG: 250 SOLUTION ORAL at 21:49

## 2022-11-23 RX ADMIN — BUPRENORPHINE AND NALOXONE 1 TABLET: 8; 2 TABLET SUBLINGUAL at 21:47

## 2022-11-23 RX ADMIN — Medication 500 MG: at 21:48

## 2022-11-23 RX ADMIN — SUCRALFATE 1 G: 1 TABLET ORAL at 00:14

## 2022-11-23 RX ADMIN — SUCRALFATE 1 G: 1 TABLET ORAL at 06:03

## 2022-11-23 RX ADMIN — HEPARIN SODIUM 5000 UNITS: 10000 INJECTION INTRAVENOUS; SUBCUTANEOUS at 12:20

## 2022-11-23 RX ADMIN — HEPARIN SODIUM 5000 UNITS: 10000 INJECTION INTRAVENOUS; SUBCUTANEOUS at 03:13

## 2022-11-23 RX ADMIN — CEFEPIME 2000 MG: 2 INJECTION, POWDER, FOR SOLUTION INTRAVENOUS at 10:41

## 2022-11-23 RX ADMIN — CARVEDILOL 6.25 MG: 6.25 TABLET, FILM COATED ORAL at 16:39

## 2022-11-23 RX ADMIN — AMLODIPINE BESYLATE 10 MG: 10 TABLET ORAL at 08:01

## 2022-11-23 RX ADMIN — CLONIDINE HYDROCHLORIDE 0.2 MG: 0.2 TABLET ORAL at 21:48

## 2022-11-23 RX ADMIN — SUCRALFATE 1 G: 1 TABLET ORAL at 12:04

## 2022-11-23 RX ADMIN — SUCRALFATE 1 G: 1 TABLET ORAL at 17:42

## 2022-11-23 RX ADMIN — Medication 500 MG: at 08:01

## 2022-11-23 RX ADMIN — Medication 500 MG: at 16:39

## 2022-11-23 NOTE — PROGRESS NOTES
SPEECH/LANGUAGE PATHOLOGY  VIDEOFLUOROSCOPIC STUDY OF SWALLOWING (MBS)   and PLAN OF CARE    PATIENT NAME:  Antonia Parker  (male)     MRN:  [id number]    :  1960  (57 y.o.)  STATUS:  Inpatient: Room 6402/6402-A    TODAY'S DATE:  2022  REFERRING PROVIDER:   Dr. Mirtha Zavala: [place] modified barium swallow with video  Date of order:  22   REASON FOR REFERRAL: assess oropharyngeal swallow function    EVALUATING THERAPIST: MYLENE Parks      RESULTS:      DYSPHAGIA DIAGNOSIS:  mild-moderate oropharyngeal phase dysphagia     DIET RECOMMENDATIONS:  Pureed consistency solids (IDDSI level 4) with  nectar consistency (mildly thick - IDDSI level 2) liquids, MEDICATION ADMINISTRATION, and Administer medication crushed, as able, with pudding/applesauce    FEEDING RECOMMENDATIONS:    Assistance level:  Stand by assistance is needed during all oral intake, Set-up is required for all oral intake, Encourage self-feeding     Compensatory strategies recommended: Double swallow, Small bites/sips, Alternate solids and liquids, and No straw     Discussed recommendations with nursing and/or faxed report to referring provider: Yes    Laryngeal Penetration and Aspiration:  Penetration WITHOUT aspiration was observed in today's study with  thin liquid    SPEECH THERAPY  PLAN OF CARE   The dysphagia POC is established based on physician order and dysphagia diagnosis    Trials skilled SLP intervention for dysphagia management on acute care 3-5 x per week until goals met, pt plateaus in function and/or discharged from hospital      Conditions Requiring Skilled Therapeutic Intervention for dysphagia:    Patient is performing below functional baseline d/t  current acute condition, Multiple diagnoses, multiple medications, and increased dependency upon caregivers.     SPECIFIC DYSPHAGIA INTERVENTIONS TO INCLUDE:     Compensatory strategy training   Trials of upgraded diet/liquid   Therapeutic intervention to facilitate implementation of energy conservation techniques during intake to decrease potential for aspiration associated with compromised swallow/breathing sequence. Specific instructions for next treatment:  therapeutic po trial to determine safety of advanced diet textures and consistencies, initiate instruction of therapeutic exercises , and initiate instruction of compensatory strategies  Treatment Goals:    Short Term Goals:  Pt will participate in ongoing mealtime assessment to provide diet modification and compensatory strategy implementation to minimize risk of aspiration associated with PO intake  Pt will complete PO trials of upgraded diet textures with SLP only to determine the least restrictive PO diet to maintain adequate nutrition/hydration with no more than 1 overt s/s of pen/asp. Pt will complete BOTR strength/ ROM exercises to reduce pharyngeal residuals and improve epiglottic inversion maximal verbal prompts  Pt will complete laryngeal strength/ ROM therapeutic exercises to improve airway protection for the least restrictive PO diet maximal verbal prompts    Long Term Goals:   Pt will improve oropharyngeal swallow function to ensure airway protection during PO intake to maintain adequate nutrition/hydration and decrease signs/symptoms of aspiration to less than 1 x/day. Patient/family Goal:    Did not state. Will further assess during treatment. Plan of care discussed with Patient   The Patient did not demonstrate complete understanding of the diagnosis, prognosis and plan of care     Rehabilitation Potential/Prognosis: fair                      ADMITTING DIAGNOSIS: Subdural hematoma [S06. 5XAA]  SAH (subarachnoid hemorrhage) (HCC) [I60.9]  Subarachnoid bleed (HCC) [I60.9]  JOSIE (acute kidney injury) ([facility] [address] [T00.6]  Acute alcoholic intoxication with complication ([facility] [address] [Z00.487]  Closed fracture of occipital bone, unspecified laterality, unspecified occipital fracture type, initial encounter ([facility] [C34.815T]  COVID-19 [U07.1]     VISIT DIAGNOSIS:   Visit Diagnoses         Codes    Subdural hematoma    -  Primary S06. 5XAA    Subarachnoid bleed ([facility])     I60.9    Closed fracture of occipital bone, unspecified laterality, unspecified occipital fracture type, initial encounter ([facility]     S02.119A    COVID-19     T15.7    Acute alcoholic intoxication with complication ([facility])     B06.142    Acute respiratory failure with hypoxia ([facility])     J96.01    Shock ([facility]     R57.9    septic shock     R57.9                PATIENT REPORT/COMPLAINT: patient currently NPO pending results of this evaluation    PRIOR LEVEL OF SWALLOW FUNCTION:    Past History of Dysphagia?:  none reported    Home diet: Regular consistency solids (IDDSI level 7) with  thin liquids (IDDSI level 0)  Current Diet Order:  Diet NPO Exceptions are: Sips of Water with Meds  ADULT TUBE FEEDING; PEG; Standard with Fiber; Continuous; 20; Yes; 10; Q 4 hours; 55; 30; Q 4 hours; Protein; 1 protein modular daily    PROCEDURE:  Consistencies Administered During the Evaluation   Liquids: thin liquid and nectar thick liquid   Solids:  pureed foods      Method of Intake:   cup, spoon  Fed by clinician      Position:   Seated, upright    INSTRUMENTAL ASSESSMENT:    ORAL PREP/ ORAL PHASE:    Delayed A-P transit due to: cognitive function   Lingual pumping present  Oral residuals were noted :  throughout the oral cavity     PHARYNGEAL PHASE:     ONSET TIME       Delayed initiation of the pharyngeal swallow was noted with swallow reflex triggered at the level of the pyriform sinus        PHARYNGEAL RESIDUALS        Vallecula/Pharyngeal Wall           No significant residuals were noted in the vallecula      Pyriform Sinuses      No significant residuals were noted in the pyriform sinuses     LARYNGEAL PENETRATION   Laryngeal penetration was not present during this evaluation    ASPIRATION  Aspiration  was not present during this evaluation however there is high risk for aspiration  of thin liquid due to laryngeal penetration    PENETRATION-ASPIRATION SCALE (PAS):  THIN 4 = Material enters the airway, contacts the vocal folds, and is ejected from the airway  MILDLY THICK 1 = Material does not enter the airway  MODERATELY THICK item not administered  PUREE 1 = Material does not enter the airway  HARD SOLID item not administered       COMPENSATORY STRATEGIES    Compensatory strategies that were beneficial included  Double swallow, Small bites/sips, Alternate solids and liquids, and No straw      STRUCTURAL/FUNCTIONAL ANOMALIES   No structural/functional anomalies were noted    CERVICAL ESOPHAGEAL STAGE :     The cervical esophagus appeared adequate          ___________    Cognition:   Confusion noted    Oral Peripheral Examination   Adequate lingual/labial strength     Current Respiratory Status   2 liters nasal cannula     Parameters of Speech Production  Respiration:  Adequate for speech production  Quality:   Within functional limits  Intensity: Within functional limits    Pain: No pain reported. EDUCATION:   The Speech Language Pathologist (SLP) completed education regarding results of evaluation and that intervention is warranted at this time. Learner: Patient  Education: Reviewed results and recommendations of this evaluation and Reviewed diet and strategies  Evaluation of Education:  No evidence of learning    This plan may be re-evaluated and revised as warranted. Evaluation Time includes thorough review of current medical information, gathering information on past medical history/social history and prior level of function, completion of standardized testing/informal observation of tasks, assessment of data and education on plan of care and goals. [x]The admitting diagnosis and active problem list, have been reviewed prior to initiation of this evaluation.     CPT Code: 04648  dysphagia study    ACTIVE PROBLEM LIST:   Patient Active Problem List   Diagnosis    Inguinal hernia, right    Cervical radiculopathy    Chronic hepatitis C without hepatic coma (HCC)    Right shoulder pain    Right subclavian artery occlusion    Essential hypertension    Sinus tachycardia    Benign paroxysmal positional vertigo due to bilateral vestibular disorder    Hypotension    Pneumonia due to organism    Bacterial pneumonia    Acidosis    JOISE (acute kidney injury) ([address]    Bacteremia    Alcohol withdrawal syndrome without complication (HCC)    Dysphagia    Alcohol abuse    Subarachnoid bleed (HCC)    Subdural hematoma    Acute alcoholic intoxication with complication ([address]    Closed fracture of occipital bone ([address]    COVID-19    Opioid dependence on agonist therapy ([address]    Delirium    Other constipation    Hypokalemia    Hypomagnesemia       Danae Dailey., CCC-SLP  Speech-Language Pathologist  [id number]  11/23/2022

## 2022-11-23 NOTE — CARE COORDINATION
Care Coordination: Covid positive isolation since he was retested 11//17/22- check to dc isolation per hospital protocol on 11/24/22. S/p SDH, SAH with closed fracture of occipital bone, ETOH intox, hx of hep C, Peg tube, external cath Bilateral wrist restraints, suboxone, maxipime iv q8h , duonebs. Transfer from SICU on 11/21/22 evening with initial plan for LTAC but had lost criteria. Nilda Alba is following him but pt remains in restraints- he will need to be out of restraints- and will require therapy evals and Precert. Per previous cm, 7000 initiated and transport envelope completed and in soft chart.      Robles Diaz

## 2022-11-24 LAB
ANION GAP SERPL CALCULATED.3IONS-SCNC: 8 MMOL/L (ref 7–16)
ANION GAP SERPL CALCULATED.3IONS-SCNC: 9 MMOL/L (ref 7–16)
BUN BLDV-MCNC: 15 MG/DL (ref 6–23)
BUN BLDV-MCNC: 15 MG/DL (ref 6–23)
CALCIUM IONIZED: 1.21 MMOL/L (ref 1.15–1.33)
CALCIUM SERPL-MCNC: 9.2 MG/DL (ref 8.6–10.2)
CALCIUM SERPL-MCNC: 9.2 MG/DL (ref 8.6–10.2)
CHLORIDE BLD-SCNC: 101 MMOL/L (ref 98–107)
CHLORIDE BLD-SCNC: 103 MMOL/L (ref 98–107)
CO2: 29 MMOL/L (ref 22–29)
CO2: 29 MMOL/L (ref 22–29)
CREAT SERPL-MCNC: 0.5 MG/DL (ref 0.7–1.2)
CREAT SERPL-MCNC: 0.5 MG/DL (ref 0.7–1.2)
GFR SERPL CREATININE-BSD FRML MDRD: >60 ML/MIN/1.73
GFR SERPL CREATININE-BSD FRML MDRD: >60 ML/MIN/1.73
GLUCOSE BLD-MCNC: 113 MG/DL (ref 74–99)
GLUCOSE BLD-MCNC: 133 MG/DL (ref 74–99)
HCT VFR BLD CALC: 37.4 % (ref 37–54)
HEMOGLOBIN: 12.4 G/DL (ref 12.5–16.5)
MAGNESIUM: 1.9 MG/DL (ref 1.6–2.6)
MCH RBC QN AUTO: 33.5 PG (ref 26–35)
MCHC RBC AUTO-ENTMCNC: 33.2 % (ref 32–34.5)
MCV RBC AUTO: 101.1 FL (ref 80–99.9)
PDW BLD-RTO: 11.9 FL (ref 11.5–15)
PHOSPHORUS: 3.9 MG/DL (ref 2.5–4.5)
PLATELET # BLD: 343 E9/L (ref 130–450)
PMV BLD AUTO: 11.2 FL (ref 7–12)
POTASSIUM REFLEX MAGNESIUM: 4.2 MMOL/L (ref 3.5–5)
POTASSIUM REFLEX MAGNESIUM: 4.2 MMOL/L (ref 3.5–5)
RBC # BLD: 3.7 E12/L (ref 3.8–5.8)
SODIUM BLD-SCNC: 139 MMOL/L (ref 132–146)
SODIUM BLD-SCNC: 140 MMOL/L (ref 132–146)
WBC # BLD: 5.2 E9/L (ref 4.5–11.5)

## 2022-11-24 PROCEDURE — 83735 ASSAY OF MAGNESIUM: CPT

## 2022-11-24 PROCEDURE — 99232 SBSQ HOSP IP/OBS MODERATE 35: CPT | Performed by: SURGERY

## 2022-11-24 PROCEDURE — 80048 BASIC METABOLIC PNL TOTAL CA: CPT

## 2022-11-24 PROCEDURE — 82330 ASSAY OF CALCIUM: CPT

## 2022-11-24 PROCEDURE — 2580000003 HC RX 258: Performed by: SURGERY

## 2022-11-24 PROCEDURE — 2700000000 HC OXYGEN THERAPY PER DAY

## 2022-11-24 PROCEDURE — 6360000002 HC RX W HCPCS: Performed by: SURGERY

## 2022-11-24 PROCEDURE — 2580000003 HC RX 258: Performed by: STUDENT IN AN ORGANIZED HEALTH CARE EDUCATION/TRAINING PROGRAM

## 2022-11-24 PROCEDURE — 6360000002 HC RX W HCPCS

## 2022-11-24 PROCEDURE — 6370000000 HC RX 637 (ALT 250 FOR IP): Performed by: SURGERY

## 2022-11-24 PROCEDURE — 2140000000 HC CCU INTERMEDIATE R&B

## 2022-11-24 PROCEDURE — 6370000000 HC RX 637 (ALT 250 FOR IP): Performed by: STUDENT IN AN ORGANIZED HEALTH CARE EDUCATION/TRAINING PROGRAM

## 2022-11-24 PROCEDURE — 36415 COLL VENOUS BLD VENIPUNCTURE: CPT

## 2022-11-24 PROCEDURE — 94640 AIRWAY INHALATION TREATMENT: CPT

## 2022-11-24 PROCEDURE — 85027 COMPLETE CBC AUTOMATED: CPT

## 2022-11-24 PROCEDURE — 84100 ASSAY OF PHOSPHORUS: CPT

## 2022-11-24 RX ADMIN — HEPARIN SODIUM 5000 UNITS: 10000 INJECTION INTRAVENOUS; SUBCUTANEOUS at 20:59

## 2022-11-24 RX ADMIN — CLONIDINE HYDROCHLORIDE 0.2 MG: 0.2 TABLET ORAL at 09:25

## 2022-11-24 RX ADMIN — SUCRALFATE 1 G: 1 TABLET ORAL at 00:12

## 2022-11-24 RX ADMIN — VALPROIC ACID 250 MG: 250 SOLUTION ORAL at 12:48

## 2022-11-24 RX ADMIN — SUCRALFATE 1 G: 1 TABLET ORAL at 06:52

## 2022-11-24 RX ADMIN — Medication 10 ML: at 20:46

## 2022-11-24 RX ADMIN — CEFEPIME 2000 MG: 2 INJECTION, POWDER, FOR SOLUTION INTRAVENOUS at 02:44

## 2022-11-24 RX ADMIN — BUPRENORPHINE AND NALOXONE 1 TABLET: 8; 2 TABLET SUBLINGUAL at 09:26

## 2022-11-24 RX ADMIN — Medication 500 MG: at 09:25

## 2022-11-24 RX ADMIN — Medication 500 MG: at 20:44

## 2022-11-24 RX ADMIN — CLONIDINE HYDROCHLORIDE 0.2 MG: 0.2 TABLET ORAL at 20:45

## 2022-11-24 RX ADMIN — AMLODIPINE BESYLATE 10 MG: 10 TABLET ORAL at 09:26

## 2022-11-24 RX ADMIN — CLONIDINE HYDROCHLORIDE 0.2 MG: 0.2 TABLET ORAL at 12:49

## 2022-11-24 RX ADMIN — VALPROIC ACID 250 MG: 250 SOLUTION ORAL at 06:52

## 2022-11-24 RX ADMIN — LANSOPRAZOLE 30 MG: KIT at 18:32

## 2022-11-24 RX ADMIN — LANSOPRAZOLE 30 MG: KIT at 09:26

## 2022-11-24 RX ADMIN — IPRATROPIUM BROMIDE AND ALBUTEROL SULFATE 1 AMPULE: .5; 2.5 SOLUTION RESPIRATORY (INHALATION) at 11:41

## 2022-11-24 RX ADMIN — Medication 500 MG: at 18:32

## 2022-11-24 RX ADMIN — Medication 500 MG: at 12:48

## 2022-11-24 RX ADMIN — CARVEDILOL 6.25 MG: 6.25 TABLET, FILM COATED ORAL at 18:32

## 2022-11-24 RX ADMIN — CARVEDILOL 6.25 MG: 6.25 TABLET, FILM COATED ORAL at 09:26

## 2022-11-24 RX ADMIN — HEPARIN SODIUM 5000 UNITS: 10000 INJECTION INTRAVENOUS; SUBCUTANEOUS at 06:56

## 2022-11-24 RX ADMIN — SUCRALFATE 1 G: 1 TABLET ORAL at 18:32

## 2022-11-24 RX ADMIN — BUPRENORPHINE AND NALOXONE 1 TABLET: 8; 2 TABLET SUBLINGUAL at 20:45

## 2022-11-24 RX ADMIN — IPRATROPIUM BROMIDE AND ALBUTEROL SULFATE 1 AMPULE: .5; 2.5 SOLUTION RESPIRATORY (INHALATION) at 07:36

## 2022-11-24 RX ADMIN — SUCRALFATE 1 G: 1 TABLET ORAL at 12:48

## 2022-11-24 RX ADMIN — VALPROIC ACID 250 MG: 250 SOLUTION ORAL at 20:44

## 2022-11-24 ASSESSMENT — PAIN SCALES - GENERAL: PAINLEVEL_OUTOF10: 0

## 2022-11-24 NOTE — PROGRESS NOTES
[facility]   ATTENDING PHYSICIAN PROGRESS NOTE     I have examined the patient, reviewed the record, and discussed the case with the APN/ Resident. I have reviewed all relevant labs and imaging data. Please refer to the APN/ resident's note. I agree with the assessment and plan with the following corrections/ additions. The following summarizes my clinical findings and independent assessment. CC: fall    Pt confused.         Awake and alert  Confused  Follows commands  Hrt:  regular rate/rhythm; no murmur  Lungs:  fairly clear bilaterally  Abd:  soft; BS active; NT/ND; PEG in place  Skin:  warm/dry  Ext:  moving all 4 ext    Labs personally reviewed    Patient Active Problem List    Diagnosis Date Noted    Hypokalemia 11/21/2022    Hypomagnesemia 11/21/2022    Opioid dependence on agonist therapy ([address] 11/14/2022    Delirium 11/14/2022    Other constipation 11/14/2022    Subdural hematoma 38/22/4055    Acute alcoholic intoxication with complication ([address] 16/60/2888    Closed fracture of occipital bone ([address] 11/07/2022    COVID-19 11/07/2022    Subarachnoid bleed ([address] 11/05/2022    Dysphagia     Alcohol abuse     Alcohol withdrawal syndrome without complication ([address]     Bacteremia     Pneumonia due to organism 03/24/2021    Bacterial pneumonia     Acidosis     JOSIE (acute kidney injury) ([address]     Hypotension 06/17/2020    Benign paroxysmal positional vertigo due to bilateral vestibular disorder 06/13/2020    Essential hypertension 06/12/2020    Sinus tachycardia 06/12/2020    Right subclavian artery occlusion 12/03/2015    Right shoulder pain 11/12/2015    Chronic hepatitis C without hepatic coma ([address] 10/16/2015    Inguinal hernia, right 05/08/2014    Cervical radiculopathy 05/08/2014       S/p fall  SDH/SAH/IPH--monitor neuro exam  HTN--cont norvasc, coreg, clonidine  On home suboxone  Cefepime empirically  PPI/carafate for gastric/duodenal ulcers seen when PEG placed  Dysphagia--dysphagia diet  PT/OT evals   Speech eval  DVT risk--PCDs/subQ heparin  Discharge planning      Michaelle Velasquez MD, FACS  11/24/2022  12:50 PM

## 2022-11-24 NOTE — PROGRESS NOTES
[facility]   ATTENDING PHYSICIAN PROGRESS NOTE     I have examined the patient, reviewed the record, and discussed the case with the APN/ Resident. I have reviewed all relevant labs and imaging data. Please refer to the APN/ resident's note. I agree with the assessment and plan with the following corrections/ additions. The following summarizes my clinical findings and independent assessment. CC: fall    Pt confused.         Awake and alert  Confused  Follows commands  Hrt:  regular rate/rhythm; no murmur  Lungs:  fairly clear bilaterally  Abd:  soft; BS active; NT/ND; PEG in place  Skin:  warm/dry  Ext:  moving all 4 ext    Labs personally reviewed    Patient Active Problem List    Diagnosis Date Noted    Hypokalemia 11/21/2022    Hypomagnesemia 11/21/2022    Opioid dependence on agonist therapy ([address] 11/14/2022    Delirium 11/14/2022    Other constipation 11/14/2022    Subdural hematoma 64/88/5189    Acute alcoholic intoxication with complication ([address] 56/81/0929    Closed fracture of occipital bone ([address] 11/07/2022    COVID-19 11/07/2022    Subarachnoid bleed ([address] 11/05/2022    Dysphagia     Alcohol abuse     Alcohol withdrawal syndrome without complication ([address]     Bacteremia     Pneumonia due to organism 03/24/2021    Bacterial pneumonia     Acidosis     JOSIE (acute kidney injury) ([address]     Hypotension 06/17/2020    Benign paroxysmal positional vertigo due to bilateral vestibular disorder 06/13/2020    Essential hypertension 06/12/2020    Sinus tachycardia 06/12/2020    Right subclavian artery occlusion 12/03/2015    Right shoulder pain 11/12/2015    Chronic hepatitis C without hepatic coma ([address] 10/16/2015    Inguinal hernia, right 05/08/2014    Cervical radiculopathy 05/08/2014       S/p fall  SDH/SAH/IPH--monitor neuro exam  HTN--cont norvasc, coreg, clonidine  On home suboxone  Cefepime empirically  PPI/carafate for gastric/duodenal ulcers seen when PEG placed  Dysphagia--dysphagia diet  PT/OT evals   Speech eval  DVT risk--PCDs/subQ heparin      Yasmin Tan MD, FACS  11/23/2022  9:22 PM

## 2022-11-25 LAB
ANION GAP SERPL CALCULATED.3IONS-SCNC: 10 MMOL/L (ref 7–16)
BUN BLDV-MCNC: 15 MG/DL (ref 6–23)
CALCIUM IONIZED: 1.27 MMOL/L (ref 1.15–1.33)
CALCIUM SERPL-MCNC: 9.3 MG/DL (ref 8.6–10.2)
CHLORIDE BLD-SCNC: 105 MMOL/L (ref 98–107)
CO2: 27 MMOL/L (ref 22–29)
CREAT SERPL-MCNC: 0.5 MG/DL (ref 0.7–1.2)
GFR SERPL CREATININE-BSD FRML MDRD: >60 ML/MIN/1.73
GLUCOSE BLD-MCNC: 98 MG/DL (ref 74–99)
HCT VFR BLD CALC: 37.2 % (ref 37–54)
HEMOGLOBIN: 12.7 G/DL (ref 12.5–16.5)
MAGNESIUM: 1.8 MG/DL (ref 1.6–2.6)
MCH RBC QN AUTO: 34.5 PG (ref 26–35)
MCHC RBC AUTO-ENTMCNC: 34.1 % (ref 32–34.5)
MCV RBC AUTO: 101.1 FL (ref 80–99.9)
PDW BLD-RTO: 11.8 FL (ref 11.5–15)
PHOSPHORUS: 3.3 MG/DL (ref 2.5–4.5)
PLATELET # BLD: 335 E9/L (ref 130–450)
PMV BLD AUTO: 10.3 FL (ref 7–12)
POTASSIUM REFLEX MAGNESIUM: 4.1 MMOL/L (ref 3.5–5)
RBC # BLD: 3.68 E12/L (ref 3.8–5.8)
SODIUM BLD-SCNC: 142 MMOL/L (ref 132–146)
WBC # BLD: 5.2 E9/L (ref 4.5–11.5)

## 2022-11-25 PROCEDURE — 83735 ASSAY OF MAGNESIUM: CPT

## 2022-11-25 PROCEDURE — 99232 SBSQ HOSP IP/OBS MODERATE 35: CPT | Performed by: SURGERY

## 2022-11-25 PROCEDURE — 6370000000 HC RX 637 (ALT 250 FOR IP): Performed by: STUDENT IN AN ORGANIZED HEALTH CARE EDUCATION/TRAINING PROGRAM

## 2022-11-25 PROCEDURE — 1200000000 HC SEMI PRIVATE

## 2022-11-25 PROCEDURE — 97530 THERAPEUTIC ACTIVITIES: CPT

## 2022-11-25 PROCEDURE — 6360000002 HC RX W HCPCS

## 2022-11-25 PROCEDURE — 2580000003 HC RX 258: Performed by: STUDENT IN AN ORGANIZED HEALTH CARE EDUCATION/TRAINING PROGRAM

## 2022-11-25 PROCEDURE — 6370000000 HC RX 637 (ALT 250 FOR IP): Performed by: SURGERY

## 2022-11-25 PROCEDURE — 84100 ASSAY OF PHOSPHORUS: CPT

## 2022-11-25 PROCEDURE — 85027 COMPLETE CBC AUTOMATED: CPT

## 2022-11-25 PROCEDURE — 2700000000 HC OXYGEN THERAPY PER DAY

## 2022-11-25 PROCEDURE — 36415 COLL VENOUS BLD VENIPUNCTURE: CPT

## 2022-11-25 PROCEDURE — 97165 OT EVAL LOW COMPLEX 30 MIN: CPT

## 2022-11-25 PROCEDURE — 2140000000 HC CCU INTERMEDIATE R&B

## 2022-11-25 PROCEDURE — 80048 BASIC METABOLIC PNL TOTAL CA: CPT

## 2022-11-25 PROCEDURE — 82330 ASSAY OF CALCIUM: CPT

## 2022-11-25 PROCEDURE — 97535 SELF CARE MNGMENT TRAINING: CPT

## 2022-11-25 PROCEDURE — 94640 AIRWAY INHALATION TREATMENT: CPT

## 2022-11-25 RX ADMIN — AMLODIPINE BESYLATE 10 MG: 10 TABLET ORAL at 08:44

## 2022-11-25 RX ADMIN — HEPARIN SODIUM 5000 UNITS: 10000 INJECTION INTRAVENOUS; SUBCUTANEOUS at 04:52

## 2022-11-25 RX ADMIN — BUPRENORPHINE AND NALOXONE 1 TABLET: 8; 2 TABLET SUBLINGUAL at 21:15

## 2022-11-25 RX ADMIN — IPRATROPIUM BROMIDE AND ALBUTEROL SULFATE 1 AMPULE: .5; 2.5 SOLUTION RESPIRATORY (INHALATION) at 16:12

## 2022-11-25 RX ADMIN — HEPARIN SODIUM 5000 UNITS: 10000 INJECTION INTRAVENOUS; SUBCUTANEOUS at 21:17

## 2022-11-25 RX ADMIN — CARVEDILOL 6.25 MG: 6.25 TABLET, FILM COATED ORAL at 08:44

## 2022-11-25 RX ADMIN — CLONIDINE HYDROCHLORIDE 0.2 MG: 0.2 TABLET ORAL at 14:17

## 2022-11-25 RX ADMIN — Medication 2 PACKET: at 08:44

## 2022-11-25 RX ADMIN — IPRATROPIUM BROMIDE AND ALBUTEROL SULFATE 1 AMPULE: .5; 2.5 SOLUTION RESPIRATORY (INHALATION) at 20:25

## 2022-11-25 RX ADMIN — CARVEDILOL 6.25 MG: 6.25 TABLET, FILM COATED ORAL at 16:22

## 2022-11-25 RX ADMIN — VALPROIC ACID 250 MG: 250 SOLUTION ORAL at 21:15

## 2022-11-25 RX ADMIN — BUPRENORPHINE AND NALOXONE 1 TABLET: 8; 2 TABLET SUBLINGUAL at 08:58

## 2022-11-25 RX ADMIN — SUCRALFATE 1 G: 1 TABLET ORAL at 11:58

## 2022-11-25 RX ADMIN — Medication 10 ML: at 21:16

## 2022-11-25 RX ADMIN — LANSOPRAZOLE 30 MG: KIT at 08:06

## 2022-11-25 RX ADMIN — CLONIDINE HYDROCHLORIDE 0.2 MG: 0.2 TABLET ORAL at 21:15

## 2022-11-25 RX ADMIN — SUCRALFATE 1 G: 1 TABLET ORAL at 16:21

## 2022-11-25 RX ADMIN — SUCRALFATE 1 G: 1 TABLET ORAL at 00:21

## 2022-11-25 RX ADMIN — VALPROIC ACID 250 MG: 250 SOLUTION ORAL at 05:51

## 2022-11-25 RX ADMIN — LANSOPRAZOLE 30 MG: KIT at 16:22

## 2022-11-25 RX ADMIN — Medication 10 ML: at 08:45

## 2022-11-25 RX ADMIN — SUCRALFATE 1 G: 1 TABLET ORAL at 05:51

## 2022-11-25 RX ADMIN — VALPROIC ACID 250 MG: 250 SOLUTION ORAL at 14:16

## 2022-11-25 RX ADMIN — HEPARIN SODIUM 5000 UNITS: 10000 INJECTION INTRAVENOUS; SUBCUTANEOUS at 11:58

## 2022-11-25 RX ADMIN — CLONIDINE HYDROCHLORIDE 0.2 MG: 0.2 TABLET ORAL at 08:44

## 2022-11-25 ASSESSMENT — PAIN SCALES - GENERAL
PAINLEVEL_OUTOF10: 0
PAINLEVEL_OUTOF10: 0

## 2022-11-25 NOTE — PROGRESS NOTES
Occupational Therapy  OCCUPATIONAL THERAPY INITIAL EVALUATION    [facility] [address] [address]      YJQS:                                                Patient Name: Xin Farris  MRN: [id number]  : 1960  Room: [address] [place] [address]    Evaluating OT: Tabitha Gutierrez OTR/L #[id number]     Referring Provider: Brenda Johnson MD  Specific Provider Orders/Date: OT eval and treat 22    Diagnosis: Subdural hematoma [S06. 5XAA]  SAH (subarachnoid hemorrhage) (HCC) [I60.9]  Subarachnoid bleed (HCC) [I60.9]  JOSIE (acute kidney injury) ([facility] [V17.9]  Acute alcoholic intoxication with complication ([facility] [V92.956]  Closed fracture of occipital bone, unspecified laterality, unspecified occipital fracture type, initial encounter ([facility] [W57.482K]  COVID-19 [U07.1]   Pt admitted to hospital following fall; + ETOH     Pertinent Medical History:  has a past medical history of Hepatitis C and Herniated disc, cervical.     Procedure: EGD, PEG placement ; Intubated ;   Extubated         Precautions:  Fall Risk, Droplet plus (COVID-19),  cognition, bed alarm, incontinence       Assessment of current deficits    [x] Functional mobility  [x]ADLs  [x] Strength               [x]Cognition    [x] Functional transfers   [x] IADLs         [x] Safety Awareness   [x]Endurance    [] Fine Coordination              [x] Balance      [] Vision/perception   []Sensation     []Gross Motor Coordination  [] ROM  [] Delirium                   [] Motor Control     OT PLAN OF CARE   OT POC based on physician orders, patient diagnosis and results of clinical assessment    Frequency/Duration 1-3 days/wk for 2 weeks PRN   Specific OT Treatment Interventions to include:   * Instruction/training on adapted ADL techniques and AE recommendations to increase functional independence within precautions       * Training on energy conservation strategies, correct breathing pattern and techniques to improve independence/tolerance for self-care routine  * Functional transfer/mobility training/DME recommendations for increased independence, safety, and fall prevention  * Patient/Family education to increase follow through with safety techniques and functional independence  * Recommendation of environmental modifications for increased safety with functional transfers/mobility and ADLs  * Cognitive retraining/development of therapeutic activities to improve problem solving, judgement, memory, and attention for increased safety/participation in ADL/IADL tasks  * Sensory re-education to improve body/limb awareness, maintain/improve skin integrity, and improve hand/UE motor function  * Visual-perceptual training to improve environmental scanning, visual attention/focus, and oculomotor skills for increased safety/independence with functional transfers/mobility and ADLs  * Splinting/positioning for increased function, prevention of contractures, and improve skin integrity  * Therapeutic exercise to improve motor endurance, ROM, and functional strength for ADLs/functional transfers  * Therapeutic activities to facilitate/challenge dynamic balance, stand tolerance for increased safety and independence with ADLs  * Therapeutic activities to facilitate gross/fine motor skills for increased independence with ADLs  * Neuro-muscular re-education: facilitation of righting/equilibrium reactions, midline orientation, scapular stability/mobility, normalization of muscle tone, and facilitation of volitional active controled movement  * Positioning to improve skin integrity, interaction with environment and functional independence    Recommended Adaptive Equipment:  TBD     Home Living: Pt unable to provide PLOF due to cognition. Per chart review, pt lives with his domestic partner, Olga Mohan, in a 2 story duplex with 2nd floor bedroom and bathroom. Pt is independent at baseline.    Bathroom setup: na    Equipment owned: na    Prior Level of Function: Independent with ADLs , Independent with IADLs; ambulated without AD   Driving: yes   Occupation: na    Pain Level: Pt denies pain this session    Cognition: A&O: x1 (self only); Follows 1 step directions with cuing    Memory:  poor   Sequencing: poor   Problem solving:  poor   Judgement/safety: poor     Functional Assessment:  AM-PAC Daily Activity Raw Score: 12/24   Initial Eval Status  Date: 11/25/22 Treatment Status  Date: STGs = LTGs  Time frame: 10-14 days   Feeding Set-up  Mod I   Grooming Minimal Assist   Minimal Assist    UB Dressing Moderate Assist   Moderate Assist   LB Dressing Dependent   Moderate Assist    Bathing Maximal Assist  Moderate Assist    Toileting Dependent   Moderate Assist    Bed Mobility  Supine to sit: Maximal Assist   Sit to supine: Maximal Assist   Supine to sit: Minimal Assist   Sit to supine: Minimal Assist    Functional Transfers Maximal Assist     Sit to stands; + posterior lean  Minimal Assist    Functional Mobility NT   Moderate Assist    Balance Sitting:     Static:  Min A    Dynamic:min A  Standing: max A     Activity Tolerance F-  F+   Visual/  Perceptual wfl                    Hand Dominance right   Strength ROM Additional Info:    RUE  3-/5 proximal  Distal 3+/5 Proximal limited  Elbow wfl  Fair  and fair FMC/dexterity noted during ADL tasks     LUE 4/5 wfl good  and wfl FMC/dexterity noted during ADL tasks     Hearing: wfl  Sensation:wfl  Tone: wfl  Edema:none noted     Comments: Upon arrival patient supine in bed and agreeable to OT session. Therapist educated pt on role of OT. At end of session, patient semi-supine in bed with call light and phone within reach, all lines and tubes intact. Overall patient demonstrated decreased independence and safety during completion of ADL/functional transfer/mobility tasks.   Pt would benefit from continued skilled OT to increase safety and independence with completion of ADL/IADL tasks for functional independence and quality of life. Treatment: OT treatment provided this date includes: Facilitation of bed mobility, sitting balance at EOB (impacting ADLs; addressing posture, weight shifting, dynamic reaching), functional sit to stand transfers, standing tolerance tasks at w/w (+ posterior lean; addressing posture, balance and activity tolerance while incorporating light functional reaching; impacting ADLs and functional activity) - skilled cuing on sequencing, task initiation, hand placement, posture, body mechanics, energy conservation techniques and safety. Therapist facilitated self-care retraining: UB/LB self-care tasks (gown, partial bathing task, socks), toileting hygiene task (+ incontinence x2 of urine / BM) and seated grooming tasks while educating pt on sequencing, task initiation, modified techniques, posture, safety and energy conservation techniques. Skilled monitoring of HR, O2 sats and pts response to treatment (O2 sat greater than 93%)    Rehab Potential: Good for established goals     Patient / Family Goal: none stated    Patient and/or family were instructed on functional diagnosis, prognosis/goals and OT plan of care. Demonstrated poor understanding. Eval Complexity: Low    Time In: 840  Time Out: 920  Total Treatment Time: 25 minutes    Min Units   OT Eval Low 97165  x  1   OT Eval Medium 17884      OT Eval High 56063      OT Re-Eval [id number]       Therapeutic Ex 91952       Therapeutic Activities 64008  10  1   ADL/Self Care 74825  15  1   Orthotic Management 55477       Manual [id number]     Neuro Re-Ed 52471       Non-Billable Time          Evaluation Time additionally includes thorough review of current medical information, gathering information on past medical history/social history and prior level of function, interpretation of standardized testing/informal observation of tasks, assessment of data and development of plan of care and goals.           Delonte Zhou OTR/L #[id number]

## 2022-11-25 NOTE — PROGRESS NOTES
[facility]   ATTENDING PHYSICIAN PROGRESS NOTE     I have examined the patient, reviewed the record, and discussed the case with the APN/ Resident. I have reviewed all relevant labs and imaging data. Please refer to the APN/ resident's note. I agree with the assessment and plan with the following corrections/ additions. The following summarizes my clinical findings and independent assessment. CC: fall    Pt confused.         Awake and alert  Confused  Follows commands  Hrt:  regular rate/rhythm; no murmur  Lungs:  fairly clear bilaterally  Abd:  soft; BS active; NT/ND; PEG in place  Skin:  warm/dry  Ext:  moving all 4 ext    Labs personally reviewed    Patient Active Problem List    Diagnosis Date Noted    Hypokalemia 11/21/2022    Hypomagnesemia 11/21/2022    Opioid dependence on agonist therapy ([address] 11/14/2022    Delirium 11/14/2022    Other constipation 11/14/2022    Subdural hematoma 23/36/7591    Acute alcoholic intoxication with complication ([address] 16/56/2109    Closed fracture of occipital bone ([address] 11/07/2022    COVID-19 11/07/2022    Subarachnoid bleed ([address] 11/05/2022    Dysphagia     Alcohol abuse     Alcohol withdrawal syndrome without complication ([address]     Bacteremia     Pneumonia due to organism 03/24/2021    Bacterial pneumonia     Acidosis     JOSIE (acute kidney injury) ([address]     Hypotension 06/17/2020    Benign paroxysmal positional vertigo due to bilateral vestibular disorder 06/13/2020    Essential hypertension 06/12/2020    Sinus tachycardia 06/12/2020    Right subclavian artery occlusion 12/03/2015    Right shoulder pain 11/12/2015    Chronic hepatitis C without hepatic coma ([address] 10/16/2015    Inguinal hernia, right 05/08/2014    Cervical radiculopathy 05/08/2014       S/p fall  SDH/SAH/IPH--monitor neuro exam  HTN--cont norvasc, coreg, clonidine  On home suboxone  Cefepime empirically  PPI/carafate for gastric/duodenal ulcers seen when PEG placed  Dysphagia--dysphagia diet  PT/OT evals   Speech eval  DVT risk--PCDs/subQ heparin  Discharge planning      Bob Walter MD, FACS  11/25/2022  11:18 AM

## 2022-11-25 NOTE — CARE COORDINATION
Care Coordination: Ptx and otx completed today. Per nurse, pt has been out of restaints for 24 hours.  Spoke to [place] at [facility], will initiate precert 'IF' MMO is open today    Laxmi Mann

## 2022-11-25 NOTE — PLAN OF CARE
Problem: Discharge Planning  Goal: Discharge to home or other facility with appropriate resources  Outcome: Not Progressing     Problem: Gastrointestinal - Adult  Goal: Maintains adequate nutritional intake  Outcome: Not Progressing     Problem: Nutrition Deficit:  Goal: Optimize nutritional status  Outcome: Not Progressing     Problem: Skin/Tissue Integrity  Goal: Absence of new skin breakdown  Description: 1. Monitor for areas of redness and/or skin breakdown  2. Assess vascular access sites hourly  3. Every 4-6 hours minimum:  Change oxygen saturation probe site  4. Every 4-6 hours:  If on nasal continuous positive airway pressure, respiratory therapy assess nares and determine need for appliance change or resting period.   Outcome: Progressing     Problem: Safety - Adult  Goal: Free from fall injury  Outcome: Progressing     Problem: ABCDS Injury Assessment  Goal: Absence of physical injury  Outcome: Progressing     Problem: Neurosensory - Adult  Goal: Achieves stable or improved neurological status  Outcome: Progressing  Goal: Absence of seizures  Outcome: Progressing  Goal: Remains free of injury related to seizures activity  Outcome: Progressing  Goal: Achieves maximal functionality and self care  Outcome: Progressing     Problem: Respiratory - Adult  Goal: Achieves optimal ventilation and oxygenation  Outcome: Progressing     Problem: Cardiovascular - Adult  Goal: Maintains optimal cardiac output and hemodynamic stability  Outcome: Progressing  Goal: Absence of cardiac dysrhythmias or at baseline  Outcome: Progressing     Problem: Skin/Tissue Integrity - Adult  Goal: Skin integrity remains intact  Outcome: Progressing  Goal: Incisions, wounds, or drain sites healing without S/S of infection  Outcome: Progressing  Goal: Oral mucous membranes remain intact  Outcome: Progressing     Problem: Musculoskeletal - Adult  Goal: Return mobility to safest level of function  Outcome: Progressing  Goal: Maintain proper alignment of affected body part  Outcome: Progressing     Problem: Safety - Medical Restraint  Goal: Remains free of injury from restraints (Restraint for Interference with Medical Device)  Description: INTERVENTIONS:  1. Determine that other, less restrictive measures have been tried or would not be effective before applying the restraint  2. Evaluate the patient's condition at the time of restraint application  3. Inform patient/family regarding the reason for restraint  4. Q2H: Monitor safety, psychosocial status, comfort, nutrition and hydration  Outcome: Progressing     Problem: Anxiety  Goal: Will report anxiety at manageable levels  Description: INTERVENTIONS:  1. Administer medication as ordered  2. Teach and rehearse alternative coping skills  3.  Provide emotional support with 1:1 interaction with staff  Outcome: Progressing     Problem: Chronic Conditions and Co-morbidities  Goal: Patient's chronic conditions and co-morbidity symptoms are monitored and maintained or improved  Outcome: Progressing     Problem: Gastrointestinal - Adult  Goal: Minimal or absence of nausea and vomiting  Outcome: Progressing  Goal: Maintains or returns to baseline bowel function  Outcome: Progressing     Problem: Genitourinary - Adult  Goal: Absence of urinary retention  Outcome: Progressing     Problem: Infection - Adult  Goal: Absence of infection at discharge  Outcome: Progressing  Goal: Absence of infection during hospitalization  Outcome: Progressing  Goal: Absence of fever/infection during anticipated neutropenic period  Outcome: Progressing     Problem: Metabolic/Fluid and Electrolytes - Adult  Goal: Electrolytes maintained within normal limits  Outcome: Progressing  Goal: Hemodynamic stability and optimal renal function maintained  Outcome: Progressing  Goal: Glucose maintained within prescribed range  Outcome: Progressing     Problem: Hematologic - Adult  Goal: Maintains hematologic stability  Outcome: Progressing     Problem: Pain  Goal: Verbalizes/displays adequate comfort level or baseline comfort level  Outcome: Progressing     Problem: Discharge Planning  Goal: Discharge to home or other facility with appropriate resources  Outcome: Not Progressing     Problem: Gastrointestinal - Adult  Goal: Maintains adequate nutritional intake  Outcome: Not Progressing     Problem: Nutrition Deficit:  Goal: Optimize nutritional status  Outcome: Not Progressing

## 2022-11-25 NOTE — CARE COORDINATION
Care Coordination: spoke to [place] at [facility], will need Saboxone , 2 week supply prior to coming to facility. Appears he was following at [facility] in [facility]. [phone]. Will need to make sure since pt was positive for ETOH on admission. spoke to daughter, she was not certain where he was getting saboxone. Called [employer] other and he see's a [facility] ( she is uncertain of spelling) discussed dispensing while using alcohol. She was not aware he was drinking and thinks etoh level might have been elevated from night before. She also obtains her saboxone at same facility but has another prescriber. She also thinks he might have a supply at home but hides it d/t grandchildren. Unable to start precert until plan for saboxone. Also Sandra was not certain MMO was open. Will place on weekend eval list.  I spoke to answering service and she states on demand is closed today but can try office tomorrow from 8-noon at [phone] but she does not have a list of providers that will be in. Email for updated evals. Can check if pt can downgrade to med surg in the meantime.     Uriah Soriano

## 2022-11-25 NOTE — PROGRESS NOTES
Physical Therapy  Treatment Note    Name: Xin Farris  : 1960  MRN: [id number]      Date of Service: 2022    Evaluating PT:  Jean Carlos Bautista PT, DPT  [id number]     Room #:  0639/9659-U  Diagnosis:  Subdural hematoma [S06. 5XAA]  SAH (subarachnoid hemorrhage) (HCC) [I60.9]  Subarachnoid bleed (HCC) [I60.9]  JOSIE (acute kidney injury) ([facility] [I22.8]  Acute alcoholic intoxication with complication ([facility] [C65.735]  Closed fracture of occipital bone, unspecified laterality, unspecified occipital fracture type, initial encounter ([facility] [J20.386O]  COVID-19 [U07.1]  PMHx/PSHx:   has a past medical history of Hepatitis C and Herniated disc, cervical.   Procedure/Surgery:  EGD, PEG placement ; Intubated ; Extubated     Precautions:  Falls, Droplet Plus isolation, COVID-19, Cognition, Safety, PEG, O2, Occipital bone fx, SDH/SAH/IPH, BP<140  Equipment Needs:  TBD    SUBJECTIVE:    Pt unable to provide social history or PLOF. Per chart review, pt lives with his domestic partner, Salma Maldonado, in a 2 story duplex with 2nd floor bedroom and bathroom. Pt is independent at baseline. OBJECTIVE:   Initial Evaluation  Date: 22 Treatment  Short Term/ Long Term   Goals   AM-PAC 6 Clicks     Was pt agreeable to Eval/treatment? Yes  yes    Does pt have pain? None reported  No c/o pain    Bed Mobility  Rolling: Mod A  Supine to sit: Max A  Sit to supine: Max A  Scooting: Min A to EOB Rolling: Mod A  Supine to sit:  Max A  Sit to supine: Max A  Scooting: MaxA to EOB Rolling: SBA  Supine to sit: SBA  Sit to supine: SBA  Scooting: SBA   Transfers Sit to stand: NT  Stand to sit: NT  Stand pivot: NT Sit to stand: MaxA  Stand to sit: MaxA  Stand pivot: NT Sit to stand: Min A  Stand to sit: Min A  Stand pivot: Min A with AAD   Ambulation    NT  >40 feet with AAD Mod A   Stair negotiation: ascended and descended  NT  NA   ROM BUE:  WFL  BLE:  WFL     Strength BUE:  Unable to follow MMT commands  BLE: Unable to follow MMT commands - grossly >3+/5 through observation      Balance Sitting EOB:  Min<>mod A  Dynamic Standing:  NT d/t safety  Sitting EOB:  ModA  Dynamic Standing:  MaxA Sitting EOB:  SBA  Dynamic Standing:  Min A     Pt is A & O x  (self). Follows 1 step commands with proper cues  Sensation:  Pt denies numbness and tingling to extremities  Edema:  Unremarkable    Therapeutic Exercises:    BLE AAROM in supine and at EOB  BEU AAROM at EOB     Patient education  Pt educated on PT role, safety during functional mobility    Patient response to education:   Pt verbalized understanding Pt demonstrated skill Pt requires further education in this area   no no yes     ASSESSMENT:    Conditions Requiring Skilled Therapeutic Intervention:    [x]Decreased strength     []Decreased ROM  [x]Decreased functional mobility  [x]Decreased balance   [x]Decreased endurance   [x]Decreased posture  []Decreased sensation  [x]Decreased coordination   []Decreased vision  [x]Decreased safety awareness   []Increased pain       Comments:  Pt agreeable to PT. Pt continues to demonstrate strength, balance, and endurance deficits. Pt continues to have cognitive deficits requiring increased time for processing with maximum cues. Required assistance with incontinence. Posterior lean in stance. Patient would benefit from continued skilled PT to maximize functional mobility independence. Treatment:  Patient practiced and was instructed in the following treatment:    Bed mobility training - pt given verbal and tactile cues to facilitate proper sequencing and safety during rolling and supine>sit as well as provided with physical assistance to complete task    Sitting EOB for ~3 minutes for upright tolerance, postural awareness and BLE ROM   Functional transfers-Verbal cues for proper positioning and sequencing to perform transfers safely with maximum independence.    Skilled positioning - Pt placed in the semi chair position with pillows utilized to facilitate upright posture, joint and skin integrity, and interaction with environment. Pt's/ family goals   1. None stated     Prognosis is good for reaching above PT goals. Patient and or family understand(s) diagnosis, prognosis, and plan of care. PHYSICAL THERAPY PLAN OF CARE:    PT POC is established based on physician order and patient diagnosis     Referring provider/PT Order:  Anjana Keating MD / PT eval and treat   Diagnosis:  Subdural hematoma [S06. 5XAA]  SAH (subarachnoid hemorrhage) (HCC) [I60.9]  Subarachnoid bleed (HCC) [I60.9]  JOSIE (acute kidney injury) ([facility] [F45.6]  Acute alcoholic intoxication with complication [facility] [D56.465]  Closed fracture of occipital bone, unspecified laterality, unspecified occipital fracture type, initial encounter ([facility] [H72.010N]  COVID-19 [U07.1]  Specific instructions for next treatment:  progress functional activity as pt is able to safely participate     Current Treatment Recommendations:     [x] Strengthening to improve independence with functional mobility   [] ROM to improve independence with functional mobility   [x] Balance Training to improve static/dynamic balance and to reduce fall risk  [x] Endurance Training to improve activity tolerance during functional mobility   [x] Transfer Training to improve safety and independence with all functional transfers   [x] Gait Training to improve gait mechanics, endurance and asses need for appropriate assistive device  [] Stair Training in preparation for safe discharge home and/or into the community   [x] Positioning to prevent skin breakdown and contractures  [x] Safety and Education Training   [x] Patient/Caregiver Education   [x] HEP  [] Other     PT long term treatment goals are located in above grid    Frequency of treatments: 2-5x/week x 1-2 weeks.     Time in  1015  Time out  1000    Total Treatment Time  15 minutes     Evaluation Time includes thorough review of current medical information, gathering information on past medical history/social history and prior level of function, completion of standardized testing/informal observation of tasks, assessment of data and education on plan of care and goals.     CPT codes:  [] Low Complexity PT evaluation 30349  [] Moderate Complexity PT evaluation 20803  [] High Complexity PT evaluation 97524  [] PT Re-evaluation 74847  [] Gait training 92547 -- minutes  [] Manual therapy [id number] -- minutes  [x] Therapeutic activities 44753 15 minutes  [] Therapeutic exercises 40321 - minutes  [] Neuromuscular reeducation 28399 -- minutes       Anna Nava, PT, DPT  [id number]

## 2022-11-26 LAB
ANION GAP SERPL CALCULATED.3IONS-SCNC: 10 MMOL/L (ref 7–16)
BUN BLDV-MCNC: 17 MG/DL (ref 6–23)
CALCIUM SERPL-MCNC: 9.4 MG/DL (ref 8.6–10.2)
CHLORIDE BLD-SCNC: 105 MMOL/L (ref 98–107)
CO2: 32 MMOL/L (ref 22–29)
CREAT SERPL-MCNC: 0.6 MG/DL (ref 0.7–1.2)
GFR SERPL CREATININE-BSD FRML MDRD: >60 ML/MIN/1.73
GLUCOSE BLD-MCNC: 118 MG/DL (ref 74–99)
HCT VFR BLD CALC: 40.6 % (ref 37–54)
HEMOGLOBIN: 13.5 G/DL (ref 12.5–16.5)
MCH RBC QN AUTO: 33.4 PG (ref 26–35)
MCHC RBC AUTO-ENTMCNC: 33.3 % (ref 32–34.5)
MCV RBC AUTO: 100.5 FL (ref 80–99.9)
PDW BLD-RTO: 11.9 FL (ref 11.5–15)
PLATELET # BLD: 316 E9/L (ref 130–450)
PMV BLD AUTO: 10.4 FL (ref 7–12)
POTASSIUM REFLEX MAGNESIUM: 3.8 MMOL/L (ref 3.5–5)
RBC # BLD: 4.04 E12/L (ref 3.8–5.8)
SODIUM BLD-SCNC: 147 MMOL/L (ref 132–146)
WBC # BLD: 4.5 E9/L (ref 4.5–11.5)

## 2022-11-26 PROCEDURE — 2700000000 HC OXYGEN THERAPY PER DAY

## 2022-11-26 PROCEDURE — 6370000000 HC RX 637 (ALT 250 FOR IP): Performed by: SURGERY

## 2022-11-26 PROCEDURE — 94640 AIRWAY INHALATION TREATMENT: CPT

## 2022-11-26 PROCEDURE — 99232 SBSQ HOSP IP/OBS MODERATE 35: CPT | Performed by: SURGERY

## 2022-11-26 PROCEDURE — 85027 COMPLETE CBC AUTOMATED: CPT

## 2022-11-26 PROCEDURE — 36415 COLL VENOUS BLD VENIPUNCTURE: CPT

## 2022-11-26 PROCEDURE — 6360000002 HC RX W HCPCS

## 2022-11-26 PROCEDURE — 1200000000 HC SEMI PRIVATE

## 2022-11-26 PROCEDURE — 80048 BASIC METABOLIC PNL TOTAL CA: CPT

## 2022-11-26 PROCEDURE — 6370000000 HC RX 637 (ALT 250 FOR IP): Performed by: STUDENT IN AN ORGANIZED HEALTH CARE EDUCATION/TRAINING PROGRAM

## 2022-11-26 PROCEDURE — 2140000000 HC CCU INTERMEDIATE R&B

## 2022-11-26 PROCEDURE — 2580000003 HC RX 258: Performed by: STUDENT IN AN ORGANIZED HEALTH CARE EDUCATION/TRAINING PROGRAM

## 2022-11-26 RX ADMIN — AMLODIPINE BESYLATE 10 MG: 10 TABLET ORAL at 09:29

## 2022-11-26 RX ADMIN — HEPARIN SODIUM 5000 UNITS: 10000 INJECTION INTRAVENOUS; SUBCUTANEOUS at 04:27

## 2022-11-26 RX ADMIN — LANSOPRAZOLE 30 MG: KIT at 09:29

## 2022-11-26 RX ADMIN — SUCRALFATE 1 G: 1 TABLET ORAL at 05:45

## 2022-11-26 RX ADMIN — SUCRALFATE 1 G: 1 TABLET ORAL at 18:34

## 2022-11-26 RX ADMIN — VALPROIC ACID 250 MG: 250 SOLUTION ORAL at 05:45

## 2022-11-26 RX ADMIN — LANSOPRAZOLE 30 MG: KIT at 18:34

## 2022-11-26 RX ADMIN — VALPROIC ACID 250 MG: 250 SOLUTION ORAL at 13:39

## 2022-11-26 RX ADMIN — IPRATROPIUM BROMIDE AND ALBUTEROL SULFATE 1 AMPULE: .5; 2.5 SOLUTION RESPIRATORY (INHALATION) at 12:12

## 2022-11-26 RX ADMIN — CLONIDINE HYDROCHLORIDE 0.2 MG: 0.2 TABLET ORAL at 20:36

## 2022-11-26 RX ADMIN — IPRATROPIUM BROMIDE AND ALBUTEROL SULFATE 1 AMPULE: .5; 2.5 SOLUTION RESPIRATORY (INHALATION) at 08:58

## 2022-11-26 RX ADMIN — CLONIDINE HYDROCHLORIDE 0.2 MG: 0.2 TABLET ORAL at 09:29

## 2022-11-26 RX ADMIN — SUCRALFATE 1 G: 1 TABLET ORAL at 23:52

## 2022-11-26 RX ADMIN — Medication 10 ML: at 20:37

## 2022-11-26 RX ADMIN — Medication 10 ML: at 09:31

## 2022-11-26 RX ADMIN — SUCRALFATE 1 G: 1 TABLET ORAL at 00:10

## 2022-11-26 RX ADMIN — SUCRALFATE 1 G: 1 TABLET ORAL at 13:39

## 2022-11-26 RX ADMIN — CARVEDILOL 6.25 MG: 6.25 TABLET, FILM COATED ORAL at 09:29

## 2022-11-26 RX ADMIN — CLONIDINE HYDROCHLORIDE 0.2 MG: 0.2 TABLET ORAL at 13:39

## 2022-11-26 RX ADMIN — HEPARIN SODIUM 5000 UNITS: 10000 INJECTION INTRAVENOUS; SUBCUTANEOUS at 20:38

## 2022-11-26 RX ADMIN — VALPROIC ACID 250 MG: 250 SOLUTION ORAL at 20:36

## 2022-11-26 RX ADMIN — CARVEDILOL 6.25 MG: 6.25 TABLET, FILM COATED ORAL at 18:34

## 2022-11-26 RX ADMIN — HEPARIN SODIUM 5000 UNITS: 10000 INJECTION INTRAVENOUS; SUBCUTANEOUS at 13:39

## 2022-11-26 RX ADMIN — BUPRENORPHINE AND NALOXONE 1 TABLET: 8; 2 TABLET SUBLINGUAL at 20:36

## 2022-11-26 RX ADMIN — BUPRENORPHINE AND NALOXONE 1 TABLET: 8; 2 TABLET SUBLINGUAL at 09:29

## 2022-11-26 ASSESSMENT — PAIN SCALES - GENERAL: PAINLEVEL_OUTOF10: 0

## 2022-11-26 NOTE — PROGRESS NOTES
[facility]   ATTENDING PHYSICIAN PROGRESS NOTE     I have examined the patient, reviewed the record, and discussed the case with the APN/ Resident. I have reviewed all relevant labs and imaging data. Please refer to the APN/ resident's note. I agree with the assessment and plan with the following corrections/ additions. The following summarizes my clinical findings and independent assessment. CC: fall    Pt seems less confused.         Awake and alert  Confused  Follows commands  Hrt:  regular rate/rhythm; no murmur  Lungs:  fairly clear bilaterally  Abd:  soft; BS active; NT/ND; PEG in place  Skin:  warm/dry  Ext:  moving all 4 ext    Labs personally reviewed    Patient Active Problem List    Diagnosis Date Noted    Hypokalemia 11/21/2022    Hypomagnesemia 11/21/2022    Opioid dependence on agonist therapy ([address] 11/14/2022    Delirium 11/14/2022    Other constipation 11/14/2022    Subdural hematoma 47/96/2220    Acute alcoholic intoxication with complication ([address] 00/74/1957    Closed fracture of occipital bone ([address] 11/07/2022    COVID-19 11/07/2022    Subarachnoid bleed ([address] 11/05/2022    Dysphagia     Alcohol abuse     Alcohol withdrawal syndrome without complication ([address]     Bacteremia     Pneumonia due to organism 03/24/2021    Bacterial pneumonia     Acidosis     JOSIE (acute kidney injury) ([address]     Hypotension 06/17/2020    Benign paroxysmal positional vertigo due to bilateral vestibular disorder 06/13/2020    Essential hypertension 06/12/2020    Sinus tachycardia 06/12/2020    Right subclavian artery occlusion 12/03/2015    Right shoulder pain 11/12/2015    Chronic hepatitis C without hepatic coma ([address] 10/16/2015    Inguinal hernia, right 05/08/2014    Cervical radiculopathy 05/08/2014       S/p fall  SDH/SAH/IPH--monitor neuro exam  HTN--cont norvasc, coreg, clonidine  On home suboxone  Cefepime empirically  PPI/carafate for gastric/duodenal ulcers seen when PEG placed  Dysphagia--dysphagia diet  PT/OT evals   Speech eval  DVT risk--PCDs/subQ heparin  Discharge planning      Bobby Munroe MD, FACS  11/26/2022  9:30 AM

## 2022-11-27 LAB
ANION GAP SERPL CALCULATED.3IONS-SCNC: 10 MMOL/L (ref 7–16)
BUN BLDV-MCNC: 20 MG/DL (ref 6–23)
CALCIUM SERPL-MCNC: 9.6 MG/DL (ref 8.6–10.2)
CHLORIDE BLD-SCNC: 111 MMOL/L (ref 98–107)
CO2: 30 MMOL/L (ref 22–29)
CREAT SERPL-MCNC: 0.6 MG/DL (ref 0.7–1.2)
GFR SERPL CREATININE-BSD FRML MDRD: >60 ML/MIN/1.73
GLUCOSE BLD-MCNC: 128 MG/DL (ref 74–99)
HCT VFR BLD CALC: 38.5 % (ref 37–54)
HEMOGLOBIN: 12.7 G/DL (ref 12.5–16.5)
MCH RBC QN AUTO: 33.3 PG (ref 26–35)
MCHC RBC AUTO-ENTMCNC: 33 % (ref 32–34.5)
MCV RBC AUTO: 101 FL (ref 80–99.9)
PDW BLD-RTO: 11.8 FL (ref 11.5–15)
PLATELET # BLD: 316 E9/L (ref 130–450)
PMV BLD AUTO: 10.1 FL (ref 7–12)
POTASSIUM REFLEX MAGNESIUM: 3.7 MMOL/L (ref 3.5–5)
RBC # BLD: 3.81 E12/L (ref 3.8–5.8)
SODIUM BLD-SCNC: 151 MMOL/L (ref 132–146)
WBC # BLD: 4.5 E9/L (ref 4.5–11.5)

## 2022-11-27 PROCEDURE — 36415 COLL VENOUS BLD VENIPUNCTURE: CPT

## 2022-11-27 PROCEDURE — 2580000003 HC RX 258: Performed by: STUDENT IN AN ORGANIZED HEALTH CARE EDUCATION/TRAINING PROGRAM

## 2022-11-27 PROCEDURE — 6370000000 HC RX 637 (ALT 250 FOR IP): Performed by: SURGERY

## 2022-11-27 PROCEDURE — 99232 SBSQ HOSP IP/OBS MODERATE 35: CPT | Performed by: SURGERY

## 2022-11-27 PROCEDURE — 85027 COMPLETE CBC AUTOMATED: CPT

## 2022-11-27 PROCEDURE — 6360000002 HC RX W HCPCS

## 2022-11-27 PROCEDURE — 2140000000 HC CCU INTERMEDIATE R&B

## 2022-11-27 PROCEDURE — 1200000000 HC SEMI PRIVATE

## 2022-11-27 PROCEDURE — 6370000000 HC RX 637 (ALT 250 FOR IP): Performed by: STUDENT IN AN ORGANIZED HEALTH CARE EDUCATION/TRAINING PROGRAM

## 2022-11-27 PROCEDURE — 80048 BASIC METABOLIC PNL TOTAL CA: CPT

## 2022-11-27 RX ADMIN — BUPRENORPHINE AND NALOXONE 1 TABLET: 8; 2 TABLET SUBLINGUAL at 20:42

## 2022-11-27 RX ADMIN — CARVEDILOL 6.25 MG: 6.25 TABLET, FILM COATED ORAL at 09:31

## 2022-11-27 RX ADMIN — Medication 10 ML: at 09:32

## 2022-11-27 RX ADMIN — VALPROIC ACID 250 MG: 250 SOLUTION ORAL at 05:45

## 2022-11-27 RX ADMIN — HEPARIN SODIUM 5000 UNITS: 10000 INJECTION INTRAVENOUS; SUBCUTANEOUS at 20:45

## 2022-11-27 RX ADMIN — SUCRALFATE 1 G: 1 TABLET ORAL at 18:06

## 2022-11-27 RX ADMIN — AMLODIPINE BESYLATE 10 MG: 10 TABLET ORAL at 09:31

## 2022-11-27 RX ADMIN — Medication 10 ML: at 20:45

## 2022-11-27 RX ADMIN — HEPARIN SODIUM 5000 UNITS: 10000 INJECTION INTRAVENOUS; SUBCUTANEOUS at 14:37

## 2022-11-27 RX ADMIN — CARVEDILOL 6.25 MG: 6.25 TABLET, FILM COATED ORAL at 18:06

## 2022-11-27 RX ADMIN — SUCRALFATE 1 G: 1 TABLET ORAL at 05:45

## 2022-11-27 RX ADMIN — LANSOPRAZOLE 30 MG: KIT at 05:47

## 2022-11-27 RX ADMIN — VALPROIC ACID 250 MG: 250 SOLUTION ORAL at 20:43

## 2022-11-27 RX ADMIN — BUPRENORPHINE AND NALOXONE 1 TABLET: 8; 2 TABLET SUBLINGUAL at 09:31

## 2022-11-27 RX ADMIN — VALPROIC ACID 250 MG: 250 SOLUTION ORAL at 14:38

## 2022-11-27 RX ADMIN — CLONIDINE HYDROCHLORIDE 0.2 MG: 0.2 TABLET ORAL at 14:37

## 2022-11-27 RX ADMIN — LANSOPRAZOLE 30 MG: KIT at 14:38

## 2022-11-27 RX ADMIN — CLONIDINE HYDROCHLORIDE 0.2 MG: 0.2 TABLET ORAL at 09:31

## 2022-11-27 RX ADMIN — HEPARIN SODIUM 5000 UNITS: 10000 INJECTION INTRAVENOUS; SUBCUTANEOUS at 04:45

## 2022-11-27 RX ADMIN — CLONIDINE HYDROCHLORIDE 0.2 MG: 0.2 TABLET ORAL at 20:42

## 2022-11-27 RX ADMIN — SUCRALFATE 1 G: 1 TABLET ORAL at 14:38

## 2022-11-27 NOTE — PROGRESS NOTES
[facility]   ATTENDING PHYSICIAN PROGRESS NOTE     I have examined the patient, reviewed the record, and discussed the case with the APN/ Resident. I have reviewed all relevant labs and imaging data. Please refer to the APN/ resident's note. I agree with the assessment and plan with the following corrections/ additions. The following summarizes my clinical findings and independent assessment. CC: fall    Pt more alert and conversant today.         Awake and alert  Confused  Follows commands  Hrt:  regular rate/rhythm; no murmur  Lungs:  fairly clear bilaterally  Abd:  soft; BS active; NT/ND; PEG in place  Skin:  warm/dry  Ext:  moving all 4 ext    Labs personally reviewed    Patient Active Problem List    Diagnosis Date Noted    Hypokalemia 11/21/2022    Hypomagnesemia 11/21/2022    Opioid dependence on agonist therapy ([address] 11/14/2022    Delirium 11/14/2022    Other constipation 11/14/2022    Subdural hematoma 71/39/4455    Acute alcoholic intoxication with complication ([address] 58/35/3424    Closed fracture of occipital bone ([address] 11/07/2022    COVID-19 11/07/2022    Subarachnoid bleed ([address] 11/05/2022    Dysphagia     Alcohol abuse     Alcohol withdrawal syndrome without complication ([address]     Bacteremia     Pneumonia due to organism 03/24/2021    Bacterial pneumonia     Acidosis     JOSIE (acute kidney injury) ([address]     Hypotension 06/17/2020    Benign paroxysmal positional vertigo due to bilateral vestibular disorder 06/13/2020    Essential hypertension 06/12/2020    Sinus tachycardia 06/12/2020    Right subclavian artery occlusion 12/03/2015    Right shoulder pain 11/12/2015    Chronic hepatitis C without hepatic coma ([address] 10/16/2015    Inguinal hernia, right 05/08/2014    Cervical radiculopathy 05/08/2014       S/p fall  SDH/SAH/IPH--monitor neuro exam  HTN--cont norvasc, coreg, clonidine  On home suboxone  Cefepime empirically  PPI/carafate for gastric/duodenal ulcers seen when PEG placed  Dysphagia--dysphagia diet  PT/OT evals   Speech eval  DVT risk--PCDs/subQ heparin  Discharge planning      Ramin Prado MD, FACS  11/27/2022  11:23 AM

## 2022-11-28 LAB
ANION GAP SERPL CALCULATED.3IONS-SCNC: 12 MMOL/L (ref 7–16)
BUN BLDV-MCNC: 20 MG/DL (ref 6–23)
CALCIUM SERPL-MCNC: 9.7 MG/DL (ref 8.6–10.2)
CHLORIDE BLD-SCNC: 108 MMOL/L (ref 98–107)
CO2: 26 MMOL/L (ref 22–29)
CREAT SERPL-MCNC: 0.5 MG/DL (ref 0.7–1.2)
GFR SERPL CREATININE-BSD FRML MDRD: >60 ML/MIN/1.73
GLUCOSE BLD-MCNC: 94 MG/DL (ref 74–99)
HCT VFR BLD CALC: 43 % (ref 37–54)
HEMOGLOBIN: 13.7 G/DL (ref 12.5–16.5)
MCH RBC QN AUTO: 33.7 PG (ref 26–35)
MCHC RBC AUTO-ENTMCNC: 31.9 % (ref 32–34.5)
MCV RBC AUTO: 105.7 FL (ref 80–99.9)
PDW BLD-RTO: 11.8 FL (ref 11.5–15)
PLATELET # BLD: 308 E9/L (ref 130–450)
PMV BLD AUTO: 10.5 FL (ref 7–12)
POTASSIUM REFLEX MAGNESIUM: 3.7 MMOL/L (ref 3.5–5)
RBC # BLD: 4.07 E12/L (ref 3.8–5.8)
SODIUM BLD-SCNC: 146 MMOL/L (ref 132–146)
WBC # BLD: 3.2 E9/L (ref 4.5–11.5)

## 2022-11-28 PROCEDURE — 6360000002 HC RX W HCPCS

## 2022-11-28 PROCEDURE — 97530 THERAPEUTIC ACTIVITIES: CPT

## 2022-11-28 PROCEDURE — 85027 COMPLETE CBC AUTOMATED: CPT

## 2022-11-28 PROCEDURE — 2700000000 HC OXYGEN THERAPY PER DAY

## 2022-11-28 PROCEDURE — 6370000000 HC RX 637 (ALT 250 FOR IP): Performed by: STUDENT IN AN ORGANIZED HEALTH CARE EDUCATION/TRAINING PROGRAM

## 2022-11-28 PROCEDURE — 36415 COLL VENOUS BLD VENIPUNCTURE: CPT

## 2022-11-28 PROCEDURE — 2140000000 HC CCU INTERMEDIATE R&B

## 2022-11-28 PROCEDURE — 99232 SBSQ HOSP IP/OBS MODERATE 35: CPT | Performed by: SURGERY

## 2022-11-28 PROCEDURE — 6370000000 HC RX 637 (ALT 250 FOR IP): Performed by: SURGERY

## 2022-11-28 PROCEDURE — 2580000003 HC RX 258: Performed by: STUDENT IN AN ORGANIZED HEALTH CARE EDUCATION/TRAINING PROGRAM

## 2022-11-28 PROCEDURE — 97535 SELF CARE MNGMENT TRAINING: CPT

## 2022-11-28 PROCEDURE — 94640 AIRWAY INHALATION TREATMENT: CPT

## 2022-11-28 PROCEDURE — 80048 BASIC METABOLIC PNL TOTAL CA: CPT

## 2022-11-28 PROCEDURE — 1200000000 HC SEMI PRIVATE

## 2022-11-28 RX ADMIN — HEPARIN SODIUM 5000 UNITS: 10000 INJECTION INTRAVENOUS; SUBCUTANEOUS at 20:50

## 2022-11-28 RX ADMIN — CLONIDINE HYDROCHLORIDE 0.2 MG: 0.2 TABLET ORAL at 13:06

## 2022-11-28 RX ADMIN — CARVEDILOL 6.25 MG: 6.25 TABLET, FILM COATED ORAL at 09:04

## 2022-11-28 RX ADMIN — BUPRENORPHINE AND NALOXONE 1 TABLET: 8; 2 TABLET SUBLINGUAL at 20:48

## 2022-11-28 RX ADMIN — Medication 10 ML: at 09:06

## 2022-11-28 RX ADMIN — CLONIDINE HYDROCHLORIDE 0.2 MG: 0.2 TABLET ORAL at 09:05

## 2022-11-28 RX ADMIN — CLONIDINE HYDROCHLORIDE 0.2 MG: 0.2 TABLET ORAL at 20:48

## 2022-11-28 RX ADMIN — BUPRENORPHINE AND NALOXONE 1 TABLET: 8; 2 TABLET SUBLINGUAL at 09:05

## 2022-11-28 RX ADMIN — LANSOPRAZOLE 30 MG: KIT at 16:45

## 2022-11-28 RX ADMIN — IPRATROPIUM BROMIDE AND ALBUTEROL SULFATE 1 AMPULE: .5; 2.5 SOLUTION RESPIRATORY (INHALATION) at 19:40

## 2022-11-28 RX ADMIN — HEPARIN SODIUM 5000 UNITS: 10000 INJECTION INTRAVENOUS; SUBCUTANEOUS at 04:27

## 2022-11-28 RX ADMIN — SUCRALFATE 1 G: 1 TABLET ORAL at 00:25

## 2022-11-28 RX ADMIN — CARVEDILOL 6.25 MG: 6.25 TABLET, FILM COATED ORAL at 16:45

## 2022-11-28 RX ADMIN — SUCRALFATE 1 G: 1 TABLET ORAL at 13:06

## 2022-11-28 RX ADMIN — AMLODIPINE BESYLATE 10 MG: 10 TABLET ORAL at 09:04

## 2022-11-28 RX ADMIN — Medication 10 ML: at 20:49

## 2022-11-28 RX ADMIN — VALPROIC ACID 250 MG: 250 SOLUTION ORAL at 20:48

## 2022-11-28 RX ADMIN — HEPARIN SODIUM 5000 UNITS: 10000 INJECTION INTRAVENOUS; SUBCUTANEOUS at 13:07

## 2022-11-28 RX ADMIN — SUCRALFATE 1 G: 1 TABLET ORAL at 05:22

## 2022-11-28 RX ADMIN — VALPROIC ACID 250 MG: 250 SOLUTION ORAL at 05:22

## 2022-11-28 RX ADMIN — LANSOPRAZOLE 30 MG: KIT at 05:23

## 2022-11-28 RX ADMIN — VALPROIC ACID 250 MG: 250 SOLUTION ORAL at 13:06

## 2022-11-28 ASSESSMENT — PAIN SCALES - GENERAL
PAINLEVEL_OUTOF10: 0
PAINLEVEL_OUTOF10: 0

## 2022-11-28 NOTE — CARE COORDINATION
Care Coordination: call to [facility] [place] location . Spoke to Carol Ann Mishra. She is reaching out to pre scriber to obtain a 2 week supply of suboxone prior to pt karoline stay. Will update [facility], Call to liban and she will start precert if suboxone is covered once updated evals in . Spoke to Juliette Francis at [phone] to facilitate updated evals asap to start precert    James Spearing    Addendum: call to [facility]. Spoke  to narayan and connected to [facility]. She is calling Mayur Nava and will call me right back and I am to let her know where Suboxone script needs to be sent. Reviewed he has been in hospital since 11/5/22 and that is why he missed his 11/15/22 apt.   Last time seen there was 10/24/22,  Awaiting call back from [facility] and from [facility]    James Liz

## 2022-11-28 NOTE — PROGRESS NOTES
[facility]. Daily Progress Note 11/28/2022    Date of admission:  11/5/2022    CC: Fall    Subjective:  More awake and alert today. Denies complaints. Quite confused,     Objective:  /76   Pulse 84   Temp 97.5 °F (36.4 °C) (Tympanic)   Resp 16   Ht 5' 10\" (1.778 m)   Wt 165 lb 4.8 oz (75 kg)   SpO2 94%   BMI 23.72 kg/m²     GENERAL:  Laying in bed, awake, alert, cooperative, no apparent distress  NEUROLOGIC:      HEAD: Normocephalic, atraumatic  EYES: No sclera icterus, pupils equal  LUNGS:  No increased work of breathing. room air. Clear b/l   CARDIOVASCULAR:  RRR, hemodynamically stable no extra heart sounds   ABDOMEN:  Soft, non-tender, non-distended  EXTREMITIES: No edema or swelling  SKIN: Warm and dry    Assessment/Plan:  57 y.o. male     Principal Problem:    Subarachnoid bleed (HCC)  Active Problems:    Subdural hematoma    Acute alcoholic intoxication with complication (HCC)    Closed fracture of occipital bone (HCC)    COVID-19    Opioid dependence on agonist therapy (HCC)    Delirium    Other constipation    Hypokalemia    Hypomagnesemia  Resolved Problems:    * No resolved hospital problems. *      Neuro: SDH, SAH, IPH, continue to monitor neuro exam, finished keppra  Cardiac: No acute issues. Pulmonary: No acute issues. Monitor RR. Maintain SpO2 > 92%. Aggressive pulmonary hygiene. GI: No acute issues. [facility] Dysphagia diet . Senna  Renal: No acute issues. Monitor Urine Output,  Musculoskeletal: Occipital fx, non-op management  ID: No acute issues. Completed course of cefepime on 11/23  Endocrine: No acute issues.       DVT Prophylaxis: PCDs, SQ Heparin  Ancillary consults:   Neurosurgery   Family Update:         As available   CODE Status:   Full code  Dispo:[facility], 12/24 on Natalya Edmonds MD      Attending Attestation   Patient seen and examined, agree with resident note except for changes made by me, for remaining HP/Consult/progress note details please see resident HP/Consult/progress note. PTOT d/c planning, placement.       Flora Mcdaniel MD

## 2022-11-28 NOTE — PROGRESS NOTES
Occupational Therapy  OCCUPATIONAL THERAPY TREATMENT SESSION   [facility] [address] [address] [address]      ZQXB:                                                Patient Name: Xin Farris  MRN: [id number]  : 1960  Room: 79868187    Evaluating OT: Tabitha Gutierrez OTR/L #[id number]     Referring Provider: Brenda Johnson MD  Specific Provider Orders/Date: OT eval and treat 22    Diagnosis: Subdural hematoma [S06. 5XAA]  SAH (subarachnoid hemorrhage) (HCC) [I60.9]  Subarachnoid bleed (HCC) [I60.9]  JOSIE (acute kidney injury) ([facility] [Q47.1]  Acute alcoholic intoxication with complication ([facility] [O02.651]  Closed fracture of occipital bone, unspecified laterality, unspecified occipital fracture type, initial encounter [facility] [Z99.138V]  COVID-19 [U07.1]   Pt admitted to hospital following fall; + ETOH     Pertinent Medical History:  has a past medical history of Hepatitis C and Herniated disc, cervical.     Procedure: EGD, PEG placement ; Intubated ;   Extubated         Precautions:  Fall Risk, Droplet plus (COVID-19),  cognition, bed alarm, incontinence, O2      Assessment of current deficits    [x] Functional mobility  [x]ADLs  [x] Strength               [x]Cognition    [x] Functional transfers   [x] IADLs         [x] Safety Awareness   [x]Endurance    [] Fine Coordination              [x] Balance      [] Vision/perception   []Sensation     []Gross Motor Coordination  [] ROM  [] Delirium                   [] Motor Control     OT PLAN OF CARE   OT POC based on physician orders, patient diagnosis and results of clinical assessment    Frequency/Duration 1-3 days/wk for 2 weeks PRN   Specific OT Treatment Interventions to include:   * Instruction/training on adapted ADL techniques and AE recommendations to increase functional independence within precautions       * Training on energy conservation strategies, correct breathing pattern and techniques to improve independence/tolerance for self-care routine  * Functional transfer/mobility training/DME recommendations for increased independence, safety, and fall prevention  * Patient/Family education to increase follow through with safety techniques and functional independence  * Recommendation of environmental modifications for increased safety with functional transfers/mobility and ADLs  * Cognitive retraining/development of therapeutic activities to improve problem solving, judgement, memory, and attention for increased safety/participation in ADL/IADL tasks  * Sensory re-education to improve body/limb awareness, maintain/improve skin integrity, and improve hand/UE motor function  * Visual-perceptual training to improve environmental scanning, visual attention/focus, and oculomotor skills for increased safety/independence with functional transfers/mobility and ADLs  * Splinting/positioning for increased function, prevention of contractures, and improve skin integrity  * Therapeutic exercise to improve motor endurance, ROM, and functional strength for ADLs/functional transfers  * Therapeutic activities to facilitate/challenge dynamic balance, stand tolerance for increased safety and independence with ADLs  * Therapeutic activities to facilitate gross/fine motor skills for increased independence with ADLs  * Neuro-muscular re-education: facilitation of righting/equilibrium reactions, midline orientation, scapular stability/mobility, normalization of muscle tone, and facilitation of volitional active controled movement  * Positioning to improve skin integrity, interaction with environment and functional independence    Recommended Adaptive Equipment:  TBD     Home Living: Pt unable to provide PLOF due to cognition. Per chart review, pt lives with his domestic partner, Brayden Coles, in a 2 story duplex with 2nd floor bedroom and bathroom. Pt is independent at baseline.    Bathroom setup: na    Equipment owned: na    Prior Level of Function: Independent with ADLs , Independent with IADLs; ambulated without AD   Driving: yes   Occupation: na    Pain Level: Pt denies pain this session    Cognition: A&O: x1 (self only); Follows 1 step directions with cuing    Memory:  poor   Sequencing: poor   Problem solving:  poor   Judgement/safety: poor     Functional Assessment:  AM-PAC Daily Activity Raw Score: 14/24   Initial Eval Status  Date: 11/25/22 Treatment Status  Date: 11/28 STGs = LTGs  Time frame: 10-14 days   Feeding Set-up Set-up Mod I   Grooming Minimal Assist  Min A    Seated grooming tasks at EOB Minimal Assist    UB Dressing Moderate Assist  Min A Moderate Assist   LB Dressing Dependent  Max A    Socks; assist and cuing on balance, technique and sequencing Moderate Assist    Bathing Maximal Assist Max A    Partial bathing task at sink Moderate Assist    Toileting Dependent  Dep    Toileting hygiene task Moderate Assist    Bed Mobility  Supine to sit: Maximal Assist   Sit to supine: Maximal Assist  Supine to sit: Minimal Assist   Sit to supine: Moderate Assist    Cuing on sequencing and technique   Supine to sit: Minimal Assist   Sit to supine: Minimal Assist    Functional Transfers Maximal Assist     Sit to stands; + posterior lean Mod A    Cuing on sequencing, hand placement, body mechanics and safety; + posterior lean  Minimal Assist    Functional Mobility NT  Mod A    Several steps (side steps & forward / backwards) with w/w; + unsteadiness and posterior lean Moderate Assist    Balance Sitting:     Static:  Min A    Dynamic:min A  Standing: max A Sitting: SBA - min A    Standing: min - mod A at w.w     Activity Tolerance F- F-   F+   Visual/  Perceptual wfl    wfl              Comments: Upon arrival pt supine in bed and agreeable to OT Session. At end of session semi-supine in bed with all lines and tubes intact, call light within reach (bed alarm on).     Treatment: Facilitation of bed mobility, sitting balance at EOB (impacting ADLs; addressing posture, weight shifting, dynamic reaching), functional sit to stand transfers, standing tolerance tasks at w/w (addressing posture, balance and activity tolerance; impacting ADLs and functional activity) and side steps with w/w (cuing on sequencing, posture, w/w management and safety) - skilled cuing on sequencing, task initiation, hand placement, posture, body mechanics, energy conservation techniques and safety. Therapist facilitated self-care retraining: UB/LB self-care tasks (gown, partial bathing task, socks), toileting hygiene task and grooming tasks while cuing on sequencing, task initiation, modified techniques, posture, safety and energy conservation techniques. Skilled monitoring of HR, O2 sats and pts response to treatment. Pt has made F+ progress towards set goals. Continue with current plan of care: addressing adl retraining, transfer training and functional ambulation.        Treatment Time In:1032           Treatment Time Out: 1055              Treatment Charges: Mins Units   Ther Ex  46668     Manual Therapy 02539     Thera Activities 68542 14 1   ADL/Home Mgt 52865 9 1   Neuro Re-ed 97178     Group Therapy      Orthotic manage/training  18176     Non-Billable Time     Total Timed Treatment 23 2        Luh Sanchez OTR/L #[id number]

## 2022-11-28 NOTE — CARE COORDINATION
Care Coordination:   Call back from Sukhjinder Campuzano at [facility]. [facility] and [facility] would like to review clinical. He may call for update. Confirmed with olivia that pt has been receiving since 11/6/22 and dosage which was correct. She will be there tomorrow [facility]. I am to call back and speak to olivia in am to see where script needs escribed to. I can sit there for 5 days total before it is cancelled. I spoke to Keneth Crigler, reviewed A&O x 1, confused and more alert, no telesitter, resting quietly in bed. Precert was started, will touch base with Claribel at [facility] and Sukhjinder Campuzano at [place] [address] for suboxone. Mikaela Remington received a message from sister who is not listed on call list  Call from Rsoemarie Serrano, sister      Re: Ernesto Dorado [address] [place] [address]  Room [address]  She has question about POA  Needs guidance  P [phone]  [email or web address]      Addendum: I spoke to sister, many concerns regarding POA, and what happens if he does not recover. Pt is a retired  in [place] and brother Pranav Martínez is a  in [place]. They will reach out to daughter and José Page.   And will reach out to me once they speak again    Jeff Gurrola

## 2022-11-28 NOTE — PLAN OF CARE
Problem: Discharge Planning  Goal: Discharge to home or other facility with appropriate resources  Outcome: Progressing     Problem: Skin/Tissue Integrity  Goal: Absence of new skin breakdown  Description: 1. Monitor for areas of redness and/or skin breakdown  2. Assess vascular access sites hourly  3. Every 4-6 hours minimum:  Change oxygen saturation probe site  4. Every 4-6 hours:  If on nasal continuous positive airway pressure, respiratory therapy assess nares and determine need for appliance change or resting period.   Outcome: Progressing     Problem: Safety - Adult  Goal: Free from fall injury  Outcome: Progressing     Problem: ABCDS Injury Assessment  Goal: Absence of physical injury  Outcome: Progressing     Problem: Neurosensory - Adult  Goal: Achieves stable or improved neurological status  Outcome: Progressing  Goal: Absence of seizures  Outcome: Progressing  Goal: Remains free of injury related to seizures activity  Outcome: Progressing  Goal: Achieves maximal functionality and self care  Outcome: Progressing     Problem: Respiratory - Adult  Goal: Achieves optimal ventilation and oxygenation  Outcome: Progressing     Problem: Cardiovascular - Adult  Goal: Maintains optimal cardiac output and hemodynamic stability  Outcome: Progressing  Goal: Absence of cardiac dysrhythmias or at baseline  Outcome: Progressing     Problem: Skin/Tissue Integrity - Adult  Goal: Skin integrity remains intact  Outcome: Progressing  Goal: Incisions, wounds, or drain sites healing without S/S of infection  Outcome: Progressing  Goal: Oral mucous membranes remain intact  Outcome: Progressing     Problem: Musculoskeletal - Adult  Goal: Return mobility to safest level of function  Outcome: Progressing  Goal: Maintain proper alignment of affected body part  Outcome: Progressing  Goal: Return ADL status to a safe level of function  Outcome: Progressing     Problem: Safety - Medical Restraint  Goal: Remains free of injury from restraints (Restraint for Interference with Medical Device)  Description: INTERVENTIONS:  1. Determine that other, less restrictive measures have been tried or would not be effective before applying the restraint  2. Evaluate the patient's condition at the time of restraint application  3. Inform patient/family regarding the reason for restraint  4. Q2H: Monitor safety, psychosocial status, comfort, nutrition and hydration  Outcome: Progressing     Problem: Anxiety  Goal: Will report anxiety at manageable levels  Description: INTERVENTIONS:  1. Administer medication as ordered  2. Teach and rehearse alternative coping skills  3. Provide emotional support with 1:1 interaction with staff  Outcome: Progressing     Problem: Behavior  Goal: Pt/Family maintain appropriate behavior and adhere to behavioral management agreement, if implemented  Description: INTERVENTIONS:  1. Assess patient/family's coping skills and  non-compliant behavior (including use of illegal substances)  2. Notify security of behavior or suspected illegal substances which indicate the need for search of the family and/or belongings  3. Encourage verbalization of thoughts and concerns in a socially appropriate manner  4. Utilize positive, consistent limit setting strategies supporting safety of patient, staff and others  5. Encourage participation in the decision making process about the behavioral management agreement  6. If a visitor's behavior poses a threat to safety call refer to organization policy.   7. Initiate consult with , Psychosocial CNS, Spiritual Care as appropriate  Outcome: Progressing     Problem: Chronic Conditions and Co-morbidities  Goal: Patient's chronic conditions and co-morbidity symptoms are monitored and maintained or improved  Outcome: Progressing     Problem: Gastrointestinal - Adult  Goal: Minimal or absence of nausea and vomiting  Outcome: Progressing  Goal: Maintains or returns to baseline bowel function  Outcome: Progressing  Goal: Maintains adequate nutritional intake  Outcome: Progressing     Problem: Genitourinary - Adult  Goal: Absence of urinary retention  Outcome: Progressing     Problem: Infection - Adult  Goal: Absence of infection at discharge  Outcome: Progressing  Goal: Absence of infection during hospitalization  Outcome: Progressing  Goal: Absence of fever/infection during anticipated neutropenic period  Outcome: Progressing     Problem: Metabolic/Fluid and Electrolytes - Adult  Goal: Electrolytes maintained within normal limits  Outcome: Progressing  Goal: Hemodynamic stability and optimal renal function maintained  Outcome: Progressing  Goal: Glucose maintained within prescribed range  Outcome: Progressing     Problem: Hematologic - Adult  Goal: Maintains hematologic stability  Outcome: Progressing     Problem: Pain  Goal: Verbalizes/displays adequate comfort level or baseline comfort level  Outcome: Progressing     Problem: Nutrition Deficit:  Goal: Optimize nutritional status  Outcome: Progressing

## 2022-11-29 LAB
ANION GAP SERPL CALCULATED.3IONS-SCNC: 16 MMOL/L (ref 7–16)
BUN BLDV-MCNC: 16 MG/DL (ref 6–23)
CALCIUM SERPL-MCNC: 9.9 MG/DL (ref 8.6–10.2)
CHLORIDE BLD-SCNC: 109 MMOL/L (ref 98–107)
CO2: 24 MMOL/L (ref 22–29)
CREAT SERPL-MCNC: 0.5 MG/DL (ref 0.7–1.2)
GFR SERPL CREATININE-BSD FRML MDRD: >60 ML/MIN/1.73
GLUCOSE BLD-MCNC: 117 MG/DL (ref 74–99)
HCT VFR BLD CALC: 42 % (ref 37–54)
HEMOGLOBIN: 13.8 G/DL (ref 12.5–16.5)
MCH RBC QN AUTO: 33.5 PG (ref 26–35)
MCHC RBC AUTO-ENTMCNC: 32.9 % (ref 32–34.5)
MCV RBC AUTO: 101.9 FL (ref 80–99.9)
PDW BLD-RTO: 11.7 FL (ref 11.5–15)
PLATELET # BLD: 289 E9/L (ref 130–450)
PMV BLD AUTO: 10.3 FL (ref 7–12)
POTASSIUM REFLEX MAGNESIUM: 3.7 MMOL/L (ref 3.5–5)
RBC # BLD: 4.12 E12/L (ref 3.8–5.8)
SODIUM BLD-SCNC: 149 MMOL/L (ref 132–146)
WBC # BLD: 3.6 E9/L (ref 4.5–11.5)

## 2022-11-29 PROCEDURE — 2580000003 HC RX 258: Performed by: STUDENT IN AN ORGANIZED HEALTH CARE EDUCATION/TRAINING PROGRAM

## 2022-11-29 PROCEDURE — 99232 SBSQ HOSP IP/OBS MODERATE 35: CPT | Performed by: SURGERY

## 2022-11-29 PROCEDURE — 36415 COLL VENOUS BLD VENIPUNCTURE: CPT

## 2022-11-29 PROCEDURE — 6370000000 HC RX 637 (ALT 250 FOR IP): Performed by: SURGERY

## 2022-11-29 PROCEDURE — 6360000002 HC RX W HCPCS

## 2022-11-29 PROCEDURE — 94640 AIRWAY INHALATION TREATMENT: CPT

## 2022-11-29 PROCEDURE — 85027 COMPLETE CBC AUTOMATED: CPT

## 2022-11-29 PROCEDURE — 80048 BASIC METABOLIC PNL TOTAL CA: CPT

## 2022-11-29 PROCEDURE — 1200000000 HC SEMI PRIVATE

## 2022-11-29 PROCEDURE — 6370000000 HC RX 637 (ALT 250 FOR IP): Performed by: STUDENT IN AN ORGANIZED HEALTH CARE EDUCATION/TRAINING PROGRAM

## 2022-11-29 RX ADMIN — VALPROIC ACID 250 MG: 250 SOLUTION ORAL at 14:38

## 2022-11-29 RX ADMIN — BUPRENORPHINE AND NALOXONE 1 TABLET: 8; 2 TABLET SUBLINGUAL at 09:43

## 2022-11-29 RX ADMIN — SUCRALFATE 1 G: 1 TABLET ORAL at 23:40

## 2022-11-29 RX ADMIN — IPRATROPIUM BROMIDE AND ALBUTEROL SULFATE 1 AMPULE: .5; 2.5 SOLUTION RESPIRATORY (INHALATION) at 11:51

## 2022-11-29 RX ADMIN — HEPARIN SODIUM 5000 UNITS: 10000 INJECTION INTRAVENOUS; SUBCUTANEOUS at 14:39

## 2022-11-29 RX ADMIN — IPRATROPIUM BROMIDE AND ALBUTEROL SULFATE 1 AMPULE: .5; 2.5 SOLUTION RESPIRATORY (INHALATION) at 21:14

## 2022-11-29 RX ADMIN — SUCRALFATE 1 G: 1 TABLET ORAL at 00:32

## 2022-11-29 RX ADMIN — VALPROIC ACID 250 MG: 250 SOLUTION ORAL at 20:43

## 2022-11-29 RX ADMIN — SUCRALFATE 1 G: 1 TABLET ORAL at 16:52

## 2022-11-29 RX ADMIN — CARVEDILOL 6.25 MG: 6.25 TABLET, FILM COATED ORAL at 09:45

## 2022-11-29 RX ADMIN — AMLODIPINE BESYLATE 10 MG: 10 TABLET ORAL at 09:45

## 2022-11-29 RX ADMIN — IPRATROPIUM BROMIDE AND ALBUTEROL SULFATE 1 AMPULE: .5; 2.5 SOLUTION RESPIRATORY (INHALATION) at 08:06

## 2022-11-29 RX ADMIN — CARVEDILOL 6.25 MG: 6.25 TABLET, FILM COATED ORAL at 16:52

## 2022-11-29 RX ADMIN — CLONIDINE HYDROCHLORIDE 0.2 MG: 0.2 TABLET ORAL at 09:45

## 2022-11-29 RX ADMIN — LANSOPRAZOLE 30 MG: KIT at 16:52

## 2022-11-29 RX ADMIN — BUPRENORPHINE AND NALOXONE 1 TABLET: 8; 2 TABLET SUBLINGUAL at 20:43

## 2022-11-29 RX ADMIN — LANSOPRAZOLE 30 MG: KIT at 09:50

## 2022-11-29 RX ADMIN — CLONIDINE HYDROCHLORIDE 0.2 MG: 0.2 TABLET ORAL at 20:44

## 2022-11-29 RX ADMIN — Medication 10 ML: at 09:46

## 2022-11-29 RX ADMIN — SUCRALFATE 1 G: 1 TABLET ORAL at 05:38

## 2022-11-29 RX ADMIN — CLONIDINE HYDROCHLORIDE 0.2 MG: 0.2 TABLET ORAL at 14:38

## 2022-11-29 RX ADMIN — IPRATROPIUM BROMIDE AND ALBUTEROL SULFATE 1 AMPULE: .5; 2.5 SOLUTION RESPIRATORY (INHALATION) at 16:03

## 2022-11-29 RX ADMIN — HEPARIN SODIUM 5000 UNITS: 10000 INJECTION INTRAVENOUS; SUBCUTANEOUS at 04:38

## 2022-11-29 RX ADMIN — VALPROIC ACID 250 MG: 250 SOLUTION ORAL at 05:37

## 2022-11-29 RX ADMIN — HEPARIN SODIUM 5000 UNITS: 10000 INJECTION INTRAVENOUS; SUBCUTANEOUS at 20:44

## 2022-11-29 RX ADMIN — SUCRALFATE 1 G: 1 TABLET ORAL at 14:38

## 2022-11-29 RX ADMIN — Medication 10 ML: at 20:43

## 2022-11-29 ASSESSMENT — PAIN SCALES - GENERAL
PAINLEVEL_OUTOF10: 0

## 2022-11-29 NOTE — CARE COORDINATION
Care Coordination: [facility] can accept. They would just need attending at hospital to prescribe 3 days script or on demand and then they have an NP that will cover dosing at facility. They can start precert once therapy notes corrected as 11/28 note has 11/25 date. I have called kapil in therapy. In the meantime, butch is covering [facility] today, she is checking to see if pt actually has to sign for them or they just need script. I explained that [facility] is ready to start precert. I called daughter Chicho Singer, she has no preference. She states she is 9 hours away and cannot currently cannot assist and wants what is best for dad. She actually states she has not seen him for many years until recently. Discussed shared poa with aunt and uncle and she is agreeable to adding aunt to contact.   Spoke to butch at [facility], okay to let chiara proceed with precert    Tom Boss

## 2022-11-29 NOTE — CARE COORDINATION
Care Coordination: received call from Caitie  from [facility] early evening. Although on demands will approve saboxone, pt must accompany receiving facility to pharmacy and  every 2 weeks, at this point - he is still confused. Pt was received to this unit with plan in place. Uncertain if it was process of elimination in deciding on [facility]res but pt would benefit from a facility that can prescribe suboxone and not have to leave for refills. I discussed this with [facility] and they are receptive to referral to [address]. I spoke to Laura Dorado and referral given and she will evaluate to accept. If accepts, will start precert and I will need to contact daughter to get approval for this referral. Spoke to sister last evening and she is concerned regarding  POA, need for cobra, plan long term if he does not recover, possibly need SSI or medicaid and daughter does not seem to want to be involved. Significant other is pleasant but also on suboxone and going through rehab and uncertain if she will be able to look into all needed.   She will discuss with pt's daughter and brother who is an  to see if there is any thing they can do as pt is not alert and oriented    David Mccrary

## 2022-11-29 NOTE — PLAN OF CARE
Problem: Skin/Tissue Integrity  Goal: Absence of new skin breakdown  Description: 1. Monitor for areas of redness and/or skin breakdown  2. Assess vascular access sites hourly  3. Every 4-6 hours minimum:  Change oxygen saturation probe site  4. Every 4-6 hours:  If on nasal continuous positive airway pressure, respiratory therapy assess nares and determine need for appliance change or resting period.   Outcome: Progressing     Problem: Safety - Adult  Goal: Free from fall injury  Outcome: Progressing     Problem: Neurosensory - Adult  Goal: Achieves stable or improved neurological status  Outcome: Progressing

## 2022-11-30 VITALS
RESPIRATION RATE: 20 BRPM | WEIGHT: 165.3 LBS | OXYGEN SATURATION: 95 % | HEART RATE: 82 BPM | HEIGHT: 70 IN | SYSTOLIC BLOOD PRESSURE: 130 MMHG | TEMPERATURE: 97.6 F | DIASTOLIC BLOOD PRESSURE: 94 MMHG | BODY MASS INDEX: 23.66 KG/M2

## 2022-11-30 PROCEDURE — 99232 SBSQ HOSP IP/OBS MODERATE 35: CPT | Performed by: SURGERY

## 2022-11-30 PROCEDURE — 6360000002 HC RX W HCPCS

## 2022-11-30 PROCEDURE — 6370000000 HC RX 637 (ALT 250 FOR IP): Performed by: SURGERY

## 2022-11-30 PROCEDURE — 2580000003 HC RX 258: Performed by: STUDENT IN AN ORGANIZED HEALTH CARE EDUCATION/TRAINING PROGRAM

## 2022-11-30 PROCEDURE — 94640 AIRWAY INHALATION TREATMENT: CPT

## 2022-11-30 PROCEDURE — 6370000000 HC RX 637 (ALT 250 FOR IP): Performed by: STUDENT IN AN ORGANIZED HEALTH CARE EDUCATION/TRAINING PROGRAM

## 2022-11-30 RX ORDER — SENNA PLUS 8.6 MG/1
1 TABLET ORAL NIGHTLY PRN
DISCHARGE
Start: 2022-11-30 | End: 2022-12-30

## 2022-11-30 RX ORDER — CARVEDILOL 6.25 MG/1
6.25 TABLET ORAL 2 TIMES DAILY WITH MEALS
Qty: 60 TABLET | Refills: 3 | DISCHARGE
Start: 2022-11-30 | End: 2022-12-27 | Stop reason: SDUPTHER

## 2022-11-30 RX ORDER — LANSOPRAZOLE
30 KIT
Qty: 300 ML | DISCHARGE
Start: 2022-11-30 | End: 2023-01-14 | Stop reason: ALTCHOICE

## 2022-11-30 RX ORDER — AMLODIPINE BESYLATE 10 MG/1
10 TABLET ORAL DAILY
Qty: 30 TABLET | Refills: 3 | DISCHARGE
Start: 2022-12-01 | End: 2023-01-12

## 2022-11-30 RX ORDER — VALPROIC ACID 250 MG/5ML
250 SOLUTION ORAL EVERY 8 HOURS SCHEDULED
Qty: 600 ML | DISCHARGE
Start: 2022-11-30 | End: 2023-01-14 | Stop reason: SDUPTHER

## 2022-11-30 RX ORDER — SUCRALFATE 1 G/1
1 TABLET ORAL 4 TIMES DAILY
Qty: 120 TABLET | Refills: 3 | DISCHARGE
Start: 2022-11-30 | End: 2023-01-14 | Stop reason: SDUPTHER

## 2022-11-30 RX ORDER — CLONIDINE HYDROCHLORIDE 0.2 MG/1
0.2 TABLET ORAL 3 TIMES DAILY
Qty: 60 TABLET | Refills: 3 | DISCHARGE
Start: 2022-11-30 | End: 2022-12-27 | Stop reason: SDUPTHER

## 2022-11-30 RX ORDER — IPRATROPIUM BROMIDE AND ALBUTEROL SULFATE 2.5; .5 MG/3ML; MG/3ML
3 SOLUTION RESPIRATORY (INHALATION)
Qty: 360 ML | DISCHARGE
Start: 2022-11-30

## 2022-11-30 RX ORDER — HEPARIN SODIUM 10000 [USP'U]/ML
5000 INJECTION, SOLUTION INTRAVENOUS; SUBCUTANEOUS EVERY 8 HOURS
DISCHARGE
Start: 2022-11-30

## 2022-11-30 RX ORDER — IPRATROPIUM BROMIDE AND ALBUTEROL SULFATE 2.5; .5 MG/3ML; MG/3ML
3 SOLUTION RESPIRATORY (INHALATION) EVERY 4 HOURS PRN
Qty: 360 ML | DISCHARGE
Start: 2022-11-30 | End: 2022-12-27 | Stop reason: SDUPTHER

## 2022-11-30 RX ADMIN — CLONIDINE HYDROCHLORIDE 0.2 MG: 0.2 TABLET ORAL at 08:42

## 2022-11-30 RX ADMIN — LANSOPRAZOLE 30 MG: KIT at 05:30

## 2022-11-30 RX ADMIN — VALPROIC ACID 250 MG: 250 SOLUTION ORAL at 05:30

## 2022-11-30 RX ADMIN — Medication 10 ML: at 08:44

## 2022-11-30 RX ADMIN — CARVEDILOL 6.25 MG: 6.25 TABLET, FILM COATED ORAL at 08:42

## 2022-11-30 RX ADMIN — BUPRENORPHINE AND NALOXONE 1 TABLET: 8; 2 TABLET SUBLINGUAL at 08:41

## 2022-11-30 RX ADMIN — HEPARIN SODIUM 5000 UNITS: 10000 INJECTION INTRAVENOUS; SUBCUTANEOUS at 03:28

## 2022-11-30 RX ADMIN — SUCRALFATE 1 G: 1 TABLET ORAL at 05:30

## 2022-11-30 RX ADMIN — AMLODIPINE BESYLATE 10 MG: 10 TABLET ORAL at 08:42

## 2022-11-30 RX ADMIN — IPRATROPIUM BROMIDE AND ALBUTEROL SULFATE 1 AMPULE: .5; 2.5 SOLUTION RESPIRATORY (INHALATION) at 08:04

## 2022-11-30 ASSESSMENT — PAIN SCALES - GENERAL: PAINLEVEL_OUTOF10: 0

## 2022-11-30 NOTE — PLAN OF CARE
Problem: Discharge Planning  Goal: Discharge to home or other facility with appropriate resources  11/29/2022 2125 by Dawn Jeans, RN  Outcome: Progressing  11/29/2022 1144 by Steve Wong RN  Outcome: Progressing     Problem: Skin/Tissue Integrity  Goal: Absence of new skin breakdown  11/29/2022 2125 by Dawn Jeans, RN  Outcome: Progressing  11/29/2022 1144 by Steve Wong RN  Outcome: Progressing     Problem: Safety - Adult  Goal: Free from fall injury  11/29/2022 2125 by Dawn Jeans, RN  Outcome: Progressing  11/29/2022 1144 by Steve Wong RN  Outcome: Progressing     Problem: ABCDS Injury Assessment  Goal: Absence of physical injury  11/29/2022 2125 by Dawn Jeans, RN  Outcome: Progressing  11/29/2022 1144 by Steve Wong RN  Outcome: Progressing     Problem: Neurosensory - Adult  Goal: Achieves stable or improved neurological status  11/29/2022 2125 by Dawn Jeans, RN  Outcome: Progressing  11/29/2022 1144 by Steve Wong RN  Outcome: Progressing  Goal: Absence of seizures  11/29/2022 2125 by Dawn Jeans, RN  Outcome: Progressing  11/29/2022 1144 by Steve Wong RN  Outcome: Progressing  Goal: Remains free of injury related to seizures activity  11/29/2022 2125 by Dawn Jeans, RN  Outcome: Progressing  11/29/2022 1144 by Steve Wong RN  Outcome: Progressing  Goal: Achieves maximal functionality and self care  11/29/2022 2125 by Dawn Jeans, RN  Outcome: Progressing  11/29/2022 1144 by Steve Wong RN  Outcome: Progressing     Problem: Respiratory - Adult  Goal: Achieves optimal ventilation and oxygenation  11/29/2022 2125 by Dawn Jeans, RN  Outcome: Progressing  11/29/2022 1144 by Steve Wong RN  Outcome: Progressing     Problem: Cardiovascular - Adult  Goal: Maintains optimal cardiac output and hemodynamic stability  11/29/2022 2125 by Dawn Jeans, RN  Outcome: Progressing  11/29/2022 1144 by Maura Doss SHAN Harry  Outcome: Progressing  Goal: Absence of cardiac dysrhythmias or at baseline  11/29/2022 2125 by Dawn Jeans, RN  Outcome: Progressing  11/29/2022 1144 by Steve Wong RN  Outcome: Progressing     Problem: Skin/Tissue Integrity - Adult  Goal: Skin integrity remains intact  11/29/2022 2125 by Dawn Jeans, RN  Outcome: Progressing  11/29/2022 1144 by Steve Wong RN  Outcome: Progressing  Goal: Incisions, wounds, or drain sites healing without S/S of infection  11/29/2022 2125 by Dawn Jeans, RN  Outcome: Progressing  11/29/2022 1144 by Steve Wong RN  Outcome: Progressing  Goal: Oral mucous membranes remain intact  11/29/2022 2125 by Dawn Jeans, RN  Outcome: Progressing  11/29/2022 1144 by Steve Wong RN  Outcome: Progressing     Problem: Musculoskeletal - Adult  Goal: Return mobility to safest level of function  11/29/2022 2125 by Dawn Jeans, RN  Outcome: Progressing  11/29/2022 1144 by Steve Wong RN  Outcome: Progressing  Goal: Maintain proper alignment of affected body part  11/29/2022 2125 by Dawn Jeans, RN  Outcome: Progressing  11/29/2022 1144 by Steve Wong RN  Outcome: Progressing  Goal: Return ADL status to a safe level of function  11/29/2022 2125 by Dawn Jeans, RN  Outcome: Progressing  11/29/2022 1144 by Steve Wong RN  Outcome: Progressing     Problem: Safety - Medical Restraint  Goal: Remains free of injury from restraints (Restraint for Interference with Medical Device)  11/29/2022 2125 by Dawn Jeans, RN  Outcome: Progressing  11/29/2022 1144 by Steve Wong RN  Outcome: Progressing     Problem: Anxiety  Goal: Will report anxiety at manageable levels  11/29/2022 1144 by Steve Wong RN  Outcome: Progressing     Problem: Behavior  Goal: Pt/Family maintain appropriate behavior and adhere to behavioral management agreement, if implemented  11/29/2022 1144 by Steve Wong RN  Outcome: Progressing     Problem: Chronic Conditions and Co-morbidities  Goal: Patient's chronic conditions and co-morbidity symptoms are monitored and maintained or improved  11/29/2022 1144 by Teri Collier RN  Outcome: Progressing     Problem: Gastrointestinal - Adult  Goal: Minimal or absence of nausea and vomiting  11/29/2022 2125 by Mago Carrasco RN  Outcome: Progressing  11/29/2022 1144 by Teri Collier RN  Outcome: Progressing  Goal: Maintains or returns to baseline bowel function  11/29/2022 2125 by Mago Carrasco RN  Outcome: Progressing  11/29/2022 1144 by Teri Collier RN  Outcome: Progressing  Goal: Maintains adequate nutritional intake  11/29/2022 2125 by Mago Carrasco RN  Outcome: Progressing  11/29/2022 1144 by Teri Collier RN  Outcome: Progressing     Problem: Genitourinary - Adult  Goal: Absence of urinary retention  11/29/2022 2125 by Mago Carrasco RN  Outcome: Progressing  11/29/2022 1144 by Teri Collier RN  Outcome: Progressing     Problem: Infection - Adult  Goal: Absence of infection at discharge  11/29/2022 2125 by Mago Carrasco RN  Outcome: Progressing  11/29/2022 1144 by Teri Collier RN  Outcome: Progressing  Goal: Absence of infection during hospitalization  11/29/2022 2125 by Mago Carrasco RN  Outcome: Progressing  11/29/2022 1144 by Teri Collier RN  Outcome: Progressing  Goal: Absence of fever/infection during anticipated neutropenic period  11/29/2022 2125 by Mago Carrasco RN  Outcome: Progressing  11/29/2022 1144 by Teri Collier RN  Outcome: Progressing     Problem: Metabolic/Fluid and Electrolytes - Adult  Goal: Electrolytes maintained within normal limits  11/29/2022 2125 by Mago Carrasco RN  Outcome: Progressing  11/29/2022 1144 by Teri Collier RN  Outcome: Progressing  Goal: Hemodynamic stability and optimal renal function maintained  11/29/2022 2125 by Mago Carrasco RN  Outcome: Progressing  11/29/2022 1144 by Rosa Maria Milan RN  Outcome: Progressing  Goal: Glucose maintained within prescribed range  11/29/2022 2125 by Deangelo Redmond RN  Outcome: Progressing  11/29/2022 1144 by Rosa Maria Milan RN  Outcome: Progressing     Problem: Hematologic - Adult  Goal: Maintains hematologic stability  11/29/2022 1144 by Rosa Maria Milan RN  Outcome: Progressing     Problem: Pain  Goal: Verbalizes/displays adequate comfort level or baseline comfort level  11/29/2022 1144 by Rosa Maria Milan RN  Outcome: Progressing     Problem: Nutrition Deficit:  Goal: Optimize nutritional status  Recent Flowsheet Documentation  Taken 11/29/2022 1401 by Briseida Monaco RD  Nutrient intake appropriate for improving, restoring, or maintaining nutritional needs: Assess nutritional status and recommend course of action  11/29/2022 1144 by Rosa Maria Milan RN  Outcome: Progressing

## 2022-11-30 NOTE — DISCHARGE INSTR - COC
Continuity of Care Form    Patient Name: Freddie Umaña   :  1960  MRN:  [id number]    Admit date:  2022  Discharge date:  2022    Code Status Order: Full Code   Advance Directives:     Admitting Physician:  Heavenly Kapadia MD  PCP: Jena Noguera MD    Discharging Nurse: [facility]/Room#: 8083/1872-O  Discharging Unit Phone Number: [phone]    Emergency Contact:   Extended Emergency Contact Information  Primary Emergency Contact: Cari Tucker 1  Mobile Phone: [phone]  Relation: Child  Secondary Emergency Contact: Aldo Keating  Address: [address] [place] [address]           [address]           [address] Phone: [phone]  Mobile Phone: [phone]  Relation: Other  Hearing or visual needs: None  Other needs: None  Preferred language: English   needed?  No    Past Surgical History:  Past Surgical History:   Procedure Laterality Date    ANTERIOR CRUCIATE LIGAMENT REPAIR      left, [address] [facility]    ANTERIOR CRUCIATE LIGAMENT REPAIR  2009    right ACL repair, Dr. Daly Pel    [facility]    EGD PEG TUBE PLACEMENT performed by Jennifer Avendaño MD at [place]  2014    right indirect inguinal herniorrhaphy with mesh, Dr. Julane Fothergill, Tammie Mohs Right 14    with mesh    NECK SURGERY  1992    neck fusion C6 and C7, [address] [facility]    PICC LINE INSERTION NURSE  3/28/2021         SHOULDER SURGERY  2007    left, removal spur, Dr. Perry Hernández  2007    melenoma, left clavical area, [facility]    UPPER GASTROINTESTINAL ENDOSCOPY N/A 3/31/2021    EGD BIOPSY and AGUERO DILATATION performed by Francisco Javier Leonard DO at [facility] ENDOSCOPY       Immunization History:   Immunization History   Administered Date(s) Administered    COVID-19, MODERNA BLUE border, Primary or Immunocompromised, (age 12y+), IM, 100 mcg/0.5mL 06/02/2021, 07/09/2021    Pneumococcal Conjugate 13-valent ([id number]) 03/25/2021    Tdap (Boostrix, Adacel) 10/15/2015, 03/25/2018       Active Problems:  Patient Active Problem List   Diagnosis Code    Inguinal hernia, right K40.90    Cervical radiculopathy M54.12    Chronic hepatitis C without hepatic coma (HCC) B18.2    Right shoulder pain M25.511    Right subclavian artery occlusion I70.8    Essential hypertension I10    Sinus tachycardia R00.0    Benign paroxysmal positional vertigo due to bilateral vestibular disorder H81.13    Hypotension I95.9    Pneumonia due to organism J18.9    Bacterial pneumonia J15.9    Acidosis E87.20    JOSIE (acute kidney injury) ([address] N17.9    Bacteremia R78.81    Alcohol withdrawal syndrome without complication ([facility]) W07.002    Dysphagia R13.10    Alcohol abuse F10.10    Subarachnoid bleed ([facility]) I60.9    Subdural hematoma S06. 5XAA    Acute alcoholic intoxication with complication ([address] F63.097    Closed fracture of occipital bone ([address] S02.119A    COVID-19 U07.1    Opioid dependence on agonist therapy ([address] F11.20    Delirium R41.0    Other constipation K59.09    Hypokalemia E87.6    Hypomagnesemia E83.42       Isolation/Infection:   Isolation            No Isolation          Patient Infection Status       Infection Onset Added Last Indicated Last Indicated By Review Planned Expiration Resolved Resolved By    None active    Resolved    Haemophilus influenza 11/17/22 11/19/22 11/17/22 Culture, Respiratory   11/23/22 Vu Carrera RN    Maintain Standard Precautions per CDC guidelines. -CRodomskyRN 11/23/22    COVID-19 11/05/22 11/05/22 11/17/22 Respiratory Panel, Molecular, with COVID-19 (Restricted: peds pts or suitable admitted adults)   11/28/22 Vu Carrera RN    Per [facility] guidelines, meets criteria for discontinuation of Droplet Plus precautions for Covid-19.   CRodomskyRN  11/28/22    COVID-19 (Rule Out) 11/05/22 11/05/22 11/05/22 COVID-19, Rapid (Ordered)   11/05/22 Rule-Out Test Resulted    MRSA 06/15/22 06/18/22 06/15/22 Culture, Wound   11/11/22 Onur Nash RN    COVID-19 (Rule Out) 11/24/21 11/24/21 11/23/21 COVID-19 Ambulatory (Ordered)   11/25/21 Rule-Out Test Resulted    COVID-19 (Rule Out) 09/20/21 09/20/21 09/20/21 COVID-19 Ambulatory (Ordered)   09/22/21 Rule-Out Test Resulted    COVID-19 (Rule Out) 03/24/21 03/24/21 03/24/21 Respiratory Panel, Molecular, with COVID-19 (Restricted: peds pts or suitable admitted adults) (Ordered)   03/24/21 Rule-Out Test Resulted    COVID-19 (Rule Out) 03/24/21 03/24/21 03/24/21 SARS-CoV-2 NAAT (Rapid) (Ordered)   03/24/21 Rule-Out Test Resulted    COVID-19 (Rule Out) 07/01/20 07/01/20 07/01/20 COVID-19 (Ordered)   07/02/20 Rule-Out Test Resulted            Nurse Assessment:  Last Vital Signs: BP (!) 130/94   Pulse 82   Temp 97.6 °F (36.4 °C) (Oral)   Resp 20   Ht 5' 10\" (1.778 m)   Wt 165 lb 4.8 oz (75 kg)   SpO2 95%   BMI 23.72 kg/m²     Last documented pain score (0-10 scale): Pain Level: 0  Last Weight:   Wt Readings from Last 1 Encounters:   11/12/22 165 lb 4.8 oz (75 kg)     Mental Status:  disoriented    IV Access:  - None    Nursing Mobility/ADLs:  Walking   Dependent  Transfer  Dependent  Bathing  Dependent  Dressing  Dependent  Toileting  Dependent  Feeding  Assisted  Med Admin  Dependent  Med Delivery   whole with nectar thick liquids     Wound Care Documentation and Therapy:        Elimination:  Continence: Bowel: No  Bladder: No  Urinary Catheter: None   Colostomy/Ileostomy/Ileal Conduit: No       Date of Last BM: 11/29/22    Intake/Output Summary (Last 24 hours) at 11/30/2022 1028  Last data filed at 11/30/2022 0528  Gross per 24 hour   Intake 300 ml   Output 200 ml   Net 100 ml     I/O last 3 completed shifts: In: 300 [P.O.:300]  Out: 200 [Urine:200]    Safety Concerns:      At Risk for Falls    Impairments/Disabilities:      None    Nutrition Therapy:  Current Nutrition Therapy: - Oral Diet:  Dysphagia 1 pureed    Routes of Feeding: Oral  Liquids: Nectar Thick Liquids  Daily Fluid Restriction: no  Last Modified Barium Swallow with Video (Video Swallowing Test): not done    Treatments at the Time of Hospital Discharge:   Respiratory Treatments: None  Oxygen Therapy:  is not on home oxygen therapy. Ventilator:    - No ventilator support    Rehab Therapies: Physical Therapy and Occupational Therapy  Weight Bearing Status/Restrictions: No weight bearing restrictions  Other Medical Equipment (for information only, NOT a DME order):  none  Other Treatments: None    Patient's personal belongings (please select all that are sent with patient):  None    RN SIGNATURE:  Electronically signed by Daylin Hall RN on 11/30/22 at 12:00 PM EST    CASE MANAGEMENT/SOCIAL WORK SECTION    Inpatient Status Date: 11/30/22    Readmission Risk Assessment Score:  Readmission Risk              Risk of Unplanned Readmission:  17           Discharging to Facility/ Agency   Name: [facility]  Address: [address] [place] [address], [place] [address]  Phone: [phone]  Fax:    Dialysis Facility (if applicable)   Name:  Address:  Dialysis Schedule:  Phone:  Fax:    / signature: Electronically signed by EMMANUELLE Henry on 11/30/22 at 12:04 PM EST    PHYSICIAN SECTION    Prognosis: Good    Condition at Discharge: Stable    Rehab Potential (if transferring to Rehab): Good    Recommended Labs or Other Treatments After Discharge: PT/OT, follow up    Physician Certification: I certify the above information and transfer of Freddie Umaña  is necessary for the continuing treatment of the diagnosis listed and that he requires [facility] for less 30 days.      Update Admission H&P: Changes in H&P as follows - See Discharge summary     PHYSICIAN SIGNATURE:  Electronically signed by BENITA Vasquez CNP on 11/30/22 at 10:29 AM EST

## 2022-11-30 NOTE — PLAN OF CARE
Problem: Discharge Planning  Goal: Discharge to home or other facility with appropriate resources  11/30/2022 1243 by Avery Griffin RN  Outcome: Adequate for Discharge  11/30/2022 1102 by Avery Griffin RN  Outcome: Progressing     Problem: Skin/Tissue Integrity  Goal: Absence of new skin breakdown  Description: 1. Monitor for areas of redness and/or skin breakdown  2. Assess vascular access sites hourly  3. Every 4-6 hours minimum:  Change oxygen saturation probe site  4. Every 4-6 hours:  If on nasal continuous positive airway pressure, respiratory therapy assess nares and determine need for appliance change or resting period.   11/30/2022 1243 by Avery Griffin RN  Outcome: Adequate for Discharge  11/30/2022 1102 by Avery Griffin RN  Outcome: Progressing     Problem: Safety - Adult  Goal: Free from fall injury  11/30/2022 1243 by Avery Griffin RN  Outcome: Adequate for Discharge  11/30/2022 1102 by Avery Griffin RN  Outcome: Progressing     Problem: ABCDS Injury Assessment  Goal: Absence of physical injury  11/30/2022 1243 by Avery Griffin RN  Outcome: Adequate for Discharge  11/30/2022 1102 by Avery Griffin RN  Outcome: Progressing     Problem: Neurosensory - Adult  Goal: Achieves stable or improved neurological status  11/30/2022 1243 by Avery Griffin RN  Outcome: Adequate for Discharge  11/30/2022 1102 by Avery Griffin RN  Outcome: Progressing  Goal: Absence of seizures  11/30/2022 1243 by Avery Griffin RN  Outcome: Adequate for Discharge  11/30/2022 1102 by Avery Griffin RN  Outcome: Progressing  Goal: Remains free of injury related to seizures activity  11/30/2022 1243 by Avery Griffin RN  Outcome: Adequate for Discharge  11/30/2022 1102 by Avery Griffin RN  Outcome: Progressing  Goal: Achieves maximal functionality and self care  11/30/2022 1243 by Avery Griffin RN  Outcome: Adequate for Discharge  11/30/2022 1102 by Avery Griffin RN  Outcome: Progressing Problem: Respiratory - Adult  Goal: Achieves optimal ventilation and oxygenation  11/30/2022 1243 by Clovia Dubin, RN  Outcome: Adequate for Discharge  11/30/2022 1102 by Clovia Dubin, RN  Outcome: Progressing     Problem: Cardiovascular - Adult  Goal: Maintains optimal cardiac output and hemodynamic stability  11/30/2022 1243 by Clovia Dubin, RN  Outcome: Adequate for Discharge  11/30/2022 1102 by Clovia Dubin, RN  Outcome: Progressing  Goal: Absence of cardiac dysrhythmias or at baseline  11/30/2022 1243 by Clovia Dubin, RN  Outcome: Adequate for Discharge  11/30/2022 1102 by Clovia Dubin, RN  Outcome: Progressing     Problem: Skin/Tissue Integrity - Adult  Goal: Skin integrity remains intact  11/30/2022 1243 by Clovia Dubin, RN  Outcome: Adequate for Discharge  11/30/2022 1102 by Clovia Dubin, RN  Outcome: Progressing  Goal: Incisions, wounds, or drain sites healing without S/S of infection  11/30/2022 1243 by Clovia Dubin, RN  Outcome: Adequate for Discharge  11/30/2022 1102 by Clovia Dubin, RN  Outcome: Progressing  Goal: Oral mucous membranes remain intact  11/30/2022 1243 by Clovia Dubin, RN  Outcome: Adequate for Discharge  11/30/2022 1102 by Clovia Dubin, RN  Outcome: Progressing     Problem: Musculoskeletal - Adult  Goal: Return mobility to safest level of function  11/30/2022 1243 by Clovia Dubin, RN  Outcome: Adequate for Discharge  11/30/2022 1102 by Clovia Dubin, RN  Outcome: Progressing  Goal: Maintain proper alignment of affected body part  11/30/2022 1243 by Clovia Dubin, RN  Outcome: Adequate for Discharge  11/30/2022 1102 by Clovia Dubin, RN  Outcome: Progressing  Goal: Return ADL status to a safe level of function  11/30/2022 1243 by Clovia Dubin, RN  Outcome: Adequate for Discharge  11/30/2022 1102 by Clovia Dubin, RN  Outcome: Progressing     Problem: Safety - Medical Restraint  Goal: Remains free of injury from restraints (Restraint for Interference with Medical Device)  Description: INTERVENTIONS:  1. Determine that other, less restrictive measures have been tried or would not be effective before applying the restraint  2. Evaluate the patient's condition at the time of restraint application  3. Inform patient/family regarding the reason for restraint  4. Q2H: Monitor safety, psychosocial status, comfort, nutrition and hydration  11/30/2022 1243 by Hugh Otoole RN  Outcome: Adequate for Discharge  11/30/2022 1102 by Hugh Otoole RN  Outcome: Progressing     Problem: Anxiety  Goal: Will report anxiety at manageable levels  Description: INTERVENTIONS:  1. Administer medication as ordered  2. Teach and rehearse alternative coping skills  3. Provide emotional support with 1:1 interaction with staff  11/30/2022 1243 by Hugh Otoole RN  Outcome: Adequate for Discharge  11/30/2022 1102 by Hugh Otoole RN  Outcome: Progressing     Problem: Behavior  Goal: Pt/Family maintain appropriate behavior and adhere to behavioral management agreement, if implemented  Description: INTERVENTIONS:  1. Assess patient/family's coping skills and  non-compliant behavior (including use of illegal substances)  2. Notify security of behavior or suspected illegal substances which indicate the need for search of the family and/or belongings  3. Encourage verbalization of thoughts and concerns in a socially appropriate manner  4. Utilize positive, consistent limit setting strategies supporting safety of patient, staff and others  5. Encourage participation in the decision making process about the behavioral management agreement  6. If a visitor's behavior poses a threat to safety call refer to organization policy.   7. Initiate consult with , Psychosocial CNS, Spiritual Care as appropriate  11/30/2022 1243 by Hugh Otoole RN  Outcome: Adequate for Discharge  11/30/2022 1102 by Hugh Otoole RN  Outcome: Progressing     Problem: Chronic Conditions and Co-morbidities  Goal: Patient's chronic conditions and co-morbidity symptoms are monitored and maintained or improved  11/30/2022 1243 by Danielle Mello RN  Outcome: Adequate for Discharge  11/30/2022 1102 by Danielle Mlelo RN  Outcome: Progressing     Problem: Gastrointestinal - Adult  Goal: Minimal or absence of nausea and vomiting  11/30/2022 1243 by Danielle Mello RN  Outcome: Adequate for Discharge  11/30/2022 1102 by Danielle Mello RN  Outcome: Progressing  Goal: Maintains or returns to baseline bowel function  11/30/2022 1243 by Danielle Mello RN  Outcome: Adequate for Discharge  11/30/2022 1102 by Danielle Mello RN  Outcome: Progressing  Goal: Maintains adequate nutritional intake  11/30/2022 1243 by Danielle Mello RN  Outcome: Adequate for Discharge  11/30/2022 1102 by Danielle Mello RN  Outcome: Progressing     Problem: Genitourinary - Adult  Goal: Absence of urinary retention  11/30/2022 1243 by Danielle Mello RN  Outcome: Adequate for Discharge  11/30/2022 1102 by Danielle Mello RN  Outcome: Progressing     Problem: Infection - Adult  Goal: Absence of infection at discharge  11/30/2022 1243 by Danielle Mello RN  Outcome: Adequate for Discharge  11/30/2022 1102 by Danielle Mello RN  Outcome: Progressing  Goal: Absence of infection during hospitalization  11/30/2022 1243 by Danielle Mello RN  Outcome: Adequate for Discharge  11/30/2022 1102 by Danielle Mello RN  Outcome: Progressing  Goal: Absence of fever/infection during anticipated neutropenic period  11/30/2022 1243 by Danielle Mello RN  Outcome: Adequate for Discharge  11/30/2022 1102 by Danielle Mello RN  Outcome: Progressing     Problem: Metabolic/Fluid and Electrolytes - Adult  Goal: Electrolytes maintained within normal limits  11/30/2022 1243 by Danielle Mello RN  Outcome: Adequate for Discharge  11/30/2022 1102 by Danielle Mello RN  Outcome: Progressing  Goal: Hemodynamic stability and optimal renal function maintained  11/30/2022 1243 by Teri Collier RN  Outcome: Adequate for Discharge  11/30/2022 1102 by Teri Collier RN  Outcome: Progressing  Goal: Glucose maintained within prescribed range  11/30/2022 1243 by Teri Collier RN  Outcome: Adequate for Discharge  11/30/2022 1102 by Teri Collier RN  Outcome: Progressing     Problem: Hematologic - Adult  Goal: Maintains hematologic stability  11/30/2022 1243 by Teri Collier RN  Outcome: Adequate for Discharge  11/30/2022 1102 by Teri Collier RN  Outcome: Progressing     Problem: Pain  Goal: Verbalizes/displays adequate comfort level or baseline comfort level  11/30/2022 1243 by Teri Collier RN  Outcome: Adequate for Discharge  11/30/2022 1102 by Teri Collier RN  Outcome: Progressing     Problem: Nutrition Deficit:  Goal: Optimize nutritional status  11/30/2022 1243 by Teri Collier RN  Outcome: Adequate for Discharge  11/30/2022 1102 by Teri Collier RN  Outcome: Progressing

## 2022-11-30 NOTE — PROGRESS NOTES
[facility]. Daily Progress Note 11/30/2022    Date of admission:  11/5/2022    CC: Fall while intoxicated    Subjective:  Remains confused this morning. Feeling well, no new complaints. Amber diet,     Objective:  /79   Pulse 74   Temp 98.1 °F (36.7 °C) (Oral)   Resp 19   Ht 5' 10\" (1.778 m)   Wt 165 lb 4.8 oz (75 kg)   SpO2 97%   BMI 23.72 kg/m²     GENERAL:  Laying in bed, awake, alert, cooperative, no apparent distress. NEUROLOGIC: Moves all extremities, mild left upper extremity weakness, unchanged from prior exams. HEAD: Normocephalic, atraumatic  EYES: No sclera icterus, pupils equal  LUNGS:  No increased work of breathing. On room air BS clear b/l   CARDIOVASCULAR:  RRR no extra heart sounds   ABDOMEN:  Soft, non-tender, non-distended, G tube in place  EXTREMITIES: No edema or swelling  SKIN: Warm and dry    Assessment/Plan:  57 y.o. male     Principal Problem:    Subarachnoid bleed (HCC)  Active Problems:    Subdural hematoma    Acute alcoholic intoxication with complication (HCC)    Closed fracture of occipital bone (HCC)    COVID-19    Opioid dependence on agonist therapy (HCC)    Delirium    Other constipation    Hypokalemia    Hypomagnesemia  Resolved Problems:    * No resolved hospital problems. *      Neuro: SDH, SAH, IPH, continue to monitor neuro exam, finished keppra  Cardiac: No acute issues. Pulmonary: No acute issues. Monitor RR. Maintain SpO2 > 92%. Aggressive pulmonary hygiene. GI: No acute issues. Continue pureed and mildly thick diet. Senna  Renal: No acute issues. Monitor Urine Output,  Musculoskeletal: Occipital fx, non-op. WBAT all extremities. AM-PAC Score 12/24  ID: No acute issues. Endocrine: No acute issues.     Heme:  No acute issues    DVT Prophylaxis: PCDs, SQ Heparin  Ancillary consults:   Neurosurgery   Family Update:         As available   CODE Status:   Full code  Dispo:SNF, awaiting insurance precert     Todd Ventura MD    Attending Attestation   Patient seen and examined, agree with resident note except for changes made by me, for remaining HP/Consult/progress note details please see resident HP/Consult/progress note.       Placement as able    Dianne Owusu MD

## 2022-11-30 NOTE — PLAN OF CARE
Problem: Discharge Planning  Goal: Discharge to home or other facility with appropriate resources  11/30/2022 1102 by Damion Robles RN  Outcome: Progressing  11/29/2022 2125 by Jing Marie RN  Outcome: Progressing     Problem: ABCDS Injury Assessment  Goal: Absence of physical injury  11/30/2022 1102 by Damion Robles RN  Outcome: Progressing  11/29/2022 2125 by Jing Marie RN  Outcome: Progressing     Problem: Safety - Adult  Goal: Free from fall injury  11/30/2022 1102 by Damion Robles RN  Outcome: Progressing  11/29/2022 2125 by Jing Marie RN  Outcome: Progressing

## 2022-11-30 NOTE — DISCHARGE INSTRUCTIONS
MILD TRAUMATIC BRAIN INJURY OR CONCUSSION  A mild traumatic brain injury (TBI) is one that causes some degree of injury to the brain causing symptoms ranging from a brief period of confusion to loss of consciousness (being knocked out). There is no major bruising or bleeding in the brain but symptoms can last from hours to months depending on the severity of the injury. Family or friends need to observe any change in behavior for the next 48 hours. Delayed effects from head injury do occur occasionally and can be due to slow bleeding or swelling around the surface of the brain. These effects may occur even if the x-rays/CT scans were normal.  Please observe the following symptoms during the next 24-48 hours. CALL 911 if:  Pupils (black part in the center of the eye) are unequal in size, and this is new. Seizure (convulsion). Not responding to others/won't wake up or very hard to wake up  Faints (passes out)  Vomiting more than 3 times    Notify the TRAUMA CLINIC if any of the following symptoms occur:  Severe headache -- Mild headache may last for days. Report worsening pain or uncontrolled pain with prescribed medicine. Numbness, tingling or weakness -- Present in arms or legs; unsteady walking. Eye Changes/light sensation -- Vision problems; blurred or double-vision; unequal sized pupils. Nausea/Vomiting -- Episodes of vomiting may occur initially after a head injury. Persistent vomiting or difficulty taking medication by mouth. Increased Sleepiness -- Difficulty waking from sleep with increased confusion. Dizziness -- Does not go away or occurs repeatedly. Vomiting may accompany dizziness. Drainage -- Clear fluid or blood from the nose and ears. Fever -- Temperature over 101 degrees. Neck Pain. The First Four Weeks  The symptoms below are common after a mild brain injury.  They usually get better on their own within a few weeks:   feeling tired or ?low  problems falling or staying asleep  feeling confused, poor concentration, or slow to answer questions  feeling dizzy, poor balance, or poor coordination  being sensitive to light  being sensitive to sounds  ringing in the ears  a mild headache, sometimes with nausea and/or vomiting  being irritable, having mood swings, or feeling somewhat sad or down  Contact the [address] if your symptoms are affecting your everyday activities. Remember that letting yourself get too tired can make your symptoms worse. Listen to your body: if doing a certain activity increases your symptoms, take a break from that activity. Build up the amount of time you do an activity and stay under the threshold of symptoms. Long term Effects (Post-Concussive Syndrome)    Notify physician if any of the following persists longer than 2-4 weeks. Difficulty with concentration or attention (easily distracted)  Frequent headaches  Memory problems   Sensitivity to light   Sleeping difficulties      There is a higher risk of having a more serious head injury if:  Previous history of head injury or concussion  Taking medicine that thins your blood, or have a bleeding disorder  Have other neurologic (brain) problems  Have difficulty walking or frequent falls  Active in high impact contact sports, like soccer or football. Activities  Stay away from activities that could cause another head injury (like sports), until the doctor says it's okay. A second blow to the head can cause more damage to the brain  Limit reading, television, video games, etc. the first 48 hours. Your brain needs to rest so that it can recover. You may find that it helps to take time off school or work. Limit exposure to bright lights, loud noises, and crowds for the first 48 hours, as these can make your symptoms worse  Limit use of screens, such as an IPad, computer, cellphone, TV, etc, as these can make symptoms, especially headaches, worse.       Work/School  It is recommended that you wait to return to work/school until after your follow-up appointment with trauma or your family doctor. If you are having no symptoms, please call for an earlier appointment  Some people find it hard to concentrate well so return to your normal activities slowly. Go back to work or school for half days at first, and increase as tolerated. Trauma services can help you with a graduated schedule. If you feel comfortable doing so, tell work or school about your concussion. You may have to adjust your activities, depending on your job or school demands. Rest and Sleep  Rest for the first 24 hours; it's one of the best things to help your brain recover. It's okay to sleep if you want  You don't have to be woken up every few hours. If someone does wake you up, you should awaken easily. Do some light physical activity (housework) or light exercise (walking, stationary bike) as soon as you can tolerate the movement. Strenuous exercise (such as jogging or weight lifting) can make your concussion symptoms worse or last longer. Diet  Return to a normal diet as tolerated, you may want to start with liquids first  Eat healthy meals (including breakfast) and snacks throughout the day as your brain heals. Managing Pain  Tylenol or Ibuprofen are the best meds to take for headaches. Your doctor may have prescribed you medications if your headaches were severe or you have other injuries. Please take as directed. Driving  Your ability to concentrate and react quickly might be affected by the concussion. Please contact trauma services for advice on when to resume driving. Do not drive if you're concerned about vision problems, slowed thinking, slowed reaction time, reduced attention or poor judgment. Wear sunglasses even during winter if kassandra while driving. The bright light may induce a headache.      Alcohol use/Drug use  Don't drink alcohol or use recreational drugs as they may make you feel worse and/or hide the warning signs. Follow-up:  Trauma Clinic: [phone] -- press option 2   [address], [address]    Call [phone], option 2, for any questions/concerns and for follow-up appointment in 2 week(s). Please follow the instructions checked below:    ACTIVITY INSTRUCTIONS  Increase activity as tolerated  No heavy lifting or strenuous activity  Take your incentive spirometer home and use 4-6 times/day   [x]  No driving until cleared by all providers    WOUND/DRESSING INSTRUCTIONS:  You may shower. No sitting in bath tub, hot tub or swimming until cleared by physician. Ice to areas of pain for first 24 hours. Heat to areas of pain after that. Wash areas of lacerations/abrasions with soap & water. Rinse well. Pat dry with clean towel. Apply thin layer of Bacitracin, Neosporin, or triple antibiotic cream to affected area 2-3 times per day. Keep wounds clean and dry. MEDICATION INSTRUCTIONS  Take medication as prescribed. When taking pain medications, you may experience dizziness or drowsiness. Do not drink alcohol or drive when taking these medications. You may experience constipation while taking pain medication. You may take over the counter stool softeners such as docusate (Colace), sennosides S (Senokot-S), or Miralax.   []  You may take Ibuprofen (over the counter) as directed for mild pain. --You may take up to 800mg every 8 hours for pain, please take with food or milk. [x]  You may take acetaminophen (Tylenol) products. Do NOT take more than 4000mg of Tylenol in 24h. []  Do not take any other acetaminophen (Tylenol) products if you are taking Percocet or Norco, as these contain Tylenol. --Do NOT take more than 4000mg of Tylenol in 24h. OPIOID MEDICATION INSTRUCTIONS  Read the medication guide that is included with your prescription.   Take your medication exactly as prescribed. Store medication away from children and in a safe place. Do NOT share your medication with others. Do NOT take medication unless it is prescribed for you. Do NOT drink alcohol while taking opioids (I.e., Norco, Percocet, Oxycodone, etc). Discuss with the Trauma Clinic staff if the dose of medication you are taking does not control your pain and any side effects that you may be having. CALL 911 OR YOUR LOCAL EMERGENCY SERVICE:  --If you take too much medication  --If you have trouble breathing or shortness of breath  --A child has taken this medication. WORK:  You may not return to work until you receive follow-up with the Trauma Clinic or clearance by all consultants. Call the trauma clinic for any of the following or for questions/concerns;  --fever over 101F  --redness, swelling, hardness or warmth at the wound site(s).   --Unrelieved nausea/vomiting  --Foul smelling or cloudy drainage at the wound site(s)  --Unrelieved pain or increase in pain  --Increase in shortness of breath    Follow-up:  Trauma Clinic: [phone] option [address]  [place], [id number] [address]

## 2022-11-30 NOTE — CARE COORDINATION
Care Coordination: Set up for [facility] for  12:00  must have suboxone on AVS for continuation upon discharge. Precert obtained, charge nurse notified. Spoke to daughter Trevin Forward, updated her on all of above, provided number and address of [place].  SHE IS ASKING NURSE CALL HER ONCE PT IS PICKED UP so she can then call facility within 30 minutes time and confirm room number and speak to RN at facility    Yemi Kapoor

## 2022-11-30 NOTE — PROGRESS NOTES
Nurse to nurse called to [facility] LPN at [facility]

## 2022-12-19 ENCOUNTER — HOSPITAL ENCOUNTER (OUTPATIENT)
Dept: CT IMAGING | Age: 62
Discharge: HOME | End: 2022-12-21
Payer: COMMERCIAL

## 2022-12-19 DIAGNOSIS — I60.9 SAH (SUBARACHNOID HEMORRHAGE) (HCC): ICD-10-CM

## 2022-12-19 PROCEDURE — 70450 CT HEAD/BRAIN W/O DYE: CPT

## 2022-12-20 ENCOUNTER — OFFICE VISIT (OUTPATIENT)
Dept: NEUROSURGERY | Age: 62
End: 2022-12-20
Payer: COMMERCIAL

## 2022-12-20 VITALS — OXYGEN SATURATION: 96 % | BODY MASS INDEX: 23.62 KG/M2 | WEIGHT: 165 LBS | HEIGHT: 70 IN | HEART RATE: 89 BPM

## 2022-12-20 DIAGNOSIS — S06.5XAA SUBDURAL HEMATOMA: Primary | ICD-10-CM

## 2022-12-20 PROCEDURE — 1111F DSCHRG MED/CURRENT MED MERGE: CPT | Performed by: STUDENT IN AN ORGANIZED HEALTH CARE EDUCATION/TRAINING PROGRAM

## 2022-12-20 PROCEDURE — G8484 FLU IMMUNIZE NO ADMIN: HCPCS | Performed by: STUDENT IN AN ORGANIZED HEALTH CARE EDUCATION/TRAINING PROGRAM

## 2022-12-20 PROCEDURE — 99212 OFFICE O/P EST SF 10 MIN: CPT

## 2022-12-20 PROCEDURE — G8428 CUR MEDS NOT DOCUMENT: HCPCS | Performed by: STUDENT IN AN ORGANIZED HEALTH CARE EDUCATION/TRAINING PROGRAM

## 2022-12-20 PROCEDURE — 99213 OFFICE O/P EST LOW 20 MIN: CPT | Performed by: STUDENT IN AN ORGANIZED HEALTH CARE EDUCATION/TRAINING PROGRAM

## 2022-12-20 PROCEDURE — G8420 CALC BMI NORM PARAMETERS: HCPCS | Performed by: STUDENT IN AN ORGANIZED HEALTH CARE EDUCATION/TRAINING PROGRAM

## 2022-12-20 PROCEDURE — 3017F COLORECTAL CA SCREEN DOC REV: CPT | Performed by: STUDENT IN AN ORGANIZED HEALTH CARE EDUCATION/TRAINING PROGRAM

## 2022-12-20 PROCEDURE — 4004F PT TOBACCO SCREEN RCVD TLK: CPT | Performed by: STUDENT IN AN ORGANIZED HEALTH CARE EDUCATION/TRAINING PROGRAM

## 2022-12-20 NOTE — PROGRESS NOTES
Hospital Follow-up     This is a 57year old male who presents to the office for a 1 month follow-up s/p bifrontal contusions and SDH     Subjective: Patient presents from [facility]. Patient states he is doing well. He denies any significant HA, dizziness or visual changes. No new numbness or weakness. No other complaints. CT Head reviewed with patient. Physical Exam:              WDWN, no apparent distress              Non-labored breathing               Vitals Stable              Alert and oriented x3              CN 3-12 intact              PERRL              EOMI              BARKER well              Motor strength symmetric              Sensation to LT intact bilaterally   (-)drift              Imagin2022 CT Head   Impression   1. No new intracranial hemorrhage. 2.  Low-density subdural focal fluid collection in the left parietal region   is INCREASED in thickness from prior examinations. Maximum diameter is 2.1   cm compared with 10 mm on the prior exam.       3.  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 4.  Stable nondisplaced fractures of the right and left occipital bone. 5.  Minimal shift of midline structures left-to-right of 3 mm compared with 5   mm on the prior study. RECOMMENDATIONS:   Follow-up imaging recommended to evaluate low-density left parietal subdural   fluid collection. Assessment: This is a 57 y.o.  male presenting for a 1 month follow-up s/p bilateral contusions and SDH. Plan:  -Pain control and expectations discussed  -Continue restrictions   -CT Head reviewed and discussed in detail   -OARRS report reviewed   -Follow-up in neurosurgery clinic in 2 months with repeat CT Head   -Call or return to neurosurgery office sooner if symptoms worsen or if new issues arise in the interim.     Electronically signed by [facility] Bender on 12/20/2022 at 2:40 PM

## 2022-12-27 RX ORDER — IPRATROPIUM BROMIDE AND ALBUTEROL SULFATE 2.5; .5 MG/3ML; MG/3ML
3 SOLUTION RESPIRATORY (INHALATION) EVERY 4 HOURS PRN
Qty: 360 ML | Refills: 0 | Status: SHIPPED | OUTPATIENT
Start: 2022-12-27

## 2022-12-27 RX ORDER — M-VIT,TX,IRON,MINS/CALC/FOLIC 27MG-0.4MG
1 TABLET ORAL DAILY
Qty: 30 TABLET | Refills: 0 | Status: SHIPPED | OUTPATIENT
Start: 2022-12-27

## 2022-12-27 RX ORDER — CARVEDILOL 6.25 MG/1
6.25 TABLET ORAL 2 TIMES DAILY WITH MEALS
Qty: 60 TABLET | Refills: 0 | Status: SHIPPED | OUTPATIENT
Start: 2022-12-27

## 2022-12-27 RX ORDER — IPRATROPIUM BROMIDE AND ALBUTEROL SULFATE 2.5; .5 MG/3ML; MG/3ML
3 SOLUTION RESPIRATORY (INHALATION)
Qty: 360 ML | Refills: 1 | OUTPATIENT
Start: 2022-12-27

## 2022-12-27 RX ORDER — SUCRALFATE 1 G/1
1 TABLET ORAL 4 TIMES DAILY
Qty: 120 TABLET | Refills: 3 | OUTPATIENT
Start: 2022-12-27

## 2022-12-27 RX ORDER — CLONIDINE HYDROCHLORIDE 0.2 MG/1
0.2 TABLET ORAL 3 TIMES DAILY
Qty: 60 TABLET | Refills: 0 | Status: SHIPPED | OUTPATIENT
Start: 2022-12-27

## 2022-12-27 NOTE — TELEPHONE ENCOUNTER
cloNIDine (CATAPRES) 0.2 MG tablet  carvedilol (COREG) 6.25 MG tablet  amLODIPine (NORVASC) 10 MG tablet   sucralfate (CARAFATE) 1 GM tablet   Multiple Vitamins-Minerals (THERAPEUTIC MULTIVITAMIN-MINERALS) tablet  ipratropium-albuterol (DUONEB) 0.5-2.5   ipratropium-albuterol (DUONEB) 0.5-2.5 (3) MG/3ML SOLN nebulizer solution  ipratropium-albuterol (DUONEB) 0.5-2.5 (3) MG/3ML SOLN nebulizer solution  Pt will also need a nebulizer. Pt has been in a nursing for a month. Pt was sent home  for [place] pt refused to go back to nursing home. [facility]. Pt now is at home needs refills. [facility] in on [address]

## 2023-01-11 ENCOUNTER — COMMUNITY OUTREACH (OUTPATIENT)
Dept: FAMILY MEDICINE CLINIC | Age: 63
End: 2023-01-11

## 2023-01-11 NOTE — PROGRESS NOTES
Patient's HM shows they are overdue for Colorectal Screening. Care Everywhere and  files searched. No results to attach to order nor HM updated. Fax sent to [facility] for Colonoscopy.

## 2023-01-12 ENCOUNTER — APPOINTMENT (OUTPATIENT)
Dept: CT IMAGING | Age: 63
End: 2023-01-12
Payer: COMMERCIAL

## 2023-01-12 ENCOUNTER — HOSPITAL ENCOUNTER (EMERGENCY)
Age: 63
Discharge: HOME | End: 2023-01-12
Attending: EMERGENCY MEDICINE
Payer: COMMERCIAL

## 2023-01-12 ENCOUNTER — OFFICE VISIT (OUTPATIENT)
Dept: FAMILY MEDICINE CLINIC | Age: 63
End: 2023-01-12
Payer: COMMERCIAL

## 2023-01-12 VITALS
SYSTOLIC BLOOD PRESSURE: 121 MMHG | RESPIRATION RATE: 15 BRPM | HEART RATE: 65 BPM | OXYGEN SATURATION: 98 % | TEMPERATURE: 98.6 F | DIASTOLIC BLOOD PRESSURE: 83 MMHG

## 2023-01-12 VITALS
OXYGEN SATURATION: 95 % | DIASTOLIC BLOOD PRESSURE: 36 MMHG | BODY MASS INDEX: 23.68 KG/M2 | RESPIRATION RATE: 19 BRPM | SYSTOLIC BLOOD PRESSURE: 51 MMHG | HEIGHT: 70 IN | HEART RATE: 93 BPM

## 2023-01-12 DIAGNOSIS — I95.9 HYPOTENSION, UNSPECIFIED HYPOTENSION TYPE: Primary | ICD-10-CM

## 2023-01-12 DIAGNOSIS — I60.9 SUBARACHNOID BLEED (HCC): ICD-10-CM

## 2023-01-12 DIAGNOSIS — R90.89 MIDLINE SHIFT OF BRAIN: ICD-10-CM

## 2023-01-12 LAB
ALBUMIN SERPL-MCNC: 3.4 G/DL (ref 3.5–5.2)
ALP BLD-CCNC: 126 U/L (ref 40–129)
ALT SERPL-CCNC: 225 U/L (ref 0–40)
ANION GAP SERPL CALCULATED.3IONS-SCNC: 11 MMOL/L (ref 7–16)
AST SERPL-CCNC: 190 U/L (ref 0–39)
BACTERIA: ABNORMAL /HPF
BASOPHILS ABSOLUTE: 0.08 E9/L (ref 0–0.2)
BASOPHILS RELATIVE PERCENT: 1.8 % (ref 0–2)
BILIRUB SERPL-MCNC: 0.6 MG/DL (ref 0–1.2)
BILIRUBIN DIRECT: 0.2 MG/DL (ref 0–0.3)
BILIRUBIN URINE: NEGATIVE
BILIRUBIN, INDIRECT: 0.4 MG/DL (ref 0–1)
BLOOD, URINE: ABNORMAL
BUN BLDV-MCNC: 13 MG/DL (ref 6–23)
CALCIUM SERPL-MCNC: 9.4 MG/DL (ref 8.6–10.2)
CHLORIDE BLD-SCNC: 103 MMOL/L (ref 98–107)
CLARITY: CLEAR
CO2: 24 MMOL/L (ref 22–29)
COLOR: YELLOW
CREAT SERPL-MCNC: 0.8 MG/DL (ref 0.7–1.2)
EOSINOPHILS ABSOLUTE: 0.2 E9/L (ref 0.05–0.5)
EOSINOPHILS RELATIVE PERCENT: 4.5 % (ref 0–6)
EPITHELIAL CELLS, UA: ABNORMAL /HPF
GFR SERPL CREATININE-BSD FRML MDRD: >60 ML/MIN/1.73
GLUCOSE BLD-MCNC: 101 MG/DL (ref 74–99)
GLUCOSE URINE: NEGATIVE MG/DL
HCT VFR BLD CALC: 36.4 % (ref 37–54)
HEMOGLOBIN: 12.4 G/DL (ref 12.5–16.5)
IMMATURE GRANULOCYTES #: 0.01 E9/L
IMMATURE GRANULOCYTES %: 0.2 % (ref 0–5)
INFLUENZA A BY PCR: NOT DETECTED
INFLUENZA B BY PCR: NOT DETECTED
KETONES, URINE: NEGATIVE MG/DL
LACTIC ACID, SEPSIS: 1 MMOL/L (ref 0.5–1.9)
LEUKOCYTE ESTERASE, URINE: NEGATIVE
LIPASE: 38 U/L (ref 13–60)
LYMPHOCYTES ABSOLUTE: 1.4 E9/L (ref 1.5–4)
LYMPHOCYTES RELATIVE PERCENT: 31.3 % (ref 20–42)
MCH RBC QN AUTO: 31.8 PG (ref 26–35)
MCHC RBC AUTO-ENTMCNC: 34.1 % (ref 32–34.5)
MCV RBC AUTO: 93.3 FL (ref 80–99.9)
MONOCYTES ABSOLUTE: 0.66 E9/L (ref 0.1–0.95)
MONOCYTES RELATIVE PERCENT: 14.7 % (ref 2–12)
NEUTROPHILS ABSOLUTE: 2.13 E9/L (ref 1.8–7.3)
NEUTROPHILS RELATIVE PERCENT: 47.5 % (ref 43–80)
NITRITE, URINE: NEGATIVE
PDW BLD-RTO: 12.9 FL (ref 11.5–15)
PH UA: 6.5 (ref 5–9)
PLATELET # BLD: 269 E9/L (ref 130–450)
PMV BLD AUTO: 8.8 FL (ref 7–12)
POTASSIUM SERPL-SCNC: 4.1 MMOL/L (ref 3.5–5)
PROTEIN UA: 30 MG/DL
RBC # BLD: 3.9 E12/L (ref 3.8–5.8)
RBC UA: ABNORMAL /HPF (ref 0–2)
SARS-COV-2, NAAT: NOT DETECTED
SODIUM BLD-SCNC: 138 MMOL/L (ref 132–146)
SPECIFIC GRAVITY UA: 1.01 (ref 1–1.03)
TOTAL PROTEIN: 7.8 G/DL (ref 6.4–8.3)
UROBILINOGEN, URINE: 0.2 E.U./DL
WBC # BLD: 4.5 E9/L (ref 4.5–11.5)
WBC UA: ABNORMAL /HPF (ref 0–5)

## 2023-01-12 PROCEDURE — G8420 CALC BMI NORM PARAMETERS: HCPCS | Performed by: FAMILY MEDICINE

## 2023-01-12 PROCEDURE — 85025 COMPLETE CBC W/AUTO DIFF WBC: CPT

## 2023-01-12 PROCEDURE — 3078F DIAST BP <80 MM HG: CPT | Performed by: FAMILY MEDICINE

## 2023-01-12 PROCEDURE — 4004F PT TOBACCO SCREEN RCVD TLK: CPT | Performed by: FAMILY MEDICINE

## 2023-01-12 PROCEDURE — 93005 ELECTROCARDIOGRAM TRACING: CPT

## 2023-01-12 PROCEDURE — G8484 FLU IMMUNIZE NO ADMIN: HCPCS | Performed by: FAMILY MEDICINE

## 2023-01-12 PROCEDURE — G8428 CUR MEDS NOT DOCUMENT: HCPCS | Performed by: FAMILY MEDICINE

## 2023-01-12 PROCEDURE — 6360000004 HC RX CONTRAST MEDICATION: Performed by: RADIOLOGY

## 2023-01-12 PROCEDURE — 87502 INFLUENZA DNA AMP PROBE: CPT

## 2023-01-12 PROCEDURE — 87040 BLOOD CULTURE FOR BACTERIA: CPT

## 2023-01-12 PROCEDURE — 83690 ASSAY OF LIPASE: CPT

## 2023-01-12 PROCEDURE — 99285 EMERGENCY DEPT VISIT HI MDM: CPT

## 2023-01-12 PROCEDURE — 2580000003 HC RX 258

## 2023-01-12 PROCEDURE — 83605 ASSAY OF LACTIC ACID: CPT

## 2023-01-12 PROCEDURE — 80076 HEPATIC FUNCTION PANEL: CPT

## 2023-01-12 PROCEDURE — 87635 SARS-COV-2 COVID-19 AMP PRB: CPT

## 2023-01-12 PROCEDURE — 3074F SYST BP LT 130 MM HG: CPT | Performed by: FAMILY MEDICINE

## 2023-01-12 PROCEDURE — 81001 URINALYSIS AUTO W/SCOPE: CPT

## 2023-01-12 PROCEDURE — 3017F COLORECTAL CA SCREEN DOC REV: CPT | Performed by: FAMILY MEDICINE

## 2023-01-12 PROCEDURE — 80048 BASIC METABOLIC PNL TOTAL CA: CPT

## 2023-01-12 PROCEDURE — 74177 CT ABD & PELVIS W/CONTRAST: CPT

## 2023-01-12 PROCEDURE — 99215 OFFICE O/P EST HI 40 MIN: CPT | Performed by: FAMILY MEDICINE

## 2023-01-12 PROCEDURE — 87088 URINE BACTERIA CULTURE: CPT

## 2023-01-12 RX ORDER — 0.9 % SODIUM CHLORIDE 0.9 %
1000 INTRAVENOUS SOLUTION INTRAVENOUS ONCE
Status: COMPLETED | OUTPATIENT
Start: 2023-01-12 | End: 2023-01-12

## 2023-01-12 RX ADMIN — IOPAMIDOL 75 ML: 755 INJECTION, SOLUTION INTRAVENOUS at 18:19

## 2023-01-12 RX ADMIN — SODIUM CHLORIDE 1000 ML: 9 INJECTION, SOLUTION INTRAVENOUS at 15:38

## 2023-01-12 ASSESSMENT — PAIN - FUNCTIONAL ASSESSMENT: PAIN_FUNCTIONAL_ASSESSMENT: NONE - DENIES PAIN

## 2023-01-12 NOTE — ED PROVIDER NOTES
Name: Shamir Castellanos    MRN: [id number]     Date / Time Roomed:  1/12/2023  2:09 PM  ED Bed Assignment:  11/11    ------------------ History of Present Illness --------------------  1/12/23, Time: 2:40 PM EST   Chief Complaint   Patient presents with    Hypotension     Patient sent from PCP for dizziness . Hx of head injury/SDH with shift in November. Patient BP 70/50 during triage      HPI    Shamir Castellanos is a 57 y.o. male, with hx of traumatic brain injury, hypertension, who presents to the ED today for hypotension, which began today. Patient presented to his primary care office where he became dizzy and hypotensive. Was then sent to the ED for further evaluation by his family medicine doctor. Is noted in family medicine's note that patient is no longer taking amlodipine but is still on clonidine and Coreg, noted to be on higher dose of clonidine than what was recommended by PCP. Patient states that he does feel dizzy and lightheaded when he gets up but denies any other symptoms at this time, denies any chest pain shortness of breath or trouble breathing. Has had no sick contacts denies any fevers, chills or weight loss. Of note patient does have PEG tube in place from hospitalization in November, patient does note to have some pain on palpation around PEG tube, also noted to have some granulation tissue. PCP: Kaylee Chan MD.    -------------------- [facility] --------------------  Past Medical History:  has a past medical history of Hepatitis C and Herniated disc, cervical.    Past Surgical History:  has a past surgical history that includes Anterior cruciate ligament repair (1990s); Neck surgery (1992); shoulder surgery (2007); Skin cancer excision (2007); Anterior cruciate ligament repair (2009); Colonoscopy (2009); Inguinal hernia repair (5/20/2014); Inguinal hernia repair (Right, 5/20/14); picc line insertion nurse (3/28/2021);  Upper gastrointestinal endoscopy (N/A, 3/31/2021); and Gastrostomy tube placement (N/A, 11/16/2022). Social History:  reports that he has been smoking cigarettes. He started smoking about 53 years ago. He has a 25.00 pack-year smoking history. He has never used smokeless tobacco. He reports current alcohol use of about 38.0 standard drinks per week. He reports current drug use. Drug: Opiates . Family History: family history includes Cancer in his father; Heart Attack (age of onset: 68) in his mother; Parkinsonism in his paternal uncle. Allergies: Blue dyes (parenteral)    The patients past medical history has been reviewed.     -------------------- Current Meds --------------------  Meds:   Current Facility-Administered Medications:     0.9 % sodium chloride bolus, 1,000 mL, IntraVENous, Once, Audrey Blanco MD    Current Outpatient Medications:     cloNIDine (CATAPRES) 0.2 MG tablet, Take 1 tablet by mouth 3 times daily, Disp: 60 tablet, Rfl: 0    carvedilol (COREG) 6.25 MG tablet, Take 1 tablet by mouth 2 times daily (with meals), Disp: 60 tablet, Rfl: 0    ipratropium-albuterol (DUONEB) 0.5-2.5 (3) MG/3ML SOLN nebulizer solution, Inhale 3 mLs into the lungs every 4 hours as needed for Shortness of Breath, Disp: 360 mL, Rfl: 0    Multiple Vitamins-Minerals (THERAPEUTIC MULTIVITAMIN-MINERALS) tablet, Take 1 tablet by mouth daily, Disp: 30 tablet, Rfl: 0    amLODIPine (NORVASC) 10 MG tablet, Take 1 tablet by mouth daily, Disp: 30 tablet, Rfl: 3    lansoprazole 3 MG/ML SUSP, Take 10 mLs by mouth 2 times daily (before meals), Disp: 300 mL, Rfl:     sucralfate (CARAFATE) 1 GM tablet, Take 1 tablet by mouth 4 times daily, Disp: 120 tablet, Rfl: 3    Heparin Sodium, Porcine, (HEPARIN, PORCINE,) 24120 UNIT/ML injection, Inject 0.5 mLs into the skin in the morning and 0.5 mLs at noon and 0.5 mLs in the evening., Disp: , Rfl:     valproic acid (DEPAKENE) 250 MG/5ML SOLN oral solution, 5 mLs by Per G Tube route every 8 hours, Disp: 600 mL, Rfl:     ipratropium-albuterol (DUONEB) 0.5-2.5 (3) MG/3ML SOLN nebulizer solution, Inhale 3 mLs into the lungs every 4 hours (while awake), Disp: 360 mL, Rfl:     buprenorphine-naloxone (SUBOXONE) 8-2 MG FILM SL film, Place 1 Film under the tongue 2 times daily. , Disp: , Rfl:      The patients home medications have been reviewed. -------------------- PE --------------------  Physical Exam  Constitutional:       Appearance: Normal appearance. Eyes:      Pupils: Pupils are equal, round, and reactive to light. Cardiovascular:      Rate and Rhythm: Normal rate and regular rhythm. Pulses: Normal pulses. Heart sounds: Normal heart sounds. No murmur heard. Pulmonary:      Effort: Pulmonary effort is normal.      Breath sounds: Normal breath sounds. No wheezing or rhonchi. Abdominal:      General: Abdomen is flat. There is no distension. Palpations: Abdomen is soft. Tenderness: There is no abdominal tenderness. Musculoskeletal:         General: Normal range of motion. Cervical back: Normal range of motion. Skin:     General: Skin is warm and dry. Capillary Refill: Capillary refill takes less than 2 seconds. Neurological:      General: No focal deficit present. Mental Status: He is alert and oriented to person, place, and time. Psychiatric:         Mood and Affect: Mood normal.         Behavior: Behavior normal.      -------------------- MDM --------------------    Patient was found on from primary care offices due to dizziness and hypotension, systolic blood pressure was noted to be in the 70s at primary care office. When seen patient states that he does have some dizziness when he sits up however he overall feels okay and has not passed out or had any episodes of syncope. He states that he overall feels pretty well and has no major complaints. He does note that his PEG tube site is tender to the touch, but it does not look infected is noted to have some granulation tissue and slight redness.   Upon chart review noted that patient is on clonidine 0.2 mg 3 times daily along with Coreg, and questionable amlodipine usage as well. Is recommended that patient stop these medications and to follow-up with his PCP for further blood pressure management. Patient was given 1 L of fluid in the ED after which his systolic blood pressure jazmyne to the 120s, patient was respoken to, he states that he feels good and has no major concerns at this time. Patient will be discharged home and is to follow-up with his primary care physician for further evaluation of his blood pressure. Recommendation was also made for patient to follow-up with general surgery regarding removal of his PEG tube. BP 86/61   Pulse 67   Temp 98.6 °F (37 °C)   Resp 18   SpO2 98%     Exam remarkable for granulation tissue around PEG tube site    Diagnoses considered, but not limited to, include infection, medication compliance, dehydration. EKG Interpretation  Interpreted by emergency department physician. 1/12/23  Time: 1532    Rate: 65  Axis: Normal  DE: 170  QRS: 86  Qtc: 449  Rhythm: Sinus  Clinical Impression: Normal sinus rhythm  Comparison to old EKG: stable as compared to pt's most recent       Labwork ordered and interpretation by myself. Details below. Imaging ordered and interpretations by myself and radiologist. Details below.             Medications given include:    Medications   0.9 % sodium chloride bolus (has no administration in time range)         -------------------- RESULTS --------------------    Labs:  Results for orders placed or performed during the hospital encounter of 01/12/23   COVID-19, Rapid    Specimen: Nasopharyngeal Swab   Result Value Ref Range    SARS-CoV-2, NAAT Not Detected Not Detected   RAPID INFLUENZA A/B ANTIGENS    Specimen: Nasopharyngeal   Result Value Ref Range    Influenza A by PCR Not Detected Not Detected    Influenza B by PCR Not Detected Not Detected   CBC with Auto Differential   Result Value Ref Range    WBC 4.5 4.5 - 11.5 E9/L    RBC 3.90 3.80 - 5.80 E12/L    Hemoglobin 12.4 (L) 12.5 - 16.5 g/dL    Hematocrit 36.4 (L) 37.0 - 54.0 %    MCV 93.3 80.0 - 99.9 fL    MCH 31.8 26.0 - 35.0 pg    MCHC 34.1 32.0 - 34.5 %    RDW 12.9 11.5 - 15.0 fL    Platelets 446 120 - 857 E9/L    MPV 8.8 7.0 - 12.0 fL    Neutrophils % 47.5 43.0 - 80.0 %    Immature Granulocytes % 0.2 0.0 - 5.0 %    Lymphocytes % 31.3 20.0 - 42.0 %    Monocytes % 14.7 (H) 2.0 - 12.0 %    Eosinophils % 4.5 0.0 - 6.0 %    Basophils % 1.8 0.0 - 2.0 %    Neutrophils Absolute 2.13 1.80 - 7.30 E9/L    Immature Granulocytes # 0.01 E9/L    Lymphocytes Absolute 1.40 (L) 1.50 - 4.00 E9/L    Monocytes Absolute 0.66 0.10 - 0.95 E9/L    Eosinophils Absolute 0.20 0.05 - 0.50 E9/L    Basophils Absolute 0.08 0.00 - 0.20 V4/X   Basic Metabolic Panel   Result Value Ref Range    Sodium 138 132 - 146 mmol/L    Potassium 4.1 3.5 - 5.0 mmol/L    Chloride 103 98 - 107 mmol/L    CO2 24 22 - 29 mmol/L    Anion Gap 11 7 - 16 mmol/L    Glucose 101 (H) 74 - 99 mg/dL    BUN 13 6 - 23 mg/dL    Creatinine 0.8 0.7 - 1.2 mg/dL    Est, Glom Filt Rate >60 >=60 mL/min/1.73    Calcium 9.4 8.6 - 10.2 mg/dL   Hepatic Function Panel   Result Value Ref Range    Total Protein 7.8 6.4 - 8.3 g/dL    Albumin 3.4 (L) 3.5 - 5.2 g/dL    Alkaline Phosphatase 126 40 - 129 U/L     (H) 0 - 40 U/L     (H) 0 - 39 U/L    Total Bilirubin 0.6 0.0 - 1.2 mg/dL    Bilirubin, Direct 0.2 0.0 - 0.3 mg/dL    Bilirubin, Indirect 0.4 0.0 - 1.0 mg/dL   Lactate, Sepsis   Result Value Ref Range    Lactic Acid, Sepsis 1.0 0.5 - 1.9 mmol/L   Urinalysis with Microscopic   Result Value Ref Range    Color, UA Yellow Straw/Yellow    Clarity, UA Clear Clear    Glucose, Ur Negative Negative mg/dL    Bilirubin Urine Negative Negative    Ketones, Urine Negative Negative mg/dL    Specific Gravity, UA 1.010 1.005 - 1.030    Blood, Urine TRACE-LYSED Negative    pH, UA 6.5 5.0 - 9.0    Protein, UA 30 (A) Negative mg/dL    Urobilinogen, Urine 0.2 <2.0 E.U./dL    Nitrite, Urine Negative Negative    Leukocyte Esterase, Urine Negative Negative    WBC, UA 0-1 0 - 5 /HPF    RBC, UA 0-1 0 - 2 /HPF    Epithelial Cells, UA RARE /HPF    Bacteria, UA RARE (A) None Seen /HPF   Lipase   Result Value Ref Range    Lipase 38 13 - 60 U/L   EKG 12 Lead   Result Value Ref Range    Ventricular Rate 65 BPM    Atrial Rate 65 BPM    P-R Interval 170 ms    QRS Duration 86 ms    Q-T Interval 432 ms    QTc Calculation (Bazett) 449 ms    P Axis 30 degrees    R Axis 11 degrees    T Axis 47 degrees       Radiology:  CT ABDOMEN PELVIS W IV CONTRAST Additional Contrast? None   Final Result   1. No evidence of acute inflammatory process or abscess. 2. Bilateral nonobstructive nephrolithiasis. 3. Probable sludge and/or small stones in the gallbladder without evidence of   acute cholecystitis. 4. Possible constipation. 5. Possible fatty liver.             -------------------- NURSING NOTES & VITALS --------------------    The nursing notes within the ED encounter and vital signs were reviewed. Vitals:    01/12/23 1807   BP:    Pulse: 65   Resp: 15   Temp:    SpO2:         Patient Vitals for the past 8 hrs:   BP Temp Pulse Resp SpO2   01/12/23 1807 -- -- 65 15 --   01/12/23 1802 121/83 -- 57 11 --   01/12/23 1730 122/76 -- 58 (!) 9 --   01/12/23 1700 120/84 -- 58 (!) 7 --   01/12/23 1630 123/85 -- 62 12 --   01/12/23 1530 89/68 -- 62 14 --   01/12/23 1500 102/67 -- -- 11 --   01/12/23 1418 86/61 98.6 °F (37 °C) 67 18 98 %       Oxygen Saturation Interpretation: Normal    -------------------- PROGRESS NOTES --------------------  8:17 PM EST  At this time, no further workup was felt necessary. I have spoken with the patient and discussed todays results, in addition to providing specific details for the plan of care and counseling regarding the diagnosis and prognosis. Their questions are answered at this time.  I discussed at length with them reasons for immediate return here for re evaluation. They will followup with their PCP by calling their office tomorrow and were instructed to return to the ED for any new or worsening symptoms. They are amenable to this plan.    -------------------- ADDITIONAL PROVIDER NOTES --------------------  At this time the patient is without objective evidence of an acute process requiring hospitalization or inpatient management. They have remained hemodynamically stable throughout their entire ED visit and are stable for discharge with outpatient follow-up. The plan has been discussed in detail and they are aware of the specific conditions for emergent return, as well as the importance of follow-up. Discharge Medication List as of 1/12/2023  7:07 PM          Diagnosis:  1. Hypotension, unspecified hypotension type        Disposition:  Patient's disposition: Discharge to home  Patient's condition is stable. Misa Titus MD       *NOTE: This report was transcribed using voice recognition software. Every effort was made to ensure accuracy; however, inadvertent computerized transcription errors may be present.          Misa Titus MD  Resident  01/12/23 2017

## 2023-01-12 NOTE — PROGRESS NOTES
FM Progress Note    Subjective:   Hospital follow-up. Left the nursing home the day after christmas. Low BP. Has not had amlodipine since Gutierrez day. Has been on clonidine and Coreg, but the clonidine is 0.2 mg 3 times a day, a higher dose than previous. CT head 12/19/22:  Impression   1. No new intracranial hemorrhage. 2.  Low-density subdural focal fluid collection in the left parietal region   is INCREASED in thickness from prior examinations. Maximum diameter is 2.1   cm compared with 10 mm on the prior exam.       3.  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 4.  Stable nondisplaced fractures of the right and left occipital bone. 5.  Minimal shift of midline structures left-to-right of 3 mm compared with 5   mm on the prior study. Feels lightheaded today and woozy, intermittent confusion. Not sure which meds he's supposed to be taking. Health Maintenance Due   Topic Date Due    Hepatitis A vaccine (1 of 2 - Risk 2-dose series) Never done    Hepatitis B vaccine (1 of 3 - Risk 3-dose series) Never done    Colorectal Cancer Screen  Never done    Shingles vaccine (1 of 2) Never done    Low dose CT lung screening  Never done    COVID-19 Vaccine (3 - Booster for Moderna series) 09/03/2021    Depression Screen  02/04/2022    Pneumococcal 0-64 years Vaccine (2 - PPSV23 if available, else PCV20) 03/25/2022    Flu vaccine (1) Never done           Objective:   BP (!) 51/36 (Site: Left Upper Arm, Position: Sitting, Cuff Size: Medium Adult)   Pulse 93   Resp 19   Ht 5' 10\" (1.778 m)   SpO2 95%   BMI 23.68 kg/m²   General appearance: NAD, alert and interacting appropriately  HEENT: NCAT, PERRLA, EOMI   Resp: CTAB, no WRC  CVS: RRR, no MRG. Diminished pulses. Delayed cap refill. Abdomen: BS +, SNDNT  Extremities: No clubbing, cyanosis, or edema. Warm. Dry.         I have reviewed this patient's previous records. I have reviewed this patient's labs. I have reviewed this patient's imaging reports. I have reviewed this patient's medications. Assessment/Plan:    Kayden Fallon was seen today for loss of consciousness. Diagnoses and all orders for this visit:    Hypotension, unspecified hypotension type    Subarachnoid bleed ([facility] [address]    Midline shift of brain    Send to ED. Communicated with them. DDx: Medication versus infection versus dehydration versus effect of intracerebral hemorrhage      There are no Patient Instructions on file for this visit. No follow-ups on file.   Rtc soon after hospital      Electronically signed by Brian Travis MD on 1/12/2023 at 1:36 PM

## 2023-01-13 ENCOUNTER — TELEPHONE (OUTPATIENT)
Dept: FAMILY MEDICINE CLINIC | Age: 63
End: 2023-01-13

## 2023-01-13 LAB
BLOOD CULTURE, ROUTINE: NORMAL
CULTURE, BLOOD 2: NORMAL
EKG ATRIAL RATE: 65 BPM
EKG P AXIS: 30 DEGREES
EKG P-R INTERVAL: 170 MS
EKG Q-T INTERVAL: 432 MS
EKG QRS DURATION: 86 MS
EKG QTC CALCULATION (BAZETT): 449 MS
EKG R AXIS: 11 DEGREES
EKG T AXIS: 47 DEGREES
EKG VENTRICULAR RATE: 65 BPM

## 2023-01-13 PROCEDURE — 93010 ELECTROCARDIOGRAM REPORT: CPT | Performed by: INTERNAL MEDICINE

## 2023-01-13 RX ORDER — SUCRALFATE 1 G/1
1 TABLET ORAL 4 TIMES DAILY
Qty: 120 TABLET | Refills: 3 | Status: CANCELLED | OUTPATIENT
Start: 2023-01-13

## 2023-01-13 NOTE — TELEPHONE ENCOUNTER
Pt was discharged from hospital they stopped the meds  Amlodipine  Carvedilol  Clonidine,   Patient also needs a rx for a nebulizer machine

## 2023-01-13 NOTE — ED NOTES
Pt requested for wife to be called for ride. Wife was contacted, and reports she will be here shortly.      Chon Lucio RN  01/12/23 1924

## 2023-01-14 LAB — URINE CULTURE, ROUTINE: NORMAL

## 2023-01-14 RX ORDER — PANTOPRAZOLE SODIUM 20 MG/1
20 TABLET, DELAYED RELEASE ORAL DAILY
Qty: 30 TABLET | Refills: 3 | Status: SHIPPED | OUTPATIENT
Start: 2023-01-14

## 2023-01-14 RX ORDER — VALPROIC ACID 250 MG/5ML
250 SOLUTION ORAL EVERY 8 HOURS SCHEDULED
Qty: 600 ML | Refills: 1 | Status: SHIPPED | OUTPATIENT
Start: 2023-01-14

## 2023-01-14 RX ORDER — SUCRALFATE 1 G/1
1 TABLET ORAL 4 TIMES DAILY
Qty: 120 TABLET | Refills: 3 | Status: SHIPPED | OUTPATIENT
Start: 2023-01-14

## 2023-01-14 NOTE — TELEPHONE ENCOUNTER
Discussed with patient about medication refill. Could not see why patient would need to be on Heparin injections at this time. Patient family is unsure as well. Will hold off on prescribing heparin. Will refill carafate, change liquid PPI to tablet PPI and valproic acid.     Of note patient is planning on having PEG tube removed in the near future and may need Valproic acid changed to Tablet form.    Patient is to follow up with Dr. Chavez and Neurologist when possible to discuss anticoagulation regimen.

## 2023-01-17 NOTE — TELEPHONE ENCOUNTER
Pt called he is not taking Heparin injections at this time.   Stated he was getting injections when he was hospitalized

## 2023-01-18 RX ORDER — LANSOPRAZOLE
30 KIT
Qty: 300 ML | OUTPATIENT
Start: 2023-01-18

## 2023-01-18 RX ORDER — HEPARIN SODIUM 10000 [USP'U]/ML
5000 INJECTION, SOLUTION INTRAVENOUS; SUBCUTANEOUS EVERY 8 HOURS
OUTPATIENT
Start: 2023-01-18

## 2023-01-24 ENCOUNTER — OFFICE VISIT (OUTPATIENT)
Dept: FAMILY MEDICINE CLINIC | Age: 63
End: 2023-01-24
Payer: COMMERCIAL

## 2023-01-24 VITALS
DIASTOLIC BLOOD PRESSURE: 80 MMHG | TEMPERATURE: 97.4 F | RESPIRATION RATE: 19 BRPM | HEART RATE: 100 BPM | WEIGHT: 181 LBS | HEIGHT: 70 IN | SYSTOLIC BLOOD PRESSURE: 100 MMHG | BODY MASS INDEX: 25.91 KG/M2 | OXYGEN SATURATION: 94 %

## 2023-01-24 DIAGNOSIS — R90.89 MIDLINE SHIFT OF BRAIN: ICD-10-CM

## 2023-01-24 DIAGNOSIS — Z12.11 COLON CANCER SCREENING: Primary | ICD-10-CM

## 2023-01-24 DIAGNOSIS — Z23 NEED FOR HEPATITIS B BOOSTER VACCINATION: ICD-10-CM

## 2023-01-24 DIAGNOSIS — Z87.891 PERSONAL HISTORY OF NICOTINE DEPENDENCE: ICD-10-CM

## 2023-01-24 DIAGNOSIS — I95.9 HYPOTENSION, UNSPECIFIED HYPOTENSION TYPE: ICD-10-CM

## 2023-01-24 DIAGNOSIS — S06.5XAA SUBDURAL HEMATOMA: ICD-10-CM

## 2023-01-24 DIAGNOSIS — I10 ESSENTIAL HYPERTENSION: ICD-10-CM

## 2023-01-24 PROCEDURE — 3079F DIAST BP 80-89 MM HG: CPT | Performed by: FAMILY MEDICINE

## 2023-01-24 PROCEDURE — 90746 HEPB VACCINE 3 DOSE ADULT IM: CPT | Performed by: FAMILY MEDICINE

## 2023-01-24 PROCEDURE — G8427 DOCREV CUR MEDS BY ELIG CLIN: HCPCS | Performed by: FAMILY MEDICINE

## 2023-01-24 PROCEDURE — 99214 OFFICE O/P EST MOD 30 MIN: CPT | Performed by: FAMILY MEDICINE

## 2023-01-24 PROCEDURE — 4004F PT TOBACCO SCREEN RCVD TLK: CPT | Performed by: FAMILY MEDICINE

## 2023-01-24 PROCEDURE — 3074F SYST BP LT 130 MM HG: CPT | Performed by: FAMILY MEDICINE

## 2023-01-24 PROCEDURE — 3017F COLORECTAL CA SCREEN DOC REV: CPT | Performed by: FAMILY MEDICINE

## 2023-01-24 PROCEDURE — 90471 IMMUNIZATION ADMIN: CPT | Performed by: FAMILY MEDICINE

## 2023-01-24 PROCEDURE — G8419 CALC BMI OUT NRM PARAM NOF/U: HCPCS | Performed by: FAMILY MEDICINE

## 2023-01-24 PROCEDURE — G8484 FLU IMMUNIZE NO ADMIN: HCPCS | Performed by: FAMILY MEDICINE

## 2023-01-24 RX ORDER — BLOOD PRESSURE TEST KIT
1 KIT MISCELLANEOUS 2 TIMES DAILY
Qty: 1 KIT | Refills: 0 | Status: SHIPPED | OUTPATIENT
Start: 2023-01-24

## 2023-01-24 ASSESSMENT — PATIENT HEALTH QUESTIONNAIRE - PHQ9
SUM OF ALL RESPONSES TO PHQ9 QUESTIONS 1 & 2: 0
SUM OF ALL RESPONSES TO PHQ QUESTIONS 1-9: 0
1. LITTLE INTEREST OR PLEASURE IN DOING THINGS: 0
2. FEELING DOWN, DEPRESSED OR HOPELESS: 0

## 2023-01-24 NOTE — PROGRESS NOTES
FM Progress Note    Subjective:   ER f/u for hypotension. Was given fluid. CT a/p w/o e/o bleed. Today feels much better. Confusion minimal. BP controlled. Not taking any medication for HTN at this time. No FND. On valproate    Plans to have visit with Gen Surg to evaluate PEG for removal. Pt is now eating w/o dysphagia. EtOH abuse. Now just beer, one tall boy per day. Shot of liquor maybe once a week. Did NA for 8 years, which included alcohol counseling, and is willing to return. On suboxone. Has appointment with neurosurgery coming up on February 21st      Health Maintenance Due   Topic Date Due    Hepatitis A vaccine (1 of 2 - Risk 2-dose series) Never done    Colorectal Cancer Screen  Never done    Shingles vaccine (1 of 2) Never done    Low dose CT lung screening  Never done    COVID-19 Vaccine (3 - Booster for Moderna series) 09/03/2021    Depression Screen  02/04/2022    Pneumococcal 0-64 years Vaccine (2 - PPSV23 if available, else PCV20) 03/25/2022    Flu vaccine (1) Never done           Objective:   /80   Pulse 100   Temp 97.4 °F (36.3 °C)   Resp 19   Ht 5' 10\" (1.778 m)   Wt 181 lb (82.1 kg)   SpO2 94%   BMI 25.97 kg/m²   General appearance: NAD, alert and interacting appropriately  HEENT: NCAT, PERRLA, EOMI   Resp: CTAB, no [place]  CVS: RRR, no MRG  Abdomen: BS +, SNDNT  Extremities: No clubbing, cyanosis, or edema. Warm. Dry. I have reviewed this patient's previous records. I have reviewed this patient's labs. I have reviewed this patient's imaging reports. I have reviewed this patient's medications. Assessment/Plan:    Cheryl Galaviz was seen today for follow-up. Diagnoses and all orders for this visit:    Colon cancer screening  -     Idalmis Maya MD, General Surgery, [place]    Personal history of nicotine dependence  -     CT Lung Screen (Initial/Annual);  Future    Hypotension, unspecified hypotension type  -     Blood Pressure KIT; 1 each by Does not apply route in the morning and at bedtime    Essential hypertension  -     Blood Pressure KIT; 1 each by Does not apply route in the morning and at bedtime    Need for hepatitis B booster vaccination  -     Hep B, ENGERIX-B, (age 21 yrs+), IM, 1mL, 3-dose    Subdural hematoma  -     CT HEAD WO CONTRAST; Future  -     Cancel: Ata Roldan MD, Neurosurgery, [place]    Midline shift of brain  -     CT HEAD WO CONTRAST; Future  -     Cancel: Ata Roldan MD, Neurosurgery, [facility]          Patient Instructions   Ask the health department for hepatitis A vaccine. Return in about 1 month (around 2/24/2023).           Electronically signed by Fritz Mcgovern MD on 1/24/2023 at 10:28 AM

## 2023-01-24 NOTE — TELEPHONE ENCOUNTER
----- Message from Ivan Joyce sent at 4/18/2022  9:24 AM EDT -----  Subject: Referral Request    QUESTIONS   Reason for referral request? Pulmonologist due to CT scan of lungs   Has the physician seen you for this condition before? Yes  Select a date? 2021-11-30  Select the Provider the patient wants to be referred to, if known (PCP or   Specialist)? [employer]   Preferred Specialist (if applicable)? Do you already have an appointment scheduled? No  Additional Information for Provider? Text message preferred   ---------------------------------------------------------------------------  --------------  CALL BACK INFO  What is the best way for the office to contact you? Do not leave any   message, patient will call back for answer  Preferred Call Back Phone Number? [phone]  ---------------------------------------------------------------------------  --------------  SCRIPT ANSWERS  Relationship to Patient?  Self
Patient was contacted and given number to pulmonary disease.
yes

## 2023-01-30 ENCOUNTER — OFFICE VISIT (OUTPATIENT)
Dept: SURGERY | Age: 63
End: 2023-01-30
Payer: COMMERCIAL

## 2023-01-30 ENCOUNTER — HOSPITAL ENCOUNTER (OUTPATIENT)
Dept: CT IMAGING | Age: 63
Discharge: HOME | End: 2023-02-01
Payer: COMMERCIAL

## 2023-01-30 VITALS
TEMPERATURE: 97.2 F | DIASTOLIC BLOOD PRESSURE: 63 MMHG | BODY MASS INDEX: 25.34 KG/M2 | HEIGHT: 70 IN | WEIGHT: 177 LBS | HEART RATE: 96 BPM | SYSTOLIC BLOOD PRESSURE: 108 MMHG | RESPIRATION RATE: 16 BRPM

## 2023-01-30 DIAGNOSIS — Z43.1 PEG (PERCUTANEOUS ENDOSCOPIC GASTROSTOMY) ADJUSTMENT/REPLACEMENT/REMOVAL (HCC): ICD-10-CM

## 2023-01-30 DIAGNOSIS — S06.9X5A TRAUMATIC BRAIN INJURY, WITH LOSS OF CONSCIOUSNESS GREATER THAN 24 HOURS WITH RETURN TO PRE-EXISTING CONSCIOUS LEVEL, INITIAL ENCOUNTER (HCC): Primary | ICD-10-CM

## 2023-01-30 DIAGNOSIS — S06.5XAA SUBDURAL HEMATOMA: ICD-10-CM

## 2023-01-30 DIAGNOSIS — R90.89 MIDLINE SHIFT OF BRAIN: ICD-10-CM

## 2023-01-30 PROCEDURE — G8484 FLU IMMUNIZE NO ADMIN: HCPCS | Performed by: SURGERY

## 2023-01-30 PROCEDURE — G8419 CALC BMI OUT NRM PARAM NOF/U: HCPCS | Performed by: SURGERY

## 2023-01-30 PROCEDURE — 3074F SYST BP LT 130 MM HG: CPT | Performed by: SURGERY

## 2023-01-30 PROCEDURE — 4004F PT TOBACCO SCREEN RCVD TLK: CPT | Performed by: SURGERY

## 2023-01-30 PROCEDURE — 3078F DIAST BP <80 MM HG: CPT | Performed by: SURGERY

## 2023-01-30 PROCEDURE — 99213 OFFICE O/P EST LOW 20 MIN: CPT | Performed by: SURGERY

## 2023-01-30 PROCEDURE — 70450 CT HEAD/BRAIN W/O DYE: CPT

## 2023-01-30 PROCEDURE — G8427 DOCREV CUR MEDS BY ELIG CLIN: HCPCS | Performed by: SURGERY

## 2023-01-30 PROCEDURE — 3017F COLORECTAL CA SCREEN DOC REV: CPT | Performed by: SURGERY

## 2023-01-30 NOTE — PROGRESS NOTES
[facility]  PROGRESS NOTE  ATTENDING NOTE    Chief Complaint   Patient presents with    Consultation     Pt is here for PEG tube removal. Pt no longer uses it and eats everything by mouth. S: 64-year-old male who is well-known to me from the trauma service after sustaining a traumatic brain injury after a fall. He spent a significant mount of time in the ICU. I placed his PEG. He presents today for follow-up and PEG removal.  He has now been home for couple weeks. He is tolerating a regular diet. He has seen neurosurgery. He had a follow-up CAT scan of his head on December 19. His girlfriend is with him today and although she is very sweet she does not really understand anything medical and believes that he needs to have a stat head scan tonight because he had an increase in his bleed. I went over the CAT scans with him and showed him the very first 1 and compared it to the one that he had recently. I explained to them that he has developed some hygromas but he is otherwise asymptomatic. He continues to take his Suboxone and he has gone back to drinking. He is limited to 24 ounces a day which I guess is an improvement for him but I explained to him he was sober while he was in the hospital and sober while he was in the nursing home so he should not have gone back to drinking.     I reviewed postconcussive screen with him:  Headaches--negative  Nausea vomiting-negative  Vision changes--negative  Balance issues--positive  Memory issues--positive  Concentration issues--likely positive as he was interrupting and girlfriend was interrupting during the conversation  Feeling like his head is stuck in a fog--positive  Sleeping difficulties--positive    /63 (Site: Right Upper Arm, Position: Sitting, Cuff Size: Large Adult)   Pulse 96   Temp 97.2 °F (36.2 °C) (Infrared)   Resp 16   Ht 5' 10\" (1.778 m)   Wt 177 lb (80.3 kg)   BMI 25.40 kg/m²   Gen:  NAD  HEENT:  PERRL @4mm  Abd:  soft, NT, ND  PEG: Removed without difficulty. Small mount of pressure applied as there is low bit of bleeding. Dry dressing applied with tape. ASSESSMENT/PLAN:   TBI with post concussive symptoms--follow-up CAT scan of the head tonight as already ordered, refer to speech therapy and physical therapy, follow-up with neurosurgery  Dysphagia--resolved remove PEG today I advised patient to change dressing daily and as needed. I advised that he may have a little bit of bleeding over the next day or 2 but that should resolve. I advised that he might have a little bit of discharge but that should resolve over the next day or 2. I advised that should close down in about 2 weeks if it does not close down by a month we will have to do something to fix that. 3.  Age is a risk factor for colon cancer--plan for discussion of colonoscopy at his next visit.     Return to clinic 1 month    Elaina Guillen MD, MSc, FACS  1/30/2023  4:50 PM

## 2023-02-08 ENCOUNTER — HOSPITAL ENCOUNTER (OUTPATIENT)
Dept: SPEECH THERAPY | Age: 63
Setting detail: THERAPIES SERIES
Discharge: HOME | End: 2023-02-08

## 2023-02-08 NOTE — PROGRESS NOTES
Speech Therapy  Cancellation/No-show Note  Patient Name:  Steve Clement  :  1960   Date:  2023    For today's appointment patient:  []  Cancelled  []  Rescheduled appointment  [x]  No-show     Reason given by patient:  []  Patient ill  []  Conflicting appointment  []  No transportation    []  Conflict with work  []  No reason given  []  Other:     Comments:      Electronically signed by:   Sandeep Greenfield, SLP

## 2024-07-05 ENCOUNTER — TELEPHONE (OUTPATIENT)
Dept: FAMILY MEDICINE CLINIC | Age: 64
End: 2024-07-05

## 2024-07-05 NOTE — TELEPHONE ENCOUNTER
----- Message from Libia Banda sent at 7/5/2024  1:18 PM EDT -----  Regarding: [facility] Appointment Request  [facility] Appointment Request    Patient needs appointment for [facility] Appointment Type: Existing Condition Follow Up.    Patient Requested Dates(s): Any available appointment for July or Aug. 5, 2024  Patient Requested Time: For morning appointment  Provider Name: Cheng Chavez MD    Reason for Appointment Request: Established Patient - Available appointments did not meet patient need  --------------------------------------------------------------------------------------------------------------------------    Relationship to Patient: Spouse/Partner Jenniffer Walton     Call Back Information: OK to leave message on voicemail  Preferred Call Back Number: Phone [phone]        NOTE:  The patient partner is calling to have an appointment for his partner and be establish on July or Aug. 5, 2024 appointment.

## (undated) DEVICE — DEFENDO AIR WATER SUCTION AND BIOPSY VALVE KIT FOR  OLYMPUS: Brand: DEFENDO AIR/WATER/SUCTION AND BIOPSY VALVE

## (undated) DEVICE — ENDOVIVE SFT PEG KIT PULL WENFIT 20F BX2

## (undated) DEVICE — Device

## (undated) DEVICE — SPONGE GZ W4XL4IN RAYON POLY FILL CVR W/ NONWOVEN FAB

## (undated) DEVICE — GRADUATE TRIANG MEASURE 1000ML BLK PRNT

## (undated) DEVICE — FORCEPS BX OVL CUP FEN DISPOSABLE CAP L 160CM RAD JAW 4

## (undated) DEVICE — BITEBLOCK 54FR W/ DENT RIM BLOX

## (undated) DEVICE — BLOCK BITE 60FR RUBBER ADLT DENTAL